# Patient Record
Sex: FEMALE | Race: AMERICAN INDIAN OR ALASKA NATIVE | NOT HISPANIC OR LATINO | Employment: FULL TIME | URBAN - METROPOLITAN AREA
[De-identification: names, ages, dates, MRNs, and addresses within clinical notes are randomized per-mention and may not be internally consistent; named-entity substitution may affect disease eponyms.]

---

## 2021-04-27 ENCOUNTER — HOSPITAL ENCOUNTER (OUTPATIENT)
Dept: RADIOLOGY | Facility: MEDICAL CENTER | Age: 41
End: 2021-04-27
Payer: OTHER GOVERNMENT

## 2021-04-27 ENCOUNTER — HOSPITAL ENCOUNTER (INPATIENT)
Facility: MEDICAL CENTER | Age: 41
LOS: 7 days | DRG: 433 | End: 2021-05-04
Attending: EMERGENCY MEDICINE | Admitting: INTERNAL MEDICINE
Payer: OTHER GOVERNMENT

## 2021-04-27 ENCOUNTER — APPOINTMENT (OUTPATIENT)
Dept: RADIOLOGY | Facility: MEDICAL CENTER | Age: 41
DRG: 433 | End: 2021-04-27
Attending: EMERGENCY MEDICINE
Payer: OTHER GOVERNMENT

## 2021-04-27 ENCOUNTER — APPOINTMENT (OUTPATIENT)
Dept: RADIOLOGY | Facility: MEDICAL CENTER | Age: 41
DRG: 433 | End: 2021-04-27
Attending: INTERNAL MEDICINE
Payer: OTHER GOVERNMENT

## 2021-04-27 DIAGNOSIS — K70.30 ALCOHOLIC CIRRHOSIS OF LIVER WITHOUT ASCITES (HCC): ICD-10-CM

## 2021-04-27 DIAGNOSIS — E87.20 LACTIC ACIDOSIS: ICD-10-CM

## 2021-04-27 DIAGNOSIS — I50.9 NEW ONSET OF CONGESTIVE HEART FAILURE (HCC): ICD-10-CM

## 2021-04-27 DIAGNOSIS — K72.00 ACUTE LIVER FAILURE WITHOUT HEPATIC COMA: ICD-10-CM

## 2021-04-27 DIAGNOSIS — R73.9 HYPERGLYCEMIA: ICD-10-CM

## 2021-04-27 PROBLEM — K21.9 GASTROESOPHAGEAL REFLUX DISEASE WITHOUT ESOPHAGITIS: Status: ACTIVE | Noted: 2021-04-27

## 2021-04-27 PROBLEM — E11.49 TYPE 2 DIABETES MELLITUS WITH NEUROLOGIC COMPLICATION, WITHOUT LONG-TERM CURRENT USE OF INSULIN (HCC): Status: ACTIVE | Noted: 2021-04-27

## 2021-04-27 PROBLEM — R79.89 ELEVATED BRAIN NATRIURETIC PEPTIDE (BNP) LEVEL: Status: ACTIVE | Noted: 2021-04-27

## 2021-04-27 PROBLEM — K70.10 ALCOHOLIC HEPATITIS WITHOUT ASCITES: Status: ACTIVE | Noted: 2021-04-27

## 2021-04-27 PROBLEM — R06.01 ORTHOPNEA: Status: ACTIVE | Noted: 2021-04-27

## 2021-04-27 PROBLEM — R79.89 ELEVATED LIVER FUNCTION TESTS: Status: ACTIVE | Noted: 2021-04-27

## 2021-04-27 PROBLEM — D53.9 MACROCYTIC ANEMIA: Status: ACTIVE | Noted: 2021-04-27

## 2021-04-27 PROBLEM — R94.31 QT PROLONGATION: Status: ACTIVE | Noted: 2021-04-27

## 2021-04-27 LAB
ALBUMIN SERPL BCP-MCNC: 2 G/DL (ref 3.2–4.9)
ALBUMIN/GLOB SERPL: 0.3 G/DL
ALP SERPL-CCNC: 263 U/L (ref 30–99)
ALT SERPL-CCNC: 31 U/L (ref 2–50)
AMMONIA PLAS-SCNC: 61 UMOL/L (ref 11–45)
AMPHET UR QL SCN: NEGATIVE
ANION GAP SERPL CALC-SCNC: 13 MMOL/L (ref 7–16)
ANION GAP SERPL CALC-SCNC: 7 MMOL/L (ref 7–16)
APPEARANCE UR: CLEAR
AST SERPL-CCNC: 167 U/L (ref 12–45)
B-OH-BUTYR SERPL-MCNC: 0.34 MMOL/L (ref 0.02–0.27)
BACTERIA #/AREA URNS HPF: NEGATIVE /HPF
BARBITURATES UR QL SCN: NEGATIVE
BASE EXCESS BLDV CALC-SCNC: 6 MMOL/L
BASOPHILS # BLD AUTO: 5.1 % (ref 0–1.8)
BASOPHILS # BLD: 0.42 K/UL (ref 0–0.12)
BENZODIAZ UR QL SCN: NEGATIVE
BILIRUB SERPL-MCNC: 5.7 MG/DL (ref 0.1–1.5)
BILIRUB UR QL STRIP.AUTO: ABNORMAL
BODY TEMPERATURE: ABNORMAL CENTIGRADE
BUN SERPL-MCNC: 2 MG/DL (ref 8–22)
BUN SERPL-MCNC: 2 MG/DL (ref 8–22)
BZE UR QL SCN: NEGATIVE
CALCIUM SERPL-MCNC: 7.1 MG/DL (ref 8.5–10.5)
CALCIUM SERPL-MCNC: 7.3 MG/DL (ref 8.5–10.5)
CANNABINOIDS UR QL SCN: NEGATIVE
CHLORIDE SERPL-SCNC: 95 MMOL/L (ref 96–112)
CHLORIDE SERPL-SCNC: 95 MMOL/L (ref 96–112)
CHOLEST SERPL-MCNC: 108 MG/DL (ref 100–199)
CO2 SERPL-SCNC: 26 MMOL/L (ref 20–33)
CO2 SERPL-SCNC: 29 MMOL/L (ref 20–33)
COLOR UR: ABNORMAL
CREAT SERPL-MCNC: 0.41 MG/DL (ref 0.5–1.4)
CREAT SERPL-MCNC: 0.49 MG/DL (ref 0.5–1.4)
EKG IMPRESSION: NORMAL
EOSINOPHIL # BLD AUTO: 0.14 K/UL (ref 0–0.51)
EOSINOPHIL NFR BLD: 1.7 % (ref 0–6.9)
EPI CELLS #/AREA URNS HPF: NEGATIVE /HPF
ERYTHROCYTE [DISTWIDTH] IN BLOOD BY AUTOMATED COUNT: 61.9 FL (ref 35.9–50)
EST. AVERAGE GLUCOSE BLD GHB EST-MCNC: 226 MG/DL
GLOBULIN SER CALC-MCNC: 6.1 G/DL (ref 1.9–3.5)
GLUCOSE BLD-MCNC: 248 MG/DL (ref 65–99)
GLUCOSE BLD-MCNC: 267 MG/DL (ref 65–99)
GLUCOSE BLD-MCNC: 276 MG/DL (ref 65–99)
GLUCOSE SERPL-MCNC: 265 MG/DL (ref 65–99)
GLUCOSE SERPL-MCNC: 301 MG/DL (ref 65–99)
GLUCOSE UR STRIP.AUTO-MCNC: 100 MG/DL
HAV IGM SERPL QL IA: NORMAL
HBA1C MFR BLD: 9.5 % (ref 4–5.6)
HBV CORE IGM SER QL: NORMAL
HBV SURFACE AG SER QL: NORMAL
HCG SERPL QL: NEGATIVE
HCO3 BLDV-SCNC: 28 MMOL/L (ref 24–28)
HCT VFR BLD AUTO: 34.7 % (ref 37–47)
HCV AB SER QL: NORMAL
HDLC SERPL-MCNC: 6 MG/DL
HGB BLD-MCNC: 11.5 G/DL (ref 12–16)
HYALINE CASTS #/AREA URNS LPF: ABNORMAL /LPF
INR PPP: 1.77 (ref 0.87–1.13)
KETONES UR STRIP.AUTO-MCNC: NEGATIVE MG/DL
LACTATE BLD-SCNC: 3.4 MMOL/L (ref 0.5–2)
LACTATE BLD-SCNC: 4.6 MMOL/L (ref 0.5–2)
LACTATE BLD-SCNC: 4.9 MMOL/L (ref 0.5–2)
LACTATE BLD-SCNC: 6.4 MMOL/L (ref 0.5–2)
LDLC SERPL CALC-MCNC: 64 MG/DL
LEUKOCYTE ESTERASE UR QL STRIP.AUTO: NEGATIVE
LIPASE SERPL-CCNC: 9 U/L (ref 11–82)
LYMPHOCYTES # BLD AUTO: 0.78 K/UL (ref 1–4.8)
LYMPHOCYTES NFR BLD: 9.4 % (ref 22–41)
MAGNESIUM SERPL-MCNC: 1.8 MG/DL (ref 1.5–2.5)
MAGNESIUM SERPL-MCNC: 1.8 MG/DL (ref 1.5–2.5)
MANUAL DIFF BLD: NORMAL
MCH RBC QN AUTO: 35 PG (ref 27–33)
MCHC RBC AUTO-ENTMCNC: 33.1 G/DL (ref 33.6–35)
MCV RBC AUTO: 105.5 FL (ref 81.4–97.8)
METHADONE UR QL SCN: NEGATIVE
MICRO URNS: ABNORMAL
MONOCYTES # BLD AUTO: 0.56 K/UL (ref 0–0.85)
MONOCYTES NFR BLD AUTO: 6.8 % (ref 0–13.4)
MORPHOLOGY BLD-IMP: NORMAL
MYELOCYTES NFR BLD MANUAL: 0.9 %
NEUTROPHILS # BLD AUTO: 6.32 K/UL (ref 2–7.15)
NEUTROPHILS NFR BLD: 74.4 % (ref 44–72)
NEUTS BAND NFR BLD MANUAL: 1.7 % (ref 0–10)
NITRITE UR QL STRIP.AUTO: NEGATIVE
NRBC # BLD AUTO: 0 K/UL
NRBC BLD-RTO: 0 /100 WBC
NT-PROBNP SERPL IA-MCNC: 1068 PG/ML (ref 0–125)
OPIATES UR QL SCN: NEGATIVE
OXYCODONE UR QL SCN: NEGATIVE
PCO2 BLDV: 34.8 MMHG (ref 41–51)
PCP UR QL SCN: NEGATIVE
PH BLDV: 7.53 [PH] (ref 7.31–7.45)
PH UR STRIP.AUTO: 7 [PH] (ref 5–8)
PHOSPHATE SERPL-MCNC: 1.1 MG/DL (ref 2.5–4.5)
PLATELET # BLD AUTO: 123 K/UL (ref 164–446)
PLATELET BLD QL SMEAR: NORMAL
PMV BLD AUTO: 11.9 FL (ref 9–12.9)
PO2 BLDV: 36.6 MMHG (ref 25–40)
POTASSIUM SERPL-SCNC: 3.2 MMOL/L (ref 3.6–5.5)
POTASSIUM SERPL-SCNC: 4 MMOL/L (ref 3.6–5.5)
PROPOXYPH UR QL SCN: NEGATIVE
PROT SERPL-MCNC: 8.1 G/DL (ref 6–8.2)
PROT UR QL STRIP: NEGATIVE MG/DL
PROTHROMBIN TIME: 21.1 SEC (ref 12–14.6)
RBC # BLD AUTO: 3.29 M/UL (ref 4.2–5.4)
RBC # URNS HPF: ABNORMAL /HPF
RBC UR QL AUTO: ABNORMAL
SAO2 % BLDV: 74 %
SODIUM SERPL-SCNC: 131 MMOL/L (ref 135–145)
SODIUM SERPL-SCNC: 134 MMOL/L (ref 135–145)
SP GR UR STRIP.AUTO: 1.01
T4 FREE SERPL-MCNC: 1.14 NG/DL (ref 0.93–1.7)
T4 FREE SERPL-MCNC: 1.29 NG/DL (ref 0.93–1.7)
TRIGL SERPL-MCNC: 189 MG/DL (ref 0–149)
TROPONIN T SERPL-MCNC: 6 NG/L (ref 6–19)
TROPONIN T SERPL-MCNC: <6 NG/L (ref 6–19)
TSH SERPL DL<=0.005 MIU/L-ACNC: 8 UIU/ML (ref 0.38–5.33)
UROBILINOGEN UR STRIP.AUTO-MCNC: 4 MG/DL
VIT B12 SERPL-MCNC: >2000 PG/ML (ref 211–911)
WBC # BLD AUTO: 8.3 K/UL (ref 4.8–10.8)
WBC #/AREA URNS HPF: ABNORMAL /HPF

## 2021-04-27 PROCEDURE — 99285 EMERGENCY DEPT VISIT HI MDM: CPT

## 2021-04-27 PROCEDURE — 700105 HCHG RX REV CODE 258: Performed by: STUDENT IN AN ORGANIZED HEALTH CARE EDUCATION/TRAINING PROGRAM

## 2021-04-27 PROCEDURE — 87040 BLOOD CULTURE FOR BACTERIA: CPT | Mod: 91

## 2021-04-27 PROCEDURE — 700105 HCHG RX REV CODE 258: Performed by: EMERGENCY MEDICINE

## 2021-04-27 PROCEDURE — 84484 ASSAY OF TROPONIN QUANT: CPT | Mod: 91

## 2021-04-27 PROCEDURE — 85610 PROTHROMBIN TIME: CPT

## 2021-04-27 PROCEDURE — 83735 ASSAY OF MAGNESIUM: CPT | Mod: 91

## 2021-04-27 PROCEDURE — 81001 URINALYSIS AUTO W/SCOPE: CPT

## 2021-04-27 PROCEDURE — 80074 ACUTE HEPATITIS PANEL: CPT

## 2021-04-27 PROCEDURE — 700111 HCHG RX REV CODE 636 W/ 250 OVERRIDE (IP): Performed by: EMERGENCY MEDICINE

## 2021-04-27 PROCEDURE — 770020 HCHG ROOM/CARE - TELE (206)

## 2021-04-27 PROCEDURE — 84439 ASSAY OF FREE THYROXINE: CPT | Mod: 91

## 2021-04-27 PROCEDURE — 82607 VITAMIN B-12: CPT

## 2021-04-27 PROCEDURE — 83690 ASSAY OF LIPASE: CPT

## 2021-04-27 PROCEDURE — 84100 ASSAY OF PHOSPHORUS: CPT

## 2021-04-27 PROCEDURE — 82803 BLOOD GASES ANY COMBINATION: CPT

## 2021-04-27 PROCEDURE — 84443 ASSAY THYROID STIM HORMONE: CPT

## 2021-04-27 PROCEDURE — 700111 HCHG RX REV CODE 636 W/ 250 OVERRIDE (IP): Performed by: STUDENT IN AN ORGANIZED HEALTH CARE EDUCATION/TRAINING PROGRAM

## 2021-04-27 PROCEDURE — A9270 NON-COVERED ITEM OR SERVICE: HCPCS | Performed by: STUDENT IN AN ORGANIZED HEALTH CARE EDUCATION/TRAINING PROGRAM

## 2021-04-27 PROCEDURE — 700117 HCHG RX CONTRAST REV CODE 255: Performed by: EMERGENCY MEDICINE

## 2021-04-27 PROCEDURE — 82010 KETONE BODYS QUAN: CPT

## 2021-04-27 PROCEDURE — 93970 EXTREMITY STUDY: CPT | Mod: 26 | Performed by: INTERNAL MEDICINE

## 2021-04-27 PROCEDURE — 93005 ELECTROCARDIOGRAM TRACING: CPT | Performed by: STUDENT IN AN ORGANIZED HEALTH CARE EDUCATION/TRAINING PROGRAM

## 2021-04-27 PROCEDURE — 80061 LIPID PANEL: CPT

## 2021-04-27 PROCEDURE — 93970 EXTREMITY STUDY: CPT

## 2021-04-27 PROCEDURE — 80307 DRUG TEST PRSMV CHEM ANLYZR: CPT

## 2021-04-27 PROCEDURE — 96372 THER/PROPH/DIAG INJ SC/IM: CPT

## 2021-04-27 PROCEDURE — 82140 ASSAY OF AMMONIA: CPT

## 2021-04-27 PROCEDURE — 96375 TX/PRO/DX INJ NEW DRUG ADDON: CPT

## 2021-04-27 PROCEDURE — 83880 ASSAY OF NATRIURETIC PEPTIDE: CPT

## 2021-04-27 PROCEDURE — 83036 HEMOGLOBIN GLYCOSYLATED A1C: CPT

## 2021-04-27 PROCEDURE — 84703 CHORIONIC GONADOTROPIN ASSAY: CPT

## 2021-04-27 PROCEDURE — 93010 ELECTROCARDIOGRAM REPORT: CPT | Performed by: INTERNAL MEDICINE

## 2021-04-27 PROCEDURE — 80048 BASIC METABOLIC PNL TOTAL CA: CPT

## 2021-04-27 PROCEDURE — 36415 COLL VENOUS BLD VENIPUNCTURE: CPT

## 2021-04-27 PROCEDURE — 99223 1ST HOSP IP/OBS HIGH 75: CPT | Mod: GC | Performed by: INTERNAL MEDICINE

## 2021-04-27 PROCEDURE — 85027 COMPLETE CBC AUTOMATED: CPT

## 2021-04-27 PROCEDURE — 82962 GLUCOSE BLOOD TEST: CPT

## 2021-04-27 PROCEDURE — 700102 HCHG RX REV CODE 250 W/ 637 OVERRIDE(OP): Performed by: STUDENT IN AN ORGANIZED HEALTH CARE EDUCATION/TRAINING PROGRAM

## 2021-04-27 PROCEDURE — 80053 COMPREHEN METABOLIC PANEL: CPT

## 2021-04-27 PROCEDURE — 96365 THER/PROPH/DIAG IV INF INIT: CPT

## 2021-04-27 PROCEDURE — 74177 CT ABD & PELVIS W/CONTRAST: CPT

## 2021-04-27 PROCEDURE — 83605 ASSAY OF LACTIC ACID: CPT | Mod: 91

## 2021-04-27 PROCEDURE — 85007 BL SMEAR W/DIFF WBC COUNT: CPT

## 2021-04-27 RX ORDER — GUAIFENESIN/DEXTROMETHORPHAN 100-10MG/5
10 SYRUP ORAL EVERY 6 HOURS PRN
Status: DISCONTINUED | OUTPATIENT
Start: 2021-04-27 | End: 2021-05-04 | Stop reason: HOSPADM

## 2021-04-27 RX ORDER — LANSOPRAZOLE 30 MG/1
30 CAPSULE, DELAYED RELEASE ORAL DAILY
COMMUNITY
End: 2021-06-11

## 2021-04-27 RX ORDER — PREGABALIN 50 MG/1
50 CAPSULE ORAL 2 TIMES DAILY
COMMUNITY
End: 2021-08-01

## 2021-04-27 RX ORDER — PREGABALIN 25 MG/1
50 CAPSULE ORAL 2 TIMES DAILY
Status: DISCONTINUED | OUTPATIENT
Start: 2021-04-27 | End: 2021-05-04 | Stop reason: HOSPADM

## 2021-04-27 RX ORDER — FUROSEMIDE 10 MG/ML
40 INJECTION INTRAMUSCULAR; INTRAVENOUS
Status: DISCONTINUED | OUTPATIENT
Start: 2021-04-27 | End: 2021-04-27

## 2021-04-27 RX ORDER — POLYETHYLENE GLYCOL 3350 17 G/17G
1 POWDER, FOR SOLUTION ORAL
Status: DISCONTINUED | OUTPATIENT
Start: 2021-04-27 | End: 2021-05-04 | Stop reason: HOSPADM

## 2021-04-27 RX ORDER — ACETAMINOPHEN 500 MG
500 TABLET ORAL EVERY 6 HOURS PRN
Status: DISCONTINUED | OUTPATIENT
Start: 2021-04-27 | End: 2021-05-04 | Stop reason: HOSPADM

## 2021-04-27 RX ORDER — VITAMIN B COMPLEX
2500 TABLET ORAL DAILY
Status: DISCONTINUED | OUTPATIENT
Start: 2021-04-27 | End: 2021-04-27

## 2021-04-27 RX ORDER — INSULIN GLARGINE 100 [IU]/ML
20 INJECTION, SOLUTION SUBCUTANEOUS DAILY
COMMUNITY
End: 2021-06-11

## 2021-04-27 RX ORDER — SODIUM CHLORIDE, SODIUM LACTATE, POTASSIUM CHLORIDE, AND CALCIUM CHLORIDE .6; .31; .03; .02 G/100ML; G/100ML; G/100ML; G/100ML
500 INJECTION, SOLUTION INTRAVENOUS ONCE
Status: COMPLETED | OUTPATIENT
Start: 2021-04-27 | End: 2021-04-27

## 2021-04-27 RX ORDER — MAGNESIUM SULFATE HEPTAHYDRATE 40 MG/ML
2 INJECTION, SOLUTION INTRAVENOUS ONCE
Status: COMPLETED | OUTPATIENT
Start: 2021-04-27 | End: 2021-04-28

## 2021-04-27 RX ORDER — VITAMIN B COMPLEX
2500 TABLET ORAL DAILY
COMMUNITY

## 2021-04-27 RX ORDER — DEXTROSE MONOHYDRATE 25 G/50ML
50 INJECTION, SOLUTION INTRAVENOUS
Status: DISCONTINUED | OUTPATIENT
Start: 2021-04-27 | End: 2021-05-04 | Stop reason: HOSPADM

## 2021-04-27 RX ORDER — AMOXICILLIN 250 MG
2 CAPSULE ORAL 2 TIMES DAILY
Status: DISCONTINUED | OUTPATIENT
Start: 2021-04-27 | End: 2021-05-04 | Stop reason: HOSPADM

## 2021-04-27 RX ORDER — POTASSIUM CHLORIDE 750 MG/1
10 TABLET, FILM COATED, EXTENDED RELEASE ORAL DAILY
Status: DISCONTINUED | OUTPATIENT
Start: 2021-04-27 | End: 2021-04-29

## 2021-04-27 RX ORDER — FUROSEMIDE 40 MG/1
40 TABLET ORAL EVERY MORNING
COMMUNITY
End: 2021-06-11

## 2021-04-27 RX ORDER — OMEPRAZOLE 20 MG/1
20 CAPSULE, DELAYED RELEASE ORAL DAILY
Status: DISCONTINUED | OUTPATIENT
Start: 2021-04-27 | End: 2021-05-04 | Stop reason: HOSPADM

## 2021-04-27 RX ORDER — SODIUM CHLORIDE 9 MG/ML
500 INJECTION, SOLUTION INTRAVENOUS ONCE
Status: COMPLETED | OUTPATIENT
Start: 2021-04-27 | End: 2021-04-27

## 2021-04-27 RX ORDER — POTASSIUM CHLORIDE 20 MEQ/1
40 TABLET, EXTENDED RELEASE ORAL ONCE
Status: COMPLETED | OUTPATIENT
Start: 2021-04-27 | End: 2021-04-27

## 2021-04-27 RX ORDER — CANAGLIFLOZIN 100 MG/1
100 TABLET, FILM COATED ORAL DAILY
Status: ON HOLD | COMMUNITY
End: 2021-06-14

## 2021-04-27 RX ORDER — ONDANSETRON 4 MG/1
8 TABLET, ORALLY DISINTEGRATING ORAL 3 TIMES DAILY PRN
COMMUNITY
End: 2021-06-11

## 2021-04-27 RX ORDER — CLOTRIMAZOLE 1 %
1 CREAM WITH APPLICATOR VAGINAL
COMMUNITY
End: 2021-04-27

## 2021-04-27 RX ORDER — ACETAMINOPHEN 325 MG/1
650 TABLET ORAL EVERY 6 HOURS PRN
Status: DISCONTINUED | OUTPATIENT
Start: 2021-04-27 | End: 2021-04-27

## 2021-04-27 RX ORDER — POTASSIUM CHLORIDE 750 MG/1
10 TABLET, FILM COATED, EXTENDED RELEASE ORAL DAILY
Status: ON HOLD | COMMUNITY
End: 2021-05-04

## 2021-04-27 RX ORDER — HEPARIN SODIUM 5000 [USP'U]/ML
5000 INJECTION, SOLUTION INTRAVENOUS; SUBCUTANEOUS EVERY 8 HOURS
Status: DISCONTINUED | OUTPATIENT
Start: 2021-04-27 | End: 2021-05-04 | Stop reason: HOSPADM

## 2021-04-27 RX ORDER — ONDANSETRON 2 MG/ML
4 INJECTION INTRAMUSCULAR; INTRAVENOUS EVERY 4 HOURS PRN
Status: DISCONTINUED | OUTPATIENT
Start: 2021-04-27 | End: 2021-04-27

## 2021-04-27 RX ORDER — INSULIN GLARGINE 100 [IU]/ML
20 INJECTION, SOLUTION SUBCUTANEOUS DAILY
Status: DISCONTINUED | OUTPATIENT
Start: 2021-04-27 | End: 2021-04-28

## 2021-04-27 RX ORDER — BISACODYL 10 MG
10 SUPPOSITORY, RECTAL RECTAL
Status: DISCONTINUED | OUTPATIENT
Start: 2021-04-27 | End: 2021-05-04 | Stop reason: HOSPADM

## 2021-04-27 RX ORDER — FOLIC ACID 1 MG/1
1 TABLET ORAL DAILY
Status: DISCONTINUED | OUTPATIENT
Start: 2021-04-27 | End: 2021-05-04 | Stop reason: HOSPADM

## 2021-04-27 RX ORDER — IBUPROFEN 400 MG/1
400 TABLET ORAL EVERY 6 HOURS PRN
Status: ON HOLD | COMMUNITY
End: 2021-06-14

## 2021-04-27 RX ORDER — GAUZE BANDAGE 2" X 2"
100 BANDAGE TOPICAL DAILY
Status: DISCONTINUED | OUTPATIENT
Start: 2021-04-27 | End: 2021-04-27

## 2021-04-27 RX ORDER — LIDOCAINE HYDROCHLORIDE 20 MG/ML
10 SOLUTION OROPHARYNGEAL PRN
COMMUNITY
End: 2021-04-27

## 2021-04-27 RX ORDER — PROCHLORPERAZINE EDISYLATE 5 MG/ML
10 INJECTION INTRAMUSCULAR; INTRAVENOUS EVERY 6 HOURS PRN
Status: DISCONTINUED | OUTPATIENT
Start: 2021-04-27 | End: 2021-04-29

## 2021-04-27 RX ORDER — TRAMADOL HYDROCHLORIDE 50 MG/1
50 TABLET ORAL 3 TIMES DAILY PRN
COMMUNITY
End: 2021-06-11

## 2021-04-27 RX ADMIN — FUROSEMIDE 40 MG: 10 INJECTION, SOLUTION INTRAMUSCULAR; INTRAVENOUS at 11:00

## 2021-04-27 RX ADMIN — PREDNISOLONE 39.9 MG: 15 SOLUTION ORAL at 15:42

## 2021-04-27 RX ADMIN — MAGNESIUM SULFATE 2 G: 2 INJECTION INTRAVENOUS at 22:50

## 2021-04-27 RX ADMIN — INSULIN GLARGINE 20 UNITS: 100 INJECTION, SOLUTION SUBCUTANEOUS at 17:45

## 2021-04-27 RX ADMIN — INSULIN HUMAN 5 UNITS: 100 INJECTION, SOLUTION PARENTERAL at 20:53

## 2021-04-27 RX ADMIN — PREGABALIN 50 MG: 25 CAPSULE ORAL at 13:51

## 2021-04-27 RX ADMIN — ONDANSETRON 4 MG: 2 INJECTION INTRAMUSCULAR; INTRAVENOUS at 10:56

## 2021-04-27 RX ADMIN — INSULIN HUMAN 3 UNITS: 100 INJECTION, SOLUTION PARENTERAL at 10:52

## 2021-04-27 RX ADMIN — OMEPRAZOLE 20 MG: 20 CAPSULE, DELAYED RELEASE ORAL at 11:00

## 2021-04-27 RX ADMIN — INSULIN HUMAN 6 UNITS: 100 INJECTION, SOLUTION PARENTERAL at 17:44

## 2021-04-27 RX ADMIN — SODIUM CHLORIDE 500 ML: 9 INJECTION, SOLUTION INTRAVENOUS at 13:52

## 2021-04-27 RX ADMIN — PREGABALIN 50 MG: 25 CAPSULE ORAL at 17:45

## 2021-04-27 RX ADMIN — POTASSIUM CHLORIDE 40 MEQ: 1500 TABLET, EXTENDED RELEASE ORAL at 21:03

## 2021-04-27 RX ADMIN — POTASSIUM CHLORIDE 40 MEQ: 1500 TABLET, EXTENDED RELEASE ORAL at 15:41

## 2021-04-27 RX ADMIN — POTASSIUM CHLORIDE 10 MEQ: 750 TABLET, FILM COATED, EXTENDED RELEASE ORAL at 13:51

## 2021-04-27 RX ADMIN — DOCUSATE SODIUM 50 MG AND SENNOSIDES 8.6 MG 2 TABLET: 8.6; 5 TABLET, FILM COATED ORAL at 17:45

## 2021-04-27 RX ADMIN — THIAMINE HYDROCHLORIDE 100 MG: 100 INJECTION, SOLUTION INTRAMUSCULAR; INTRAVENOUS at 20:38

## 2021-04-27 RX ADMIN — THERA TABS 1 TABLET: TAB at 15:41

## 2021-04-27 RX ADMIN — FOLIC ACID 1 MG: 1 TABLET ORAL at 13:51

## 2021-04-27 RX ADMIN — IOHEXOL 100 ML: 350 INJECTION, SOLUTION INTRAVENOUS at 07:50

## 2021-04-27 RX ADMIN — SODIUM CHLORIDE, POTASSIUM CHLORIDE, SODIUM LACTATE AND CALCIUM CHLORIDE 500 ML: 600; 310; 30; 20 INJECTION, SOLUTION INTRAVENOUS at 20:37

## 2021-04-27 RX ADMIN — HEPARIN SODIUM 5000 UNITS: 5000 INJECTION, SOLUTION INTRAVENOUS; SUBCUTANEOUS at 13:50

## 2021-04-27 RX ADMIN — CEFTRIAXONE SODIUM 2 G: 2 INJECTION, POWDER, FOR SOLUTION INTRAMUSCULAR; INTRAVENOUS at 07:00

## 2021-04-27 RX ADMIN — HEPARIN SODIUM 5000 UNITS: 5000 INJECTION, SOLUTION INTRAVENOUS; SUBCUTANEOUS at 21:02

## 2021-04-27 RX ADMIN — ACETAMINOPHEN 650 MG: 325 TABLET ORAL at 11:00

## 2021-04-27 RX ADMIN — Medication 100 MG: at 13:51

## 2021-04-27 ASSESSMENT — LIFESTYLE VARIABLES
DOES PATIENT WANT TO STOP DRINKING: YES
EVER HAD A DRINK FIRST THING IN THE MORNING TO STEADY YOUR NERVES TO GET RID OF A HANGOVER: NO
ALCOHOL_USE: YES
DO YOU DRINK ALCOHOL: NO
EVER FELT BAD OR GUILTY ABOUT YOUR DRINKING: YES
TOTAL SCORE: 3
AVERAGE NUMBER OF DAYS PER WEEK YOU HAVE A DRINK CONTAINING ALCOHOL: 7
DOES PATIENT WANT TO TALK TO SOMEONE ABOUT QUITTING: YES
TOTAL SCORE: 3
HOW MANY TIMES IN THE PAST YEAR HAVE YOU HAD 5 OR MORE DRINKS IN A DAY: 60
CONSUMPTION TOTAL: POSITIVE
HAVE YOU EVER FELT YOU SHOULD CUT DOWN ON YOUR DRINKING: YES
TOTAL SCORE: 3
HAVE PEOPLE ANNOYED YOU BY CRITICIZING YOUR DRINKING: YES
ON A TYPICAL DAY WHEN YOU DRINK ALCOHOL HOW MANY DRINKS DO YOU HAVE: 3

## 2021-04-27 ASSESSMENT — ENCOUNTER SYMPTOMS
HEADACHES: 0
FALLS: 0
BLURRED VISION: 0
TREMORS: 0
LOSS OF CONSCIOUSNESS: 0
NAUSEA: 1
PND: 0
COUGH: 1
CHILLS: 1
ORTHOPNEA: 1
MYALGIAS: 0
SHORTNESS OF BREATH: 1
PALPITATIONS: 0
EYE PAIN: 0
SPUTUM PRODUCTION: 0
VOMITING: 0
ABDOMINAL PAIN: 1
SPEECH CHANGE: 0
FEVER: 0
FLANK PAIN: 0
CONSTIPATION: 1
DIARRHEA: 0
SEIZURES: 0
BLOOD IN STOOL: 0
HEMOPTYSIS: 0
BACK PAIN: 1
HEARTBURN: 0
DIZZINESS: 1
WHEEZING: 0
EYES NEGATIVE: 1

## 2021-04-27 ASSESSMENT — COGNITIVE AND FUNCTIONAL STATUS - GENERAL
CLIMB 3 TO 5 STEPS WITH RAILING: A LITTLE
SUGGESTED CMS G CODE MODIFIER DAILY ACTIVITY: CJ
DRESSING REGULAR LOWER BODY CLOTHING: A LITTLE
SUGGESTED CMS G CODE MODIFIER MOBILITY: CI
MOBILITY SCORE: 23
DAILY ACTIVITIY SCORE: 21
PERSONAL GROOMING: A LITTLE
TOILETING: A LITTLE

## 2021-04-27 ASSESSMENT — FIBROSIS 4 INDEX
FIB4 SCORE: 9.27

## 2021-04-27 ASSESSMENT — PAIN DESCRIPTION - PAIN TYPE
TYPE: ACUTE PAIN
TYPE: ACUTE PAIN;CHRONIC PAIN

## 2021-04-27 ASSESSMENT — PATIENT HEALTH QUESTIONNAIRE - PHQ9
1. LITTLE INTEREST OR PLEASURE IN DOING THINGS: NOT AT ALL
2. FEELING DOWN, DEPRESSED, IRRITABLE, OR HOPELESS: NOT AT ALL
SUM OF ALL RESPONSES TO PHQ9 QUESTIONS 1 AND 2: 0

## 2021-04-27 NOTE — PROGRESS NOTES
2 RN Skin Check    2 RN skin check complete.   Devices in place: NA.  Skin assessed under devices: N\A.  Confirmed pressure ulcers found on: NA.  New potential pressure ulcers noted on NA. Wound consult placed No.  The following interventions in place Pillows.    Old scar tissue from previous surgery's, small healing scabs on torso area and belly.

## 2021-04-27 NOTE — ASSESSMENT & PLAN NOTE
The patient's labs and history are strongly suggestive of alcoholic hepatitis.  Her lower extremity edema is likely secondary to hypoalbuminemia and low oncotic pressure.    Maddrey's discriminatory function score 43 on presentation.   CT abdomen done outside of this hospital showed hepatosplenomegaly, hepatic steatosis and 1 cm renal lesion which is an incidental finding.   Lower extremity edema likely secondary to hypoalbuminemia and low oncotic pressure.  Renal function remains preserved, blood pressure remains soft.   Right upper quadrant ultrasound did not show portal vein thrombosis.  Iron panel is not suggestive of hemochromatosis, autoimmune labs pending.  Alcoholic hepatitis remains stable in terms of clinical and biochemical condition.  GI has signed off, agreed with treatment course and recommend close outpatient follow-up. Per pt insurance coverage will determine if she will continue to follow-up in [place] or closer to [place] (closest GI in [place])  GI signed off recommended close outpatient followup    Plan:  -Better prognosis by Lille score, 0.4 today, would continue prednisolone for total 28-day course ending on 5/24/2021  -Continue Lasix 40 mg p.o., as well as Aldactone 100 mg p.o.  I/O charting, sodium and water restriction.  -Continue monitoring of CMP for liver, renal function  -Boost plus supplement for albumin of 2 nutritional support,   -Leg elevation when in bed and ambulation 3 times daily  -Continue thiamine and folic along with multivitamin.  -Continued counseling on alcohol cessation, patient motivated to quit.  Declined AA at this time stating that she has a good support system.

## 2021-04-27 NOTE — ASSESSMENT & PLAN NOTE
RESOLVED  This is likely a combination of type B lactic acidosis and hypoperfusion.  This improved after fluids.

## 2021-04-27 NOTE — H&P
History & Physical Note    Date of Admission: 4/27/2021  Admission Status: Emergency  Attending: Espernaza Mai D.O.   Senior Resident: Joshua Hamlni M.D.  Contact Number: [phone]    Chief Complaint:   Chief Complaint   Patient presents with   • Hepatic Disease        History of Present Illness (HPI):   Hien is a 40 y.o. female with past medical history of diabetes mellitus type 2 and meets criteria for morbidly obese who presented 4/27/2021 with following.  About a month ago, she started noticing swelling in one of her leg and she thought that this has been secondary to her diabetes.  She then noticed lower extremity swelling in both of her legs about 2 weeks ago.  Along with this, she noticed shortness of breath with just 1 flight of stairs, orthopnea, dry cough, nausea about 2 weeks ago.  Her symptoms have been getting worse since then which prompted her to go to the [facility].  There she was found to have elevated liver function tests and tachycardia and she was transferred to this hospital for further care.  She denies having any PND or chest pain.    The patient has been taking Trulicity and SGLT2 inhibitor for her diabetes.  She has injecting Trulicity in her lower extremities.  She stopped taking this medication in December because she was having nausea and her appetite reduced significantly after started taking Trulicity.  She continued to gain weight even though she was not eating well.  Her sleep is disturbed and unable to sleep for the past 1 month.    She was tachycardic in the ED.  Afebrile.  Blood pressure ranging from 9210 systolic.  She has had dizziness in the past but not present on admission.  She is otherwise stable.      Review of Systems:   Review of Systems   Constitutional: Positive for chills and malaise/fatigue. Negative for fever.        Weight gain   HENT: Negative.  Negative for ear pain and hearing loss.    Eyes: Negative.  Negative for blurred vision  and pain.   Respiratory: Positive for cough and shortness of breath. Negative for hemoptysis, sputum production and wheezing.    Cardiovascular: Positive for orthopnea and leg swelling. Negative for chest pain, palpitations and PND.   Gastrointestinal: Positive for abdominal pain, constipation and nausea. Negative for blood in stool, diarrhea, heartburn and vomiting.   Genitourinary: Positive for dysuria. Negative for flank pain.   Musculoskeletal: Positive for back pain. Negative for falls, joint pain and myalgias.   Skin: Negative.  Negative for itching and rash.   Neurological: Positive for dizziness. Negative for tremors, speech change, seizures, loss of consciousness and headaches.       Past Medical History:   Past Medical History was reviewed with patient.    has a past medical history of DM (diabetes mellitus) (HCC).    Past Surgical History: Past Surgical History was reviewed with patient. 2 C section and cholecystectomy.    has no past surgical history on file.    Medications: Medications have been reviewed with patient.  Prior to Admission Medications   Prescriptions Last Dose Informant Patient Reported? Taking?   Canagliflozin (INVOKANA) 100 MG Tab 4/26/2021 at 0800 Patient Yes Yes   Sig: Take 100 mg by mouth every day.   cyanocobalamin (VITAMIN B12) 2500 MCG SL Tab 4/26/2021 at 0800 Patient Yes Yes   Sig: Take 2,500 mcg by mouth every day. Under the tongue   furosemide (LASIX) 40 MG Tab 4/26/2021 at 0800 Patient Yes Yes   Sig: Take 40 mg by mouth every morning.   ibuprofen (MOTRIN) 400 MG Tab 4/26/2021 at PRN Patient Yes Yes   Sig: Take 400 mg by mouth every 6 hours as needed.   insulin glargine (LANTUS SOLOSTAR) 100 UNIT/ML Solution Pen-injector injection 4/26/2021 at 0800 Patient Yes Yes   Sig: Inject 20 Units under the skin every day.   lansoprazole (PREVACID) 30 MG CAPSULE DELAYED RELEASE 4/26/2021 at 0800 Patient Yes Yes   Sig: Take 30 mg by mouth every day.   ondansetron (ZOFRAN ODT) 4 MG TABLET  DISPERSIBLE 4/26/2021 at PRN Patient Yes Yes   Sig: Take 8 mg by mouth 3 times a day as needed for Nausea. 2 tabs = 8 mg   potassium chloride ER (KLOR-CON) 10 MEQ tablet 4/26/2021 at 0800 Patient Yes Yes   Sig: Take 10 mEq by mouth every day. With food   pregabalin (LYRICA) 50 MG capsule 4/26/2021 at 0800 Patient Yes Yes   Sig: Take 50 mg by mouth 2 times a day.   traMADol (ULTRAM) 50 MG Tab 4/26/2021 at PRN Patient Yes Yes   Sig: Take 50 mg by mouth 3 times a day as needed (Pain).      Facility-Administered Medications: None        Allergies: Allergies have been reviewed with patient.  No Known Allergies    Family History: Father has diabetes. Mother has arthritis.   family history is not on file.     Social History:   Tobacco: Quit 10 years ago. 1 pack a week before this.   Alcohol: 2 beer / violet a day   Recreational drugs (illegal and prescription):  Hx of meth use 5 years ago  Employment: Healthcare worker, home health in [place]  Activity Level: Walker by her self. [employer].    Living situation:  [place], [place] with  and 2 kids  Recent travel:  None  Primary Care Provider: reviewed Sai Wang M.D.  Other (stressors, spirituality, exposures):  [employer]  Physical Exam:   Vitals:  Temp:  [36.3 °C (97.4 °F)-36.8 °C (98.2 °F)] 36.8 °C (98.2 °F)  Pulse:  [104-113] 108  Resp:  [14-21] 18  BP: ()/(56-72) 107/64  SpO2:  [86 %-97 %] 92 %    Physical Exam  Vitals and nursing note reviewed.   Constitutional:       General: She is not in acute distress.     Appearance: Normal appearance. She is obese. She is not ill-appearing.   HENT:      Head: Normocephalic and atraumatic.      Nose: Nose normal. No congestion.      Mouth/Throat:      Mouth: Mucous membranes are moist.      Pharynx: Oropharynx is clear.   Eyes:      Extraocular Movements: Extraocular movements intact.      Conjunctiva/sclera: Conjunctivae normal.      Pupils: Pupils are equal, round, and reactive to light.   Cardiovascular:      Rate  and Rhythm: Regular rhythm. Tachycardia present.      Pulses: Normal pulses.      Heart sounds: Normal heart sounds. No murmur.   Pulmonary:      Effort: Pulmonary effort is normal. No respiratory distress.      Breath sounds: No wheezing.      Comments: Reduced breath sounds bilaterally  Abdominal:      General: Abdomen is flat. Bowel sounds are normal. There is distension.      Palpations: Abdomen is soft.      Tenderness: There is no abdominal tenderness.   Musculoskeletal:         General: No swelling or tenderness. Normal range of motion.      Cervical back: Normal range of motion and neck supple.      Right lower leg: Edema present.      Left lower leg: Edema present.   Skin:     General: Skin is warm and dry.      Coloration: Skin is not jaundiced or pale.   Neurological:      General: No focal deficit present.      Mental Status: She is alert and oriented to person, place, and time. Mental status is at baseline.         Labs:   Recent Results (from the past 24 hour(s))   CBC WITH DIFFERENTIAL    Collection Time: 04/27/21  5:27 AM   Result Value Ref Range    WBC 8.3 4.8 - 10.8 K/uL    RBC 3.29 (L) 4.20 - 5.40 M/uL    Hemoglobin 11.5 (L) 12.0 - 16.0 g/dL    Hematocrit 34.7 (L) 37.0 - 47.0 %    .5 (H) 81.4 - 97.8 fL    MCH 35.0 (H) 27.0 - 33.0 pg    MCHC 33.1 (L) 33.6 - 35.0 g/dL    RDW 61.9 (H) 35.9 - 50.0 fL    Platelet Count 123 (L) 164 - 446 K/uL    MPV 11.9 9.0 - 12.9 fL    Neutrophils-Polys 74.40 (H) 44.00 - 72.00 %    Lymphocytes 9.40 (L) 22.00 - 41.00 %    Monocytes 6.80 0.00 - 13.40 %    Eosinophils 1.70 0.00 - 6.90 %    Basophils 5.10 (H) 0.00 - 1.80 %    Nucleated RBC 0.00 /100 WBC    Neutrophils (Absolute) 6.32 2.00 - 7.15 K/uL    Lymphs (Absolute) 0.78 (L) 1.00 - 4.80 K/uL    Monos (Absolute) 0.56 0.00 - 0.85 K/uL    Eos (Absolute) 0.14 0.00 - 0.51 K/uL    Baso (Absolute) 0.42 (H) 0.00 - 0.12 K/uL    NRBC (Absolute) 0.00 K/uL   COMP METABOLIC PANEL    Collection Time: 04/27/21  5:27 AM    Result Value Ref Range    Sodium 134 (L) 135 - 145 mmol/L    Potassium 3.2 (L) 3.6 - 5.5 mmol/L    Chloride 95 (L) 96 - 112 mmol/L    Co2 26 20 - 33 mmol/L    Anion Gap 13.0 7.0 - 16.0    Glucose 265 (H) 65 - 99 mg/dL    Bun 2 (L) 8 - 22 mg/dL    Creatinine 0.49 (L) 0.50 - 1.40 mg/dL    Calcium 7.3 (L) 8.5 - 10.5 mg/dL    AST(SGOT) 167 (H) 12 - 45 U/L    ALT(SGPT) 31 2 - 50 U/L    Alkaline Phosphatase 263 (H) 30 - 99 U/L    Total Bilirubin 5.7 (H) 0.1 - 1.5 mg/dL    Albumin 2.0 (L) 3.2 - 4.9 g/dL    Total Protein 8.1 6.0 - 8.2 g/dL    Globulin 6.1 (H) 1.9 - 3.5 g/dL    A-G Ratio 0.3 g/dL   LIPASE    Collection Time: 04/27/21  5:27 AM   Result Value Ref Range    Lipase 9 (L) 11 - 82 U/L   proBrain Natriuretic Peptide, NT    Collection Time: 04/27/21  5:27 AM   Result Value Ref Range    NT-proBNP 1068 (H) 0 - 125 pg/mL   TROPONIN    Collection Time: 04/27/21  5:27 AM   Result Value Ref Range    Troponin T 6 6 - 19 ng/L   LACTIC ACID    Collection Time: 04/27/21  5:27 AM   Result Value Ref Range    Lactic Acid 6.4 (HH) 0.5 - 2.0 mmol/L   HCG QUAL SERUM    Collection Time: 04/27/21  5:27 AM   Result Value Ref Range    Beta-Hcg Qualitative Serum Negative Negative   AMMONIA    Collection Time: 04/27/21  5:27 AM   Result Value Ref Range    Ammonia 61 (H) 11 - 45 umol/L   VENOUS BLOOD GAS    Collection Time: 04/27/21  5:27 AM   Result Value Ref Range    Venous Bg Ph 7.53 (H) 7.31 - 7.45    Venous Bg Pco2 34.8 (L) 41.0 - 51.0 mmHg    Venous Bg Po2 36.6 25.0 - 40.0 mmHg    Venous Bg O2 Saturation 74.0 %    Venous Bg Hco3 28 24 - 28 mmol/L    Venous Bg Base Excess 6 mmol/L    Body Temp see below Centigrade   BETA-HYDROXYBUTYRIC ACID    Collection Time: 04/27/21  5:27 AM   Result Value Ref Range    beta-Hydroxybutyric Acid 0.34 (H) 0.02 - 0.27 mmol/L   DIFFERENTIAL MANUAL    Collection Time: 04/27/21  5:27 AM   Result Value Ref Range    Bands-Stabs 1.70 0.00 - 10.00 %    Myelocytes 0.90 %    Manual Diff Status PERFORMED     PERIPHERAL SMEAR REVIEW    Collection Time: 04/27/21  5:27 AM   Result Value Ref Range    Peripheral Smear Review see below    PLATELET ESTIMATE    Collection Time: 04/27/21  5:27 AM   Result Value Ref Range    Plt Estimation Decreased    ESTIMATED GFR    Collection Time: 04/27/21  5:27 AM   Result Value Ref Range    GFR If African American >60 >60 mL/min/1.73 m 2    GFR If Non African American >60 >60 mL/min/1.73 m 2   TSH WITH REFLEX TO FT4    Collection Time: 04/27/21  5:27 AM   Result Value Ref Range    TSH 8.000 (H) 0.380 - 5.330 uIU/mL   Magnesium    Collection Time: 04/27/21  5:27 AM   Result Value Ref Range    Magnesium 1.8 1.5 - 2.5 mg/dL   HEPATITIS PANEL ACUTE(4 COMPONENTS)    Collection Time: 04/27/21  5:27 AM   Result Value Ref Range    Hepatitis B Surface Antigen Non-Reactive Non-Reactive    Hepatitis B Cors Ab,IgM Non-Reactive Non-Reactive    Hepatitis A Virus Ab, IgM Non-Reactive Non-Reactive    Hepatitis C Antibody Non-Reactive Non-Reactive   HEMOGLOBIN A1C    Collection Time: 04/27/21  5:27 AM   Result Value Ref Range    Glycohemoglobin 9.5 (H) 4.0 - 5.6 %    Est Avg Glucose 226 mg/dL   Lipid Profile    Collection Time: 04/27/21  5:27 AM   Result Value Ref Range    Cholesterol,Tot 108 100 - 199 mg/dL    Triglycerides 189 (H) 0 - 149 mg/dL    HDL 6 (A) >=40 mg/dL    LDL 64 <100 mg/dL   POCT glucose device results    Collection Time: 04/27/21 10:47 AM   Result Value Ref Range    Glucose - Accu-Ck 248 (H) 65 - 99 mg/dL   TROPONIN    Collection Time: 04/27/21 11:04 AM   Result Value Ref Range    Troponin T <6 6 - 19 ng/L   URINALYSIS CULTURE, IF INDICATED    Collection Time: 04/27/21 11:48 AM    Specimen: Urine   Result Value Ref Range    Color Gaby     Character Clear     Specific Gravity 1.010 <1.035    Ph 7.0 5.0 - 8.0    Glucose 100 (A) Negative mg/dL    Ketones Negative Negative mg/dL    Protein Negative Negative mg/dL    Bilirubin Moderate (A) Negative    Urobilinogen, Urine 4.0 Negative     Nitrite Negative Negative    Leukocyte Esterase Negative Negative    Occult Blood Large (A) Negative    Micro Urine Req Microscopic    URINE DRUG SCREEN    Collection Time: 21 11:48 AM   Result Value Ref Range    Amphetamines Urine Negative Negative    Barbiturates Negative Negative    Benzodiazepines Negative Negative    Cocaine Metabolite Negative Negative    Methadone Negative Negative    Opiates Negative Negative    Oxycodone Negative Negative    Phencyclidine -Pcp Negative Negative    Propoxyphene Negative Negative    Cannabinoid Metab Negative Negative   URINE MICROSCOPIC (W/UA)    Collection Time: 21 11:48 AM   Result Value Ref Range    WBC 2-5 /hpf    -150 (A) /hpf    Bacteria Negative None /hpf    Epithelial Cells Negative /hpf    Hyaline Cast 0-2 /lpf   Prothrombin Time    Collection Time: 21 12:38 PM   Result Value Ref Range    PT 21.1 (H) 12.0 - 14.6 sec    INR 1.77 (H) 0.87 - 1.13   LACTIC ACID    Collection Time: 21 12:38 PM   Result Value Ref Range    Lactic Acid 4.6 (HH) 0.5 - 2.0 mmol/L   EKG    Collection Time: 21  1:57 PM   Result Value Ref Range    Report       [facility]    Test Date:  2021  Pt Name:    YUSUF CULVER                Department: ER  MRN:        [id number]                      Room:       T717  Gender:     Female                       Technician: HEATHER  :        [id number]                   Requested By:LIANA PAYAN  Order #:    [id number]                    Reading MD: Lit Liao MD    Measurements  Intervals                                Axis  Rate:       107                          P:          30  LA:         82                           QRS:        -19  QRSD:       78                           T:          -14  QT:         420  QTc:        561    Interpretive Statements  SINUS TACHYCARDIA  LOW VOLTAGE, PRECORDIAL LEADS  LVH BY VOLTAGE  NONSPECIFIC T ABNRM, ANTEROLATERAL LEADS  PROLONGED QT INTERVAL  No previous ECG  available for comparison  Electronically Signed On 4- 14:49:42 PDT by Lit Liao MD         Imaging:   [id number] WITH   Final Result      Hepatomegaly and hepatic steatosis. The liver is heterogeneous and small hepatic lesions are not excluded.      Indeterminate 1 cm right renal lesion. Further evaluation can be performed with renal protocol MRI.      Scattered colonic diverticulosis.      Status post cholecystectomy.      Splenomegaly.      OUTSIDE IMAGES-DX CHEST   Final Result      US-EXTREMITY VENOUS LOWER BILAT    (Results Pending)   EC-ECHOCARDIOGRAM COMPLETE W/O CONT    (Results Pending)       Previous Data Review: reviewed    Problem Representation:     * Alcoholic hepatitis without ascites- (present on admission)  Assessment & Plan  She has been drinking 2 glasses of alcohol every day.  Presented with AST more than ALT, elevated alkaline phosphatase and total bilirubin, reduced platelet count and albumin.  Maddrey's discriminatory function score 43 on presentation.  -Started her on prednisolone 40 mg daily for 28 days.  -Ammonia level 61 on arrival however patient is not confused.  -CT abdomen done outside of this hospital showed hepatosplenomegaly, hepatic steatosis and 1 cm renal lesion which is an incidental finding.  -Follow-up with CHEM panel tomorrow morning  -Boost plus supplement for albumin of 2  -Continue thiamine and folic along with multivitamin.  -Aggressively counseled her to stop drinking alcohol.    Lactic acidosis  Assessment & Plan  Does not have signs of infection.  This may be secondary to hypoperfusion.  Lactate levels trended down with IV fluids.  Repeat lactate in 4 hours and make sure lactate is normalized.    Elevated brain natriuretic peptide (BNP) level- (present on admission)  Assessment & Plan  She has orthopnea, dyspnea on exertion and lower extremity swelling consistent with volume overload.  This could be secondary to alcoholic liver disease.  She does  not have a history of heart failure.  She has a history of methamphetamine use which she stopped about 5 years ago.  She also drinks about 2 alcoholic drinks every day.  - BNP on admission: 1068  -  We do not have an echocardiogram for this patient which was ordered on 4/27.    Macrocytic anemia- (present on admission)  Assessment & Plan  This may be secondary to alcohol use affecting her liver.  Vitamin B12 level pending.  Continue folate.    QT prolongation  Assessment & Plan  Probably secondary to hypokalemia.  Avoid QTC prolonging medication.    Gastroesophageal reflux disease without esophagitis- (present on admission)  Assessment & Plan  Continue PPI for GERD which she takes at home    Type 2 diabetes mellitus with neurologic complication, without long-term current use of insulin (HCC)- (present on admission)  Assessment & Plan  She has been taking Trulicity and SGLT2 inhibitor at home which was stopped on admission due to unavailability.  -We will continue Lantus 20 units along with sliding scale insulin during this hospitalization.  -Follow-up with repeat A1c.  -Continue Lyrica for peripheral neuropathy

## 2021-04-27 NOTE — ASSESSMENT & PLAN NOTE
This may be secondary to alcohol causing bone marrow suppression.  Vitamin B12 and folate levels not decreased.  She is also currently on her menstrual cycle.  TSH is slightly elevated at 8 but free T4 level normal.    Plan:  -Continue to monitor at this time with periodic CBCs.

## 2021-04-27 NOTE — ED NOTES
Pt medicated per MAR to eat, provided food. Reported nausea and pain. Pt medicated for both.   Labs drawn and sent.     Tele notified pt ready.

## 2021-04-27 NOTE — CARE PLAN
Problem: Communication  Goal: The ability to communicate needs accurately and effectively will improve  Outcome: PROGRESSING AS EXPECTED     Problem: Safety  Goal: Will remain free from injury  Outcome: PROGRESSING AS EXPECTED     Problem: Pain Management  Goal: Pain level will decrease to patient's comfort goal  Outcome: PROGRESSING AS EXPECTED    Assumed care of patient at 1230. Received bedside report from ED RN. Bed is locked and lowest position, call light within reach. Treaded socks in place. Patient updated on plan of care, no complaints or pain at this time. White board updated. Pt AxOx4 Patient breathing pattern is unlabored. Pt is tele. Lactic Acid Critical 4.6 trending down from 6.4, Dr. Fajardo notified. All needs met at this time. Will continue care and monitoring as ordered.

## 2021-04-27 NOTE — ASSESSMENT & PLAN NOTE
She has been taking Trulicity and SGLT2 inhibitor at home which was stopped on admission due to unavailability.  Per records she was also on Tradjenta DPP 4 inhibitor  -Repeat A1c 9.5  Plan:  -Continue Lantus 20 units daily, 4 units Humalog premeal due to elevated blood glucose during the day along with sliding scale insulin during this hospitalization, will continue to adjust as appropriate  -For home regimen would continue Lantus and SGLT2 inhibitor, discontinue DPP 4 inhibitor  -Continue Lyrica for peripheral neuropathy

## 2021-04-27 NOTE — ED TRIAGE NOTES
Pt was transferred from [facility] due to hepatic insufficiency, DM and Lactic Acidosis. Pt will need to see a Endocrinologist. Please see chart for meds in which pt received prior to arrival. Pt has a 20G in the L AC. BS= 234

## 2021-04-27 NOTE — ED NOTES
Med Rec completed: per patient at bedside and [facility] Medication List  Preferred Pharmacy: [facility]  Allergies:  No Known Allergies    No ORAL antibiotics in last 14 days    Home Medications:  Medication Sig Comments   • furosemide (LASIX) 40 MG Tab Take 40 mg by mouth every morning.    • traMADol (ULTRAM) 50 MG Tab Take 50 mg by mouth 3 times a day as needed (Pain).    • cyanocobalamin (VITAMIN B12) 2500 MCG SL Tab Take 2,500 mcg by mouth every day. Under the tongue    • Canagliflozin (INVOKANA) 100 MG Tab Take 100 mg by mouth every day.    • lansoprazole (PREVACID) 30 MG CAPSULE DELAYED RELEASE Take 30 mg by mouth every day.    • ibuprofen (MOTRIN) 400 MG Tab Take 400 mg by mouth every 6 hours as needed.    • ondansetron (ZOFRAN ODT) 4 MG TABLET DISPERSIBLE Take 8 mg by mouth 3 times a day as needed for Nausea.   2 tabs = 8 mg    • insulin glargine (LANTUS SOLOSTAR) 100 UNIT/ML Solution Pen-injector injection Inject 20 Units under the skin every day. AM   • potassium chloride ER (KLOR-CON) 10 MEQ tablet Take 10 mEq by mouth every day. With food    • pregabalin (LYRICA) 50 MG capsule Take 50 mg by mouth 2 times a day.    • [DISCONTINUED] clotrimazole (LOTRIMIN) 1 % Cream Insert 1 Application into the vagina at bedtime. For 7 days for yeast infection On [place] list but patient reports completed script so removed   • [DISCONTINUED] linagliptin (TRADJENTA) 5 MG Tab tablet Take 5 mg by mouth every day. On [employer] list but patient reports she stopped taking in Dec.      **Patient denies any other prescription or OTC medications.

## 2021-04-27 NOTE — ED PROVIDER NOTES
"ED Provider Note    CHIEF COMPLAINT  Chief Complaint   Patient presents with   • Hepatic Disease       HPI  Hien Alfaro is a 40 y.o. female who presents to the emergency department by EMS transferred from outside facility for further evaluation of new liver failure, lactic acidosis.    Patient states she was admitted in February to an outside hospital for \"dehydration.\"  She states since that time she has struggled with nausea and vomiting and blood glucose control.  She describes a 20+ pound unintentional weight loss.  More recently with more frequent nausea and vomiting, poorly controlled blood glucose as well as malaise and fatigue.  Increased lower extremity edema.  Exertional dyspnea.  Denies fever or chills.    REVIEW OF SYSTEMS  See HPI for further details. All other systems are negative.     PAST MEDICAL HISTORY   has a past medical history of DM (diabetes mellitus) (HCC).    SOCIAL HISTORY  Social History     Tobacco Use   • Smoking status: Not on file   Substance and Sexual Activity   • Alcohol use: Not on file   • Drug use: Not on file   • Sexual activity: Not on file       SURGICAL HISTORY  patient denies any surgical history    CURRENT MEDICATIONS  Home Medications     Reviewed by Zakiya Freeman Goes Out, PhT (Pharmacy Tech) on 04/27/21 at 0522  Med List Status: Partial   Medication Last Dose Status   Canagliflozin (INVOKANA) 100 MG Tab UNK Active   clotrimazole (LOTRIMIN) 1 % Cream UNK Active   cyanocobalamin (VITAMIN B12) 2500 MCG SL Tab UNK Active   furosemide (LASIX) 40 MG Tab UNK Active   ibuprofen (MOTRIN) 400 MG Tab UNK Active   insulin glargine (LANTUS SOLOSTAR) 100 UNIT/ML Solution Pen-injector injection UNK Active   lansoprazole (PREVACID) 30 MG CAPSULE DELAYED RELEASE UNK Active   lidocaine (XYLOCAINE) 2 % Solution UNK Active   linagliptin (TRADJENTA) 5 MG Tab tablet UNK Active   ondansetron (ZOFRAN ODT) 4 MG TABLET DISPERSIBLE UNK Active   potassium chloride ER (KLOR-CON) 10 MEQ tablet " "UNK Active   pregabalin (LYRICA) 50 MG capsule UNK Active   traMADol (ULTRAM) 50 MG Tab UNK Active                ALLERGIES  No Known Allergies    PHYSICAL EXAM  VITAL SIGNS: /72   Pulse (!) 105   Temp 36.3 °C (97.4 °F) (Temporal)   Resp 17   Ht 1.6 m (5' 3\")   Wt 109 kg (240 lb)   SpO2 96%   BMI 42.51 kg/m²   Pulse ox interpretation: I interpret this pulse ox as normal.  Constitutional: Alert in no apparent distress.  HENT: Normocephalic, atraumatic. Bilateral external ears normal, Nose normal. Moist mucous membranes.    Eyes: Pupils are equal and reactive.  Scleral icterus.    Neck: Normal range of motion, Supple  Lymphatic: No lymphadenopathy noted.   Cardiovascular: Regular rate and rhythm, no murmurs. Distal pulses intact.  Pitting bilateral peripheral edema.  Thorax & Lungs: Normal breath sounds.  No wheezing/rales/ronchi. No increased work of breathing  Abdomen: Obese, soft, non-distended, non-tender to palpation. No palpable or pulsatile masses. No peritoneal signs.  Skin: Warm, Dry, No erythema, No rash.   Musculoskeletal: Good range of motion in all major joints..   Neurologic: Alert and orient x4.  Speech clear and cohesive.  Moves all extremity spontaneously.  Psychiatric: Affect normal, Judgment normal, Mood normal.       DIAGNOSTIC STUDIES / PROCEDURES    LABS  Results for orders placed or performed during the hospital encounter of 04/27/21   CBC WITH DIFFERENTIAL   Result Value Ref Range    WBC 8.3 4.8 - 10.8 K/uL    RBC 3.29 (L) 4.20 - 5.40 M/uL    Hemoglobin 11.5 (L) 12.0 - 16.0 g/dL    Hematocrit 34.7 (L) 37.0 - 47.0 %    .5 (H) 81.4 - 97.8 fL    MCH 35.0 (H) 27.0 - 33.0 pg    MCHC 33.1 (L) 33.6 - 35.0 g/dL    RDW 61.9 (H) 35.9 - 50.0 fL    Platelet Count 123 (L) 164 - 446 K/uL    MPV 11.9 9.0 - 12.9 fL    Neutrophils-Polys 74.40 (H) 44.00 - 72.00 %    Lymphocytes 9.40 (L) 22.00 - 41.00 %    Monocytes 6.80 0.00 - 13.40 %    Eosinophils 1.70 0.00 - 6.90 %    Basophils 5.10 (H) " 0.00 - 1.80 %    Nucleated RBC 0.00 /100 WBC    Neutrophils (Absolute) 6.32 2.00 - 7.15 K/uL    Lymphs (Absolute) 0.78 (L) 1.00 - 4.80 K/uL    Monos (Absolute) 0.56 0.00 - 0.85 K/uL    Eos (Absolute) 0.14 0.00 - 0.51 K/uL    Baso (Absolute) 0.42 (H) 0.00 - 0.12 K/uL    NRBC (Absolute) 0.00 K/uL   COMP METABOLIC PANEL   Result Value Ref Range    Sodium 134 (L) 135 - 145 mmol/L    Potassium 3.2 (L) 3.6 - 5.5 mmol/L    Chloride 95 (L) 96 - 112 mmol/L    Co2 26 20 - 33 mmol/L    Anion Gap 13.0 7.0 - 16.0    Glucose 265 (H) 65 - 99 mg/dL    Bun 2 (L) 8 - 22 mg/dL    Creatinine 0.49 (L) 0.50 - 1.40 mg/dL    Calcium 7.3 (L) 8.5 - 10.5 mg/dL    AST(SGOT) 167 (H) 12 - 45 U/L    ALT(SGPT) 31 2 - 50 U/L    Alkaline Phosphatase 263 (H) 30 - 99 U/L    Total Bilirubin 5.7 (H) 0.1 - 1.5 mg/dL    Albumin 2.0 (L) 3.2 - 4.9 g/dL    Total Protein 8.1 6.0 - 8.2 g/dL    Globulin 6.1 (H) 1.9 - 3.5 g/dL    A-G Ratio 0.3 g/dL   LIPASE   Result Value Ref Range    Lipase 9 (L) 11 - 82 U/L   proBrain Natriuretic Peptide, NT   Result Value Ref Range    NT-proBNP 1068 (H) 0 - 125 pg/mL   TROPONIN   Result Value Ref Range    Troponin T 6 6 - 19 ng/L   LACTIC ACID   Result Value Ref Range    Lactic Acid 6.4 (HH) 0.5 - 2.0 mmol/L   HCG QUAL SERUM   Result Value Ref Range    Beta-Hcg Qualitative Serum Negative Negative   AMMONIA   Result Value Ref Range    Ammonia 61 (H) 11 - 45 umol/L   VENOUS BLOOD GAS   Result Value Ref Range    Venous Bg Ph 7.53 (H) 7.31 - 7.45    Venous Bg Pco2 34.8 (L) 41.0 - 51.0 mmHg    Venous Bg Po2 36.6 25.0 - 40.0 mmHg    Venous Bg O2 Saturation 74.0 %    Venous Bg Hco3 28 24 - 28 mmol/L    Venous Bg Base Excess 6 mmol/L    Body Temp see below Centigrade   BETA-HYDROXYBUTYRIC ACID   Result Value Ref Range    beta-Hydroxybutyric Acid 0.34 (H) 0.02 - 0.27 mmol/L   DIFFERENTIAL MANUAL   Result Value Ref Range    Bands-Stabs 1.70 0.00 - 10.00 %    Myelocytes 0.90 %    Manual Diff Status PERFORMED    PERIPHERAL SMEAR REVIEW    Result Value Ref Range    Peripheral Smear Review see below    PLATELET ESTIMATE   Result Value Ref Range    Plt Estimation Decreased    ESTIMATED GFR   Result Value Ref Range    GFR If African American >60 >60 mL/min/1.73 m 2    GFR If Non African American >60 >60 mL/min/1.73 m 2   TSH WITH REFLEX TO FT4   Result Value Ref Range    TSH 8.000 (H) 0.380 - 5.330 uIU/mL   Magnesium   Result Value Ref Range    Magnesium 1.8 1.5 - 2.5 mg/dL   HEPATITIS PANEL ACUTE(4 COMPONENTS)   Result Value Ref Range    Hepatitis B Surface Antigen Non-Reactive Non-Reactive    Hepatitis B Cors Ab,IgM Non-Reactive Non-Reactive    Hepatitis A Virus Ab, IgM Non-Reactive Non-Reactive    Hepatitis C Antibody Non-Reactive Non-Reactive   HEMOGLOBIN A1C   Result Value Ref Range    Glycohemoglobin 9.5 (H) 4.0 - 5.6 %    Est Avg Glucose 226 mg/dL   TROPONIN   Result Value Ref Range    Troponin T <6 6 - 19 ng/L   Lipid Profile   Result Value Ref Range    Cholesterol,Tot 108 100 - 199 mg/dL    Triglycerides 189 (H) 0 - 149 mg/dL    HDL 6 (A) >=40 mg/dL    LDL 64 <100 mg/dL   POCT glucose device results   Result Value Ref Range    Glucose - Accu-Ck 248 (H) 65 - 99 mg/dL     RADIOLOGY  CT-ABDOMEN-PELVIS WITH   Final Result      Hepatomegaly and hepatic steatosis. The liver is heterogeneous and small hepatic lesions are not excluded.      Indeterminate 1 cm right renal lesion. Further evaluation can be performed with renal protocol MRI.      Scattered colonic diverticulosis.      Status post cholecystectomy.      Splenomegaly.      OUTSIDE IMAGES-DX CHEST   Final Result      US-EXTREMITY VENOUS LOWER BILAT    (Results Pending)   EC-ECHOCARDIOGRAM COMPLETE W/O CONT    (Results Pending)       COURSE & MEDICAL DECISION MAKING  Nursing notes and vital signs were reviewed. (See chart for details)  The patients records were reviewed, history was obtained from the patient;     ED evaluation was concerning for lactic acidosis, although there is no  overwhelming evidence for infectious etiology.  Symptomatology seems subacute.  No new medications.  No leukocytosis, left shift or bandemia.  Chest x-ray was unremarkable at outside facility, no pneumonia.  CT of the abdomen and pelvis quite unrevealing at this facility.  Abdominal exam is benign without evidence for peritonitis, doubt SBP.  No no peritonitis on CT, doubt SBP.  She was given a single dose of Rocephin per protocol otherwise.  Hemodynamically stable without fever, significant tachycardia or hypotension.  No acute respiratory distress or hypoxia.  She did not receive fluid bolus per protocol for the reasons above.  She does have mild hyperglycemia without DKA.  Evidence for liver failure with , ALT 31, alk phos 263 and total bilirubin 5.7.  She has had previous cholecystectomy.  Again her abdominal exam is benign and she is pain-free.  Lipase is 9, ammonia slightly elevated at 61 but mentation is intact.  She has a normal troponin but an elevated BNP.  Given exertional dyspnea, peripheral edema and new liver failure, consider congestion from a new onset CHF.  She will be hospitalized for further evaluation and treatment.    0845 - [facility] is aware of the patient agreeable to consultation.    FINAL IMPRESSION  (E87.2) Lactic acidosis  (K72.00) Acute liver failure without hepatic coma  (I50.9) New onset of congestive heart failure (HCC)  (R73.9) Hyperglycemia      Electronically signed by: Esperanza Mai D.O., 4/27/2021 12:14 PM      This dictation was created using voice recognition software. The accuracy of the dictation is limited to the abilities of the software. I expect there may be some errors of grammar and possibly content. The nursing notes were reviewed and certain aspects of this information were incorporated into this note.

## 2021-04-27 NOTE — ED NOTES
Pt ambulatory to and from restroom with steady gait. Urine collected and sent.   Bedside report completed with tele RN. Pt going to floor in stable condition with belongings.

## 2021-04-27 NOTE — ED NOTES
Assumed care of pt, report received. Introduced self as pt RN. O2 SAT while sleeping high 80's. 2L NC applied. Pt to and from CT in stable condition. Will cont to monitor.

## 2021-04-28 ENCOUNTER — APPOINTMENT (OUTPATIENT)
Dept: RADIOLOGY | Facility: MEDICAL CENTER | Age: 41
DRG: 433 | End: 2021-04-28
Attending: STUDENT IN AN ORGANIZED HEALTH CARE EDUCATION/TRAINING PROGRAM
Payer: OTHER GOVERNMENT

## 2021-04-28 ENCOUNTER — APPOINTMENT (OUTPATIENT)
Dept: CARDIOLOGY | Facility: MEDICAL CENTER | Age: 41
DRG: 433 | End: 2021-04-28
Attending: INTERNAL MEDICINE
Payer: OTHER GOVERNMENT

## 2021-04-28 PROBLEM — R06.02 SHORTNESS OF BREATH: Status: ACTIVE | Noted: 2021-04-28

## 2021-04-28 PROBLEM — E87.1 HYPONATREMIA: Status: ACTIVE | Noted: 2021-04-28

## 2021-04-28 LAB
ALBUMIN SERPL BCP-MCNC: 1.8 G/DL (ref 3.2–4.9)
ALBUMIN/GLOB SERPL: 0.3 G/DL
ALP SERPL-CCNC: 242 U/L (ref 30–99)
ALT SERPL-CCNC: 30 U/L (ref 2–50)
ANION GAP SERPL CALC-SCNC: 6 MMOL/L (ref 7–16)
AST SERPL-CCNC: 182 U/L (ref 12–45)
BASOPHILS # BLD AUTO: 0.4 % (ref 0–1.8)
BASOPHILS # BLD: 0.03 K/UL (ref 0–0.12)
BILIRUB SERPL-MCNC: 6.5 MG/DL (ref 0.1–1.5)
BUN SERPL-MCNC: 3 MG/DL (ref 8–22)
CALCIUM SERPL-MCNC: 7.2 MG/DL (ref 8.5–10.5)
CHLORIDE SERPL-SCNC: 93 MMOL/L (ref 96–112)
CO2 SERPL-SCNC: 28 MMOL/L (ref 20–33)
CREAT SERPL-MCNC: 0.37 MG/DL (ref 0.5–1.4)
EOSINOPHIL # BLD AUTO: 0 K/UL (ref 0–0.51)
EOSINOPHIL NFR BLD: 0 % (ref 0–6.9)
ERYTHROCYTE [DISTWIDTH] IN BLOOD BY AUTOMATED COUNT: 64.4 FL (ref 35.9–50)
FERRITIN SERPL-MCNC: 458 NG/ML (ref 10–291)
GLOBULIN SER CALC-MCNC: 6.1 G/DL (ref 1.9–3.5)
GLUCOSE BLD-MCNC: 256 MG/DL (ref 65–99)
GLUCOSE BLD-MCNC: 300 MG/DL (ref 65–99)
GLUCOSE BLD-MCNC: 305 MG/DL (ref 65–99)
GLUCOSE BLD-MCNC: 363 MG/DL (ref 65–99)
GLUCOSE SERPL-MCNC: 311 MG/DL (ref 65–99)
HCT VFR BLD AUTO: 31.8 % (ref 37–47)
HGB BLD-MCNC: 10.7 G/DL (ref 12–16)
IMM GRANULOCYTES # BLD AUTO: 0.04 K/UL (ref 0–0.11)
IMM GRANULOCYTES NFR BLD AUTO: 0.6 % (ref 0–0.9)
IRON SATN MFR SERPL: ABNORMAL % (ref 15–55)
IRON SERPL-MCNC: 75 UG/DL (ref 40–170)
LACTATE BLD-SCNC: 2.3 MMOL/L (ref 0.5–2)
LV EJECT FRACT  99904: 65
LV EJECT FRACT MOD 2C 99903: 67.96
LV EJECT FRACT MOD 4C 99902: 69.96
LV EJECT FRACT MOD BP 99901: 70.69
LYMPHOCYTES # BLD AUTO: 0.62 K/UL (ref 1–4.8)
LYMPHOCYTES NFR BLD: 8.6 % (ref 22–41)
MAGNESIUM SERPL-MCNC: 2.1 MG/DL (ref 1.5–2.5)
MCH RBC QN AUTO: 35.4 PG (ref 27–33)
MCHC RBC AUTO-ENTMCNC: 33.6 G/DL (ref 33.6–35)
MCV RBC AUTO: 105.3 FL (ref 81.4–97.8)
MONOCYTES # BLD AUTO: 0.33 K/UL (ref 0–0.85)
MONOCYTES NFR BLD AUTO: 4.6 % (ref 0–13.4)
NEUTROPHILS # BLD AUTO: 6.17 K/UL (ref 2–7.15)
NEUTROPHILS NFR BLD: 85.8 % (ref 44–72)
NRBC # BLD AUTO: 0 K/UL
NRBC BLD-RTO: 0 /100 WBC
PHOSPHATE SERPL-MCNC: 2.7 MG/DL (ref 2.5–4.5)
PLATELET # BLD AUTO: 122 K/UL (ref 164–446)
PMV BLD AUTO: 12.6 FL (ref 9–12.9)
POTASSIUM SERPL-SCNC: 3.8 MMOL/L (ref 3.6–5.5)
PROT SERPL-MCNC: 7.9 G/DL (ref 6–8.2)
RBC # BLD AUTO: 3.02 M/UL (ref 4.2–5.4)
SODIUM SERPL-SCNC: 127 MMOL/L (ref 135–145)
TIBC SERPL-MCNC: 92 UG/DL (ref 250–450)
UIBC SERPL-MCNC: <17 UG/DL (ref 110–370)
WBC # BLD AUTO: 7.2 K/UL (ref 4.8–10.8)

## 2021-04-28 PROCEDURE — 36415 COLL VENOUS BLD VENIPUNCTURE: CPT

## 2021-04-28 PROCEDURE — 99232 SBSQ HOSP IP/OBS MODERATE 35: CPT | Mod: GC | Performed by: INTERNAL MEDICINE

## 2021-04-28 PROCEDURE — 700102 HCHG RX REV CODE 250 W/ 637 OVERRIDE(OP): Performed by: STUDENT IN AN ORGANIZED HEALTH CARE EDUCATION/TRAINING PROGRAM

## 2021-04-28 PROCEDURE — A9270 NON-COVERED ITEM OR SERVICE: HCPCS | Performed by: STUDENT IN AN ORGANIZED HEALTH CARE EDUCATION/TRAINING PROGRAM

## 2021-04-28 PROCEDURE — 83735 ASSAY OF MAGNESIUM: CPT

## 2021-04-28 PROCEDURE — 700101 HCHG RX REV CODE 250: Performed by: STUDENT IN AN ORGANIZED HEALTH CARE EDUCATION/TRAINING PROGRAM

## 2021-04-28 PROCEDURE — 93306 TTE W/DOPPLER COMPLETE: CPT

## 2021-04-28 PROCEDURE — 84100 ASSAY OF PHOSPHORUS: CPT

## 2021-04-28 PROCEDURE — 82728 ASSAY OF FERRITIN: CPT

## 2021-04-28 PROCEDURE — 83550 IRON BINDING TEST: CPT

## 2021-04-28 PROCEDURE — 80053 COMPREHEN METABOLIC PANEL: CPT

## 2021-04-28 PROCEDURE — 83540 ASSAY OF IRON: CPT

## 2021-04-28 PROCEDURE — 83605 ASSAY OF LACTIC ACID: CPT

## 2021-04-28 PROCEDURE — 93306 TTE W/DOPPLER COMPLETE: CPT | Mod: 26 | Performed by: INTERNAL MEDICINE

## 2021-04-28 PROCEDURE — 770020 HCHG ROOM/CARE - TELE (206)

## 2021-04-28 PROCEDURE — 700105 HCHG RX REV CODE 258: Performed by: STUDENT IN AN ORGANIZED HEALTH CARE EDUCATION/TRAINING PROGRAM

## 2021-04-28 PROCEDURE — 76705 ECHO EXAM OF ABDOMEN: CPT

## 2021-04-28 PROCEDURE — 82962 GLUCOSE BLOOD TEST: CPT | Mod: 91

## 2021-04-28 PROCEDURE — 700111 HCHG RX REV CODE 636 W/ 250 OVERRIDE (IP): Performed by: STUDENT IN AN ORGANIZED HEALTH CARE EDUCATION/TRAINING PROGRAM

## 2021-04-28 PROCEDURE — 85025 COMPLETE CBC W/AUTO DIFF WBC: CPT

## 2021-04-28 RX ORDER — INSULIN GLARGINE 100 [IU]/ML
20 INJECTION, SOLUTION SUBCUTANEOUS DAILY
Status: DISCONTINUED | OUTPATIENT
Start: 2021-04-28 | End: 2021-04-30

## 2021-04-28 RX ORDER — LACTULOSE 20 G/30ML
30 SOLUTION ORAL 2 TIMES DAILY
Status: DISCONTINUED | OUTPATIENT
Start: 2021-04-28 | End: 2021-04-28

## 2021-04-28 RX ADMIN — DOCUSATE SODIUM 50 MG AND SENNOSIDES 8.6 MG 2 TABLET: 8.6; 5 TABLET, FILM COATED ORAL at 05:52

## 2021-04-28 RX ADMIN — HEPARIN SODIUM 5000 UNITS: 5000 INJECTION, SOLUTION INTRAVENOUS; SUBCUTANEOUS at 21:17

## 2021-04-28 RX ADMIN — INSULIN LISPRO 5 UNITS: 100 INJECTION, SOLUTION INTRAVENOUS; SUBCUTANEOUS at 21:18

## 2021-04-28 RX ADMIN — THIAMINE HYDROCHLORIDE 100 MG: 100 INJECTION, SOLUTION INTRAMUSCULAR; INTRAVENOUS at 17:52

## 2021-04-28 RX ADMIN — FOLIC ACID 1 MG: 1 TABLET ORAL at 05:52

## 2021-04-28 RX ADMIN — INSULIN LISPRO 8 UNITS: 100 INJECTION, SOLUTION INTRAVENOUS; SUBCUTANEOUS at 10:44

## 2021-04-28 RX ADMIN — SODIUM PHOSPHATE, MONOBASIC, MONOHYDRATE AND SODIUM PHOSPHATE, DIBASIC, ANHYDROUS 30 MMOL: 276; 142 INJECTION, SOLUTION INTRAVENOUS at 01:33

## 2021-04-28 RX ADMIN — INSULIN GLARGINE 20 UNITS: 100 INJECTION, SOLUTION SUBCUTANEOUS at 17:27

## 2021-04-28 RX ADMIN — PREDNISOLONE 39.9 MG: 15 SOLUTION ORAL at 05:52

## 2021-04-28 RX ADMIN — INSULIN LISPRO 6 UNITS: 100 INJECTION, SOLUTION INTRAVENOUS; SUBCUTANEOUS at 17:25

## 2021-04-28 RX ADMIN — HEPARIN SODIUM 5000 UNITS: 5000 INJECTION, SOLUTION INTRAVENOUS; SUBCUTANEOUS at 06:02

## 2021-04-28 RX ADMIN — LACTULOSE 30 ML: 20 SOLUTION ORAL at 10:44

## 2021-04-28 RX ADMIN — OMEPRAZOLE 20 MG: 20 CAPSULE, DELAYED RELEASE ORAL at 05:52

## 2021-04-28 RX ADMIN — POTASSIUM CHLORIDE 10 MEQ: 750 TABLET, FILM COATED, EXTENDED RELEASE ORAL at 05:56

## 2021-04-28 RX ADMIN — INSULIN LISPRO 5 UNITS: 100 INJECTION, SOLUTION INTRAVENOUS; SUBCUTANEOUS at 08:30

## 2021-04-28 RX ADMIN — HEPARIN SODIUM 5000 UNITS: 5000 INJECTION, SOLUTION INTRAVENOUS; SUBCUTANEOUS at 13:51

## 2021-04-28 RX ADMIN — INSULIN GLARGINE 20 UNITS: 100 INJECTION, SOLUTION SUBCUTANEOUS at 06:21

## 2021-04-28 RX ADMIN — PREGABALIN 50 MG: 25 CAPSULE ORAL at 05:58

## 2021-04-28 RX ADMIN — DOCUSATE SODIUM 50 MG AND SENNOSIDES 8.6 MG 2 TABLET: 8.6; 5 TABLET, FILM COATED ORAL at 17:26

## 2021-04-28 RX ADMIN — THERA TABS 1 TABLET: TAB at 05:52

## 2021-04-28 RX ADMIN — PREGABALIN 50 MG: 25 CAPSULE ORAL at 17:26

## 2021-04-28 ASSESSMENT — PAIN DESCRIPTION - PAIN TYPE
TYPE: ACUTE PAIN
TYPE: ACUTE PAIN;CHRONIC PAIN
TYPE: ACUTE PAIN;CHRONIC PAIN

## 2021-04-28 ASSESSMENT — ENCOUNTER SYMPTOMS
DIARRHEA: 0
TREMORS: 0
SPUTUM PRODUCTION: 0
DIZZINESS: 1
BLOOD IN STOOL: 0
VOMITING: 0
SEIZURES: 0
NAUSEA: 0
BACK PAIN: 1
FALLS: 0
MYALGIAS: 0
LOSS OF CONSCIOUSNESS: 0
ORTHOPNEA: 1
EYES NEGATIVE: 1
ABDOMINAL PAIN: 1
SPEECH CHANGE: 0
FEVER: 0
EYE PAIN: 0
BLURRED VISION: 0
PALPITATIONS: 0
SHORTNESS OF BREATH: 1
HEARTBURN: 0
PND: 0
CONSTIPATION: 1
HEADACHES: 0
CHILLS: 0
HEMOPTYSIS: 0
COUGH: 1
WHEEZING: 0
FLANK PAIN: 0

## 2021-04-28 ASSESSMENT — FIBROSIS 4 INDEX
FIB4 SCORE: 10.35
FIB4 SCORE: 10.35

## 2021-04-28 NOTE — PROGRESS NOTES
"Daily Progress Note:     Date of Service: 4/28/2021  Primary Team: [facility] Red Team    Attending: Vianey Fajardo M.D.   Senior Resident: Joseph Vogel M.D.   Contact:  [phone]    Chief Complaint:   Leg swelling, shortness of breath    ID: 40-year-old female with past medical history of T2DM and alcohol use presented to outside hospital in [facility] for elevated liver function test, worsening bilateral lower extremity swelling with pain, and shortness of breath.    Subjective:  -This morning patient states her lower extremity swelling and pain is unchanged from admission.    -Has been constipated, last BM 4 days ago  -Currently on her menstrual period, day 4 of about 6, medium-heavy flow per usual  -Continue shortness of breath worse after taking a deep breath  -Overall she has been fatigued with less \"get up and go\"  -Patient motivated to cease alcohol completely    Interval Updates:  -bilirubin to 6.5, MELD 26, 19.6% mortality  -Received 500 cc LR bolus due to elevated lactic acid and sodium phosphate due to hypophosphatemia  -GI consulted    Consultants/Specialty:  GI    Review of Systems:   Review of Systems   Constitutional: Positive for malaise/fatigue. Negative for chills and fever.        Weight gain   HENT: Negative.  Negative for ear pain and hearing loss.    Eyes: Negative.  Negative for blurred vision and pain.   Respiratory: Positive for cough and shortness of breath. Negative for hemoptysis, sputum production and wheezing.    Cardiovascular: Positive for orthopnea and leg swelling. Negative for chest pain, palpitations and PND.   Gastrointestinal: Positive for abdominal pain and constipation. Negative for blood in stool, diarrhea, heartburn, nausea and vomiting.   Genitourinary: Positive for dysuria. Negative for flank pain.   Musculoskeletal: Positive for back pain. Negative for falls, joint pain and myalgias.   Skin: Negative.  Negative for itching and rash.   Neurological: " Positive for dizziness. Negative for tremors, speech change, seizures, loss of consciousness and headaches.       Objective Data:   Physical Exam:   Vitals:   Temp:  [36.6 °C (97.9 °F)-37.6 °C (99.6 °F)] 36.6 °C (97.9 °F)  Pulse:  [] 96  Resp:  [16-18] 17  BP: ()/(63-75) 115/75  SpO2:  [90 %-95 %] 90 %    Physical Exam  Vitals and nursing note reviewed.   Constitutional:       General: She is not in acute distress.     Appearance: Normal appearance. She is obese. She is not ill-appearing.   HENT:      Head: Normocephalic and atraumatic.      Nose: Nose normal. No congestion.      Mouth/Throat:      Mouth: Mucous membranes are moist.      Pharynx: Oropharynx is clear.      Comments: Sublingual icterus  Eyes:      General: Scleral icterus present.      Extraocular Movements: Extraocular movements intact.      Conjunctiva/sclera: Conjunctivae normal.      Pupils: Pupils are equal, round, and reactive to light.   Cardiovascular:      Rate and Rhythm: Regular rhythm. Tachycardia present.      Pulses: Normal pulses.      Heart sounds: Normal heart sounds. No murmur.   Pulmonary:      Effort: Pulmonary effort is normal. No respiratory distress.      Breath sounds: No wheezing.      Comments: Reduced breath sounds bilaterally  Abdominal:      General: Abdomen is flat. Bowel sounds are normal. There is distension.      Palpations: Abdomen is soft. There is no mass.      Tenderness: There is no abdominal tenderness. There is no guarding or rebound.      Comments: Hepatomegaly to about 2 cm below costal margin.  No palpable spleen   Musculoskeletal:         General: No swelling or tenderness. Normal range of motion.      Cervical back: Normal range of motion and neck supple.      Right lower le+ Pitting Edema present.      Left lower le+ Pitting Edema present.      Comments: Edema to the thighs   Skin:     General: Skin is warm and dry.      Coloration: Skin is not jaundiced or pale.   Neurological:       General: No focal deficit present.      Mental Status: She is alert and oriented to person, place, and time. Mental status is at baseline.           Labs:   HEMATOLOGY/ ONCOLOGY/ID:            Recent Labs     04/27/21 0527 04/28/21 0013   WBC 8.3 7.2   RBC 3.29* 3.02*   HEMOGLOBIN 11.5* 10.7*   HEMATOCRIT 34.7* 31.8*   .5* 105.3*   MCH 35.0* 35.4*   RDW 61.9* 64.4*   PLATELETCT 123* 122*   MPV 11.9 12.6   NEUTSPOLYS 74.40* 85.80*   LYMPHOCYTES 9.40* 8.60*   MONOCYTES 6.80 4.60   EOSINOPHILS 1.70 0.00   BASOPHILS 5.10* 0.40     Lab Results   Component Value Date    WXCAUOXS61 >2000 (H) 04/27/2021    FERRITIN 458.0 (H) 04/28/2021    IRON 75 04/28/2021    TOTIRONBC 92 (L) 04/28/2021       RENAL:        Estimated GFR/CRCL = Estimated Creatinine Clearance: 253.2 mL/min (A) (by C-G formula based on SCr of 0.37 mg/dL (L)).  Recent Labs     04/27/21 0527 04/27/21  1956 04/28/21  0013 04/28/21  0727   SODIUM 134* 131* 127*  --    POTASSIUM 3.2* 4.0 3.8  --    CHLORIDE 95* 95* 93*  --    CO2 26 29 28  --    GLUCOSE 265* 301* 311*  --    BUN 2* 2* 3*  --    CREATININE 0.49* 0.41* 0.37*  --    CALCIUM 7.3* 7.1* 7.2*  --    MAGNESIUM 1.8 1.8 2.1  --    PHOSPHORUS  --  1.1*  --  2.7   ALBUMIN 2.0*  --  1.8*  --        GASTROINTESTINAL/ HEPATIC:          Recent Labs     04/27/21 0527 04/27/21  1238 04/28/21 0013   ALTSGPT 31  --  30   ASTSGOT 167*  --  182*   ALKPHOSPHAT 263*  --  242*   TBILIRUBIN 5.7*  --  6.5*   LIPASE 9*  --   --    ALBUMIN 2.0*  --  1.8*   GLOBULIN 6.1*  --  6.1*   INR  --  1.77*  --      Lab Results   Component Value Date    AMMONIA 61 (H) 04/27/2021       ENDOCRINE:              Recent Labs     04/27/21  0527 04/27/21  1047 04/27/21  1725 04/27/21 1956 04/27/21 2050 04/28/21  0013 04/28/21  0741 04/28/21  1026   GLUCOSE 265*  --   --  301*  --  311*  --   --    POCGLUCOSE  --    < >   < >  --  276*  --  300* 363*    < > = values in this interval not displayed.     Lab Results   Component Value  Date    HBA1C 9.5 (H) 04/27/2021    FREET4 1.14 04/27/2021    FREET4 1.29 04/27/2021       Imaging:   US-EXTREMITY VENOUS LOWER BILAT   Final Result      CT-ABDOMEN-PELVIS WITH   Final Result      Hepatomegaly and hepatic steatosis. The liver is heterogeneous and small hepatic lesions are not excluded.      Indeterminate 1 cm right renal lesion. Further evaluation can be performed with renal protocol MRI.      Scattered colonic diverticulosis.      Status post cholecystectomy.      Splenomegaly.      OUTSIDE IMAGES-DX CHEST   Final Result      EC-ECHOCARDIOGRAM COMPLETE W/O CONT    (Results Pending)   US-RUQ    (Results Pending)       Problem Representation: 40-year-old female with history of type 2 diabetes and alcohol use presenting with shortness of breath lower extremity edema and AST over ALT elevation more than 2:1, elevated bilirubin with scleral and sublingual icterus being treated for alcoholic hepatitis.    * Alcoholic hepatitis without ascites- (present on admission)  Assessment & Plan  She has been drinking 2 glasses of alcohol every day.  Presented with AST more than ALT, elevated alkaline phosphatase and total bilirubin, reduced platelet count and albumin.  Maddrey's discriminatory function score 43 on presentation.  -CT abdomen done outside of this hospital showed hepatosplenomegaly, hepatic steatosis and 1 cm renal lesion which is an incidental finding.  Lower extremity edema likely secondary to hypoalbuminemia and low oncotic pressure.  Renal function preserved  Labs and history strongly suggest alcohol as cause for otitis, however will rule out autoimmune hepatitis and hereditary causes (hemochromatosis). Acute viral hepatitis panel negative    Plan:  -Started her on prednisolone 40 mg daily for 28 days.  Lille score to be calculated after 7 days to determine effectiveness.  -Right upper quadrant ultrasound to evaluate portal vein flow  -Ordered antimitochondrial anti-smooth muscle antibody to rule  out autoimmune hepatitis  -Iron panel to rule out hemochromatosis.    -continue close monitoring of CMP and MELD  -Boost plus supplement for albumin of 2 nutritional support,   -Leg elevation when in bed and ambulation 3 times daily  -Continue thiamine and folic along with multivitamin.  -Continued counseling on alcohol cessation, patient motivated    Lactic acidosis  Assessment & Plan  Does not have signs of infection.  This may be secondary to hypoperfusion, since improved after small amount of fluids to avoid overload.  No further IV fluids, continue PO intake when not NPO with fluid restriction.    Elevated brain natriuretic peptide (BNP) level- (present on admission)  Assessment & Plan  She has orthopnea, dyspnea on exertion and lower extremity swelling consistent with volume overload.  This could be secondary to alcoholic liver disease. She does not have a history of heart failure. Low lung volumes on CXR.  She has a history of methamphetamine use which she stopped about 5 years ago.  She also drinks about 2 alcoholic drinks every day.  - BNP on admission: 1068  - Pending echocardiogram    Shortness of breath  Assessment & Plan  Admission x-ray clear but decreased lung volumes.  Satting above 90%, likely component of obesity,  no ascites seen on CT scan but will recheck with ultrasound.  Continue incentive spirometry (able to draw 1500 cc with maximum effort), ambulation,  monitor closely for signs of hepatopulmonary syndrome.    Macrocytic anemia- (present on admission)  Assessment & Plan  This may be secondary to alcohol use affecting her liver.  Vitamin B12 and folate levels not decreased.  She is also currently on her menstrual cycle.  TSH is slightly elevated at 8 but free T4 level normal.    Plan:  -Check iron panel and ferritin    Hyponatremia  Assessment & Plan  Secondary to liver disease and poor nutrition.    Plan:  -Check urine osmolality and urine sodium to determine if it is appropriate to start  diuretics  -Dietary consult      QT prolongation  Assessment & Plan  Probably secondary to hypokalemia.  Avoid QTC prolonging medication.    Gastroesophageal reflux disease without esophagitis- (present on admission)  Assessment & Plan  Continue PPI for GERD which she takes at home    Type 2 diabetes mellitus with neurologic complication, without long-term current use of insulin (HCC)- (present on admission)  Assessment & Plan  She has been taking Trulicity and SGLT2 inhibitor at home which was stopped on admission due to unavailability. -Repeat A1c 9.5  Plan:  -We will continue Lantus 20 units daily along with sliding scale insulin during this hospitalization and adjust as appropriate  -Continue Lyrica for peripheral neuropathy

## 2021-04-28 NOTE — ASSESSMENT & PLAN NOTE
Satting above 90%, likely component of obesity,  no ascites seen on ultrasound.      Plan:  -Continue incentive spirometry, monitor closely for signs of hepatopulmonary syndrome.

## 2021-04-28 NOTE — CARE PLAN
Problem: Communication  Goal: The ability to communicate needs accurately and effectively will improve  Outcome: PROGRESSING AS EXPECTED     Problem: Safety  Goal: Will remain free from injury  Outcome: PROGRESSING AS EXPECTED     Problem: Pain Management  Goal: Pain level will decrease to patient's comfort goal  Outcome: PROGRESSING AS EXPECTED    Assumed care of patient at 0715. Received bedside report from night shift RN. Bed is locked and lowest position, call light within reach. Treaded socks in place. Patient updated on plan of care, no complaints or pain at this time. White board updated. Pt AxOx4 Patient breathing pattern is unlabored. Pt is tele. All needs met at this time. Will continue care and monitoring as ordered.

## 2021-04-28 NOTE — DISCHARGE SUMMARY
Discharge Summary    CHIEF COMPLAINT ON ADMISSION  Chief Complaint   Patient presents with   • Hepatic Disease       Reason for Admission  EMS     Admission Date  4/27/2021    CODE STATUS  Full Code    HPI & HOSPITAL COURSE  This is a 40 y.o. female here with a past medical history of type 2 diabetes and alcohol use, remote meth use 5 years prior who was transferred from [facility] where she presented with 2 weeks of increasing lower extremity edema, shortness of breath, orthopnea and found to have elevated liver enzymes.  She was drinking about 2 alcoholic drinks every day and sometimes more. She also had poor appetite and oral intake since starting GLP-1 and SGLT2 medications.  She was also noted to be jaundiced with scleral icterus.    Conditions treated:  1.  Alcoholic cirrhosis with alcoholic hepatitis  She was found to have elevated AST over ALT, elevated INR at 1.9, and Madrey discriminant function score on admission 43 with total bilirubin 5.7.  Anti-mitochondrial antibody and anti-smooth muscle negative, less likely autoimmune. She was started on prednisolone daily on admission however after 7 days her bilirubin had increased to 9.8 and Lille score was 0.49 suggesting nonresponse to prednisolone this was discontinued on discharge. CT scan showed steatohepatitis, hepatomegaly, and diverticulosis.  It also showed an incidental 1 cm right renal lesion to be followed up as outpatient. Right upper quadrant ultrasound suggested cirrhosis with normal portal vein flow.  During her hospitalization the renal function remained normal and she had no detectable ascites. She was started on Lasix and Aldactone for fluid control and was discharged on 40 mg and 100 mg respectively, her blood pressure tolerated this dose. Over the course of hospitalization her presenting symptoms improved.  On day of discharge she was clinically stable eating and drinking voiding and stooling. She was extensively  counseled on complete cessation of alcohol and the high mortality risk associated with her alcoholic liver disease on her discharge meld score was 22 15-20% 90day mortality risk, this was shared with the patient.  She will be discharged with lab tests and PCP follow-up within 1 to 2 weeks and prompt GI evaluation where available. Depending on her MELD score and length of sobriety she may also need referral to tertiary transplant center.    2.  Lower extremity edema, orthopnea  Her NT proBNP was elevated at 1086, echocardiogram showed ejection fraction of 65% with no wall motion abnormalities or valvular abnormalities. Lower extremity ultrasound bilaterally did not show any evidence of DVT.  Therefore her BNP elevation likely secondary to fluid overload from low oncotic pressure secondary to liver disease.  Her edema and breathing slowly improved after the above-mentioned diuretics which she will continue as an outpatient.    3.  Lactic acidosis  Likely secondary to poor oral intake of fluids, improved with gentle hydration.    #4.  Protein calorie malnutrition  Albumin 1.8 on admission, secondary to liver disease and poor oral intake.  She was counseled by dietitian on healthy diet and protein supplements.     #5.  Type 2 diabetes, uncontrolled  Her A1c on admission was 9.5.  She was restarted on her home Lantus 20 units with scheduled mealtime insulin 4 units.  Her fasting blood glucose on this regimen is acceptable in 150s however she did have excursions to the high 200s around lunchtime. On discharge she will continue her Lantus 20 with as well as her SGLT2 inhibitor Invokana, recommended discontinuing DPP 4 inhibitor (Tradjenta) at this time and follow-up with primary care provider. Depending on her intake at home she may need scheduled mealtime insulin.    #6.  Macrocytic anemia  Most likely secondary to alcohol use and bone marrow suppression.  B12 and folate normal. Iron level normal with low percent  saturation.    #7. Subclinical hypothyroidism  TSH elevated at 8.0, Free T-4 1.14, no treatment indicated currently unless significantly symptomatic. Her current fatigue likely from malnutrition and ETOH use.    8# Hyponatremia  Secondary to alcoholic liver disease and poor oral intake.    No notes on file    Therefore, she is discharged in fair and stable condition to home with close outpatient follow-up.    The patient met 2-midnight criteria for an inpatient stay at the time of discharge.    Discharge Date  05/04/21     FOLLOW UP ITEMS POST DISCHARGE  #1.  Follow-up with PCP in 1 week for kidney function electrolytes in setting of diuretics, diabetes management, and liver enzymes, prompt GI referral either to closest provider or may follow-up in [place]  #2.  Prompt GI referral and follow-up for alcoholic liver disease, monitoring of liver function and evaluation for transplant/tertiary center if necessary  #3.  Follow-up with PCP for incidentally found hypodense 1cm right renal lesion. May be evaluated in the future with MRI.      DISCHARGE DIAGNOSES  Principal Problem:    Alcoholic cirrhosis of liver without ascites (HCC) POA: Unknown  Active Problems:    Alcoholic hepatitis without ascites POA: Yes    Macrocytic anemia POA: Yes    Elevated brain natriuretic peptide (BNP) level POA: Yes    Type 2 diabetes mellitus with neurologic complication, without long-term current use of insulin (HCC) POA: Yes    Gastroesophageal reflux disease without esophagitis POA: Yes    Hyponatremia POA: Unknown  Resolved Problems:    Lactic acidosis POA: Unknown    Shortness of breath POA: Unknown    QT prolongation POA: Unknown      FOLLOW UP  No future appointments.  Sai Wang M.D.  [address] [facility] [address] [phone]    Schedule an appointment as soon as possible for a visit in 1 week      [address] [place] [address] [phone]      for GI in [place]      MEDICATIONS ON DISCHARGE      Medication List      START taking these medications      Instructions   folic acid 1 MG Tabs  Start taking on: May 5, 2021  Commonly known as: FOLVITE   Take 1 tablet by mouth every day.  Dose: 1 mg     multivitamin Tabs  Start taking on: May 5, 2021   Take 1 tablet by mouth every day.  Dose: 1 tablet     spironolactone 100 MG Tabs  Start taking on: May 5, 2021  Commonly known as: ALDACTONE   Take 1 tablet by mouth every day.  Dose: 100 mg        CONTINUE taking these medications      Instructions   cyanocobalamin 2500 MCG Subl  Commonly known as: vitamin b12   Take 2,500 mcg by mouth every day. Under the tongue  Dose: 2,500 mcg     furosemide 40 MG Tabs  Commonly known as: LASIX   Take 40 mg by mouth every morning.  Dose: 40 mg     ibuprofen 400 MG Tabs  Commonly known as: MOTRIN   Take 400 mg by mouth every 6 hours as needed.  Dose: 400 mg     Invokana 100 MG Tabs  Generic drug: Canagliflozin   Take 100 mg by mouth every day.  Dose: 100 mg     lansoprazole 30 MG Cpdr  Commonly known as: PREVACID   Take 30 mg by mouth every day.  Dose: 30 mg     Lantus SoloStar 100 UNIT/ML Sopn injection  Generic drug: insulin glargine   Inject 20 Units under the skin every day.  Dose: 20 Units     ondansetron 4 MG Tbdp  Commonly known as: ZOFRAN ODT   Take 8 mg by mouth 3 times a day as needed for Nausea. 2 tabs = 8 mg  Dose: 8 mg     pregabalin 50 MG capsule  Commonly known as: LYRICA   Take 50 mg by mouth 2 times a day.  Dose: 50 mg     traMADol 50 MG Tabs  Commonly known as: ULTRAM   Take 50 mg by mouth 3 times a day as needed (Pain).  Dose: 50 mg        STOP taking these medications    potassium chloride ER 10 MEQ tablet  Commonly known as: KLOR-CON            Allergies  No Known Allergies    DIET  Orders Placed This Encounter   Procedures   • Diet Order Diet: Consistent CHO (Diabetic); Second Modifier: (optional): 2 Gram Sodium; Fluid modifications: (optional): 1800 ml Fluid Restriction     Standing Status:   Standing      Number of Occurrences:   1     Order Specific Question:   Diet:     Answer:   Consistent CHO (Diabetic) [4]     Order Specific Question:   Second Modifier: (optional)     Answer:   2 Gram Sodium [7]     Order Specific Question:   Fluid modifications: (optional)     Answer:   1800 ml Fluid Restriction [10]       ACTIVITY  As tolerated.  Weight bearing as tolerated    CONSULTATIONS  [facility] Dr. Smyth    PROCEDURES  N/A    Subjective  On the day of discharge patient has been urinating several times a night with diuretics, no further shortness of breath and abdomen has no tenderness, energy improved with nutrition. Dietary and alcohol cessation counseling provided and she is motivated to improve her condition.    Physical Exam  Vitals and nursing note reviewed.   Constitutional:       General: She is not in acute distress.     Appearance: She is obese. She is not ill-appearing.   HENT:      Head: Normocephalic and atraumatic.      Mouth/Throat:      Mouth: Mucous membranes are moist.      Pharynx: Oropharynx is clear.      Comments: Sublingual jaundice  Eyes:      General: Scleral icterus present.      Extraocular Movements: Extraocular movements intact.      Pupils: Pupils are equal, round, and reactive to light.   Cardiovascular:      Rate and Rhythm: Normal rate and regular rhythm.      Heart sounds: No murmur. No gallop.    Pulmonary:      Effort: Pulmonary effort is normal. No respiratory distress.      Breath sounds: Normal breath sounds. No wheezing, rhonchi or rales.   Abdominal:      General: Abdomen is flat. There is no distension.      Palpations: Abdomen is soft.      Tenderness: There is no abdominal tenderness. There is no guarding.   Musculoskeletal:         General: Swelling present.      Right lower leg: Edema present.      Left lower leg: Edema present.      Comments: Bilateral 2+ pitting edema to thighs, improved from admission   Skin:     General: Skin is warm  and dry.   Neurological:      General: No focal deficit present.      Mental Status: She is alert and oriented to person, place, and time.   Psychiatric:         Mood and Affect: Mood normal.         Behavior: Behavior normal.            LABORATORY  Lab Results   Component Value Date    SODIUM 137 05/04/2021    POTASSIUM 3.7 05/04/2021    CHLORIDE 106 05/04/2021    CO2 24 05/04/2021    GLUCOSE 158 (H) 05/04/2021    BUN 6 (L) 05/04/2021    CREATININE 0.21 (L) 05/04/2021        Lab Results   Component Value Date    WBC 9.5 05/03/2021    HEMOGLOBIN 11.3 (L) 05/03/2021    HEMATOCRIT 34.4 (L) 05/03/2021    PLATELETCT 153 (L) 05/03/2021        Total time of the discharge process exceeds 40 minutes.

## 2021-04-28 NOTE — PROGRESS NOTES
Telemetry Shift Summary     Rhythm ST  HR Range 100-104  Ectopy NA  Measurements .20/.08/.34       Normal Values  Rhythm SR  HR Range    Measurements 0.12-0.20 / 0.06-0.10  / 0.30-0.52    Ongoing care no acute issues at this time. Reported all critical to Dr. Fajardo. Patient resting in bed safely with no c/o anything at this time. Will continue care and monitoring as ordered.

## 2021-04-28 NOTE — DIETARY
NUTRITION SERVICES: BMI - Pt with BMI >40 (=Body mass index is 45.18 kg/m².), Class III obesity. Weight loss counseling not appropriate in acute care setting.     Offered low sodium, high protein diet education for alcoholic hepatitis and possible CHF. Pt at time of visit did not feel well. She accepted handouts but did not want to discuss diet. She said that she has a procedure at 3:00 today. Pt would like RD to follow up after her procedure.     RECOMMEND - If appropriate at [place] please refer to outpatient nutrition services for weight management.

## 2021-04-28 NOTE — CONSULTS
Gastroenterology Consult Note     Date of Consult: 4/28/2021 alcoholic hepatitis     Reason for consult: Alcoholic Hepatitis     HPI:     The patient is a 40-year-old female with a past medical history significant for type 2 diabetes mellitus, morbid obesity, who presented to the hospital on 4/27/2021 with lower extremity swelling and pain.  Patient states that she is a daily drinker.  She states that she drinks approximately 2-3 margaritas per day since the age of 23 and up to 6 alcoholic beverages on the weekends.  She denies any prior history of liver disease.  Patient reports that she has been having intermittent abdominal pain.  She noticed that she was getting short of breath approximately 2 weeks ago.  She lives in [facility] and went to [facility].  She was initially thought to have acute heart failure and was transferred to [facility] for higher level of care. Echocardiogram at [facility] showed EF of 65%.    On admission at [facility], the patient's labs were significant for a lactic acid of 6.4, an AST of 182, ALT of 30, alk phos of 242, total bilirubin of 6.5, and albumin of 1.8.  Patient had a CT abdomen pelvis that was significant for paddle megaly and hepatic steatosis.    Patient denies any NSAID use.  Denies any family history of liver disease, colon cancer, pancreatic cancer.  Denies any black tarry stools, hematemesis, odynophagia or dysphagia.       PMHX:  Past Medical History:   Diagnosis Date   • DM (diabetes mellitus) (HCC)           PSurgHx: History reviewed. No pertinent surgical history.     ALLERGIES:Patient has no known allergies.     SocHx:   Social History     Socioeconomic History   • Marital status:      Spouse name: Not on file   • Number of children: Not on file   • Years of education: Not on file   • Highest education level: Not on file   Occupational History   • Not on file   Tobacco Use   • Smoking status: Former Smoker   •  Smokeless tobacco: Former User   Substance and Sexual Activity   • Alcohol use: Yes     Alcohol/week: 8.4 oz     Types: 14 Cans of beer per week   • Drug use: Never   • Sexual activity: Not on file   Other Topics Concern   • Not on file   Social History Narrative   • Not on file     Social Determinants of Health     Financial Resource Strain:    • Difficulty of Paying Living Expenses:    Food Insecurity:    • Worried About Running Out of Food in the Last Year:    • Ran Out of Food in the Last Year:    Transportation Needs:    • Lack of Transportation (Medical):    • Lack of Transportation (Non-Medical):    Physical Activity:    • Days of Exercise per Week:    • Minutes of Exercise per Session:    Stress:    • Feeling of Stress :    Social Connections:    • Frequency of Communication with Friends and Family:    • Frequency of Social Gatherings with Friends and Family:    • Attends Hoahaoism Services:    • Active Member of Clubs or Organizations:    • Attends Club or Organization Meetings:    • Marital Status:    Intimate Partner Violence:    • Fear of Current or Ex-Partner:    • Emotionally Abused:    • Physically Abused:    • Sexually Abused:         FAMHx: History reviewed. No pertinent family history.     ROS:  Constitutional: No fevers, chills, no night sweats, no weight changes  HEENT: no vision or hearing changes, no dry mouth, no change in smell  CARDIO: no palpitations, no orthopnea, no chest pain  PULM: no cough, positive for shortness of breath  NEURO: no Seizures, no memory impairment, no change in sensation  GI: as above  : no dysuria, no hematuria  HEME: no anemia, no easy brusing  MUSCULOSKELETAL: no muscle aches, no back pain, no arthritis  PSYCH: no anxiety or depression  SKIN: no rashes     PE:  Vitals:    04/28/21 0448 04/28/21 0825 04/28/21 0900 04/28/21 1313   BP: 111/71 109/74  115/75   Pulse: 98 93  96   Resp: 16 17  17   Temp: 37.3 °C (99.1 °F) 37.1 °C (98.8 °F)  36.6 °C (97.9 °F)   TempSrc:  Temporal Temporal  Temporal   SpO2: 91% 90%  90%   Weight:   116 kg (255 lb 1.2 oz)    Height:         Gen: AAOx3, NAD, lying in bed  HEENT: PERRL, EOMI, nares patent, Mucous membranes moist. No scleral icterus.  Neck: supple, no cervical or supraclavicular adenopathy  Chest: No spider angiomata.  CVS: regular rhythm, normal rate, no MRG  Pulm: CTAB, no crackles  Abd: soft, Nd, epigastric tenderness with moderate palpation, no guarding or rebound. Hepatomegaly present. No fluid shift.  Ext: 3+ pitting peripheral edema bilaterally  NEURO: grossly normal, no weakness. No flapping tremor.   Skin: warm, no rash  Psych: normal Affect, no anxiety     LABS:  Lab Results   Component Value Date/Time    SODIUM 127 (L) 04/28/2021 12:13 AM    POTASSIUM 3.8 04/28/2021 12:13 AM    CHLORIDE 93 (L) 04/28/2021 12:13 AM    CO2 28 04/28/2021 12:13 AM    GLUCOSE 311 (H) 04/28/2021 12:13 AM    BUN 3 (L) 04/28/2021 12:13 AM    CREATININE 0.37 (L) 04/28/2021 12:13 AM      Lab Results   Component Value Date/Time    WBC 7.2 04/28/2021 12:13 AM    RBC 3.02 (L) 04/28/2021 12:13 AM    HEMOGLOBIN 10.7 (L) 04/28/2021 12:13 AM    HEMATOCRIT 31.8 (L) 04/28/2021 12:13 AM    .3 (H) 04/28/2021 12:13 AM    MCH 35.4 (H) 04/28/2021 12:13 AM    MCHC 33.6 04/28/2021 12:13 AM    MPV 12.6 04/28/2021 12:13 AM    NEUTSPOLYS 85.80 (H) 04/28/2021 12:13 AM    LYMPHOCYTES 8.60 (L) 04/28/2021 12:13 AM    MONOCYTES 4.60 04/28/2021 12:13 AM    EOSINOPHILS 0.00 04/28/2021 12:13 AM    BASOPHILS 0.40 04/28/2021 12:13 AM        Lab Results   Component Value Date/Time    PROTHROMBTM 21.1 (H) 04/27/2021 12:38 PM    INR 1.77 (H) 04/27/2021 12:38 PM      Recent Labs     04/27/21  0527 04/27/21  1238 04/28/21  0013   ASTSGOT 167*  --  182*   ALTSGPT 31  --  30   TBILIRUBIN 5.7*  --  6.5*   GLOBULIN 6.1*  --  6.1*   INR  --  1.77*  --    AMMONIA 61*  --   --        IMAGING: Reviewed personally and summarized in HPI       ASSESSMENT:        #Alcoholic Hepatitis without  Ascites     The patient is a 40-year-old female with a past medical history significant for      type 2 diabetes mellitus, morbid obesity, who presented to the hospital on     4/27/2021 with lower extremity swelling and pain.  Patient has a MELD score     of 27 with a 19.6% estimated 3 month morality. Maddrey's Discriminant     function is 46.  Patient was started on IV steroids.  As the patient's renal     function is intact and she has no active signs of infection or GI bleed, can     continue steroids for now. Per STOPAH trial, no significant mortality benefit     with steroid use in alcoholic hepatitis, but agree that it is reasonable to         continue given thepatient's renal  function is intact and she has no active     signs of infection or GI bleed. Diuretics were held as the patient's lactic acid     was elevated on admission and she was hypotensive. If the patient's lactic      Acid is normal in the AM and BP stablizes, then can consider starting     Diuretics. We will also check an ammonia as the patient reported recent     Confusion, though is not overtly encephalopathic. The patient was counseled     extensively on the importance of Alcohol cessation.    #Hyperbilirubinemia  -Secondary to alcoholic use. No clinical signs of obstructive stones.    #Lactic acidosis  -Likely secondary to hypoperfusion. Holding diuretics for now    #GERD  -History of GERD on PPI, can continue fore now    #Macrocytic Anemia  -Secondary to alcohol use.    #Hyponatremia  -Likely secondary to liver disease. Further workup per primary team.    #Type II DM  -Insulin regimen per primary team     PLAN:   Agree with continuing steroids for now.  Reassess patient's blood pressure, lactic acid in the AM.  If patient's lactic acid and BP normalize, can start diuretics with IV lasix.  Check ammonia  Check PT/INR daily  Needs close outpatient followup with GI  Once hepatitis resolves, will need evaluation for cirrhosis.   Avoid all  NSAIDs    Thank you for this consult.        Khang Freeman D.O. PGY-3

## 2021-04-28 NOTE — HEART FAILURE PROGRAM
Possible new heart failure in the setting of prior methamphetamine abuse and current excessive ETOH consumption.     There are abnormal hepatic labs which prompted patient's transfer to us yesterday from outlying facility. CT shows hepatomegaly and steatosis, heterogenous liver.    She has been diagnosed with alcoholic hepatitis. Echocardiogram is pending completion for suspicion of HF.    Glycohemoglobin 9.5  UDS negative  NT -proBNP is 1068    Patient lives in [place], [place] and has [facility] coverage.    I's and O's and daily weights are ordered- this is appropriate. NO urine output documented since 1800 yesterday. Also only 120 mL documented PO so far and that was at 1230 yesterday.    Ambulation tolerance has been documented twice and both indicate that patient Tolerates Well. Please continue this documentation as it is very informative for what class patient is should she end up with a heart failure diagnosis.    Thank you, Jayshree Cardio RN Navigator [phone]

## 2021-04-29 LAB
ALBUMIN SERPL BCP-MCNC: 1.8 G/DL (ref 3.2–4.9)
ALBUMIN/GLOB SERPL: 0.3 G/DL
ALP SERPL-CCNC: 213 U/L (ref 30–99)
ALT SERPL-CCNC: 29 U/L (ref 2–50)
AMMONIA PLAS-SCNC: 68 UMOL/L (ref 11–45)
ANION GAP SERPL CALC-SCNC: 9 MMOL/L (ref 7–16)
ANISOCYTOSIS BLD QL SMEAR: ABNORMAL
AST SERPL-CCNC: 191 U/L (ref 12–45)
BASOPHILS # BLD AUTO: 0.8 % (ref 0–1.8)
BASOPHILS # BLD: 0.07 K/UL (ref 0–0.12)
BILIRUB SERPL-MCNC: 6.1 MG/DL (ref 0.1–1.5)
BUN SERPL-MCNC: 5 MG/DL (ref 8–22)
CALCIUM SERPL-MCNC: 7.5 MG/DL (ref 8.5–10.5)
CHLORIDE SERPL-SCNC: 99 MMOL/L (ref 96–112)
CO2 SERPL-SCNC: 27 MMOL/L (ref 20–33)
CREAT SERPL-MCNC: 0.24 MG/DL (ref 0.5–1.4)
CREAT UR-MCNC: 307.42 MG/DL
EOSINOPHIL # BLD AUTO: 0 K/UL (ref 0–0.51)
EOSINOPHIL NFR BLD: 0 % (ref 0–6.9)
ERYTHROCYTE [DISTWIDTH] IN BLOOD BY AUTOMATED COUNT: 65.5 FL (ref 35.9–50)
GLOBULIN SER CALC-MCNC: 5.8 G/DL (ref 1.9–3.5)
GLUCOSE BLD-MCNC: 201 MG/DL (ref 65–99)
GLUCOSE BLD-MCNC: 271 MG/DL (ref 65–99)
GLUCOSE BLD-MCNC: 275 MG/DL (ref 65–99)
GLUCOSE BLD-MCNC: 294 MG/DL (ref 65–99)
GLUCOSE SERPL-MCNC: 184 MG/DL (ref 65–99)
HCT VFR BLD AUTO: 31 % (ref 37–47)
HGB BLD-MCNC: 10.3 G/DL (ref 12–16)
INR PPP: 1.9 (ref 0.87–1.13)
LYMPHOCYTES # BLD AUTO: 1.83 K/UL (ref 1–4.8)
LYMPHOCYTES NFR BLD: 19.7 % (ref 22–41)
MACROCYTES BLD QL SMEAR: ABNORMAL
MAGNESIUM SERPL-MCNC: 2.3 MG/DL (ref 1.5–2.5)
MANUAL DIFF BLD: NORMAL
MCH RBC QN AUTO: 35.3 PG (ref 27–33)
MCHC RBC AUTO-ENTMCNC: 33.2 G/DL (ref 33.6–35)
MCV RBC AUTO: 106.2 FL (ref 81.4–97.8)
MONOCYTES # BLD AUTO: 0.96 K/UL (ref 0–0.85)
MONOCYTES NFR BLD AUTO: 10.3 % (ref 0–13.4)
MORPHOLOGY BLD-IMP: NORMAL
NEUTROPHILS # BLD AUTO: 6.44 K/UL (ref 2–7.15)
NEUTROPHILS NFR BLD: 69.2 % (ref 44–72)
NRBC # BLD AUTO: 0.02 K/UL
NRBC BLD-RTO: 0.2 /100 WBC
OSMOLALITY UR: 690 MOSM/KG H2O (ref 300–900)
PLATELET # BLD AUTO: 130 K/UL (ref 164–446)
PLATELET BLD QL SMEAR: NORMAL
PMV BLD AUTO: 12 FL (ref 9–12.9)
POTASSIUM SERPL-SCNC: 3.3 MMOL/L (ref 3.6–5.5)
PROT SERPL-MCNC: 7.6 G/DL (ref 6–8.2)
PROT UR-MCNC: 152 MG/DL (ref 0–15)
PROT/CREAT UR: 494 MG/G (ref 10–107)
PROTHROMBIN TIME: 22.4 SEC (ref 12–14.6)
RBC # BLD AUTO: 2.92 M/UL (ref 4.2–5.4)
RBC BLD AUTO: PRESENT
SODIUM SERPL-SCNC: 135 MMOL/L (ref 135–145)
SODIUM UR-SCNC: <20 MMOL/L
WBC # BLD AUTO: 9.3 K/UL (ref 4.8–10.8)

## 2021-04-29 PROCEDURE — 36415 COLL VENOUS BLD VENIPUNCTURE: CPT

## 2021-04-29 PROCEDURE — 83735 ASSAY OF MAGNESIUM: CPT

## 2021-04-29 PROCEDURE — 700111 HCHG RX REV CODE 636 W/ 250 OVERRIDE (IP): Performed by: STUDENT IN AN ORGANIZED HEALTH CARE EDUCATION/TRAINING PROGRAM

## 2021-04-29 PROCEDURE — 82962 GLUCOSE BLOOD TEST: CPT | Mod: 91

## 2021-04-29 PROCEDURE — 82140 ASSAY OF AMMONIA: CPT

## 2021-04-29 PROCEDURE — 82570 ASSAY OF URINE CREATININE: CPT

## 2021-04-29 PROCEDURE — 770020 HCHG ROOM/CARE - TELE (206)

## 2021-04-29 PROCEDURE — 84156 ASSAY OF PROTEIN URINE: CPT

## 2021-04-29 PROCEDURE — 83516 IMMUNOASSAY NONANTIBODY: CPT

## 2021-04-29 PROCEDURE — 700102 HCHG RX REV CODE 250 W/ 637 OVERRIDE(OP): Performed by: STUDENT IN AN ORGANIZED HEALTH CARE EDUCATION/TRAINING PROGRAM

## 2021-04-29 PROCEDURE — 85027 COMPLETE CBC AUTOMATED: CPT

## 2021-04-29 PROCEDURE — A9270 NON-COVERED ITEM OR SERVICE: HCPCS | Performed by: STUDENT IN AN ORGANIZED HEALTH CARE EDUCATION/TRAINING PROGRAM

## 2021-04-29 PROCEDURE — 80053 COMPREHEN METABOLIC PANEL: CPT

## 2021-04-29 PROCEDURE — 85610 PROTHROMBIN TIME: CPT

## 2021-04-29 PROCEDURE — 97161 PT EVAL LOW COMPLEX 20 MIN: CPT

## 2021-04-29 PROCEDURE — 84300 ASSAY OF URINE SODIUM: CPT

## 2021-04-29 PROCEDURE — 83935 ASSAY OF URINE OSMOLALITY: CPT

## 2021-04-29 PROCEDURE — 85007 BL SMEAR W/DIFF WBC COUNT: CPT

## 2021-04-29 PROCEDURE — 99233 SBSQ HOSP IP/OBS HIGH 50: CPT | Mod: GC | Performed by: INTERNAL MEDICINE

## 2021-04-29 RX ORDER — POTASSIUM CHLORIDE 20 MEQ/1
40 TABLET, EXTENDED RELEASE ORAL ONCE
Status: COMPLETED | OUTPATIENT
Start: 2021-04-29 | End: 2021-04-29

## 2021-04-29 RX ORDER — FUROSEMIDE 20 MG/1
20 TABLET ORAL ONCE
Status: DISCONTINUED | OUTPATIENT
Start: 2021-04-29 | End: 2021-04-30

## 2021-04-29 RX ORDER — SPIRONOLACTONE 25 MG/1
50 TABLET ORAL ONCE
Status: DISCONTINUED | OUTPATIENT
Start: 2021-04-29 | End: 2021-04-30

## 2021-04-29 RX ORDER — POTASSIUM CHLORIDE 750 MG/1
40 TABLET, FILM COATED, EXTENDED RELEASE ORAL DAILY
Status: DISCONTINUED | OUTPATIENT
Start: 2021-04-30 | End: 2021-04-29

## 2021-04-29 RX ADMIN — ACETAMINOPHEN 500 MG: 500 TABLET ORAL at 16:05

## 2021-04-29 RX ADMIN — PREDNISOLONE 39.9 MG: 15 SOLUTION ORAL at 05:48

## 2021-04-29 RX ADMIN — PREGABALIN 50 MG: 25 CAPSULE ORAL at 17:52

## 2021-04-29 RX ADMIN — THERA TABS 1 TABLET: TAB at 05:48

## 2021-04-29 RX ADMIN — INSULIN LISPRO 3 UNITS: 100 INJECTION, SOLUTION INTRAVENOUS; SUBCUTANEOUS at 08:16

## 2021-04-29 RX ADMIN — HEPARIN SODIUM 5000 UNITS: 5000 INJECTION, SOLUTION INTRAVENOUS; SUBCUTANEOUS at 05:49

## 2021-04-29 RX ADMIN — HEPARIN SODIUM 5000 UNITS: 5000 INJECTION, SOLUTION INTRAVENOUS; SUBCUTANEOUS at 21:26

## 2021-04-29 RX ADMIN — POTASSIUM CHLORIDE 40 MEQ: 1500 TABLET, EXTENDED RELEASE ORAL at 08:13

## 2021-04-29 RX ADMIN — THIAMINE HYDROCHLORIDE 100 MG: 100 INJECTION, SOLUTION INTRAMUSCULAR; INTRAVENOUS at 17:52

## 2021-04-29 RX ADMIN — INSULIN LISPRO 5 UNITS: 100 INJECTION, SOLUTION INTRAVENOUS; SUBCUTANEOUS at 12:10

## 2021-04-29 RX ADMIN — OMEPRAZOLE 20 MG: 20 CAPSULE, DELAYED RELEASE ORAL at 05:48

## 2021-04-29 RX ADMIN — INSULIN GLARGINE 20 UNITS: 100 INJECTION, SOLUTION SUBCUTANEOUS at 12:11

## 2021-04-29 RX ADMIN — POTASSIUM CHLORIDE 10 MEQ: 750 TABLET, FILM COATED, EXTENDED RELEASE ORAL at 05:48

## 2021-04-29 RX ADMIN — INSULIN LISPRO 5 UNITS: 100 INJECTION, SOLUTION INTRAVENOUS; SUBCUTANEOUS at 21:29

## 2021-04-29 RX ADMIN — FOLIC ACID 1 MG: 1 TABLET ORAL at 05:48

## 2021-04-29 RX ADMIN — PREGABALIN 50 MG: 25 CAPSULE ORAL at 05:48

## 2021-04-29 RX ADMIN — INSULIN LISPRO 5 UNITS: 100 INJECTION, SOLUTION INTRAVENOUS; SUBCUTANEOUS at 17:57

## 2021-04-29 ASSESSMENT — ENCOUNTER SYMPTOMS
FEVER: 0
SHORTNESS OF BREATH: 0
HEMOPTYSIS: 0
DEPRESSION: 0
ABDOMINAL PAIN: 1
PALPITATIONS: 0
HEADACHES: 0
DOUBLE VISION: 0
MYALGIAS: 0
SHORTNESS OF BREATH: 1
ORTHOPNEA: 0
HEARTBURN: 0
BLOOD IN STOOL: 0
NAUSEA: 0
SORE THROAT: 0
BLURRED VISION: 0
CONSTIPATION: 0
BRUISES/BLEEDS EASILY: 0
VOMITING: 0
DIZZINESS: 0
SPUTUM PRODUCTION: 0
COUGH: 0
DIARRHEA: 0
CHILLS: 0

## 2021-04-29 ASSESSMENT — PAIN DESCRIPTION - PAIN TYPE
TYPE: ACUTE PAIN
TYPE: ACUTE PAIN

## 2021-04-29 ASSESSMENT — GAIT ASSESSMENTS
ASSISTIVE DEVICE: FRONT WHEEL WALKER;NONE
GAIT LEVEL OF ASSIST: SUPERVISED
DISTANCE (FEET): 450
DEVIATION: BRADYKINETIC;DECREASED HEEL STRIKE;DECREASED TOE OFF

## 2021-04-29 ASSESSMENT — COGNITIVE AND FUNCTIONAL STATUS - GENERAL
MOVING FROM LYING ON BACK TO SITTING ON SIDE OF FLAT BED: A LITTLE
WALKING IN HOSPITAL ROOM: A LITTLE
MOBILITY SCORE: 20
STANDING UP FROM CHAIR USING ARMS: A LITTLE
CLIMB 3 TO 5 STEPS WITH RAILING: A LITTLE
SUGGESTED CMS G CODE MODIFIER MOBILITY: CJ

## 2021-04-29 NOTE — PROGRESS NOTES
Gastroenterology Progress Note     Author: Khang Freeman D.O.   Date & Time Created: 4/29/2021 8:51 AM    Chief Complaint:  Lower Extremity Swelling and Pain    Interval History:  No acute overnight events  The patient states that her lower extremity leg pain is improving.   Denies any nausea, vomiting, diarrhea. Has mild abdominal pain.  Lactic acid decreased to 2.3. AST and ALT remain markedly elevated at 191 and 29 respectively INR is 1.90.    Review of Systems:  Review of Systems   Constitutional: Negative for chills and fever.   HENT: Negative for hearing loss and tinnitus.    Eyes: Negative for blurred vision.   Respiratory: Negative for cough, hemoptysis, sputum production and shortness of breath.    Cardiovascular: Positive for leg swelling. Negative for chest pain, palpitations and orthopnea.   Gastrointestinal: Positive for abdominal pain. Negative for blood in stool, constipation, diarrhea, heartburn, nausea and vomiting.   Genitourinary: Negative for dysuria, frequency, hematuria and urgency.   Musculoskeletal: Negative for myalgias.   Skin: Negative for itching and rash.   Neurological: Negative for dizziness and headaches.   Endo/Heme/Allergies: Does not bruise/bleed easily.   Psychiatric/Behavioral: Negative for depression.   All other systems reviewed and are negative.      Physical Exam:  Physical Exam   Constitutional: She appears well-developed and well-nourished.   HENT:   Head: Normocephalic and atraumatic.   Eyes: EOM are normal. Right eye exhibits no discharge. Left eye exhibits no discharge. No scleral icterus.   Neck: No tracheal deviation present. No thyromegaly present.   Cardiovascular: Normal rate, regular rhythm and normal heart sounds. Exam reveals no gallop and no friction rub.   No murmur heard.  Pulmonary/Chest: Breath sounds normal. No respiratory distress. She has no wheezes.   Abdominal: Soft. Bowel sounds are normal. She exhibits no distension. There is abdominal  tenderness. There is no rebound and no guarding.   Tenderness in lower left and right quadrants with palpation.  Hepatomegaly, splenomegaly   Musculoskeletal:         General: Edema present.      Comments: 3+ pitting edema   Skin: Skin is dry. No rash noted. No erythema.   Nursing note and vitals reviewed.      Labs:          Recent Labs     04/27/21 1956 04/28/21 0013 04/28/21 0727 04/29/21  0510   SODIUM 131* 127*  --  135   POTASSIUM 4.0 3.8  --  3.3*   CHLORIDE 95* 93*  --  99   CO2 29 28 --  27   BUN 2* 3*  --  5*   CREATININE 0.41* 0.37*  --  0.24*   MAGNESIUM 1.8 2.1  --  2.3   PHOSPHORUS 1.1*  --  2.7  --    CALCIUM 7.1* 7.2*  --  7.5*     Recent Labs     04/27/21 0527 04/27/21 0527 04/27/21 1956 04/28/21 0013 04/29/21  0510   ALTSGPT 31  --   --  30 29   ASTSGOT 167*  --   --  182* 191*   ALKPHOSPHAT 263*  --   --  242* 213*   TBILIRUBIN 5.7*  --   --  6.5* 6.1*   LIPASE 9*  --   --   --   --    GLUCOSE 265*   < > 301* 311* 184*    < > = values in this interval not displayed.     Recent Labs     04/27/21 0527 04/27/21 1238 04/28/21 0013 04/28/21 0727 04/29/21  0510   RBC 3.29*  --  3.02*  --  2.92*   HEMOGLOBIN 11.5*  --  10.7*  --  10.3*   HEMATOCRIT 34.7*  --  31.8*  --  31.0*   PLATELETCT 123*  --  122*  --  130*   PROTHROMBTM  --  21.1*  --   --  22.4*   INR  --  1.77*  --   --  1.90*   IRON  --   --   --  75  --    FERRITIN  --   --   --  458.0*  --    TOTIRONBC  --   --   --  92*  --      Recent Labs     04/27/21 0527 04/28/21 0013 04/29/21  0510   WBC 8.3 7.2 9.3   NEUTSPOLYS 74.40* 85.80* 69.20   LYMPHOCYTES 9.40* 8.60* 19.70*   MONOCYTES 6.80 4.60 10.30   EOSINOPHILS 1.70 0.00 0.00   BASOPHILS 5.10* 0.40 0.80   ASTSGOT 167* 182* 191*   ALTSGPT 31 30 29   ALKPHOSPHAT 263* 242* 213*   TBILIRUBIN 5.7* 6.5* 6.1*       Imaging:  No new imaging    Assessment:    #Alcoholic Hepatitis  The patient is a pleasant 39 y/o female with a PMH significant for Type 2 DM and morbid obesity who  presented to the hospital on 4/27/21 with lower extremity swelling and pain, found to have alcoholic hepatitis. The patient's blood pressure and lactic acid have normalized. AST is 191, ALT 29, Alk phos 213, total bilirubin 6.1. Would recommend the patient start on diuretics as her blood pressure is currently stable and creatinine is low. Counseled the patient again on the importance of alcohol cessation. MELD score is 21.0 with 19.6% estimated 3 month mortality. The patient verbalized understanding and agreed that she would attempt to stop drinking alcohol.    #Lactic Acidosis  -Resolving    #GERD  -Continue PPI    #Hypokalemia  -Replacing, continue to monitor with diuresis    #Macrocytic Anemia  -Secondary to alcohol use    #Hyponatremia  -Likely secondary to liver disease    #Type II DM  -On Invokana at home. Currently on glargine and lispro.      Plan:  Ok to start diuresing patient with IV Lasix and Aldactone.  Recommended starting dose would be 50mg of aldactone and 20mg of Lasix.  Watch kidney function closely. Will need close outpatient follow up to titrate dosage.  Strongly recommend alcohol cessation.  Continue IV steroids for now  Close follow-up with GI outpatient for further evaluation for potential cirrhosis once inflammation resolves  No NSAIDs in the future    Quality-Core Measures   Reviewed items::  Labs reviewed and Medications reviewed  Charlton catheter::  No Charlton  DVT prophylaxis pharmacological::  Heparin    Thank you for the consult.    Khang Freeman D.O. PGY-3

## 2021-04-29 NOTE — THERAPY
Physical Therapy   Initial Evaluation     Patient Name: Hien Alfaro  Age:  40 y.o., Sex:  female  Medical Record #: [id number]  Today's Date: 4/29/2021          Assessment  Patient is 40 y.o. female presents with alcoholic hepatitis and lower extremity edema; PMH of DM2 and ETOH abuse and remote meth use. Pt demonstrates limited activity tolerance compared to her baseline due to her BLE edema. Pt requiring FWW at this time for mobility, but otherwise supervision with mobility as below. Educated pt in importance of mobility, especially while in the hospital and she stated understanding. 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 At this time she has no further acute PT needs. Patient will not be actively followed for physical therapy services at this time, however may be seen if requested by physician for 1 more visit within 30 days to address any discharge or equipment needs.      Plan    Recommend Physical Therapy for Evaluation only.    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; no physical assistance but cleaning/cooking/ADLs   Prior Level of Functional Mobility   Bed Mobility Independent   Transfer Status Independent   Ambulation Independent   Distance Ambulation (Feet)   (community)   Assistive Devices Used None   Stairs Independent   Comments pt reports she started having difficulty with mobility a few days before being admitted due to her BLE  edema/pain; she has a fear of falling   History of Falls   History of Falls No   Cognition    Cognition / Consciousness WDL   Level of Consciousness Alert   Comments pleasand and cooperative    Passive ROM Lower Body   Passive ROM Lower Body WDL   Active ROM Lower Body    Active ROM Lower Body  WDL   Strength Lower Body   Lower Body Strength  WDL   Sensation Lower Body   Lower Extremity Sensation   WDL   Lower Body Muscle Tone   Lower Body Muscle Tone  WDL   Neurological Concerns   Neurological Concerns No   Coordination Lower Body    Coordination Lower Body  WDL   Gait Analysis   Gait Level Of Assist Supervised   Assistive Device Front Wheel Walker;None   Distance (Feet) 450   # of Times Distance was Traveled 1   Deviation Bradykinetic;Decreased Heel Strike;Decreased Toe Off   # of Stairs Climbed 0   Weight Bearing Status FWB   Comments pt ambulated 400ft with FWW and 50ft in room with no device and SPV. Pt politely declining stair trial this date, as she knows her  can help her with BUE support on her 2 stairs to enter her home    Bed Mobility    Supine to Sit Independent   Sit to Supine Independent   Scooting Independent   Functional Mobility   Sit to Stand Supervised   Activity Tolerance   Sitting in Chair left up in chair   Sitting Edge of Bed NT   Standing 10mins   Patient / Family Goals    Patient / Family Goal #1 To be able to walk without fear of falling again    Education Group   Education Provided Role of Physical Therapist   Role of Physical Therapist Patient Response Patient;Acceptance;Explanation;Demonstration;Verbal Demonstration;Action Demonstration

## 2021-04-29 NOTE — PROGRESS NOTES
Received bedside report from RN, pt care assumed, VSS. Pt AAOx4, with no complaint of pain at this time. No signs of acute distress noted at this time. Plan of care discussed with pt and verbalizes no questions. Pt denies any additional needs at this time. Bed locked/in lowest position, pt educated on fall risk and verbalized understanding, call light within reach, hourly rounding initiated.

## 2021-04-29 NOTE — PROGRESS NOTES
Telemetry Shift Summary     Rhythm SR  HR Range 90s  Ectopy Rpvc  Measurements .16/.08/.44       Normal Values  Rhythm SR  HR Range    Measurements 0.12-0.20 / 0.06-0.10  / 0.30-0.52    Ongoing care no acute issues at this time. Pt had Us today, starting diuretics tomorrow, labs in AM,  updated by MD Patient resting in bed safely with no c/o anything at this time. Will continue care and monitoring as ordered.

## 2021-04-29 NOTE — HEART FAILURE PROGRAM
Echocardiogram resulted yesterday afternoon not supportive of a heart failure diagnosis.    With that said, patient should be educated that she is on her way to developing heart failure if she does not make changes advised by attending team.    Thank you, Jayshree Cardio RN Navigator [phone]

## 2021-04-29 NOTE — NON-PROVIDER
"                               [facility] Internal Medicine Interval Note    Name Hien Alfaro       1980   Age/Sex 40 y.o. female   MRN [id number]       Interval Problem Daily Status Update    Hien Alfaro is a 41YO PMHx type 2 diabetes (last A1c 9.5), morbid obesity (BMI 45), presented , transfer from [facility], originally thought to have acute heart failure (EF 65%) with BLE swelling and pain. Intermittent abd pain, SOB 2 weeks ago. Drinks 2-3 margaritas/day since age 23 (19 years) and up to 6 alcoholic beverages on wknds. No prior history of liver disease. Patient denied any NSAID use.  Denied any family history of liver disease, colon cancer, pancreatic cancer.  Denied any black tarry stools, hematemesis, odynophagia or dysphagia.     On admission at [facility], the patient's labs were significant for a lactic acid of 6.4, an AST of 182, ALT of 30, alk phos of 242, total bilirubin of 6.5, and albumin of 1.8.  Patient had a CT abdomen pelvis that was significant for hepatomegaly and hepatic steatosis.    No acute overnight events.   Today, Ms. Alfaro reports better energy and improvement in abdominal pain. No change in SOB, has been doing IS exercises and ambulating. Sleeps upright, does not sleep flat because \"feels heavy\". Her BLE swelling and pain is unchanged from admission. Her last BM was last night. Continues on menstrual period, day 5 of about 6, medium-heavy flow per usual. Patient motivated to cease alcohol completely    Past Medical History:   has a past medical history of DM (diabetes mellitus) (HCC).     Inpatient Medications:  Insulin Glargine 20U daily  Humalog QID 2-9 units   omeprazole 20mg daily  prednisolone 39.9 mg daily  potassium chloride 10mEq daily   Thiamine (B1) 100mg daily  folic [id number] daily  bowel  tylenol  pregabalin 50mg BID - nerve/muscle pain  heparin TID    Allergies: NKDA    Past Surgical History:  2  and cholecystectomy.      Family History: Father " has diabetes. Mother has arthritis.      Social History:   Tobacco: Quit 10 years ago. 1 pack a week before this.   Alcohol: 2 beer / violet a day   Recreational drugs (illegal and prescription):  Hx of meth use 5 years ago  Employment: Healthcare worker, home health in [place]  Activity Level: Walker by her self. Ambulate.    Living situation:  [place], [place] with  and 2 kids  Recent travel:  None  Primary Care Provider: reviewed Sai Wang M.D.    Physical Exam       Vitals:    04/28/21 2000 04/29/21 0000 04/29/21 0332 04/29/21 0848   BP:  (!) 99/69 104/67 104/67   Pulse:  97 (!) 104 100   Resp:  19 18 18   Temp:  36.3 °C (97.4 °F) 36.6 °C (97.8 °F) 37.2 °C (98.9 °F)   TempSrc:  Temporal Temporal Temporal   SpO2:  97% 98% 90%   Weight: 116 kg (255 lb 1.2 oz)      Height:         Body mass index is 45.18 kg/m². Weight: 116 kg (255 lb 1.2 oz)  Oxygen Therapy:  Pulse Oximetry: 90 %, O2 (LPM): 0, O2 Delivery Device: None - Room Air    Physical Exam  Constitutional: NAD, laying comfortably, smiling, speaks in sentences without difficulty  Eyes: Scleral icterus present.   Mouth: Sublingual icterus  Cardiovascular: normal rate, normal rhythm. No murmurs, rubs, gallops.   Pulmonary: Lungs clear to ausculatation bilaterally. No rales suggestive of pulmonary edema. No wheezing.   Abd: Soft. No guarding. No rebound tenderness. Abdominal distension present. Hepatomegaly present. No palpable spleen. No fluid shift. No tenderness to palpation.   Ext: 2+ pitting peripheral edema bilaterally to thighs.   NEURO: grossly normal, no weakness. No flapping tremor. Mental status is at baseline.     Lab Data Review:  Recent Labs     04/27/21 1956 04/28/21  0013 04/28/21  0727 04/29/21  0510   SODIUM 131* 127*  --  135   POTASSIUM 4.0 3.8  --  3.3*   CHLORIDE 95* 93*  --  99   CO2 29 28 --  27   BUN 2* 3*  --  5*   CREATININE 0.41* 0.37*  --  0.24*   MAGNESIUM 1.8 2.1  --  2.3   PHOSPHORUS 1.1*  --  2.7  --    CALCIUM  7.1* 7.2*  --  7.5*       Recent Labs     21  0527 21  0527 21  1956 21  0013 21  0510   ALTSGPT 31  --   --  30 29   ASTSGOT 167*  --   --  182* 191*   ALKPHOSPHAT 263*  --   --  242* 213*   TBILIRUBIN 5.7*  --   --  6.5* 6.1*   LIPASE 9*  --   --   --   --    GLUCOSE 265*   < > 301* 311* 184*    < > = values in this interval not displayed.       Recent Labs     21  1238 21  0013 21  0510   RBC 3.29*  --  3.02*  --  2.92*   HEMOGLOBIN 11.5*  --  10.7*  --  10.3*   HEMATOCRIT 34.7*  --  31.8*  --  31.0*   PLATELETCT 123*  --  122*  --  130*   PROTHROMBTM  --  21.1*  --   --  22.4*   INR  --  1.77*  --   --  1.90*   IRON  --   --   --  75  --    FERRITIN  --   --   --  458.0*  --    TOTIRONBC  --   --   --  92*  --        Recent Labs     213 21  0510   WBC 8.3 7.2 9.3   NEUTSPOLYS 74.40* 85.80* 69.20   LYMPHOCYTES 9.40* 8.60* 19.70*   MONOCYTES 6.80 4.60 10.30   EOSINOPHILS 1.70 0.00 0.00   BASOPHILS 5.10* 0.40 0.80   ASTSGOT 167* 182* 191*   ALTSGPT 31 30 29   ALKPHOSPHAT 263* 242* 213*   TBILIRUBIN 5.7* 6.5* 6.1*       Imagin/27 CT Abdomen Pelvis w/ cont  HISTORY/REASON FOR EXAM: Jaundice.  IMPRESSION:  Hepatomegaly and hepatic steatosis. The liver is heterogeneous and small hepatic lesions are not excluded.     Indeterminate 1 cm right renal lesion. Further evaluation can be performed with renal protocol MRI.     Scattered colonic diverticulosis.     Status post cholecystectomy.     Splenomegaly.     US-EXTREMITY VENOUS LOWER BILAT  FINDINGS:   Bilateral lower extremities -   No evidence of deep venous thrombosis.    Complete color filling and compressibility with normal venous flow dynamics    including spontaneous flow, response to augmentation maneuvers, and    respiratory phasicity.    Interstitial fluid consistent with edema is observed below the knee,    bilaterally.      EC-ECHOCARDIOGRAM COMPLETE W/O CONT  CONCLUSIONS  Normal echocardiogram study.  LV EF:  65    %    4/28 US-RUQ   HISTORY/REASON FOR EXAM:  Jaundice; alcoholic hepatitis please evaluate portal vein flow  IMPRESSION:     1.  Limited by body habitus.  2.  Echogenic liver suggesting cirrhosis.  3.  Flow present within the portal vein.  4.  Prior cholecystectomy.  5.  No biliary dilation.  6.  Limited evaluation of pancreas and abdominal aorta.      Assessment and Plan:   Hien Alfaro is a 40-year-old female with history of type 2 diabetes and alcohol use presenting with shortness of breath, lower extremity edema and AST over ALT elevation more than 2:1, elevated bilirubin with scleral and sublingual icterus being treated for alcoholic hepatitis.    #Alcoholic hepatitis   Assessment & Plan  Labs significant for Hepatocellular Damage (AST 182H, ALT 30), Cholestasis (Alk Phos 242H - 263H, TBili 6.5H-5.7H), and Decreased Hepatic Synthesis (Albumin 1.8L - 2.0L, PT 22.4H - 21.1H, INR 1.9H - 1.77H, Platelet 130L - 122L 123L), elevated Ammonia 68H - 61H. Alcohol x 17 years. CT Abdomen showed hepatosplenomegaly, hepatic steatosis and 1cm renal lesion (incidental finding)  Overall Liver status: labs indicate hepatocellular damage (high AST), Decreased function (low albumin, high PT/INR, low platelet). Further, high ammonia (decreased detox). AST/ALT>2.  Labs and history strongly suggest alcohol as cause for hepatitis, however will r/o autoimmune hepatitis (labs pending). Hemochromatosis unlikely - labs not showing iron overload. Acute viral hepatitis panel negative. RUQ U/S showed Flow present within the portal vein. MELD score is 21 with 19.6% estimated 3 month mortality. AST to platelet ratio = 167/123 = 1.35. APRI>1 suggests cirrhosis.  Ms. Alfaro is motivated to cease alcohol completely. Lactic acid and BP have stabilized.   Plan:  - Start diuresis with IV furosemide 20mg and Spirinolactone 50mg   - Close monitoring of  kidney function  - Follow-up outpatient to titrate dosage  - Continue prednisolone 40 mg daily for now   - Follow-up with GI outpatient for further evaluation for potential cirrhosis once inflammation resolves  - Avoid NSAIDs in future  -Continued counseling on alcohol cessation, patient motivated    #Peripheral Edema, Bilateral LE  Assessment & Plan  Etiologies: Likely 2/2 to Liver Disease (hypo-osmotic effect 2/2 low albumin), Less likely Heart Failure: elevated BNP (1068), ECHO showing LV EF:  65%), less likely Kidney Disease: low albumin, urinalysis shows no protein in urine, BUN 5, Cr 0.24.   Kidney fxn normal. However, Protein/Creat Ratio 494H - P/C ratio > 3.5 can represent nephrotic range proteinuria.   Plan:   - Start diuresis with IV furosemide 20mg and Spirinolactone 50mg     #Lactic acidosis, resolving  Assessment & Plan  Does not have signs of infection.  This may be secondary to hypoperfusion, since improved after small amount of fluids to avoid overload. 4/28 Lactic acid now 2.3 from 6.4 at admission.     #Anemia, macrocytic  Assessment & Plan  Hg 10.3L, .2H, RDW 65.5H, Ferritin 458.0. Vitamin B12 and folate levels not decreased. She is also currently on her menstrual cycle.  TSH is slightly elevated at 8 but free T4 level normal. Anemia quite stable. No change in MCV despite supplement folate. Likeley 2/2 to chronic alcohol use     #Hyperammonemia  Assessment & Plan  Ammonia 68H  No asterixis, no AMS suggestive of heptatic encephalopathy  Plan:  - monitor for AMS    #Thrombocytopenia  Assessment & Plan  Platelet 130L - 122L 123L  Thrombocytopenia quite stable   Likely 2/2 to decreased thrombopoietin produced by liver    #Hyponatremia, resolving  Assessment & Plan  Sodium improving, now Na+ 135. Urine Osmolality 690. Urine Sodium < 20 indicating good kidney function.   Plan:  - Okay to start diuresis    #Type 2 diabetes mellitus (last A1c 9.5)  Assessment & Plan  She has been taking Trulicity  and SGLT2 inhibitor at home which was stopped on admission due to unavailability. Currently taking Insulin Glargine 20U daily and Humalog QID 2-9 units.   Plan:  -We will continue Lantus 20 units daily along with sliding scale insulin during this hospitalization and adjust as appropriate  -Continue Lyrica for peripheral neuropathy    #QT prolongation   Assessment & Plan  Probably secondary to hypokalemia.  Avoid QTC prolonging medication.    #Gastroesophageal reflux disease without esophagitis- (present on admission)  Assessment & Plan  Continue PPI for GERD which she takes at home

## 2021-04-30 LAB
ALBUMIN SERPL BCP-MCNC: 1.8 G/DL (ref 3.2–4.9)
ALBUMIN/GLOB SERPL: 0.3 G/DL
ALP SERPL-CCNC: 226 U/L (ref 30–99)
ALT SERPL-CCNC: 32 U/L (ref 2–50)
ANION GAP SERPL CALC-SCNC: 5 MMOL/L (ref 7–16)
AST SERPL-CCNC: 203 U/L (ref 12–45)
BASOPHILS # BLD AUTO: 0.2 % (ref 0–1.8)
BASOPHILS # BLD: 0.02 K/UL (ref 0–0.12)
BILIRUB SERPL-MCNC: 7.1 MG/DL (ref 0.1–1.5)
BUN SERPL-MCNC: 6 MG/DL (ref 8–22)
CALCIUM SERPL-MCNC: 7.6 MG/DL (ref 8.5–10.5)
CHLORIDE SERPL-SCNC: 98 MMOL/L (ref 96–112)
CO2 SERPL-SCNC: 27 MMOL/L (ref 20–33)
CREAT SERPL-MCNC: 0.38 MG/DL (ref 0.5–1.4)
EOSINOPHIL # BLD AUTO: 0.06 K/UL (ref 0–0.51)
EOSINOPHIL NFR BLD: 0.6 % (ref 0–6.9)
ERYTHROCYTE [DISTWIDTH] IN BLOOD BY AUTOMATED COUNT: 68.5 FL (ref 35.9–50)
GLOBULIN SER CALC-MCNC: 6.2 G/DL (ref 1.9–3.5)
GLUCOSE BLD-MCNC: 227 MG/DL (ref 65–99)
GLUCOSE BLD-MCNC: 282 MG/DL (ref 65–99)
GLUCOSE BLD-MCNC: 307 MG/DL (ref 65–99)
GLUCOSE SERPL-MCNC: 193 MG/DL (ref 65–99)
HCT VFR BLD AUTO: 35.4 % (ref 37–47)
HGB BLD-MCNC: 11.3 G/DL (ref 12–16)
IMM GRANULOCYTES # BLD AUTO: 0.09 K/UL (ref 0–0.11)
IMM GRANULOCYTES NFR BLD AUTO: 0.9 % (ref 0–0.9)
INR PPP: 1.75 (ref 0.87–1.13)
LYMPHOCYTES # BLD AUTO: 1.43 K/UL (ref 1–4.8)
LYMPHOCYTES NFR BLD: 15.1 % (ref 22–41)
MCH RBC QN AUTO: 35.1 PG (ref 27–33)
MCHC RBC AUTO-ENTMCNC: 31.9 G/DL (ref 33.6–35)
MCV RBC AUTO: 109.9 FL (ref 81.4–97.8)
MONOCYTES # BLD AUTO: 0.7 K/UL (ref 0–0.85)
MONOCYTES NFR BLD AUTO: 7.4 % (ref 0–13.4)
NEUTROPHILS # BLD AUTO: 7.2 K/UL (ref 2–7.15)
NEUTROPHILS NFR BLD: 75.8 % (ref 44–72)
NRBC # BLD AUTO: 0.04 K/UL
NRBC BLD-RTO: 0.4 /100 WBC
PLATELET # BLD AUTO: 150 K/UL (ref 164–446)
PMV BLD AUTO: 12.4 FL (ref 9–12.9)
POTASSIUM SERPL-SCNC: 3.5 MMOL/L (ref 3.6–5.5)
PROT SERPL-MCNC: 8 G/DL (ref 6–8.2)
PROTHROMBIN TIME: 21 SEC (ref 12–14.6)
RBC # BLD AUTO: 3.22 M/UL (ref 4.2–5.4)
SODIUM SERPL-SCNC: 130 MMOL/L (ref 135–145)
WBC # BLD AUTO: 9.5 K/UL (ref 4.8–10.8)

## 2021-04-30 PROCEDURE — 85025 COMPLETE CBC W/AUTO DIFF WBC: CPT

## 2021-04-30 PROCEDURE — 99233 SBSQ HOSP IP/OBS HIGH 50: CPT | Mod: GC | Performed by: INTERNAL MEDICINE

## 2021-04-30 PROCEDURE — A9270 NON-COVERED ITEM OR SERVICE: HCPCS | Performed by: STUDENT IN AN ORGANIZED HEALTH CARE EDUCATION/TRAINING PROGRAM

## 2021-04-30 PROCEDURE — 700111 HCHG RX REV CODE 636 W/ 250 OVERRIDE (IP): Performed by: STUDENT IN AN ORGANIZED HEALTH CARE EDUCATION/TRAINING PROGRAM

## 2021-04-30 PROCEDURE — 700102 HCHG RX REV CODE 250 W/ 637 OVERRIDE(OP): Performed by: STUDENT IN AN ORGANIZED HEALTH CARE EDUCATION/TRAINING PROGRAM

## 2021-04-30 PROCEDURE — 82962 GLUCOSE BLOOD TEST: CPT | Mod: 91

## 2021-04-30 PROCEDURE — 36415 COLL VENOUS BLD VENIPUNCTURE: CPT

## 2021-04-30 PROCEDURE — 80053 COMPREHEN METABOLIC PANEL: CPT

## 2021-04-30 PROCEDURE — 85610 PROTHROMBIN TIME: CPT

## 2021-04-30 PROCEDURE — 770020 HCHG ROOM/CARE - TELE (206)

## 2021-04-30 RX ORDER — SPIRONOLACTONE 25 MG/1
50 TABLET ORAL ONCE
Status: DISCONTINUED | OUTPATIENT
Start: 2021-04-30 | End: 2021-04-30

## 2021-04-30 RX ORDER — SPIRONOLACTONE 25 MG/1
100 TABLET ORAL ONCE
Status: COMPLETED | OUTPATIENT
Start: 2021-04-30 | End: 2021-04-30

## 2021-04-30 RX ORDER — FUROSEMIDE 40 MG/1
40 TABLET ORAL ONCE
Status: COMPLETED | OUTPATIENT
Start: 2021-04-30 | End: 2021-04-30

## 2021-04-30 RX ORDER — INSULIN GLARGINE 100 [IU]/ML
24 INJECTION, SOLUTION SUBCUTANEOUS DAILY
Status: DISCONTINUED | OUTPATIENT
Start: 2021-05-01 | End: 2021-05-01

## 2021-04-30 RX ORDER — POTASSIUM CHLORIDE 20 MEQ/1
40 TABLET, EXTENDED RELEASE ORAL ONCE
Status: COMPLETED | OUTPATIENT
Start: 2021-04-30 | End: 2021-04-30

## 2021-04-30 RX ADMIN — HEPARIN SODIUM 5000 UNITS: 5000 INJECTION, SOLUTION INTRAVENOUS; SUBCUTANEOUS at 21:23

## 2021-04-30 RX ADMIN — SPIRONOLACTONE 100 MG: 25 TABLET ORAL at 15:37

## 2021-04-30 RX ADMIN — PREGABALIN 50 MG: 25 CAPSULE ORAL at 16:46

## 2021-04-30 RX ADMIN — FOLIC ACID 1 MG: 1 TABLET ORAL at 06:03

## 2021-04-30 RX ADMIN — INSULIN LISPRO 6 UNITS: 100 INJECTION, SOLUTION INTRAVENOUS; SUBCUTANEOUS at 21:26

## 2021-04-30 RX ADMIN — HEPARIN SODIUM 5000 UNITS: 5000 INJECTION, SOLUTION INTRAVENOUS; SUBCUTANEOUS at 06:03

## 2021-04-30 RX ADMIN — PREDNISOLONE 39.9 MG: 15 SOLUTION ORAL at 06:04

## 2021-04-30 RX ADMIN — INSULIN LISPRO 3 UNITS: 100 INJECTION, SOLUTION INTRAVENOUS; SUBCUTANEOUS at 07:59

## 2021-04-30 RX ADMIN — HEPARIN SODIUM 5000 UNITS: 5000 INJECTION, SOLUTION INTRAVENOUS; SUBCUTANEOUS at 13:48

## 2021-04-30 RX ADMIN — INSULIN GLARGINE 20 UNITS: 100 INJECTION, SOLUTION SUBCUTANEOUS at 12:05

## 2021-04-30 RX ADMIN — DOCUSATE SODIUM 50 MG AND SENNOSIDES 8.6 MG 2 TABLET: 8.6; 5 TABLET, FILM COATED ORAL at 16:46

## 2021-04-30 RX ADMIN — INSULIN LISPRO 5 UNITS: 100 INJECTION, SOLUTION INTRAVENOUS; SUBCUTANEOUS at 12:05

## 2021-04-30 RX ADMIN — INSULIN LISPRO 6 UNITS: 100 INJECTION, SOLUTION INTRAVENOUS; SUBCUTANEOUS at 16:50

## 2021-04-30 RX ADMIN — PREGABALIN 50 MG: 25 CAPSULE ORAL at 06:03

## 2021-04-30 RX ADMIN — ACETAMINOPHEN 500 MG: 500 TABLET ORAL at 16:46

## 2021-04-30 RX ADMIN — POTASSIUM CHLORIDE 40 MEQ: 1500 TABLET, EXTENDED RELEASE ORAL at 07:58

## 2021-04-30 RX ADMIN — OMEPRAZOLE 20 MG: 20 CAPSULE, DELAYED RELEASE ORAL at 06:03

## 2021-04-30 RX ADMIN — FUROSEMIDE 40 MG: 40 TABLET ORAL at 12:40

## 2021-04-30 RX ADMIN — THERA TABS 1 TABLET: TAB at 06:03

## 2021-04-30 ASSESSMENT — ENCOUNTER SYMPTOMS
PALPITATIONS: 0
CONSTIPATION: 0
BLURRED VISION: 0
NAUSEA: 0
MYALGIAS: 0
ABDOMINAL PAIN: 1
DOUBLE VISION: 0
ORTHOPNEA: 0
VOMITING: 0
CHILLS: 0
HEARTBURN: 0
DIZZINESS: 0
SHORTNESS OF BREATH: 0
SORE THROAT: 0
BLOOD IN STOOL: 0
SHORTNESS OF BREATH: 1
FEVER: 0
COUGH: 0
HEMOPTYSIS: 0
DIARRHEA: 0
DEPRESSION: 0
BRUISES/BLEEDS EASILY: 0
SPUTUM PRODUCTION: 0
HEADACHES: 0

## 2021-04-30 ASSESSMENT — FIBROSIS 4 INDEX
FIB4 SCORE: 9.09
FIB4 SCORE: 9.09

## 2021-04-30 NOTE — HEART FAILURE PROGRAM
From resident note today:       Elevated brain natriuretic peptide (BNP) level- (present on admission)  Assessment & Plan  Echocardiogram did not show heart failure, normal EF without diastolic dysfunction      Appointment and heart failure checklist not indicated.    Thank you, Jayshree Cardio RN Navigator [phone]

## 2021-04-30 NOTE — CARE PLAN
Problem: Communication  Goal: The ability to communicate needs accurately and effectively will improve  Outcome: PROGRESSING AS EXPECTED     Problem: Safety  Goal: Will remain free from injury  Outcome: PROGRESSING AS EXPECTED     Problem: Infection  Goal: Will remain free from infection  Outcome: PROGRESSING AS EXPECTED     Problem: Knowledge Deficit  Goal: Knowledge of the prescribed therapeutic regimen will improve  Outcome: PROGRESSING AS EXPECTED

## 2021-04-30 NOTE — CARE PLAN
Problem: Communication  Goal: The ability to communicate needs accurately and effectively will improve  Outcome: PROGRESSING AS EXPECTED     Problem: Venous Thromboembolism (VTW)/Deep Vein Thrombosis (DVT) Prevention:  Goal: Patient will participate in Venous Thrombosis (VTE)/Deep Vein Thrombosis (DVT)Prevention Measures  Outcome: PROGRESSING AS EXPECTED     Problem: Discharge Barriers/Planning  Goal: Patient's continuum of care needs will be met  Outcome: PROGRESSING AS EXPECTED

## 2021-04-30 NOTE — PROGRESS NOTES
Daily Progress Note:     Date of Service: 4/30/2021  Primary Team: [facility] IM Red Team  Attending: Vianey Fajardo M.D.   Senior Resident: Davin Hernandez M.D.  Contact:  [phone]    ID:   40-year-old female with a past medical history of type 2 diabetes and alcohol use disorder who presented from OSH in [place], [place] for alcoholic hepatitis.    Interval Update:  Patient seen and examined at bedside this AM, stable, NAD, no acute events overnight, patient feels better today, is able to ambulate with walker, edema is unchanged, did not have much sponsor diuretic yesterday.  Will administer Lasix 40 mg p.o. and Aldactone 100 mg p.o. with hold parameters for blood pressure to assess for increased urine output, although renal function is normal this is tenuous.  Significantly hypoalbuminemic, coagulopathic, transaminitis is unchanged as well as unchanged bilirubin.  Detectable but not tappable ascites in midline in supine position.  Patient feels subjectively better.  Autoimmune labs pending.  We will continue prednisolone at current dose for 28 days, check Lille score on 5/4 to assess response, GI following, appreciate recommendations, will correct for hyperglycemia by increasing Lantus to 24 U with high correctional SSI.    Consultants/Specialty:  Gastroenterology    Review of Systems:    Review of Systems   Constitutional: Negative for chills and fever.   HENT: Negative for congestion and sore throat.    Eyes: Negative for blurred vision and double vision.   Respiratory: Positive for shortness of breath (Fatigued when getting up and walking around.). Negative for cough.    Cardiovascular: Positive for leg swelling. Negative for chest pain and orthopnea.   Gastrointestinal: Negative for nausea and vomiting.   Genitourinary: Negative for dysuria and urgency.   Musculoskeletal: Negative for joint pain and myalgias.   Skin: Negative for itching and rash.   Neurological: Negative for dizziness and headaches.        Objective Data:   Physical Exam:   Vitals:   Temp:  [36.2 °C (97.2 °F)-37.2 °C (99 °F)] 36.2 °C (97.2 °F)  Pulse:  [] 96  Resp:  [18-20] 18  BP: ()/(58-80) 110/71  SpO2:  [91 %-93 %] 93 %     Physical Exam  Vitals and nursing note reviewed.   Constitutional:       General: She is not in acute distress.     Appearance: She is obese. She is not ill-appearing.   HENT:      Head: Normocephalic and atraumatic.      Mouth/Throat:      Mouth: Mucous membranes are moist.      Pharynx: Oropharynx is clear.      Comments: Sublingual jaundice  Eyes:      General: Scleral icterus present.      Extraocular Movements: Extraocular movements intact.      Pupils: Pupils are equal, round, and reactive to light.   Cardiovascular:      Rate and Rhythm: Normal rate and regular rhythm.      Heart sounds: No murmur. No gallop.    Pulmonary:      Effort: Pulmonary effort is normal. No respiratory distress.      Breath sounds: Normal breath sounds. No wheezing, rhonchi or rales.   Abdominal:      General: Abdomen is flat. There is no distension.      Palpations: Abdomen is soft.      Tenderness: There is no abdominal tenderness. There is no guarding.   Musculoskeletal:         General: Swelling present.      Right lower leg: Edema present.      Left lower leg: Edema present.      Comments: Bilateral 2+ pitting edema to thighs.   Skin:     General: Skin is warm and dry.   Neurological:      General: No focal deficit present.      Mental Status: She is alert and oriented to person, place, and time.   Psychiatric:         Mood and Affect: Mood normal.         Behavior: Behavior normal.       Labs:   4/29/2021  2:38 PM    Recent Labs     04/27/21 1956 04/27/21 1956 04/28/21  0013 04/28/21  0727 04/29/21  0510 04/30/21  0542   SODIUM 131*   < > 127*  --  135 130*   POTASSIUM 4.0   < > 3.8  --  3.3* 3.5*   CHLORIDE 95*   < > 93*  --  99 98   CO2 29   < > 28  --  27 27   BUN 2*   < > 3*  --  5* 6*   CREATININE 0.41*   < > 0.37*   --  0.24* 0.38*   MAGNESIUM 1.8  --  2.1  --  2.3  --    PHOSPHORUS 1.1*  --   --  2.7  --   --    CALCIUM 7.1*   < > 7.2*  --  7.5* 7.6*    < > = values in this interval not displayed.       Recent Labs     04/28/21  0013 04/29/21  0510 04/30/21  0542   ALTSGPT 30 29 32   ASTSGOT 182* 191* 203*   ALKPHOSPHAT 242* 213* 226*   TBILIRUBIN 6.5* 6.1* 7.1*   GLUCOSE 311* 184* 193*       Recent Labs     04/28/21  0013 04/28/21  0727 04/29/21  0510 04/30/21  0542   RBC 3.02*  --  2.92* 3.22*   HEMOGLOBIN 10.7*  --  10.3* 11.3*   HEMATOCRIT 31.8*  --  31.0* 35.4*   PLATELETCT 122*  --  130* 150*   PROTHROMBTM  --   --  22.4* 21.0*   INR  --   --  1.90* 1.75*   IRON  --  75  --   --    FERRITIN  --  458.0*  --   --    TOTIRONBC  --  92*  --   --        Imaging:   US-RUQ   Final Result      1.  Limited by body habitus.   2.  Echogenic liver suggesting cirrhosis.   3.  Flow present within the portal vein.   4.  Prior cholecystectomy.   5.  No biliary dilation.   6.  Limited evaluation of pancreas and abdominal aorta.         EC-ECHOCARDIOGRAM COMPLETE W/O CONT   Final Result      US-EXTREMITY VENOUS LOWER BILAT   Final Result      CT-ABDOMEN-PELVIS WITH   Final Result      Hepatomegaly and hepatic steatosis. The liver is heterogeneous and small hepatic lesions are not excluded.      Indeterminate 1 cm right renal lesion. Further evaluation can be performed with renal protocol MRI.      Scattered colonic diverticulosis.      Status post cholecystectomy.      Splenomegaly.      OUTSIDE IMAGES-DX CHEST   Final Result          Problem Representation:   The patient is a 40-year-old female with a past medical history of alcohol use and type 2 diabetes who presented to the hospital with bilateral lower extremity edema and was found to have alcoholic hepatitis.     * Alcoholic hepatitis without ascites- (present on admission)  Assessment & Plan  The patient's labs and history are strongly suggestive of alcoholic hepatitis.  Her lower  extremity edema is likely secondary to hypoalbuminemia and low oncotic pressure.    Maddrey's discriminatory function score 43 on presentation.   CT abdomen done outside of this hospital showed hepatosplenomegaly, hepatic steatosis and 1 cm renal lesion which is an incidental finding.   Lower extremity edema likely secondary to hypoalbuminemia and low oncotic pressure.  Renal function remains preserved, blood pressure remains soft.   Right upper quadrant ultrasound did not show portal vein thrombosis.  Iron panel is not suggestive of hemochromatosis, autoimmune labs pending.  Alcoholic hepatitis remains stable in terms of clinical and biochemical condition.    Plan:  -Continue prednisolone 40 mg daily for 28 days (start date 4/27).  Lille score to be calculated after 7 days (5/4) to determine effectiveness.  -Lasix 40 mg p.o. for one-time dose, as well as Aldactone 100 mg p.o. 1 time and assess diuretic response, I/O charting, sodium and water restriction.  -Continue close monitoring of CMP for renal function  -Boost plus supplement for albumin of 2 nutritional support,   -Leg elevation when in bed and ambulation 3 times daily  -Continue thiamine and folic along with multivitamin.  -Continued counseling on alcohol cessation, patient motivated to quit.  Declined AA at this time stating that she has a good support system.    Lactic acidosis  Assessment & Plan  RESOLVED  This is likely a combination of type B lactic acidosis and hypoperfusion.  This improved after fluids.      Shortness of breath  Assessment & Plan  Satting above 90%, likely component of obesity,  no ascites seen on ultrasound.      Plan:  -Continue incentive spirometry (able to draw 1500 cc with maximum effort), ambulation,  monitor closely for signs of hepatopulmonary syndrome.    Macrocytic anemia- (present on admission)  Assessment & Plan  This may be secondary to alcohol causing bone marrow toxicity.  Vitamin B12 and folate levels not  decreased.  She is also currently on her menstrual cycle.  TSH is slightly elevated at 8 but free T4 level normal.    Plan:  -Continue to monitor at this time with periodic CBCs.    Hyponatremia  Assessment & Plan  Secondary to hypovolemia as suggested by urine chemistry.  This improved with IV fluids.      QT prolongation  Assessment & Plan  Probably secondary to hypokalemia.      Plan:  -Replating potassium as necessary, especially with diuretics  -Avoid QTC prolonging medication.    Gastroesophageal reflux disease without esophagitis- (present on admission)  Assessment & Plan  Continue PPI for GERD which she takes at home    Type 2 diabetes mellitus with neurologic complication, without long-term current use of insulin (HCC)- (present on admission)  Assessment & Plan  She has been taking Trulicity and SGLT2 inhibitor at home which was stopped on admission due to unavailability.   -Repeat A1c 9.5  Plan:  -Increase Lantus to 24 units daily along with sliding scale insulin during this hospitalization and adjust as appropriate  -Continue Lyrica for peripheral neuropathy    Elevated brain natriuretic peptide (BNP) level- (present on admission)  Assessment & Plan  Echocardiogram did not show heart failure, normal EF without diastolic dysfunction      DVT ppx: Heparin  Diet: Diabetic  Tubes/Lines: PIV  Code status: Full    Davin Hernandez M.D.

## 2021-04-30 NOTE — PROGRESS NOTES
Gastroenterology Progress Note     Author: Khang Freeman D.O.   Date & Time Created: 4/30/2021 7:55 AM    Chief Complaint:  Lower Extremity Swelling and Pain    Interval History:  No acute overnight events.  The patient is currently being diuresed with lasix and aldactone.  Denies any significant abdominal pain, nausea or vomiting. States that swelling has improved slightly.    Review of Systems:  Review of Systems   Constitutional: Negative for chills and fever.   HENT: Negative for hearing loss and tinnitus.    Eyes: Negative for blurred vision.   Respiratory: Negative for cough, hemoptysis, sputum production and shortness of breath.    Cardiovascular: Positive for leg swelling. Negative for chest pain, palpitations and orthopnea.   Gastrointestinal: Positive for abdominal pain. Negative for blood in stool, constipation, diarrhea, heartburn, nausea and vomiting.   Genitourinary: Negative for dysuria, frequency, hematuria and urgency.   Musculoskeletal: Negative for myalgias.   Skin: Negative for itching and rash.   Neurological: Negative for dizziness and headaches.   Endo/Heme/Allergies: Does not bruise/bleed easily.   Psychiatric/Behavioral: Negative for depression.   All other systems reviewed and are negative.      Physical Exam:  Physical Exam   Constitutional: She is oriented to person, place, and time. She appears well-developed and well-nourished.   HENT:   Head: Normocephalic and atraumatic.   Eyes: Pupils are equal, round, and reactive to light. EOM are normal. Right eye exhibits no discharge. Left eye exhibits no discharge. Scleral icterus is present.   Neck: No tracheal deviation present. No thyromegaly present.   Cardiovascular: Normal rate, regular rhythm and normal heart sounds. Exam reveals no gallop and no friction rub.   No murmur heard.  Pulmonary/Chest: Breath sounds normal. No respiratory distress. She has no wheezes.   Abdominal: Soft. Bowel sounds are normal. She exhibits no  distension. There is abdominal tenderness. There is no rebound and no guarding.   Tenderness in lower left and right quadrants with palpation.  Hepatomegaly, splenomegaly   Musculoskeletal:         General: Edema present.      Comments: 3+ pitting edema in bilateral lower extremities   Neurological: She is alert and oriented to person, place, and time.   Skin: Skin is dry. No rash noted. No erythema.   Nursing note and vitals reviewed.      Labs:          Recent Labs     04/27/21 1956 04/27/21 1956 04/28/21 0013 04/28/21 0727 04/29/21  0510 04/30/21  0542   SODIUM 131*   < > 127*  --  135 130*   POTASSIUM 4.0   < > 3.8  --  3.3* 3.5*   CHLORIDE 95*   < > 93*  --  99 98   CO2 29   < > 28  --  27 27   BUN 2*   < > 3*  --  5* 6*   CREATININE 0.41*   < > 0.37*  --  0.24* 0.38*   MAGNESIUM 1.8  --  2.1  --  2.3  --    PHOSPHORUS 1.1*  --   --  2.7  --   --    CALCIUM 7.1*   < > 7.2*  --  7.5* 7.6*    < > = values in this interval not displayed.     Recent Labs     04/28/21 0013 04/29/21  0510 04/30/21  0542   ALTSGPT 30 29 32   ASTSGOT 182* 191* 203*   ALKPHOSPHAT 242* 213* 226*   TBILIRUBIN 6.5* 6.1* 7.1*   GLUCOSE 311* 184* 193*     Recent Labs     04/27/21  1238 04/28/21  0013 04/28/21  0727 04/29/21  0510 04/30/21  0542   RBC  --  3.02*  --  2.92* 3.22*   HEMOGLOBIN  --  10.7*  --  10.3* 11.3*   HEMATOCRIT  --  31.8*  --  31.0* 35.4*   PLATELETCT  --  122*  --  130* 150*   PROTHROMBTM 21.1*  --   --  22.4* 21.0*   INR 1.77*  --   --  1.90* 1.75*   IRON  --   --  75  --   --    FERRITIN  --   --  458.0*  --   --    TOTIRONBC  --   --  92*  --   --      Recent Labs     04/28/21  0013 04/29/21  0510 04/30/21  0542   WBC 7.2 9.3 9.5   NEUTSPOLYS 85.80* 69.20 75.80*   LYMPHOCYTES 8.60* 19.70* 15.10*   MONOCYTES 4.60 10.30 7.40   EOSINOPHILS 0.00 0.00 0.60   BASOPHILS 0.40 0.80 0.20   ASTSGOT 182* 191* 203*   ALTSGPT 30 29 32   ALKPHOSPHAT 242* 213* 226*   TBILIRUBIN 6.5* 6.1* 7.1*       Imaging:  No new  imaging    Assessment:    #Alcoholic Hepatitis  The patient is a pleasant 41 y/o female with a PMH significant for Type 2 DM and morbid obesity who presented to the hospital on 4/27/21 with lower extremity swelling and pain, found to have alcoholic hepatitis. The patient has been on IV steroids due to Maddrey's Discriminant Function of 46. Would recommend calculating Lille score when the time comes to assess whether the patient would benefit from steroids upon discharge or not. Counseled the patient again on the importance of alcohol cessation.    #Lactic Acidosis  -Resolved    #Anasarca  -Continue diuresis    #GERD  -Continue PPI    #Hypokalemia  -Continue to monitor and replace  -On aldactone    #Macrocytic Anemia  -Secondary to alcohol use    #Hyponatremia  -Secondary to liver disease    #Type II DM  -On Invokana at home. Currently on glargine and lispro.      Plan:  -Continue diuresing patient with IV Lasix and Aldactone.  -Monitor kidney function closely. Will need close outpatient follow up to titrate dosage.  -Strongly recommend alcohol cessation.  -Recommend calculating Lille score to assess whether the patient would need to be on further steroid treatment. Ideally, patient would be discharged without steroids due to long-term complications of chronic steroid use.  -Close follow-up with GI outpatient for further evaluation for potential cirrhosis once inflammation resolves.  -No NSAIDs in the future  -The patient verbalized understanding and agrees with the plan of care.    GI will sign off. Please feel free to call with questions.    Quality-Core Measures   Reviewed items::  Labs reviewed and Medications reviewed  Charlton catheter::  No Charlton  DVT prophylaxis pharmacological::  Heparin    Thank you for the consult.    Khang Freeman D.O. PGY-3

## 2021-04-30 NOTE — NON-PROVIDER
[facility] Medicine Interval Note    Name Hien Alfaro       1980   Age/Sex 40 y.o. female   MRN [id number]   Code Status full     ID:   40-year-old female with a past medical history of type 2 diabetes and alcohol use disorder who presented from an outside hospital in [facility] for alcoholic hepatitis.     Interval Problem Daily Status Update    Hine Alfaro is a 41YO PMHx type 2 diabetes (last A1c 9.5), morbid obesity (BMI 45), presented , transfer from [facility], originally thought to have acute heart failure (EF 65%) with BLE swelling and pain. Intermittent abd pain, SOB 2 weeks ago. Drinks 2-3 margaritas/day since age 23 (19 years) and up to 6 alcoholic beverages on wknds. No prior history of liver disease. Patient denied any NSAID use.  Denied any family history of liver disease, colon cancer, pancreatic cancer.  Denied any black tarry stools, hematemesis, odynophagia or dysphagia.     On admission at [facility], the patient's labs were significant for a lactic acid of 6.4, an AST of 182, ALT of 30, alk phos of 242, total bilirubin of 6.5, and albumin of 1.8.  Patient had a CT abdomen pelvis that was significant for hepatomegaly and hepatic steatosis.    No acute overnight events.   Today, Ms. Alfaro reports better energy, good appetite. No new symptoms. She reports dry cough that she has had in the past. Ongoing intermitten chills. Started diuretics yesterday, Has not urinated since last night. No change in baseline subjective SOB due to inability to take deep breaths due to abdominal distention, has been doing IS exercises and ambulating. Her BLE swelling and pain is unchanged from admission. Took bowel medications yesterday, last BM was yesterday. Patient motivated to cease alcohol completely. Denies fever, N/V/D, abdominal pain, tremors, chest pain, change in baseline dyspnea, palpitations.     Consultants/Specialty:  Gastroenterology,  Dr. Smyth     Physical Exam       Vitals:    04/30/21 0449 04/30/21 0832 04/30/21 0946 04/30/21 1125   BP: 113/80 103/68  113/78   Pulse: (!) 106 99  95   Resp: 18 18  20   Temp: 36.3 °C (97.4 °F) 36.3 °C (97.4 °F)  36.3 °C (97.4 °F)   TempSrc: Temporal Temporal  Temporal   SpO2:  91%  92%   Weight:   115 kg (253 lb 15.5 oz)    Height:         Body mass index is 44.99 kg/m². Weight: 115 kg (253 lb 15.5 oz)  Oxygen Therapy:  Pulse Oximetry: 92 %, O2 (LPM): 0, O2 Delivery Device: None - Room Air    Physical Exam  Constitutional: NAD, laying comfortably, smiling, speaks in sentences without difficulty  Eyes: Scleral icterus present.   Mouth: Sublingual icterus  Cardiovascular: normal rate, normal rhythm. No murmurs, rubs, gallops.   Pulmonary: Lungs clear to ausculatation bilaterally. No rales suggestive of pulmonary edema. No wheezing.   Abd: Soft. No guarding. No rebound tenderness. Abdominal distension present. Hepatomegaly present. No palpable spleen. No fluid shift. No tenderness to palpation.   Ext: 2+ pitting peripheral edema bilaterally to thighs.   NEURO: grossly normal, no weakness. No flapping tremor. Mental status is at baseline.      Lab Data Review:  4/30/2021  1:49 PM    Recent Labs     04/27/21 1956 04/27/21 1956 04/28/21  0013 04/28/21  0727 04/29/21  0510 04/30/21  0542   SODIUM 131*   < > 127*  --  135 130*   POTASSIUM 4.0   < > 3.8  --  3.3* 3.5*   CHLORIDE 95*   < > 93*  --  99 98   CO2 29   < > 28  --  27 27   BUN 2*   < > 3*  --  5* 6*   CREATININE 0.41*   < > 0.37*  --  0.24* 0.38*   MAGNESIUM 1.8  --  2.1  --  2.3  --    PHOSPHORUS 1.1*  --   --  2.7  --   --    CALCIUM 7.1*   < > 7.2*  --  7.5* 7.6*    < > = values in this interval not displayed.       Recent Labs     04/28/21  0013 04/29/21  0510 04/30/21  0542   ALTSGPT 30 29 32   ASTSGOT 182* 191* 203*   ALKPHOSPHAT 242* 213* 226*   TBILIRUBIN 6.5* 6.1* 7.1*   GLUCOSE 311* 184* 193*       Recent Labs     04/28/21  0013 04/28/21  0727  04/29/21  0510 04/30/21  0542   RBC 3.02*  --  2.92* 3.22*   HEMOGLOBIN 10.7*  --  10.3* 11.3*   HEMATOCRIT 31.8*  --  31.0* 35.4*   PLATELETCT 122*  --  130* 150*   PROTHROMBTM  --   --  22.4* 21.0*   INR  --   --  1.90* 1.75*   IRON  --  75  --   --    FERRITIN  --  458.0*  --   --    TOTIRONBC  --  92*  --   --        Recent Labs     04/28/21  0013 04/29/21  0510 04/30/21  0542   WBC 7.2 9.3 9.5   NEUTSPOLYS 85.80* 69.20 75.80*   LYMPHOCYTES 8.60* 19.70* 15.10*   MONOCYTES 4.60 10.30 7.40   EOSINOPHILS 0.00 0.00 0.60   BASOPHILS 0.40 0.80 0.20   ASTSGOT 182* 191* 203*   ALTSGPT 30 29 32   ALKPHOSPHAT 242* 213* 226*   TBILIRUBIN 6.5* 6.1* 7.1*     Imaging: no new imaging.     Assessment and Plan   Hien Alfaro is a 40-year-old female with history of type 2 diabetes and alcohol use presenting with shortness of breath, lower extremity edema and AST over ALT elevation more than 2:1, elevated bilirubin with scleral and sublingual icterus being treated for alcoholic hepatitis.  Overall, labs show preserved renal function, stable liver integrity and function, labs consistent with intravascular volume depletion.     #Alcoholic hepatitis without ascites   Assessment & Plan  On admission, labs significant for Hepatocellular Damage (AST 182H, ALT 30), Cholestasis (Alk Phos 242H, TBili 6.5H), and Decreased Hepatic Synthesis (Albumin 1.8L, PT 22.4H, INR 1.9H, Platelet 130L), elevated Ammonia 68H. Alcohol x 17 years. CT Abdomen showed hepatosplenomegaly, hepatic steatosis and 1cm renal lesion (incidental finding)  Overall Liver status: labs indicate hepatocellular damage (high AST), Decreased function (low albumin, high PT/INR, low platelet). Further, high ammonia (decreased detox). AST/ALT>2.  Labs and history strongly suggest alcohol as cause for hepatitis, however will r/o autoimmune hepatitis (labs pending). Hemochromatosis unlikely - labs not showing iron overload. Acute viral hepatitis panel negative. RUQ U/S showed  Flow present within the portal vein. MELD score is 21 with 19.6% estimated 3 month mortality. AST to platelet ratio = 167/123 = 1.35. APRI>1 suggests cirrhosis.  Ms. Alfaro is motivated to cease alcohol completely. Lactic acid and BP have stabilized.   Today (4/30), AST elevated 203, but staying stable. ALT 32. Alk Phos elevated (226), but staying stable. Tbili elevated (7.1), INR elevated 1.75, but stable. 1.75 albumin decreased (1.8) but staying stable. Overall, liver function is stabily decreased.   Plan:  - Continue prednisolone 40 mg daily for 28 days (start date 4/27).  Lille score to be calculated after 7 days (5/4) to determine effectiveness.  - Ordered antimitochondrial anti-smooth muscle antibody to ensure that we are not missing an autoimmune process on top of her alcohol use.  - No role for lactulose as patient is not encephalopathic  - Avoid additional IV fluids given third spacing  - Encourage good diet (including protein) intake   - Continue thiamine and folic along with multivitamin  -Boost plus supplement for albumin of 2 nutritional support  -Continued counseling on alcohol cessation, patient motivated  - Follow-up with GI outpatient for further evaluation for potential cirrhosis once inflammation resolves    #Peripheral Edema, Bilateral LE  Assessment & Plan  Etiologies: Likely 2/2 to Liver Disease (hypo-osmotic effect 2/2 low albumin), Less likely Heart Failure: elevated BNP (1068), ECHO showing LV EF:  65%), less likely Kidney Disease: low albumin, urinalysis shows no protein in urine, BUN 5, Cr 0.24. US BLE showed no evidence of DVT.   Kidney fxn normal. However, Protein/Creat Ratio 494H - P/C ratio > 3.5 can represent nephrotic range proteinuria.   Started on 4/29: Diuresis with IV furosemide 20mg and Spirinolactone 50mg with minimal/no response in BLE edema. Kidney function stable (BUN 6, Cr 0.38). We will therefore increase to IV furosemide 40mg and Spirinolactone 100mg.   Plan:   - Increase  to IV furosemide 40mg and Spirinolactone 100mg  - Monitor In's and Out's  - Continue close monitoring of CMP for renal function  - Electrolyte repletion (especially K+) as needed  - Monitor BP (Patient around 110 systolic baseline)   - Leg elevation when in bed and ambulation 3 times daily  - Upon discharge, Follow-up outpatient to titrate dosage  - Avoid NSAIDs in future    #Type 2 diabetes mellitus (last A1c 9.5)  Assessment & Plan  She has been taking Trulicity and SGLT2 inhibitor at home which was stopped on admission due to unavailability. Started Insulin Glargine 20U daily and Humalog QID 2-9 units with most recent POC glucose 282, 227, 275, 295. Ideally want to keep blood sugar levels 140-180, to minimize risk of Diabetic Hyperglycemic hyperosmolar syndrome. Of note, Patient is on prednisolone which may increase blood glucose. To decrease blood glucose to target, we will change basal insulin from Insulin Glargine 20U to 24U daily, and keep short-acting Humalog 2-9 units sliding scale as is, for now.   Plan:  - Increase Lantus 20 units daily to 24 units daily along with sliding scale Humalog insulin during this hospitalization and adjust as appropriate  -Continue Lyrica for peripheral neuropathy    #QT prolongation  Assessment & Plan  Probably secondary to hypokalemia.    Plan:  -Repleting potassium as necessary, especially given use of diuretics  -Avoid QTC prolonging medication    #Anemia, macrocytic  Assessment & Plan  Hg 10.3L, .2H, RDW 65.5H, Ferritin 458.0. Vitamin B12 and folate levels not decreased. She is also currently on her menstrual cycle.  TSH is slightly elevated at 8 but free T4 level normal. Anemia quite stable. No change in MCV despite supplement folate. Likeley 2/2 to chronic alcohol use causing bone marrow toxicity  - Monitor with periodic CBCs    #Hyponatremia, resolving  Assessment & Plan  Sodium improving, now Na+ 135. Urine Osmolality 690. Urine Sodium < 20 indicating appropriate  kidney function.   Plan:  - Okay to start diuresis  - Monitor Na+ with CMP     #Shortness of breath  Assessment & Plan  Admission x-ray clear but decreased lung volumes.  Satting above 90%, likely component of obesity,  no ascites seen on ultrasound.    Plan:  -Continue incentive spirometry (able to draw 1500 cc with maximum effort), ambulation,  monitor closely for signs of hepatopulmonary syndrome.    #Lactic acidosis, resolved  Assessment & Plan  Does not have signs of infection.  This may be secondary to hypoperfusion, since improved after small amount of fluids to avoid overload. 4/28 Lactic acid now 2.3 from 6.4 at admission.     #Hyperammonemia  Assessment & Plan  Ammonia 68H  No asterixis, no AMS suggestive of heptatic encephalopathy  Plan:  - No role for lactulose    #Thrombocytopenia  Assessment & Plan  Platelet 130L - 122L 123L  Thrombocytopenia quite stable   Likely 2/2 to decreased thrombopoietin produced by liver    #QT prolongation  Assessment & Plan  Probably secondary to hypokalemia.  Avoid QTC prolonging medication.    #Gastroesophageal reflux disease without esophagitis- (present on admission)  Assessment & Plan  Continue PPI for GERD which she takes at home    #Elevated brain natriuretic peptide (BNP) level- (present on admission)  Assessment & Plan  Echocardiogram did not show heart failure.

## 2021-05-01 PROBLEM — E87.20 LACTIC ACIDOSIS: Status: RESOLVED | Noted: 2021-04-27 | Resolved: 2021-05-01

## 2021-05-01 LAB
ALBUMIN SERPL BCP-MCNC: 1.8 G/DL (ref 3.2–4.9)
ALBUMIN/GLOB SERPL: 0.3 G/DL
ALP SERPL-CCNC: 214 U/L (ref 30–99)
ALT SERPL-CCNC: 34 U/L (ref 2–50)
ANION GAP SERPL CALC-SCNC: 6 MMOL/L (ref 7–16)
AST SERPL-CCNC: 189 U/L (ref 12–45)
BILIRUB SERPL-MCNC: 7.5 MG/DL (ref 0.1–1.5)
BUN SERPL-MCNC: 6 MG/DL (ref 8–22)
CALCIUM SERPL-MCNC: 7.5 MG/DL (ref 8.5–10.5)
CHLORIDE SERPL-SCNC: 97 MMOL/L (ref 96–112)
CO2 SERPL-SCNC: 25 MMOL/L (ref 20–33)
CREAT SERPL-MCNC: 0.36 MG/DL (ref 0.5–1.4)
GLOBULIN SER CALC-MCNC: 6.1 G/DL (ref 1.9–3.5)
GLUCOSE BLD-MCNC: 230 MG/DL (ref 65–99)
GLUCOSE BLD-MCNC: 230 MG/DL (ref 65–99)
GLUCOSE BLD-MCNC: 296 MG/DL (ref 65–99)
GLUCOSE BLD-MCNC: 314 MG/DL (ref 65–99)
GLUCOSE BLD-MCNC: 322 MG/DL (ref 65–99)
GLUCOSE SERPL-MCNC: 188 MG/DL (ref 65–99)
MITOCHONDRIA M2 IGG SER-ACNC: 19.8 UNITS (ref 0–24.9)
POTASSIUM SERPL-SCNC: 3.4 MMOL/L (ref 3.6–5.5)
PROT SERPL-MCNC: 7.9 G/DL (ref 6–8.2)
SMA IGG SER-ACNC: 17 UNITS (ref 0–19)
SODIUM SERPL-SCNC: 128 MMOL/L (ref 135–145)

## 2021-05-01 PROCEDURE — A9270 NON-COVERED ITEM OR SERVICE: HCPCS | Performed by: STUDENT IN AN ORGANIZED HEALTH CARE EDUCATION/TRAINING PROGRAM

## 2021-05-01 PROCEDURE — 80053 COMPREHEN METABOLIC PANEL: CPT

## 2021-05-01 PROCEDURE — 700111 HCHG RX REV CODE 636 W/ 250 OVERRIDE (IP): Performed by: STUDENT IN AN ORGANIZED HEALTH CARE EDUCATION/TRAINING PROGRAM

## 2021-05-01 PROCEDURE — 700102 HCHG RX REV CODE 250 W/ 637 OVERRIDE(OP): Performed by: STUDENT IN AN ORGANIZED HEALTH CARE EDUCATION/TRAINING PROGRAM

## 2021-05-01 PROCEDURE — 82962 GLUCOSE BLOOD TEST: CPT

## 2021-05-01 PROCEDURE — 36415 COLL VENOUS BLD VENIPUNCTURE: CPT

## 2021-05-01 PROCEDURE — 770020 HCHG ROOM/CARE - TELE (206)

## 2021-05-01 PROCEDURE — 99232 SBSQ HOSP IP/OBS MODERATE 35: CPT | Mod: GC | Performed by: INTERNAL MEDICINE

## 2021-05-01 RX ORDER — FUROSEMIDE 40 MG/1
40 TABLET ORAL
Status: DISCONTINUED | OUTPATIENT
Start: 2021-05-01 | End: 2021-05-04 | Stop reason: HOSPADM

## 2021-05-01 RX ORDER — INSULIN GLARGINE 100 [IU]/ML
20 INJECTION, SOLUTION SUBCUTANEOUS DAILY
Status: DISCONTINUED | OUTPATIENT
Start: 2021-05-01 | End: 2021-05-04 | Stop reason: HOSPADM

## 2021-05-01 RX ORDER — SPIRONOLACTONE 25 MG/1
100 TABLET ORAL
Status: DISCONTINUED | OUTPATIENT
Start: 2021-05-01 | End: 2021-05-04 | Stop reason: HOSPADM

## 2021-05-01 RX ORDER — POTASSIUM CHLORIDE 20 MEQ/1
40 TABLET, EXTENDED RELEASE ORAL ONCE
Status: COMPLETED | OUTPATIENT
Start: 2021-05-01 | End: 2021-05-01

## 2021-05-01 RX ADMIN — FUROSEMIDE 40 MG: 40 TABLET ORAL at 09:57

## 2021-05-01 RX ADMIN — PREDNISOLONE 39.9 MG: 15 SOLUTION ORAL at 05:38

## 2021-05-01 RX ADMIN — GUAIFENESIN AND DEXTROMETHORPHAN 10 ML: 100; 10 SYRUP ORAL at 20:11

## 2021-05-01 RX ADMIN — INSULIN LISPRO 5 UNITS: 100 INJECTION, SOLUTION INTRAVENOUS; SUBCUTANEOUS at 17:25

## 2021-05-01 RX ADMIN — DOCUSATE SODIUM 50 MG AND SENNOSIDES 8.6 MG 2 TABLET: 8.6; 5 TABLET, FILM COATED ORAL at 17:26

## 2021-05-01 RX ADMIN — INSULIN LISPRO 3 UNITS: 100 INJECTION, SOLUTION INTRAVENOUS; SUBCUTANEOUS at 20:11

## 2021-05-01 RX ADMIN — HEPARIN SODIUM 5000 UNITS: 5000 INJECTION, SOLUTION INTRAVENOUS; SUBCUTANEOUS at 13:56

## 2021-05-01 RX ADMIN — OMEPRAZOLE 20 MG: 20 CAPSULE, DELAYED RELEASE ORAL at 05:36

## 2021-05-01 RX ADMIN — INSULIN LISPRO 3 UNITS: 100 INJECTION, SOLUTION INTRAVENOUS; SUBCUTANEOUS at 08:20

## 2021-05-01 RX ADMIN — HEPARIN SODIUM 5000 UNITS: 5000 INJECTION, SOLUTION INTRAVENOUS; SUBCUTANEOUS at 05:36

## 2021-05-01 RX ADMIN — PREGABALIN 50 MG: 25 CAPSULE ORAL at 05:36

## 2021-05-01 RX ADMIN — INSULIN GLARGINE 20 UNITS: 100 INJECTION, SOLUTION SUBCUTANEOUS at 11:33

## 2021-05-01 RX ADMIN — SPIRONOLACTONE 100 MG: 25 TABLET ORAL at 09:57

## 2021-05-01 RX ADMIN — POTASSIUM CHLORIDE 40 MEQ: 1500 TABLET, EXTENDED RELEASE ORAL at 08:16

## 2021-05-01 RX ADMIN — HEPARIN SODIUM 5000 UNITS: 5000 INJECTION, SOLUTION INTRAVENOUS; SUBCUTANEOUS at 21:00

## 2021-05-01 RX ADMIN — PREGABALIN 50 MG: 25 CAPSULE ORAL at 17:26

## 2021-05-01 RX ADMIN — GUAIFENESIN AND DEXTROMETHORPHAN 10 ML: 100; 10 SYRUP ORAL at 08:16

## 2021-05-01 RX ADMIN — FOLIC ACID 1 MG: 1 TABLET ORAL at 05:36

## 2021-05-01 RX ADMIN — DOCUSATE SODIUM 50 MG AND SENNOSIDES 8.6 MG 2 TABLET: 8.6; 5 TABLET, FILM COATED ORAL at 05:38

## 2021-05-01 RX ADMIN — INSULIN LISPRO 6 UNITS: 100 INJECTION, SOLUTION INTRAVENOUS; SUBCUTANEOUS at 11:33

## 2021-05-01 RX ADMIN — THERA TABS 1 TABLET: TAB at 05:36

## 2021-05-01 ASSESSMENT — ENCOUNTER SYMPTOMS
BLURRED VISION: 0
COUGH: 0
CHILLS: 0
ORTHOPNEA: 0
SHORTNESS OF BREATH: 1
FEVER: 0
DOUBLE VISION: 0
DIZZINESS: 0
HEADACHES: 0
SORE THROAT: 0
VOMITING: 0
MYALGIAS: 0
NAUSEA: 0

## 2021-05-01 ASSESSMENT — FIBROSIS 4 INDEX
FIB4 SCORE: 8.21
FIB4 SCORE: 8.21

## 2021-05-01 NOTE — CARE PLAN
Problem: Communication  Goal: The ability to communicate needs accurately and effectively will improve  Outcome: PROGRESSING AS EXPECTED     Problem: Infection  Goal: Will remain free from infection  Outcome: PROGRESSING AS EXPECTED     Problem: Knowledge Deficit  Goal: Knowledge of disease process/condition, treatment plan, diagnostic tests, and medications will improve  Outcome: PROGRESSING AS EXPECTED  Goal: Knowledge of the prescribed therapeutic regimen will improve  Outcome: PROGRESSING AS EXPECTED

## 2021-05-01 NOTE — PROGRESS NOTES
Daily Progress Note:     Date of Service: 5/1/2021  Primary Team: [facility] IM Red Team  Attending: Vianey Fajardo M.D.   Senior Resident: Joseph Vogel M.D.  Contact:  [phone]    ID:   40-year-old female with a past medical history of type 2 diabetes and alcohol use disorder who presented from OSH in [place], [place] for alcoholic hepatitis.    Subjective:  Patient seen and examined at bedside this AM, stable, NAD, no acute events overnight,   -Patient overall is feeling better, still has edema but getting around better and feels her breathing is improved slightly  -Urinated about 5 times overnight could not get accurate reading of amount.  Having bowel movements.  Discussed to royce on the white board how many times she is going to the bathroom.  -Blood pressure 120s over 80s tolerated 40 Lasix 100 Aldactone yesterday    Interval update:  -Meld score unchanged at 26, T bili still elevated above 7    Consultants/Specialty:  Gastroenterology    Review of Systems:    Review of Systems   Constitutional: Negative for chills and fever.   HENT: Negative for congestion and sore throat.    Eyes: Negative for blurred vision and double vision.   Respiratory: Positive for shortness of breath (Fatigued when getting up and walking around.). Negative for cough.    Cardiovascular: Positive for leg swelling. Negative for chest pain and orthopnea.   Gastrointestinal: Negative for nausea and vomiting.   Genitourinary: Negative for dysuria and urgency.   Musculoskeletal: Negative for joint pain and myalgias.   Skin: Negative for itching and rash.   Neurological: Negative for dizziness and headaches.       Objective Data:   Physical Exam:   Vitals:   Temp:  [36 °C (96.8 °F)-36.5 °C (97.7 °F)] 36.3 °C (97.3 °F)  Pulse:  [] 82  Resp:  [18-24] 18  BP: ()/(63-86) 99/67  SpO2:  [91 %-94 %] 91 %     Physical Exam  Vitals and nursing note reviewed.   Constitutional:       General: She is not in acute distress.      Appearance: She is obese. She is not ill-appearing.   HENT:      Head: Normocephalic and atraumatic.      Mouth/Throat:      Mouth: Mucous membranes are moist.      Pharynx: Oropharynx is clear.      Comments: Sublingual jaundice  Eyes:      General: Scleral icterus present.      Extraocular Movements: Extraocular movements intact.      Pupils: Pupils are equal, round, and reactive to light.   Cardiovascular:      Rate and Rhythm: Normal rate and regular rhythm.      Heart sounds: No murmur. No gallop.    Pulmonary:      Effort: Pulmonary effort is normal. No respiratory distress.      Breath sounds: Normal breath sounds. No wheezing, rhonchi or rales.   Abdominal:      General: Abdomen is flat. There is no distension.      Palpations: Abdomen is soft.      Tenderness: There is no abdominal tenderness. There is no guarding.   Musculoskeletal:         General: Swelling present.      Right lower leg: Edema present.      Left lower leg: Edema present.      Comments: Bilateral 3+ pitting edema to thighs, 2+ in presacral area   Skin:     General: Skin is warm and dry.   Neurological:      General: No focal deficit present.      Mental Status: She is alert and oriented to person, place, and time.   Psychiatric:         Mood and Affect: Mood normal.         Behavior: Behavior normal.       Labs:   4/29/2021  2:38 PM    Recent Labs     04/29/21  0510 04/30/21  0542 05/01/21  0325   SODIUM 135 130* 128*   POTASSIUM 3.3* 3.5* 3.4*   CHLORIDE 99 98 97   CO2 27 27 25   BUN 5* 6* 6*   CREATININE 0.24* 0.38* 0.36*   MAGNESIUM 2.3  --   --    CALCIUM 7.5* 7.6* 7.5*       Recent Labs     04/29/21  0510 04/30/21  0542 05/01/21  0325   ALTSGPT 29 32 34   ASTSGOT 191* 203* 189*   ALKPHOSPHAT 213* 226* 214*   TBILIRUBIN 6.1* 7.1* 7.5*   GLUCOSE 184* 193* 188*       Recent Labs     04/29/21  0510 04/30/21  0542   RBC 2.92* 3.22*   HEMOGLOBIN 10.3* 11.3*   HEMATOCRIT 31.0* 35.4*   PLATELETCT 130* 150*   PROTHROMBTM 22.4* 21.0*   INR  1.90* 1.75*       Imaging:   US-RUQ   Final Result      1.  Limited by body habitus.   2.  Echogenic liver suggesting cirrhosis.   3.  Flow present within the portal vein.   4.  Prior cholecystectomy.   5.  No biliary dilation.   6.  Limited evaluation of pancreas and abdominal aorta.         EC-ECHOCARDIOGRAM COMPLETE W/O CONT   Final Result      US-EXTREMITY VENOUS LOWER BILAT   Final Result      CT-ABDOMEN-PELVIS WITH   Final Result      Hepatomegaly and hepatic steatosis. The liver is heterogeneous and small hepatic lesions are not excluded.      Indeterminate 1 cm right renal lesion. Further evaluation can be performed with renal protocol MRI.      Scattered colonic diverticulosis.      Status post cholecystectomy.      Splenomegaly.      OUTSIDE IMAGES-DX CHEST   Final Result          Problem Representation:   The patient is a 40-year-old female with a past medical history of alcohol use and type 2 diabetes who presented to the hospital with bilateral lower extremity edema and was found to have alcoholic hepatitis.     * Alcoholic hepatitis without ascites- (present on admission)  Assessment & Plan  The patient's labs and history are strongly suggestive of alcoholic hepatitis.  Her lower extremity edema is likely secondary to hypoalbuminemia and low oncotic pressure.    Maddrey's discriminatory function score 43 on presentation.   CT abdomen done outside of this hospital showed hepatosplenomegaly, hepatic steatosis and 1 cm renal lesion which is an incidental finding.   Lower extremity edema likely secondary to hypoalbuminemia and low oncotic pressure.  Renal function remains preserved, blood pressure remains soft.   Right upper quadrant ultrasound did not show portal vein thrombosis.  Iron panel is not suggestive of hemochromatosis, autoimmune labs pending.  Alcoholic hepatitis remains stable in terms of clinical and biochemical condition.  GI has signed off, agreed with treatment course and recommend close  outpatient follow-up.  Patient will determine if she will continue to follow-up in [place] or closer to [place]    Plan:  -Continue prednisolone 40 mg daily for 28 days (start date 4/27).  Lille score to be calculated after 7 days (5/4) to determine effectiveness.  -Continue Lasix 40 mg p.o., as well as Aldactone 100 mg p.o.  I/O charting, sodium and water restriction.  -Continue close monitoring of CMP for renal function  -Boost plus supplement for albumin of 2 nutritional support,   -Leg elevation when in bed and ambulation 3 times daily  -Continue thiamine and folic along with multivitamin.  -Continued counseling on alcohol cessation, patient motivated to quit.  Declined AA at this time stating that she has a good support system.    Shortness of breath  Assessment & Plan  Satting above 90%, likely component of obesity,  no ascites seen on ultrasound.      Plan:  -Continue incentive spirometry, monitor closely for signs of hepatopulmonary syndrome.    Macrocytic anemia- (present on admission)  Assessment & Plan  This may be secondary to alcohol causing bone marrow suppression.  Vitamin B12 and folate levels not decreased.  She is also currently on her menstrual cycle.  TSH is slightly elevated at 8 but free T4 level normal.    Plan:  -Continue to monitor at this time with periodic CBCs.    Hyponatremia  Assessment & Plan  Secondary to hypovolemia as suggested by urine chemistry.  This improved with IV fluids.      QT prolongation  Assessment & Plan  Probably secondary to hypokalemia.      Plan:  -Replating potassium as necessary, especially with diuretics  -Avoid QTC prolonging medication.    Gastroesophageal reflux disease without esophagitis- (present on admission)  Assessment & Plan  Continue PPI for GERD which she takes at home    Type 2 diabetes mellitus with neurologic complication, without long-term current use of insulin (HCC)- (present on admission)  Assessment & Plan  She has been taking Trulicity and  SGLT2 inhibitor at home which was stopped on admission due to unavailability.   -Repeat A1c 9.5  Plan:  -Continue Lantus 20 units daily, add 4 units Humalog premeal due to elevated blood glucose during the day along with sliding scale insulin during this hospitalization, will continue to adjust as appropriate  -Continue Lyrica for peripheral neuropathy    Elevated brain natriuretic peptide (BNP) level- (present on admission)  Assessment & Plan  Echocardiogram did not show heart failure, normal EF without diastolic dysfunction      DVT ppx: Heparin  Diet: Diabetic  Tubes/Lines: PIV  Code status: Full    Joseph Vogel M.D.

## 2021-05-01 NOTE — NON-PROVIDER
[facility] Interval Note    Name Hien Alfaro       1980   Age/Sex 40 y.o. female   MRN [id number]   Code Status full     ID:   40-year-old female with a past medical history of type 2 diabetes and alcohol use disorder who presented from an outside hospital in [facility] for alcoholic hepatitis.     Interval Problem Daily Status Update    Hien Alfaro is a 39YO PMHx type 2 diabetes (last A1c 9.5), morbid obesity (BMI 45), presented , transfer from [facility], originally thought to have acute heart failure (EF 65%) with BLE swelling and pain. Intermittent abd pain, SOB 2 weeks ago. Drinks 2-3 margaritas/day since age 23 (19 years) and up to 6 alcoholic beverages on wknds. No prior history of liver disease. Patient denied any NSAID use.  Denied any family history of liver disease, colon cancer, pancreatic cancer.  Denied any black tarry stools, hematemesis, odynophagia or dysphagia.     On admission at [facility], the patient's labs were significant for a lactic acid of 6.4, an AST of 182, ALT of 30, alk phos of 242, total bilirubin of 6.5, and albumin of 1.8.  Patient had a CT abdomen pelvis that was significant for hepatomegaly and hepatic steatosis.     No acute overnight events. Hospital Day 4  GE saw her yesterday - relatively good prognosis 85% in next 6 months if remains sober and recommended outpatient follow-up PCP for diuresis, electrolytes, and sobriety/ETOH dependence support, and Calculate Lille score (at 7 days) to decide on continuation of steroids.   Lasix 20 mg and aldactone 50mg - preserved kidney fxn with little decrease in BLE edema, and no increase in urination.   Therefore, increased to lasix 40mg and spironolactone 100mg yesterday with preserved kidney function, no/minimal decrease in BLE edema, and increased urination. Have been supplementing potassium (KCl 40mEq tablet)  Basal insulin (Lantus) remained at 20U with most  recent POC blood glucose 314 / 307 / 282 / 227/ 275/294.  Negative Anti-mitochondrial Ab 19.8  Negative Anti-smooth Muscle Ab 17     Today, the patient reports continued improvement in energy and mental clarity. No new symptoms. Continues to have dry cough, has been taking robitussin. No change in baseline subjective SOB due to inability to take deep breaths due to abdominal distention, has been doing IS exercises and ambulating. Her BLE swelling and pain is unchanged from admission. BM good. Patient motivated to cease alcohol completely. Denies fever, N/V/D, abdominal pain, tremors, chest pain, change in baseline dyspnea, palpitations.       Physical Exam       Vitals:    05/01/21 0300 05/01/21 0741 05/01/21 0956 05/01/21 1137   BP: 120/86 101/63  (!) 99/67   Pulse: 100 95  82   Resp: 20 18  18   Temp: 36.5 °C (97.7 °F) 36.1 °C (97 °F)  36.3 °C (97.3 °F)   TempSrc: Temporal Temporal  Temporal   SpO2: 92% 93%  91%   Weight:   115 kg (254 lb 6.6 oz)    Height:         Body mass index is 45.07 kg/m². Weight: 115 kg (254 lb 6.6 oz)  Oxygen Therapy:  Pulse Oximetry: 91 %, O2 (LPM): 0, O2 Delivery Device: None - Room Air    Physical Exam  Constitutional: NAD, laying comfortably, smiling, speaks in sentences without difficulty  Eyes: Scleral icterus present.   Mouth: Sublingual icterus present  Cardiovascular: normal rate, normal rhythm. No murmurs, rubs, gallops.   Pulmonary: Lungs clear to ausculatation bilaterally. No rales suggestive of pulmonary edema. No wheezing.   Abd: Soft. No guarding. No rebound tenderness. Abdominal distension present. Hepatomegaly present. No palpable spleen. No fluid shift. No tenderness to palpation.   Ext: 2+ pitting peripheral edema bilaterally to thighs.   NEURO: grossly normal, no weakness. No flapping tremor. Mental status is at baseline.       Lab Data Review:  5/1/2021  1:41 PM    Recent Labs     04/29/21  0510 04/30/21  0542 05/01/21  0325   SODIUM 135 130* 128*   POTASSIUM 3.3*  3.5* 3.4*   CHLORIDE 99 98 97   CO2 27 27 25   BUN 5* 6* 6*   CREATININE 0.24* 0.38* 0.36*   MAGNESIUM 2.3  --   --    CALCIUM 7.5* 7.6* 7.5*       Recent Labs     04/29/21  0510 04/30/21  0542 05/01/21  0325   ALTSGPT 29 32 34   ASTSGOT 191* 203* 189*   ALKPHOSPHAT 213* 226* 214*   TBILIRUBIN 6.1* 7.1* 7.5*   GLUCOSE 184* 193* 188*       Recent Labs     04/29/21  0510 04/30/21  0542   RBC 2.92* 3.22*   HEMOGLOBIN 10.3* 11.3*   HEMATOCRIT 31.0* 35.4*   PLATELETCT 130* 150*   PROTHROMBTM 22.4* 21.0*   INR 1.90* 1.75*       Recent Labs     04/29/21  0510 04/30/21  0542 05/01/21  0325   WBC 9.3 9.5  --    NEUTSPOLYS 69.20 75.80*  --    LYMPHOCYTES 19.70* 15.10*  --    MONOCYTES 10.30 7.40  --    EOSINOPHILS 0.00 0.60  --    BASOPHILS 0.80 0.20  --    ASTSGOT 191* 203* 189*   ALTSGPT 29 32 34   ALKPHOSPHAT 213* 226* 214*   TBILIRUBIN 6.1* 7.1* 7.5*       Assessment and Plan   Hien Alfaro is a 40-year-old female with history of type 2 diabetes and alcohol use presenting with shortness of breath, lower extremity edema and AST over ALT elevation more than 2:1, elevated bilirubin with scleral and sublingual icterus being treated for alcoholic hepatitis.  Overall, labs show preserved renal function, stable liver integrity and function, labs consistent with intravascular volume depletion.     #Alcoholic hepatitis without ascites   Assessment & Plan  On admission, labs significant for Hepatocellular Damage (AST 182H, ALT 30), Cholestasis (Alk Phos 242H, TBili 6.5H), and Decreased Hepatic Synthesis (Albumin 1.8L, PT 22.4H, INR 1.9H, Platelet 130L), elevated Ammonia 68H. Alcohol x 17 years. CT Abdomen showed hepatosplenomegaly, hepatic steatosis and 1cm renal lesion (incidental finding)  Overall Liver status: labs indicate hepatocellular damage (high AST), Decreased function (low albumin, high PT/INR, low platelet). Further, high ammonia (decreased detox). AST/ALT>2.  Labs and history strongly suggest alcohol as cause for  hepatitis. Unlikely autoimmune hepatitis given Negative Anti-mitochondrial Ab 19.8 and Negative Anti-smooth Muscle Ab 17. Hemochromatosis unlikely - labs not showing iron overload. Acute viral hepatitis panel negative. RUQ U/S showed Flow present within the portal vein. MELD score is 21 with 19.6% estimated 3 month mortality. AST to platelet ratio = 167/123 = 1.35. APRI>1 suggests cirrhosis. Ms. Alfaro is motivated to cease alcohol completely. Lactic acid and BP have stabilized.   Today, Liver function staying quite stablily decreased   MELD score 26 today, with 19.6% 3-month mortality risk (unchanged)   Lille Model for Alcoholic Hepatitis Score 0.342 at day 4 - shows: Good prognosis, predicts 6-month survival of 85% - if remains sober  Negative Anti-mitochondrial Ab 19.8  Negative Anti-smooth Muscle Ab 17   GE saw her yesterday - relatively good prognosis 85% in next 6 months if remains sober, and follow-up PCP for diuresis, electrolytes, and sobriety/ETOH dependence support  Diuresis with Lasix 20 mg and aldactone 50mg - preserved kidney fxn with little decrease in BLE edema, and no increase in urination. We then increased to lasix 40mg and spironolactone 100mg yesterday with preserved kidney function, no/minimal decrease in BLE edema, and increased urination. Have been supplementing potassium (KCl 40mEq tablet).   Plan:  - Continue prednisolone 40 mg daily for 28 days (start date 4/27).  Lille score to be calculated after 7 days (5/4) to determine effectiveness and decide on continuation of steroids.   - Avoid additional IV fluids given third spacing  - Encourage good diet (including protein) intake   -Boost plus supplement for albumin of 2 nutritional support  - Continue thiamine and folic along with multivitamin  -Continued counseling on alcohol cessation, patient motivated  - Follow-up with GI outpatient for further evaluation for potential cirrhosis once inflammation resolves      #Peripheral Edema,  BLE  Etiologies: Likely 2/2 to Liver Disease (hypo-osmotic effect 2/2 low albumin) and poor nutritional status, Less likely Heart Failure: elevated BNP (1068), ECHO showing LV EF:  65%), less likely Kidney Disease: low albumin, urinalysis shows no protein in urine, BUN 5, Cr 0.24. US BLE showed no evidence of DVT.   Kidney fxn normal. However, Protein/Creat Ratio 494H - P/C ratio > 3.5 can represent nephrotic range proteinuria.   Diuresis with Lasix 20 mg and aldactone 50mg - preserved kidney fxn with little decrease in BLE edema, and no increase in urination. We then increased to lasix 40mg and spironolactone 100mg yesterday 4/30, with preserved kidney function, no/minimal decrease in BLE edema, and increased urination. Have been repleting K+ prn, /86, Sodium 128L   Plan:   - Continue 40mg lasix oral, 100mg aldactone  - Monitor In's and Out's  - Patient track voiding with tick marks  - Continue close monitoring of CMP for renal function  - Electrolyte repletion (especially K+) as needed  - Monitor BP (Patient around 110 systolic baseline)   - Leg elevation when in bed and ambulation 3 times daily  - Upon discharge, Follow-up outpatient to titrate dosage  - Avoid NSAIDs in future    #Type 2 diabetes mellitus (last A1c 9.5)  Assessment & Plan  She has been taking Trulicity and SGLT2 inhibitor at home which was stopped on admission due to unavailability. Started Insulin Glargine 20U daily and Humalog QID 2-9 units with most POC glucose 282, 227, 275, 295. Ideally want to keep blood sugar levels 140-180, to minimize risk of Diabetic Hyperglycemic hyperosmolar syndrome. Of note, Patient is on prednisolone which may increase blood glucose. To decrease blood glucose to target, we will continue basal insulin Insulin Glargine 20U, and continue short-acting Humalog 2-9 units sliding scale as is, for now. We will add 4 units pre-meal lispro.  Plan:  - Continue Lantus 20 units daily. Continue sliding scale Humalog  insulin   - Add 4U Lispro premeal  -Continue Lyrica for peripheral neuropathy     #QT prolongation  Assessment & Plan  Probably secondary to hypokalemia.    Plan:  -Repleting potassium as necessary, especially given use of diuretics  -Avoid QTC prolonging medication     #Anemia, macrocytic  Assessment & Plan  Hg 10.3L, .2H, RDW 65.5H, Ferritin 458.0. Vitamin B12 and folate levels not decreased. She is also currently on her menstrual cycle.  TSH is slightly elevated at 8 but free T4 level normal. Anemia quite stable. No change in MCV despite supplement folate. Likeley 2/2 to chronic alcohol use causing bone marrow toxicity  - Monitor with periodic CBCs     #Hyponatremia, resolving  Assessment & Plan  Sodium stable. Urine Osmolality 690. Urine Sodium < 20 indicating appropriate kidney function.   Plan:  - Okay to start diuresis  - Monitor Na+ with CMP     #Shortness of breath  Assessment & Plan  Admission x-ray clear but decreased lung volumes.  Satting above 90%, likely component of obesity,  no ascites seen on ultrasound.    Plan:  -Continue incentive spirometry (able to draw 1500 cc with maximum effort), ambulation,  monitor closely for signs of hepatopulmonary syndrome.     #Lactic acidosis, resolved  Assessment & Plan  Does not have signs of infection.  This may be secondary to hypoperfusion, since improved after small amount of fluids to avoid overload. 4/28 Lactic acid 2.3 from 6.4 at admission.      #Hyperammonemia  Assessment & Plan  Ammonia 68H  No asterixis, no AMS suggestive of heptatic encephalopathy  Plan:  - No role for lactulose     #Thrombocytopenia  Assessment & Plan  Platelet 130L - 122L 123L  Thrombocytopenia quite stable   Likely 2/2 to decreased thrombopoietin produced by liver     #QT prolongation  Assessment & Plan  Probably secondary to hypokalemia.  Avoid QTC prolonging medication.     #Gastroesophageal reflux disease without esophagitis- (present on admission)  Assessment &  Plan  Continue PPI for GERD which she takes at home     #Elevated brain natriuretic peptide (BNP) level- (present on admission)  Assessment & Plan  Echocardiogram did not show heart failure.

## 2021-05-02 LAB
ALBUMIN SERPL BCP-MCNC: 1.7 G/DL (ref 3.2–4.9)
ALP SERPL-CCNC: 186 U/L (ref 30–99)
ALT SERPL-CCNC: 40 U/L (ref 2–50)
ANION GAP SERPL CALC-SCNC: 8 MMOL/L (ref 7–16)
AST SERPL-CCNC: 190 U/L (ref 12–45)
BACTERIA BLD CULT: NORMAL
BACTERIA BLD CULT: NORMAL
BASOPHILS # BLD AUTO: 0.3 % (ref 0–1.8)
BASOPHILS # BLD: 0.03 K/UL (ref 0–0.12)
BILIRUB CONJ SERPL-MCNC: 6.4 MG/DL (ref 0.1–0.5)
BILIRUB INDIRECT SERPL-MCNC: 2.1 MG/DL (ref 0–1)
BILIRUB SERPL-MCNC: 8.5 MG/DL (ref 0.1–1.5)
BUN SERPL-MCNC: 6 MG/DL (ref 8–22)
CALCIUM SERPL-MCNC: 7.5 MG/DL (ref 8.5–10.5)
CHLORIDE SERPL-SCNC: 101 MMOL/L (ref 96–112)
CO2 SERPL-SCNC: 25 MMOL/L (ref 20–33)
CREAT SERPL-MCNC: 0.2 MG/DL (ref 0.5–1.4)
EKG IMPRESSION: NORMAL
EOSINOPHIL # BLD AUTO: 0.08 K/UL (ref 0–0.51)
EOSINOPHIL NFR BLD: 0.7 % (ref 0–6.9)
ERYTHROCYTE [DISTWIDTH] IN BLOOD BY AUTOMATED COUNT: 68.5 FL (ref 35.9–50)
GLUCOSE BLD-MCNC: 185 MG/DL (ref 65–99)
GLUCOSE BLD-MCNC: 218 MG/DL (ref 65–99)
GLUCOSE BLD-MCNC: 221 MG/DL (ref 65–99)
GLUCOSE BLD-MCNC: 235 MG/DL (ref 65–99)
GLUCOSE BLD-MCNC: 338 MG/DL (ref 65–99)
GLUCOSE SERPL-MCNC: 174 MG/DL (ref 65–99)
HCT VFR BLD AUTO: 34.8 % (ref 37–47)
HGB BLD-MCNC: 11.4 G/DL (ref 12–16)
IMM GRANULOCYTES # BLD AUTO: 0.14 K/UL (ref 0–0.11)
IMM GRANULOCYTES NFR BLD AUTO: 1.3 % (ref 0–0.9)
INR PPP: 1.7 (ref 0.87–1.13)
LYMPHOCYTES # BLD AUTO: 1.74 K/UL (ref 1–4.8)
LYMPHOCYTES NFR BLD: 15.8 % (ref 22–41)
MAGNESIUM SERPL-MCNC: 1.9 MG/DL (ref 1.5–2.5)
MCH RBC QN AUTO: 35.8 PG (ref 27–33)
MCHC RBC AUTO-ENTMCNC: 32.8 G/DL (ref 33.6–35)
MCV RBC AUTO: 109.4 FL (ref 81.4–97.8)
MONOCYTES # BLD AUTO: 0.79 K/UL (ref 0–0.85)
MONOCYTES NFR BLD AUTO: 7.2 % (ref 0–13.4)
NEUTROPHILS # BLD AUTO: 8.2 K/UL (ref 2–7.15)
NEUTROPHILS NFR BLD: 74.7 % (ref 44–72)
NRBC # BLD AUTO: 0 K/UL
NRBC BLD-RTO: 0 /100 WBC
PLATELET # BLD AUTO: 154 K/UL (ref 164–446)
PMV BLD AUTO: 12.3 FL (ref 9–12.9)
POTASSIUM SERPL-SCNC: 3.5 MMOL/L (ref 3.6–5.5)
PROT SERPL-MCNC: 7.4 G/DL (ref 6–8.2)
PROTHROMBIN TIME: 20.5 SEC (ref 12–14.6)
RBC # BLD AUTO: 3.18 M/UL (ref 4.2–5.4)
SIGNIFICANT IND 70042: NORMAL
SIGNIFICANT IND 70042: NORMAL
SITE SITE: NORMAL
SITE SITE: NORMAL
SODIUM SERPL-SCNC: 134 MMOL/L (ref 135–145)
SOURCE SOURCE: NORMAL
SOURCE SOURCE: NORMAL
WBC # BLD AUTO: 11 K/UL (ref 4.8–10.8)

## 2021-05-02 PROCEDURE — 700102 HCHG RX REV CODE 250 W/ 637 OVERRIDE(OP): Performed by: STUDENT IN AN ORGANIZED HEALTH CARE EDUCATION/TRAINING PROGRAM

## 2021-05-02 PROCEDURE — 93005 ELECTROCARDIOGRAM TRACING: CPT | Performed by: STUDENT IN AN ORGANIZED HEALTH CARE EDUCATION/TRAINING PROGRAM

## 2021-05-02 PROCEDURE — 770020 HCHG ROOM/CARE - TELE (206)

## 2021-05-02 PROCEDURE — 82962 GLUCOSE BLOOD TEST: CPT | Mod: 91

## 2021-05-02 PROCEDURE — 99232 SBSQ HOSP IP/OBS MODERATE 35: CPT | Mod: GC | Performed by: INTERNAL MEDICINE

## 2021-05-02 PROCEDURE — 93010 ELECTROCARDIOGRAM REPORT: CPT | Performed by: INTERNAL MEDICINE

## 2021-05-02 PROCEDURE — A9270 NON-COVERED ITEM OR SERVICE: HCPCS | Performed by: STUDENT IN AN ORGANIZED HEALTH CARE EDUCATION/TRAINING PROGRAM

## 2021-05-02 PROCEDURE — 85610 PROTHROMBIN TIME: CPT

## 2021-05-02 PROCEDURE — 83735 ASSAY OF MAGNESIUM: CPT

## 2021-05-02 PROCEDURE — 85025 COMPLETE CBC W/AUTO DIFF WBC: CPT

## 2021-05-02 PROCEDURE — 80076 HEPATIC FUNCTION PANEL: CPT

## 2021-05-02 PROCEDURE — 700111 HCHG RX REV CODE 636 W/ 250 OVERRIDE (IP): Performed by: STUDENT IN AN ORGANIZED HEALTH CARE EDUCATION/TRAINING PROGRAM

## 2021-05-02 PROCEDURE — 80048 BASIC METABOLIC PNL TOTAL CA: CPT

## 2021-05-02 PROCEDURE — 36415 COLL VENOUS BLD VENIPUNCTURE: CPT

## 2021-05-02 RX ORDER — POTASSIUM CHLORIDE 20 MEQ/1
40 TABLET, EXTENDED RELEASE ORAL ONCE
Status: COMPLETED | OUTPATIENT
Start: 2021-05-02 | End: 2021-05-02

## 2021-05-02 RX ADMIN — DOCUSATE SODIUM 50 MG AND SENNOSIDES 8.6 MG 2 TABLET: 8.6; 5 TABLET, FILM COATED ORAL at 17:49

## 2021-05-02 RX ADMIN — HEPARIN SODIUM 5000 UNITS: 5000 INJECTION, SOLUTION INTRAVENOUS; SUBCUTANEOUS at 14:34

## 2021-05-02 RX ADMIN — POTASSIUM CHLORIDE 40 MEQ: 1500 TABLET, EXTENDED RELEASE ORAL at 07:14

## 2021-05-02 RX ADMIN — PREDNISOLONE 39.9 MG: 15 SOLUTION ORAL at 05:00

## 2021-05-02 RX ADMIN — PREGABALIN 50 MG: 25 CAPSULE ORAL at 04:59

## 2021-05-02 RX ADMIN — FOLIC ACID 1 MG: 1 TABLET ORAL at 04:59

## 2021-05-02 RX ADMIN — HEPARIN SODIUM 5000 UNITS: 5000 INJECTION, SOLUTION INTRAVENOUS; SUBCUTANEOUS at 04:58

## 2021-05-02 RX ADMIN — INSULIN LISPRO 3 UNITS: 100 INJECTION, SOLUTION INTRAVENOUS; SUBCUTANEOUS at 07:17

## 2021-05-02 RX ADMIN — HEPARIN SODIUM 5000 UNITS: 5000 INJECTION, SOLUTION INTRAVENOUS; SUBCUTANEOUS at 20:22

## 2021-05-02 RX ADMIN — DOCUSATE SODIUM 50 MG AND SENNOSIDES 8.6 MG 2 TABLET: 8.6; 5 TABLET, FILM COATED ORAL at 05:00

## 2021-05-02 RX ADMIN — OMEPRAZOLE 20 MG: 20 CAPSULE, DELAYED RELEASE ORAL at 04:59

## 2021-05-02 RX ADMIN — SPIRONOLACTONE 100 MG: 25 TABLET ORAL at 04:59

## 2021-05-02 RX ADMIN — THERA TABS 1 TABLET: TAB at 04:59

## 2021-05-02 RX ADMIN — INSULIN LISPRO 6 UNITS: 100 INJECTION, SOLUTION INTRAVENOUS; SUBCUTANEOUS at 11:48

## 2021-05-02 RX ADMIN — FUROSEMIDE 40 MG: 40 TABLET ORAL at 04:59

## 2021-05-02 RX ADMIN — INSULIN GLARGINE 20 UNITS: 100 INJECTION, SOLUTION SUBCUTANEOUS at 11:48

## 2021-05-02 RX ADMIN — PREGABALIN 50 MG: 25 CAPSULE ORAL at 17:49

## 2021-05-02 RX ADMIN — INSULIN LISPRO 3 UNITS: 100 INJECTION, SOLUTION INTRAVENOUS; SUBCUTANEOUS at 20:23

## 2021-05-02 RX ADMIN — INSULIN LISPRO 3 UNITS: 100 INJECTION, SOLUTION INTRAVENOUS; SUBCUTANEOUS at 17:51

## 2021-05-02 ASSESSMENT — ENCOUNTER SYMPTOMS
COUGH: 0
BLURRED VISION: 0
VOMITING: 0
SHORTNESS OF BREATH: 1
DOUBLE VISION: 0
MYALGIAS: 0
ORTHOPNEA: 0
DIZZINESS: 0
FEVER: 0
CHILLS: 0
SORE THROAT: 0
HEADACHES: 0
NAUSEA: 0

## 2021-05-02 ASSESSMENT — FIBROSIS 4 INDEX: FIB4 SCORE: 7.41

## 2021-05-02 NOTE — PROGRESS NOTES
Daily Progress Note:     Date of Service: 5/2/2021  Primary Team: [facility] IM Red Team  Attending: Vianey Fajardo M.D.   Senior Resident: Joseph Vogel M.D.  Contact:  [phone]    ID:   40-year-old female with a past medical history of type 2 diabetes and alcohol use disorder who presented from OSH in [place], [place] for alcoholic hepatitis.    Subjective:  Patient seen and examined at bedside this AM, stable, NAD, no acute events overnight,   -Patient overall continues to feeling better, still has edema but it is slowly improving, getting around better and feels her breathing is improved slightly  -Urinated about 8 times overnight  -Blood pressure stable, tolerating diuretics tolerated 40 Lasix 100 Aldactone yesterday  -Aiming for discharge in the next few days    Interval update:  -Tbili elevated to 8.5  -Renal function continues to be stable  -Weight decreased from 254 to 250lb    Consultants/Specialty:  Gastroenterology    Review of Systems:    Review of Systems   Constitutional: Negative for chills and fever.   HENT: Negative for congestion and sore throat.    Eyes: Negative for blurred vision and double vision.   Respiratory: Positive for shortness of breath (Fatigued when getting up and walking around.). Negative for cough.    Cardiovascular: Positive for leg swelling. Negative for chest pain and orthopnea.   Gastrointestinal: Negative for nausea and vomiting.   Genitourinary: Negative for dysuria and urgency.   Musculoskeletal: Negative for joint pain and myalgias.   Skin: Negative for itching and rash.   Neurological: Negative for dizziness and headaches.       Objective Data:   Physical Exam:   Vitals:   Temp:  [36.2 °C (97.1 °F)-37.1 °C (98.8 °F)] 37.1 °C (98.8 °F)  Pulse:  [63-91] 91  Resp:  [16-20] 18  BP: ()/(66-87) 102/70  SpO2:  [90 %-98 %] 92 %     Physical Exam  Vitals and nursing note reviewed.   Constitutional:       General: She is not in acute distress.     Appearance: She is  obese. She is not ill-appearing.   HENT:      Head: Normocephalic and atraumatic.      Mouth/Throat:      Mouth: Mucous membranes are moist.      Pharynx: Oropharynx is clear.      Comments: Sublingual jaundice  Eyes:      General: Scleral icterus present.      Extraocular Movements: Extraocular movements intact.      Pupils: Pupils are equal, round, and reactive to light.   Cardiovascular:      Rate and Rhythm: Normal rate and regular rhythm.      Heart sounds: No murmur. No gallop.    Pulmonary:      Effort: Pulmonary effort is normal. No respiratory distress.      Breath sounds: Normal breath sounds. No wheezing, rhonchi or rales.   Abdominal:      General: Abdomen is flat. There is no distension.      Palpations: Abdomen is soft.      Tenderness: There is no abdominal tenderness. There is no guarding.   Musculoskeletal:         General: Swelling present.      Right lower leg: Edema present.      Left lower leg: Edema present.      Comments: Bilateral 3+ pitting edema to thighs, 1+ in presacral area/lower abdomen   Skin:     General: Skin is warm and dry.   Neurological:      General: No focal deficit present.      Mental Status: She is alert and oriented to person, place, and time.   Psychiatric:         Mood and Affect: Mood normal.         Behavior: Behavior normal.       Labs:   4/29/2021  2:38 PM    Recent Labs     04/30/21 0542 05/01/21 0325 05/02/21 0432   SODIUM 130* 128* 134*   POTASSIUM 3.5* 3.4* 3.5*   CHLORIDE 98 97 101   CO2 27 25 25   BUN 6* 6* 6*   CREATININE 0.38* 0.36* 0.20*   MAGNESIUM  --   --  1.9   CALCIUM 7.6* 7.5* 7.5*       Recent Labs     04/30/21 0542 05/01/21 0325 05/02/21 0432   ALTSGPT 32 34 40   ASTSGOT 203* 189* 190*   ALKPHOSPHAT 226* 214* 186*   TBILIRUBIN 7.1* 7.5* 8.5*   DBILIRUBIN  --   --  6.4*   GLUCOSE 193* 188* 174*       Recent Labs     04/30/21 0542 05/02/21 0432   RBC 3.22* 3.18*   HEMOGLOBIN 11.3* 11.4*   HEMATOCRIT 35.4* 34.8*   PLATELETCT 150* 154*    PROTHROMBTM 21.0* 20.5*   INR 1.75* 1.70*       Imaging:   US-RUQ   Final Result      1.  Limited by body habitus.   2.  Echogenic liver suggesting cirrhosis.   3.  Flow present within the portal vein.   4.  Prior cholecystectomy.   5.  No biliary dilation.   6.  Limited evaluation of pancreas and abdominal aorta.         EC-ECHOCARDIOGRAM COMPLETE W/O CONT   Final Result      US-EXTREMITY VENOUS LOWER BILAT   Final Result      CT-ABDOMEN-PELVIS WITH   Final Result      Hepatomegaly and hepatic steatosis. The liver is heterogeneous and small hepatic lesions are not excluded.      Indeterminate 1 cm right renal lesion. Further evaluation can be performed with renal protocol MRI.      Scattered colonic diverticulosis.      Status post cholecystectomy.      Splenomegaly.      OUTSIDE IMAGES-DX CHEST   Final Result          Problem Representation:   The patient is a 40-year-old female with a past medical history of alcohol use and type 2 diabetes who presented to the hospital with bilateral lower extremity edema and was found to have alcoholic hepatitis.     * Alcoholic hepatitis without ascites- (present on admission)  Assessment & Plan  The patient's labs and history are strongly suggestive of alcoholic hepatitis.  Her lower extremity edema is likely secondary to hypoalbuminemia and low oncotic pressure.    Maddrey's discriminatory function score 43 on presentation.   CT abdomen done outside of this hospital showed hepatosplenomegaly, hepatic steatosis and 1 cm renal lesion which is an incidental finding.   Lower extremity edema likely secondary to hypoalbuminemia and low oncotic pressure.  Renal function remains preserved, blood pressure remains soft.   Right upper quadrant ultrasound did not show portal vein thrombosis.  Iron panel is not suggestive of hemochromatosis, autoimmune labs pending.  Alcoholic hepatitis remains stable in terms of clinical and biochemical condition.  GI has signed off, agreed with  treatment course and recommend close outpatient follow-up. Per pt insurance patient will determine if she will continue to follow-up in [place] or closer to [place] (closest GI in [place])  Her Lille score is less than 0.45 indicating good prognosis, therefore steroid should be continued for 20 days.  Per Am J Gastroenterol 2017 Feb;112(2):306-315, the day for score is as effective at 7 days ago.    Plan:  -Given good prognosis by Lille score 0.33 continue prednisolone for total 28-day course ending on 5/24/2021  -Continue Lasix 40 mg p.o., as well as Aldactone 100 mg p.o.  I/O charting, sodium and water restriction.  -Continue close monitoring of CMP for renal function  -Boost plus supplement for albumin of 2 nutritional support,   -Leg elevation when in bed and ambulation 3 times daily  -Continue thiamine and folic along with multivitamin.  -Continued counseling on alcohol cessation, patient motivated to quit.  Declined AA at this time stating that she has a good support system.    Shortness of breath  Assessment & Plan  Satting above 90%, likely component of obesity,  no ascites seen on ultrasound.      Plan:  -Continue incentive spirometry, monitor closely for signs of hepatopulmonary syndrome.    Macrocytic anemia- (present on admission)  Assessment & Plan  This may be secondary to alcohol causing bone marrow suppression.  Vitamin B12 and folate levels not decreased.  She is also currently on her menstrual cycle.  TSH is slightly elevated at 8 but free T4 level normal.    Plan:  -Continue to monitor at this time with periodic CBCs.    Hyponatremia  Assessment & Plan  Secondary to hypovolemia as suggested by urine chemistry.  This improved with IV fluids.      QT prolongation  Assessment & Plan  Probably secondary to hypokalemia.      Plan:  -Replating potassium as necessary, especially with diuretics  -Avoid QTC prolonging medication.  -recheck EKG to determine suitability of antiemetic  agents    Gastroesophageal reflux disease without esophagitis- (present on admission)  Assessment & Plan  Continue PPI for GERD which she takes at home    Type 2 diabetes mellitus with neurologic complication, without long-term current use of insulin (HCC)- (present on admission)  Assessment & Plan  She has been taking Trulicity and SGLT2 inhibitor at home which was stopped on admission due to unavailability.  Per records she was also on Tradjenta DPP 4 inhibitor  -Repeat A1c 9.5  Plan:  -Continue Lantus 20 units daily, 4 units Humalog premeal due to elevated blood glucose during the day along with sliding scale insulin during this hospitalization, will continue to adjust as appropriate  -For home regimen would continue Lantus and SGLT2 inhibitor, discontinue DPP 4 inhibitor  -Continue Lyrica for peripheral neuropathy    Elevated brain natriuretic peptide (BNP) level- (present on admission)  Assessment & Plan  Echocardiogram did not show heart failure, normal EF without diastolic dysfunction      DVT ppx: Heparin  Diet: Diabetic  Tubes/Lines: PIV  Code status: Full    Joseph Vogel M.D.

## 2021-05-02 NOTE — PROGRESS NOTES
Bedside shift report received from ROSALIND Murray.  Safety check complete.  All patient needs met at this time.  Will continue to monitor.

## 2021-05-02 NOTE — CARE PLAN
Problem: Communication  Goal: The ability to communicate needs accurately and effectively will improve  Outcome: PROGRESSING AS EXPECTED     Problem: Safety  Goal: Will remain free from injury  Outcome: PROGRESSING AS EXPECTED  Goal: Will remain free from falls  Outcome: PROGRESSING AS EXPECTED     Problem: Infection  Goal: Will remain free from infection  Outcome: PROGRESSING AS EXPECTED     Problem: Venous Thromboembolism (VTW)/Deep Vein Thrombosis (DVT) Prevention:  Goal: Patient will participate in Venous Thrombosis (VTE)/Deep Vein Thrombosis (DVT)Prevention Measures  Outcome: PROGRESSING AS EXPECTED     Problem: Bowel/Gastric:  Goal: Normal bowel function is maintained or improved  Outcome: PROGRESSING AS EXPECTED  Goal: Will not experience complications related to bowel motility  Outcome: PROGRESSING AS EXPECTED     Problem: Knowledge Deficit  Goal: Knowledge of disease process/condition, treatment plan, diagnostic tests, and medications will improve  Outcome: PROGRESSING AS EXPECTED  Goal: Knowledge of the prescribed therapeutic regimen will improve  Outcome: PROGRESSING AS EXPECTED     Problem: Discharge Barriers/Planning  Goal: Patient's continuum of care needs will be met  Outcome: PROGRESSING AS EXPECTED     Problem: Fluid Volume:  Goal: Will maintain balanced intake and output  Outcome: PROGRESSING AS EXPECTED     Problem: Pain Management  Goal: Pain level will decrease to patient's comfort goal  Outcome: PROGRESSING AS EXPECTED     Problem: Respiratory:  Goal: Respiratory status will improve  Outcome: PROGRESSING AS EXPECTED

## 2021-05-03 PROBLEM — R06.02 SHORTNESS OF BREATH: Status: RESOLVED | Noted: 2021-04-28 | Resolved: 2021-05-03

## 2021-05-03 PROBLEM — R94.31 QT PROLONGATION: Status: RESOLVED | Noted: 2021-04-27 | Resolved: 2021-05-03

## 2021-05-03 LAB
ALBUMIN SERPL BCP-MCNC: 1.8 G/DL (ref 3.2–4.9)
ALBUMIN/GLOB SERPL: 0.3 G/DL
ALP SERPL-CCNC: 174 U/L (ref 30–99)
ALT SERPL-CCNC: 42 U/L (ref 2–50)
ANION GAP SERPL CALC-SCNC: 7 MMOL/L (ref 7–16)
AST SERPL-CCNC: 181 U/L (ref 12–45)
BASOPHILS # BLD AUTO: 0.4 % (ref 0–1.8)
BASOPHILS # BLD: 0.04 K/UL (ref 0–0.12)
BILIRUB SERPL-MCNC: 9 MG/DL (ref 0.1–1.5)
BUN SERPL-MCNC: 6 MG/DL (ref 8–22)
CALCIUM SERPL-MCNC: 7.4 MG/DL (ref 8.5–10.5)
CHLORIDE SERPL-SCNC: 101 MMOL/L (ref 96–112)
CO2 SERPL-SCNC: 24 MMOL/L (ref 20–33)
CREAT SERPL-MCNC: 0.31 MG/DL (ref 0.5–1.4)
EOSINOPHIL # BLD AUTO: 0.07 K/UL (ref 0–0.51)
EOSINOPHIL NFR BLD: 0.7 % (ref 0–6.9)
ERYTHROCYTE [DISTWIDTH] IN BLOOD BY AUTOMATED COUNT: 67.1 FL (ref 35.9–50)
GLOBULIN SER CALC-MCNC: 5.2 G/DL (ref 1.9–3.5)
GLUCOSE BLD-MCNC: 180 MG/DL (ref 65–99)
GLUCOSE BLD-MCNC: 182 MG/DL (ref 65–99)
GLUCOSE BLD-MCNC: 207 MG/DL (ref 65–99)
GLUCOSE BLD-MCNC: 303 MG/DL (ref 65–99)
GLUCOSE BLD-MCNC: 386 MG/DL (ref 65–99)
GLUCOSE SERPL-MCNC: 149 MG/DL (ref 65–99)
HCT VFR BLD AUTO: 34.4 % (ref 37–47)
HGB BLD-MCNC: 11.3 G/DL (ref 12–16)
IMM GRANULOCYTES # BLD AUTO: 0.12 K/UL (ref 0–0.11)
IMM GRANULOCYTES NFR BLD AUTO: 1.3 % (ref 0–0.9)
LYMPHOCYTES # BLD AUTO: 1.55 K/UL (ref 1–4.8)
LYMPHOCYTES NFR BLD: 16.4 % (ref 22–41)
MAGNESIUM SERPL-MCNC: 1.8 MG/DL (ref 1.5–2.5)
MCH RBC QN AUTO: 35.5 PG (ref 27–33)
MCHC RBC AUTO-ENTMCNC: 32.8 G/DL (ref 33.6–35)
MCV RBC AUTO: 108.2 FL (ref 81.4–97.8)
MONOCYTES # BLD AUTO: 0.84 K/UL (ref 0–0.85)
MONOCYTES NFR BLD AUTO: 8.9 % (ref 0–13.4)
NEUTROPHILS # BLD AUTO: 6.85 K/UL (ref 2–7.15)
NEUTROPHILS NFR BLD: 72.3 % (ref 44–72)
NRBC # BLD AUTO: 0 K/UL
NRBC BLD-RTO: 0 /100 WBC
PLATELET # BLD AUTO: 153 K/UL (ref 164–446)
PMV BLD AUTO: 12.2 FL (ref 9–12.9)
POTASSIUM SERPL-SCNC: 3.9 MMOL/L (ref 3.6–5.5)
PROT SERPL-MCNC: 7 G/DL (ref 6–8.2)
RBC # BLD AUTO: 3.18 M/UL (ref 4.2–5.4)
SODIUM SERPL-SCNC: 132 MMOL/L (ref 135–145)
WBC # BLD AUTO: 9.5 K/UL (ref 4.8–10.8)

## 2021-05-03 PROCEDURE — A9270 NON-COVERED ITEM OR SERVICE: HCPCS | Performed by: STUDENT IN AN ORGANIZED HEALTH CARE EDUCATION/TRAINING PROGRAM

## 2021-05-03 PROCEDURE — 83735 ASSAY OF MAGNESIUM: CPT

## 2021-05-03 PROCEDURE — 85025 COMPLETE CBC W/AUTO DIFF WBC: CPT

## 2021-05-03 PROCEDURE — 700111 HCHG RX REV CODE 636 W/ 250 OVERRIDE (IP): Performed by: STUDENT IN AN ORGANIZED HEALTH CARE EDUCATION/TRAINING PROGRAM

## 2021-05-03 PROCEDURE — 700102 HCHG RX REV CODE 250 W/ 637 OVERRIDE(OP): Performed by: STUDENT IN AN ORGANIZED HEALTH CARE EDUCATION/TRAINING PROGRAM

## 2021-05-03 PROCEDURE — 82962 GLUCOSE BLOOD TEST: CPT | Mod: 91

## 2021-05-03 PROCEDURE — 770001 HCHG ROOM/CARE - MED/SURG/GYN PRIV*

## 2021-05-03 PROCEDURE — 99232 SBSQ HOSP IP/OBS MODERATE 35: CPT | Mod: GC | Performed by: HOSPITALIST

## 2021-05-03 PROCEDURE — 36415 COLL VENOUS BLD VENIPUNCTURE: CPT

## 2021-05-03 PROCEDURE — 80053 COMPREHEN METABOLIC PANEL: CPT

## 2021-05-03 RX ORDER — MAGNESIUM SULFATE HEPTAHYDRATE 40 MG/ML
2 INJECTION, SOLUTION INTRAVENOUS ONCE
Status: COMPLETED | OUTPATIENT
Start: 2021-05-03 | End: 2021-05-03

## 2021-05-03 RX ADMIN — INSULIN LISPRO 2 UNITS: 100 INJECTION, SOLUTION INTRAVENOUS; SUBCUTANEOUS at 20:05

## 2021-05-03 RX ADMIN — HEPARIN SODIUM 5000 UNITS: 5000 INJECTION, SOLUTION INTRAVENOUS; SUBCUTANEOUS at 04:45

## 2021-05-03 RX ADMIN — PREDNISOLONE 39.9 MG: 15 SOLUTION ORAL at 04:45

## 2021-05-03 RX ADMIN — HEPARIN SODIUM 5000 UNITS: 5000 INJECTION, SOLUTION INTRAVENOUS; SUBCUTANEOUS at 20:02

## 2021-05-03 RX ADMIN — HEPARIN SODIUM 5000 UNITS: 5000 INJECTION, SOLUTION INTRAVENOUS; SUBCUTANEOUS at 14:53

## 2021-05-03 RX ADMIN — FOLIC ACID 1 MG: 1 TABLET ORAL at 04:46

## 2021-05-03 RX ADMIN — INSULIN LISPRO 8 UNITS: 100 INJECTION, SOLUTION INTRAVENOUS; SUBCUTANEOUS at 11:55

## 2021-05-03 RX ADMIN — PREGABALIN 50 MG: 25 CAPSULE ORAL at 04:45

## 2021-05-03 RX ADMIN — SPIRONOLACTONE 100 MG: 25 TABLET ORAL at 04:45

## 2021-05-03 RX ADMIN — MAGNESIUM SULFATE IN WATER 2 G: 40 INJECTION, SOLUTION INTRAVENOUS at 07:54

## 2021-05-03 RX ADMIN — INSULIN LISPRO 6 UNITS: 100 INJECTION, SOLUTION INTRAVENOUS; SUBCUTANEOUS at 16:52

## 2021-05-03 RX ADMIN — OMEPRAZOLE 20 MG: 20 CAPSULE, DELAYED RELEASE ORAL at 04:46

## 2021-05-03 RX ADMIN — INSULIN GLARGINE 20 UNITS: 100 INJECTION, SOLUTION SUBCUTANEOUS at 11:54

## 2021-05-03 RX ADMIN — FUROSEMIDE 40 MG: 40 TABLET ORAL at 04:46

## 2021-05-03 RX ADMIN — THERA TABS 1 TABLET: TAB at 04:46

## 2021-05-03 RX ADMIN — PREGABALIN 50 MG: 25 CAPSULE ORAL at 17:00

## 2021-05-03 RX ADMIN — INSULIN LISPRO 2 UNITS: 100 INJECTION, SOLUTION INTRAVENOUS; SUBCUTANEOUS at 06:08

## 2021-05-03 ASSESSMENT — ENCOUNTER SYMPTOMS
BLURRED VISION: 0
CHILLS: 0
SORE THROAT: 0
ORTHOPNEA: 0
DIZZINESS: 0
DOUBLE VISION: 0
VOMITING: 0
MYALGIAS: 0
HEADACHES: 0
NAUSEA: 0
COUGH: 0
FEVER: 0
SHORTNESS OF BREATH: 1

## 2021-05-03 NOTE — CARE PLAN
Problem: Safety  Goal: Will remain free from injury  Outcome: PROGRESSING AS EXPECTED  Pt educated on safety precautions and precautions in place. Treaded socks on, bed locked and in lowest position, belongings and call light within reach.     Problem: Knowledge Deficit  Goal: Knowledge of disease process/condition, treatment plan, diagnostic tests, and medications will improve  Outcome: PROGRESSING AS EXPECTED   Patient educated on disease process, treatment plan, medications, and plan of care for today. Patient verbalized understanding and no additional questions at this time.

## 2021-05-03 NOTE — DISCHARGE PLANNING
Care Transition Team Assessment    NOK is patient's spouse Brant Reardon at [phone]    Anticipated Discharge Disposition: Home with help    Action: RN CM spoke with patient at bedside.  Patient was previously independent with ADLs/IADLs.  Patient's home address is [address] [place] [address] [place] [id number].  Patient's PCP is Sai Wang MD, and her pharmacy is at the [email or web address] in [place].  Patient stated that she is currently employed with the [employer].  Patient explained that she desires to stop drinking, and that she will reach out to resources with the [facility].  Patient stated that she will be able to get a ride home with her  and daughter.  RN CM anticipates no needs from case coordination.    Barriers to Discharge: Medical clearance     Plan: Case coordination available to discuss discharge barriers.    Information Source  Orientation Level: Oriented X4  Information Given By: Patient  Who is responsible for making decisions for patient? : Patient    Readmission Evaluation  Is this a readmission?: No    Elopement Risk  Legal Hold: No  Ambulatory or Self Mobile in Wheelchair: Yes  Disoriented: No  Psychiatric Symptoms: None  History of Wandering: No  Elopement this Admit: No  Vocalizing Wanting to Leave: No  Displays Behaviors, Body Language Wanting to Leave: No-Not at Risk for Elopement  Elopement Risk: Not at Risk for Elopement    Interdisciplinary Discharge Planning  Lives with - Patient's Self Care Capacity: Spouse, Child Less than 18 Years of Age(11 yr old and 17 yr old)  Patient or legal guardian wants to designate a caregiver: Yes(Brant Reardon)  Caregiver name:  Brant Reardon  Caregiver contact info: [phone]  ([facility]) Authorization for Release of Health Information has been completed: Patient refused to sign  Support Systems: Family Member(s)  Housing / Facility: [address]  Prior Services: Home-Independent  Durable Medical Equipment: Not  Applicable    Discharge Preparedness  What is your plan after discharge?: Home with help  What are your discharge supports?: Spouse, Child  Prior Functional Level: Ambulatory, Independent with Activities of Daily Living, Independent with Medication Management  Difficulity with ADLs: None  Difficulity with IADLs: None    Functional Assesment  Prior Functional Level: Ambulatory, Independent with Activities of Daily Living, Independent with Medication Management    Finances  Financial Barriers to Discharge: No  Prescription Coverage: Yes    Vision / Hearing Impairment  Vision Impairment : Yes  Right Eye Vision: Impaired, Wears Glasses  Left Eye Vision: Impaired, Wears Glasses  Hearing Impairment : No    Advance Directive  Advance Directive?: None    Domestic Abuse  Have you ever been the victim of abuse or violence?: No  Physical Abuse or Sexual Abuse: No  Verbal Abuse or Emotional Abuse: No  Possible Abuse/Neglect Reported to:: Not Applicable    Psychological Assessment  History of Substance Abuse: Alcohol  Date Last Used - Alcohol: 04/27/21  History of Psychiatric Problems: No  Non-compliant with Treatment: No  Newly Diagnosed Illness: Yes    Discharge Risks or Barriers  Discharge risks or barriers?: Complex medical needs, Substance abuse  Patient risk factors: Complex medical needs, Substance abuse    Anticipated Discharge Information  Discharge Disposition: Discharged to home/self care (01)  Discharge Address: [address] [place] [address] [facility] [address]  Discharge Contact Phone Number: [phone]

## 2021-05-03 NOTE — PROGRESS NOTES
Patient now medical, tele monitor removed and returned to monitor room. Patient instructed about the need to track her I&O's and patient verbalized understanding.

## 2021-05-03 NOTE — PROGRESS NOTES
Daily Progress Note:     Date of Service: 5/3/2021  Primary Team: [facility] IM Red Team  Attending: BENJAMIN Martinez M.D.   Senior Resident: Joseph Vogel M.D.  Contact:  [phone]    ID:   40-year-old female with a past medical history of type 2 diabetes and alcohol use disorder who presented from OSH in [place], [place] for alcoholic hepatitis.    Subjective:  Patient seen and examined at bedside this AM, stable, NAD, no acute events overnight,   -Patient continues to feeling better, edema slowly improving, getting around better and feels her breathing is improved slightly  -Urinated about 5-6 times overnight  -Blood pressure stable, tolerating diuretics tolerated 40 Lasix 100 Aldactone daily  -Aiming for discharge tomorrow    Interval update:  -Tbili elevated to 9, Lille score today 0.4 (borderline, <0.45 better prognosis)  -Renal function continues to be stable  -Weight decreased from 254 to 250lb    Consultants/Specialty:  Gastroenterology    Review of Systems:    Review of Systems   Constitutional: Negative for chills and fever.   HENT: Negative for congestion and sore throat.    Eyes: Negative for blurred vision and double vision.   Respiratory: Positive for shortness of breath (Fatigued when getting up and walking around.). Negative for cough.    Cardiovascular: Positive for leg swelling. Negative for chest pain and orthopnea.   Gastrointestinal: Negative for nausea and vomiting.   Genitourinary: Negative for dysuria and urgency.   Musculoskeletal: Negative for joint pain and myalgias.   Skin: Negative for itching and rash.   Neurological: Negative for dizziness and headaches.       Objective Data:   Physical Exam:   Vitals:   Temp:  [36.5 °C (97.7 °F)-37.3 °C (99.2 °F)] 36.8 °C (98.2 °F)  Pulse:  [71-95] 74  Resp:  [16-17] 16  BP: ()/(60-73) 110/61  SpO2:  [92 %-98 %] 92 %     Physical Exam  Vitals and nursing note reviewed.   Constitutional:       General: She is not in acute distress.      Appearance: She is obese. She is not ill-appearing.   HENT:      Head: Normocephalic and atraumatic.      Mouth/Throat:      Mouth: Mucous membranes are moist.      Pharynx: Oropharynx is clear.      Comments: Sublingual jaundice  Eyes:      General: Scleral icterus present.      Extraocular Movements: Extraocular movements intact.      Pupils: Pupils are equal, round, and reactive to light.   Cardiovascular:      Rate and Rhythm: Normal rate and regular rhythm.      Heart sounds: No murmur. No gallop.    Pulmonary:      Effort: Pulmonary effort is normal. No respiratory distress.      Breath sounds: Normal breath sounds. No wheezing, rhonchi or rales.   Abdominal:      General: Abdomen is flat. There is no distension.      Palpations: Abdomen is soft.      Tenderness: There is no abdominal tenderness. There is no guarding.   Musculoskeletal:         General: Swelling present.      Right lower leg: Edema present.      Left lower leg: Edema present.      Comments: Bilateral 3+ pitting edema to thighs, 1+ in presacral area/lower abdomen   Skin:     General: Skin is warm and dry.   Neurological:      General: No focal deficit present.      Mental Status: She is alert and oriented to person, place, and time.   Psychiatric:         Mood and Affect: Mood normal.         Behavior: Behavior normal.       Labs:   4/29/2021  2:38 PM    Recent Labs     05/01/21 0325 05/02/21 0432 05/03/21  0444   SODIUM 128* 134* 132*   POTASSIUM 3.4* 3.5* 3.9   CHLORIDE 97 101 101   CO2 25 25 24   BUN 6* 6* 6*   CREATININE 0.36* 0.20* 0.31*   MAGNESIUM  --  1.9 1.8   CALCIUM 7.5* 7.5* 7.4*       Recent Labs     05/01/21 0325 05/02/21 0432 05/03/21  0444   ALTSGPT 34 40 42   ASTSGOT 189* 190* 181*   ALKPHOSPHAT 214* 186* 174*   TBILIRUBIN 7.5* 8.5* 9.0*   DBILIRUBIN  --  6.4*  --    GLUCOSE 188* 174* 149*       Recent Labs     05/02/21 0432 05/03/21  0444   RBC 3.18* 3.18*   HEMOGLOBIN 11.4* 11.3*   HEMATOCRIT 34.8* 34.4*   PLATELETCT  154* 153*   PROTHROMBTM 20.5*  --    INR 1.70*  --        Imaging:   US-RUQ   Final Result      1.  Limited by body habitus.   2.  Echogenic liver suggesting cirrhosis.   3.  Flow present within the portal vein.   4.  Prior cholecystectomy.   5.  No biliary dilation.   6.  Limited evaluation of pancreas and abdominal aorta.         EC-ECHOCARDIOGRAM COMPLETE W/O CONT   Final Result      US-EXTREMITY VENOUS LOWER BILAT   Final Result      CT-ABDOMEN-PELVIS WITH   Final Result      Hepatomegaly and hepatic steatosis. The liver is heterogeneous and small hepatic lesions are not excluded.      Indeterminate 1 cm right renal lesion. Further evaluation can be performed with renal protocol MRI.      Scattered colonic diverticulosis.      Status post cholecystectomy.      Splenomegaly.      OUTSIDE IMAGES-DX CHEST   Final Result          Problem Representation:   The patient is a 40-year-old female with a past medical history of alcohol use and type 2 diabetes who presented to the hospital with bilateral lower extremity edema and was found to have alcoholic hepatitis.     * Alcoholic hepatitis without ascites- (present on admission)  Assessment & Plan  The patient's labs and history are strongly suggestive of alcoholic hepatitis.  Her lower extremity edema is likely secondary to hypoalbuminemia and low oncotic pressure.    Maddrey's discriminatory function score 43 on presentation.   CT abdomen done outside of this hospital showed hepatosplenomegaly, hepatic steatosis and 1 cm renal lesion which is an incidental finding.   Lower extremity edema likely secondary to hypoalbuminemia and low oncotic pressure.  Renal function remains preserved, blood pressure remains soft.   Right upper quadrant ultrasound did not show portal vein thrombosis.  Iron panel is not suggestive of hemochromatosis, autoimmune labs pending.  Alcoholic hepatitis remains stable in terms of clinical and biochemical condition.  GI has signed off, agreed  with treatment course and recommend close outpatient follow-up. Per pt insurance coverage will determine if she will continue to follow-up in [place] or closer to [place] (closest GI in [place])  GI signed off recommended close outpatient followup    Plan:  -Better prognosis by Lille score, 0.4 today, would continue prednisolone for total 28-day course ending on 5/24/2021  -Continue Lasix 40 mg p.o., as well as Aldactone 100 mg p.o.  I/O charting, sodium and water restriction.  -Continue monitoring of CMP for liver, renal function  -Boost plus supplement for albumin of 2 nutritional support,   -Leg elevation when in bed and ambulation 3 times daily  -Continue thiamine and folic along with multivitamin.  -Continued counseling on alcohol cessation, patient motivated to quit.  Declined AA at this time stating that she has a good support system.    Shortness of breath  Assessment & Plan  Satting above 90%, likely component of obesity,  no ascites seen on ultrasound.      Plan:  -Continue incentive spirometry, monitor closely for signs of hepatopulmonary syndrome.    Macrocytic anemia- (present on admission)  Assessment & Plan  This may be secondary to alcohol causing bone marrow suppression.  Vitamin B12 and folate levels not decreased.  She is also currently on her menstrual cycle.  TSH is slightly elevated at 8 but free T4 level normal.    Plan:  -Continue to monitor at this time with periodic CBCs.    Hyponatremia  Assessment & Plan  Secondary to hypovolemia as suggested by urine chemistry.  This improved with IV fluids.      QT prolongation  Assessment & Plan  Likely secondary to hypokalemia. Resolved qtc 478 on subsequent EKG    Gastroesophageal reflux disease without esophagitis- (present on admission)  Assessment & Plan  Continue PPI for GERD which she takes at home    Type 2 diabetes mellitus with neurologic complication, without long-term current use of insulin (HCC)- (present on admission)  Assessment &  Plan  She has been taking Trulicity and SGLT2 inhibitor at home which was stopped on admission due to unavailability.  Per records she was also on Tradjenta DPP 4 inhibitor  -Repeat A1c 9.5  Plan:  -Continue Lantus 20 units daily, 4 units Humalog premeal due to elevated blood glucose during the day along with sliding scale insulin during this hospitalization, will continue to adjust as appropriate  -For home regimen would continue Lantus and SGLT2 inhibitor, discontinue DPP 4 inhibitor  -Continue Lyrica for peripheral neuropathy    Elevated brain natriuretic peptide (BNP) level- (present on admission)  Assessment & Plan  Echocardiogram did not show heart failure, normal EF without diastolic dysfunction      DVT ppx: Heparin  Diet: Diabetic  Tubes/Lines: PIV  Code status: Full    Joseph Vogel M.D.

## 2021-05-03 NOTE — PROGRESS NOTES
Report received at bedside from NOC RN, pt care assumed, tele box on. Pt aaox4, no signs of distress noted at this time. Patient resting comfortably in bed. POC discussed with pt and verbalizes no questions. Pt denies any additional needs at this time. Bed in lowest position, pt educated on fall risk and verbalized understanding, call light within reach.

## 2021-05-04 VITALS
TEMPERATURE: 97.8 F | WEIGHT: 250.66 LBS | DIASTOLIC BLOOD PRESSURE: 75 MMHG | HEART RATE: 97 BPM | SYSTOLIC BLOOD PRESSURE: 110 MMHG | HEIGHT: 63 IN | RESPIRATION RATE: 16 BRPM | BODY MASS INDEX: 44.41 KG/M2 | OXYGEN SATURATION: 95 %

## 2021-05-04 PROBLEM — K70.30 ALCOHOLIC CIRRHOSIS OF LIVER WITHOUT ASCITES (HCC): Status: ACTIVE | Noted: 2021-05-04

## 2021-05-04 LAB
ALBUMIN SERPL BCP-MCNC: 1.8 G/DL (ref 3.2–4.9)
ALBUMIN/GLOB SERPL: 0.4 G/DL
ALP SERPL-CCNC: 164 U/L (ref 30–99)
ALT SERPL-CCNC: 45 U/L (ref 2–50)
ANION GAP SERPL CALC-SCNC: 7 MMOL/L (ref 7–16)
AST SERPL-CCNC: 175 U/L (ref 12–45)
BILIRUB SERPL-MCNC: 9.8 MG/DL (ref 0.1–1.5)
BUN SERPL-MCNC: 6 MG/DL (ref 8–22)
CALCIUM SERPL-MCNC: 7.5 MG/DL (ref 8.5–10.5)
CHLORIDE SERPL-SCNC: 106 MMOL/L (ref 96–112)
CO2 SERPL-SCNC: 24 MMOL/L (ref 20–33)
CREAT SERPL-MCNC: 0.21 MG/DL (ref 0.5–1.4)
GLOBULIN SER CALC-MCNC: 5 G/DL (ref 1.9–3.5)
GLUCOSE BLD-MCNC: 262 MG/DL (ref 65–99)
GLUCOSE BLD-MCNC: 316 MG/DL (ref 65–99)
GLUCOSE SERPL-MCNC: 158 MG/DL (ref 65–99)
INR PPP: 1.69 (ref 0.87–1.13)
POTASSIUM SERPL-SCNC: 3.7 MMOL/L (ref 3.6–5.5)
PROT SERPL-MCNC: 6.8 G/DL (ref 6–8.2)
PROTHROMBIN TIME: 20.4 SEC (ref 12–14.6)
SODIUM SERPL-SCNC: 137 MMOL/L (ref 135–145)

## 2021-05-04 PROCEDURE — 99239 HOSP IP/OBS DSCHRG MGMT >30: CPT | Mod: GC | Performed by: HOSPITALIST

## 2021-05-04 PROCEDURE — 700102 HCHG RX REV CODE 250 W/ 637 OVERRIDE(OP): Performed by: STUDENT IN AN ORGANIZED HEALTH CARE EDUCATION/TRAINING PROGRAM

## 2021-05-04 PROCEDURE — 36415 COLL VENOUS BLD VENIPUNCTURE: CPT

## 2021-05-04 PROCEDURE — A9270 NON-COVERED ITEM OR SERVICE: HCPCS | Performed by: STUDENT IN AN ORGANIZED HEALTH CARE EDUCATION/TRAINING PROGRAM

## 2021-05-04 PROCEDURE — 85610 PROTHROMBIN TIME: CPT

## 2021-05-04 PROCEDURE — 80053 COMPREHEN METABOLIC PANEL: CPT

## 2021-05-04 PROCEDURE — 82962 GLUCOSE BLOOD TEST: CPT

## 2021-05-04 PROCEDURE — 700111 HCHG RX REV CODE 636 W/ 250 OVERRIDE (IP): Performed by: STUDENT IN AN ORGANIZED HEALTH CARE EDUCATION/TRAINING PROGRAM

## 2021-05-04 RX ORDER — FOLIC ACID 1 MG/1
1 TABLET ORAL DAILY
Qty: 90 TABLET | Refills: 0 | Status: SHIPPED | OUTPATIENT
Start: 2021-05-05

## 2021-05-04 RX ORDER — SPIRONOLACTONE 100 MG/1
100 TABLET, FILM COATED ORAL DAILY
Qty: 90 TABLET | Refills: 0 | Status: SHIPPED | OUTPATIENT
Start: 2021-05-05

## 2021-05-04 RX ADMIN — SPIRONOLACTONE 100 MG: 25 TABLET ORAL at 04:29

## 2021-05-04 RX ADMIN — INSULIN GLARGINE 20 UNITS: 100 INJECTION, SOLUTION SUBCUTANEOUS at 12:18

## 2021-05-04 RX ADMIN — INSULIN LISPRO 5 UNITS: 100 INJECTION, SOLUTION INTRAVENOUS; SUBCUTANEOUS at 07:53

## 2021-05-04 RX ADMIN — FOLIC ACID 1 MG: 1 TABLET ORAL at 04:29

## 2021-05-04 RX ADMIN — THERA TABS 1 TABLET: TAB at 04:29

## 2021-05-04 RX ADMIN — HEPARIN SODIUM 5000 UNITS: 5000 INJECTION, SOLUTION INTRAVENOUS; SUBCUTANEOUS at 04:29

## 2021-05-04 RX ADMIN — OMEPRAZOLE 20 MG: 20 CAPSULE, DELAYED RELEASE ORAL at 04:29

## 2021-05-04 RX ADMIN — PREDNISOLONE 39.9 MG: 15 SOLUTION ORAL at 04:30

## 2021-05-04 RX ADMIN — INSULIN LISPRO 6 UNITS: 100 INJECTION, SOLUTION INTRAVENOUS; SUBCUTANEOUS at 12:18

## 2021-05-04 RX ADMIN — PREGABALIN 50 MG: 25 CAPSULE ORAL at 04:29

## 2021-05-04 RX ADMIN — FUROSEMIDE 40 MG: 40 TABLET ORAL at 04:29

## 2021-05-04 NOTE — PROGRESS NOTES
Assumed care at 1900, bedside report received from day shift RN. Pt is medical. Initial assessment completed, orders reviewed, call light within reach, and hourly rounding in place. POC addressed with patient, no additional questions at this time.

## 2021-05-04 NOTE — PROGRESS NOTES
Report received at bedside from NOC RN, pt care assumed, pt medical. Pt aaox4, no signs of distress noted at this time. Patient resting comfortably in bed. POC discussed with pt and verbalizes no questions. Pt c/o of no pain. Pt denies any additional needs at this time. Bed in lowest position, pt educated on fall risk and verbalized understanding, call light within reach.

## 2021-05-04 NOTE — CARE PLAN
Problem: Communication  Goal: The ability to communicate needs accurately and effectively will improve  Outcome: PROGRESSING AS EXPECTED  Patient educated to utilize call light. Patient encouraged to ask questions about plan of care. Patient effectively uses call light and is involved in POC.      Problem: Venous Thromboembolism (VTW)/Deep Vein Thrombosis (DVT) Prevention:  Goal: Patient will participate in Venous Thrombosis (VTE)/Deep Vein Thrombosis (DVT)Prevention Measures  Outcome: PROGRESSING AS EXPECTED   Patient educated on VTE prophylaxis including mechanical and pharmacologic methods. Medication orders followed. Educated on the importance of VTE prophylaxis due to decreased mobility while in the hospital. Verbalized understanding and no additional questions at this time.

## 2021-05-04 NOTE — DISCHARGE INSTRUCTIONS
"Discharge Instructions    Discharged to home by car with relative. Discharged via wheelchair, hospital escort: Yes.  Special equipment needed: Not Applicable    Be sure to schedule a follow-up appointment with your primary care doctor or any specialists as instructed.     Discharge Plan:   Diet Plan: Discussed  Activity Level: Discussed  Confirmed Follow up Appointment: Patient to Call and Schedule Appointment  Confirmed Symptoms Management: Discussed  Medication Reconciliation Updated: Yes    I understand that a diet low in cholesterol, fat, and sodium is recommended for good health. Unless I have been given specific instructions below for another diet, I accept this instruction as my diet prescription.   Other diet: Diabetic, low sodium    Special Instructions: None    · Is patient discharged on Warfarin / Coumadin?   No     Alcoholic Hepatitis  Alcoholic hepatitis is a condition of the liver characterized by direct injury to the liver cells from alcohol abuse. This disease can occur after years of excessive drinking, or it may occur after isolated episodes of heavy alcohol consumption, such as binge drinking. The symptoms may include:  · Loss of appetite.   · Nausea or vomiting.   · Abdominal pain.   · Fever.   · Jaundice (yellowing of the skin or whites of the eyes).   · Dark urine.   · Swelling of the liver (fullness in your right upper abdomen).   Vomiting blood, fainting, memory loss, blackouts and hallucinations can develop as the disease progresses. This becomes worse with longer abuse. The more you drink, the greater the risk for severe alcoholic hepatitis. You do not have to drink to the point of being \"drunk\" to cause liver damage. Permanent liver damage with cirrhosis, liver failure and death can develop if you continue to drink.  A poor diet is a big part of the problem. Heavy drinkers do not eat a balanced diet. Essential foods and vitamins may be missing from the diet. This leads to malnutrition, " which damages the liver further, and can damage the brain, nerves, stomach and other organ systems. Treating alcoholic hepatitis means that you need to stop drinking and start eating a well-balanced diet including appropriate calories, proteins, vitamins and certain fats. You should also take vitamin supplements, especially vitamin B1 (thiamine) and folic acid. You must stay away from alcohol to recover. Many of the effects of acute alcoholic hepatitis are reversible, if you avoid further alcohol use. Avoid other drugs that are toxic to the liver; your caregiver can tell you which drugs these are. Joining a recovery group such as AA or seeking a rehabilitation clinic or group can help give you the support you need to make this change possible.  SEEK IMMEDIATE MEDICAL CARE IF:   · You have increasing abdominal pain.   · You have persistent vomiting or are unable to tolerate oral nutrition.   · You have blood in your vomit.   · You have confusion, lethargy (a lack of energy) or any change in your normal mental abilities.   · You develop jaundice.   Document Released: 01/25/2006 Document Revised: 03/11/2013 Document Reviewed: 12/20/2010  [employer] Patient Information ©2013 [employer].      Fluid Restriction  With some health conditions, you must restrict your fluid intake. This means that you need to limit the amount of fluid that you drink each day (fluid restriction). When you have a fluid restriction, you must carefully measure and keep track of the amount of fluid that you drink. Your health care provider will identify the specific amount of fluid you are allowed each day (fluid allowance). This amount may depend on several things, such as:  · How well your kidneys function.  · How much fluid you are keeping (retaining) in your body tissues.  · Your blood pressure.  · Your heart function.  · Your blood sodium level.  What is my plan?  Your health care provider recommends that you limit your fluid intake to 1800  ml per day.  What counts toward my fluid intake?  Your fluid intake includes all liquids that you drink, as well as any foods that become liquid at room temperature.  The following are examples of some fluids that you will have to restrict:  · Tea, coffee, soda, lemonade, milk, water, juice, sports drinks, and nutritional supplement beverages.  · Alcoholic beverages.  · Cream.  · Gravy.  · Ice cubes.  · Soup and broth.  The following are examples of foods that become liquid at room temperature. These foods will also count toward your fluid intake.  · Ice cream and ice milk.  · Frozen yogurt and sherbet.  · Frozen ice pops.  · Flavored gelatin.  How do I keep track of my fluid intake?  Each morning, fill a jug with the amount of water that is equal to your daily fluid allowance. You can use this water as a guideline for fluid allowance. Each time you take in any form of fluid (including ice cubes and foods that become liquid at room temperature), pour an equal amount of water out of the container. This helps you to see how much fluid you are taking in. It also helps you to see how much more fluid you can take in during the rest of the day.  The following conversions may also be helpful in measuring your fluid intake:  · 1 cup equals 8 oz (240 mL).  · ¾ cup equals 6 oz (180 mL).  · ? cup equals 5? oz (160 mL).  · ½ cup equals 4 oz (120 mL).  · ? cup equals 2? oz (80 mL).  · ¼ cup equals 2 oz (60 mL).  · 2 Tbsp equals 1 oz (30 mL).  What are tips for following this plan?  General instructions  · Make sure that you stay within your recommended fluid allowance each day. Always measure and keep track of your fluids (including ice cubes and foods that become liquid at room temperature).  · Use small cups and glasses and learn to sip fluids slowly.  · Try frozen fruits between meals, such as grapes or strawberries. These can satisfy thirst without adding to your fluid intake.  · Swallow your pills along with meals or soft  foods such as applesauce or mashed potatoes, instead of with liquids. Doing this helps you to save your fluid allowance for something that you enjoy.  Weigh yourself each day         Weigh yourself every day. Keeping track of your daily weight can help you and your health care provider to notice as soon as possible if you are retaining too much fluid in your body.  · Follow this sequence every mornin. Urinate.  2. Weigh yourself.  3. Eat breakfast.  · Wear the same amount of clothing each time you weigh yourself.  · Write down your daily weight. Give this weight record to your health care provider. If your weight is going up, you may be retaining too much fluid. Every 1 lb (0.45 kg) of body weight that you gain is a sign that your body is retaining 2 cups (480 mL) of fluid.    Manage your thirst  · Add lemon juice or a slice of fresh lemon to water or ice. Doing this helps to satisfy your thirst.  · Freeze fruit juice or water in an ice cube tray. Use this as part of your fluid allowance. These cubes are useful for quenching your thirst. Before you freeze the juice or water, measure how much liquid you use to fill a cube section of the ice tray. Subtract this amount from your day's allowance each time you consume a frozen cube.  · Avoid salty (high-sodium) foods. These foods make you thirsty and make it more difficult to stay within your daily fluid allowance.  · Keep the temperature in your home at a cooler level.  · Keep the air in your home as humid as possible. Dry air increases thirst.  · Avoid being out in the hot sun, which can cause you to sweat and become thirsty.  · To help avoid dry mouth, brush your teeth often or rinse out your mouth with mouthwash. Lemon wedges, hard sour candies, chewing gum, or breath spray may also help to moisten your mouth.  What are some signs that I may be taking in too much fluid?  You may be taking in too much fluid if:  · Your weight increases. Contact your health care  provider if you gain weight rapidly.  · Your face, hands, legs, feet, and abdomen start to swell.  · You have trouble breathing.  Summary  · With some health conditions, you must limit (restrict) your fluid intake. This means that you need to limit the amount of fluid you drink each day (fluid restriction). Your health care provider will identify the specific amount of fluid that you are allowed each day.  · When you have a fluid restriction, you must carefully measure and keep track of the amount of fluid that you drink.  · Your fluid intake includes all liquids that you drink, as well as any foods that become liquid at room temperature (such as ice cream and gelatin).  · You may be taking in too much fluid if your weight increases, your body starts to swell, or you have trouble breathing.  This information is not intended to replace advice given to you by your health care provider. Make sure you discuss any questions you have with your health care provider.  Document Released: 10/14/2008 Document Revised: 04/09/2020 Document Reviewed: 08/22/2018  [employer] Patient Education © 2020 [employer]      Depression / Suicide Risk    As you are discharged from this [facility] facility, it is important to learn how to keep safe from harming yourself.    Recognize the warning signs:  · Abrupt changes in personality, positive or negative- including increase in energy   · Giving away possessions  · Change in eating patterns- significant weight changes-  positive or negative  · Change in sleeping patterns- unable to sleep or sleeping all the time   · Unwillingness or inability to communicate  · Depression  · Unusual sadness, discouragement and loneliness  · Talk of wanting to die  · Neglect of personal appearance   · Rebelliousness- reckless behavior  · Withdrawal from people/activities they love  · Confusion- inability to concentrate     If you or a loved one observes any of these behaviors or has concerns about  self-harm, here's what you can do:  · Talk about it- your feelings and reasons for harming yourself  · Remove any means that you might use to hurt yourself (examples: pills, rope, extension cords, firearm)  · Get professional help from the community (Mental Health, Substance Abuse, psychological counseling)  · Do not be alone:Call your Safe Contact- someone whom you trust who will be there for you.  · Call your local CRISIS HOTLINE 394-8006 or 909-424-8556  · Call your local Children's Mobile Crisis Response Team [place] (240) 930-7022 or www.[employer]  · Call the toll free National Suicide Prevention Hotlines   · National Suicide Prevention Lifeline 522-454-YZCO (8548)  · National Hope Line Network 800-SUICIDE (395-9094)    Spironolactone tablets  What is this medicine?  SPIRONOLACTONE ([place] LAK tone) is a diuretic. It helps you make more urine and to lose excess water from your body. This medicine is used to treat high blood pressure, and edema or swelling from heart, kidney, or liver disease. It is also used to treat patients who make too much aldosterone or have low potassium.  This medicine may be used for other purposes; ask your health care provider or pharmacist if you have questions.  COMMON BRAND NAME(S): Aldactone  What should I tell my health care provider before I take this medicine?  They need to know if you have any of these conditions:  · high blood level of potassium  · kidney disease or trouble making urine  · liver disease  · an unusual or allergic reaction to spironolactone, other medicines, foods, dyes, or preservatives  · pregnant or trying to get pregnant  · breast-feeding  How should I use this medicine?  Take this medicine by mouth with a drink of water. Follow the directions on your prescription label. You can take it with or without food. If it upsets your stomach, take it with food. Do not take your medicine more often than directed. Remember that you will need to pass  more urine after taking this medicine. Do not take your doses at a time of day that will cause you problems. Do not take at bedtime.  Talk to your pediatrician regarding the use of this medicine in children. While this drug may be prescribed for selected conditions, precautions do apply.  Overdosage: If you think you have taken too much of this medicine contact a poison control center or emergency room at once.  NOTE: This medicine is only for you. Do not share this medicine with others.  What if I miss a dose?  If you miss a dose, take it as soon as you can. If it is almost time for your next dose, take only that dose. Do not take double or extra doses.  What may interact with this medicine?  Do not take this medicine with any of the following medications:  · cidofovir  · eplerenone  · tranylcypromine  This medicine may also interact with the following medications:  · aspirin  · certain medicines for blood pressure or heart disease like benazepril, lisinopril, losartan, valsartan  · certain medicines that treat or prevent blood clots like heparin and enoxaparin  · cholestyramine  · cyclosporine  · digoxin  · lithium  · medicines that relax muscles for surgery  · NSAIDs, medicines for pain and inflammation, like ibuprofen or naproxen  · other diuretics  · potassium supplements  · steroid medicines like prednisone or cortisone  · trimethoprim  This list may not describe all possible interactions. Give your health care provider a list of all the medicines, herbs, non-prescription drugs, or dietary supplements you use. Also tell them if you smoke, drink alcohol, or use illegal drugs. Some items may interact with your medicine.  What should I watch for while using this medicine?  Visit your doctor or health care professional for regular checks on your progress. Check your blood pressure as directed. Ask your doctor what your blood pressure should be, and when you should contact them.  You may need to be on a special  diet while taking this medicine. Ask your doctor. Also, ask how many glasses of fluid you need to drink a day. You must not get dehydrated.  This medicine may make you feel confused, dizzy or lightheaded. Drinking alcohol and taking some medicines can make this worse. Do not drive, use machinery, or do anything that needs mental alertness until you know how this medicine affects you. Do not sit or stand up quickly.  What side effects may I notice from receiving this medicine?  Side effects that you should report to your doctor or health care professional as soon as possible:  · allergic reactions such as skin rash or itching, hives, swelling of the lips, mouth, tongue, or throat  · black or tarry stools  · fast, irregular heartbeat  · fever  · muscle pain, cramps  · numbness, tingling in hands or feet  · trouble breathing  · trouble passing urine  · unusual bleeding  · unusually weak or tired  Side effects that usually do not require medical attention (report to your doctor or health care professional if they continue or are bothersome):  · change in voice or hair growth  · confusion  · dizzy, drowsy  · dry mouth, increased thirst  · enlarged or tender breasts  · headache  · irregular menstrual periods  · sexual difficulty, unable to have an erection  · stomach upset  This list may not describe all possible side effects. Call your doctor for medical advice about side effects. You may report side effects to FDA at 3-114-FDA-5263.  Where should I keep my medicine?  Keep out of the reach of children.  Store below 25 degrees C (77 degrees F). Throw away any unused medicine after the expiration date.  NOTE: This sheet is a summary. It may not cover all possible information. If you have questions about this medicine, talk to your doctor, pharmacist, or health care provider.  © 2020 [employer] (2018-05-04 09:42:28)      Edema    Edema is when you have too much fluid in your body or under your skin. Edema may  make your legs, feet, and ankles swell up. Swelling is also common in looser tissues, like around your eyes. This is a common condition. It gets more common as you get older. There are many possible causes of edema. Eating too much salt (sodium) and being on your feet or sitting for a long time can cause edema in your legs, feet, and ankles. Hot weather may make edema worse.  Edema is usually painless. Your skin may look swollen or shiny.  Follow these instructions at home:  · Keep the swollen body part raised (elevated) above the level of your heart when you are sitting or lying down.  · Do not sit still or stand for a long time.  · Do not wear tight clothes. Do not wear garters on your upper legs.  · Exercise your legs. This can help the swelling go down.  · Wear elastic bandages or support stockings as told by your doctor.  · Eat a low-salt (low-sodium) diet to reduce fluid as told by your doctor.  · Depending on the cause of your swelling, you may need to limit how much fluid you drink (fluid restriction).  · Take over-the-counter and prescription medicines only as told by your doctor.  Contact a doctor if:  · Treatment is not working.  · You have heart, liver, or kidney disease and have symptoms of edema.  · You have sudden and unexplained weight gain.  Get help right away if:  · You have shortness of breath or chest pain.  · You cannot breathe when you lie down.  · You have pain, redness, or warmth in the swollen areas.  · You have heart, liver, or kidney disease and get edema all of a sudden.  · You have a fever and your symptoms get worse all of a sudden.  Summary  · Edema is when you have too much fluid in your body or under your skin.  · Edema may make your legs, feet, and ankles swell up. Swelling is also common in looser tissues, like around your eyes.  · Raise (elevate) the swollen body part above the level of your heart when you are sitting or lying down.  · Follow your doctor's instructions about diet  and how much fluid you can drink (fluid restriction).  This information is not intended to replace advice given to you by your health care provider. Make sure you discuss any questions you have with your health care provider.  Document Released: 06/05/2009 Document Revised: 12/21/2018 Document Reviewed: 01/05/2018  Elsevier Patient Education © 2020 [employer].

## 2021-05-04 NOTE — PROGRESS NOTES
Patient escorted via wheelchair to car to go home with family. IV and tele box removed previously, all belongings gathered and sent home with patient. Patient a&ox4, educated on medications and where to pick them up. Verbalized understanding and denied any questions or additional needs at this time.

## 2021-05-04 NOTE — ASSESSMENT & PLAN NOTE
History of significant ETOH use, RUQ ultrasound suggesting steatosis cirrhotic changes liver.  Presented with alcoholic hepatitis nonresponsive to steroids per [place] score.  Abnormal liver synthetic function with INR elevated.  Clinically stable on discharge to follow with GI as outpatient.

## 2021-06-11 ENCOUNTER — HOSPITAL ENCOUNTER (INPATIENT)
Facility: MEDICAL CENTER | Age: 41
LOS: 1 days | DRG: 871 | End: 2021-06-15
Attending: EMERGENCY MEDICINE | Admitting: STUDENT IN AN ORGANIZED HEALTH CARE EDUCATION/TRAINING PROGRAM
Payer: OTHER GOVERNMENT

## 2021-06-11 ENCOUNTER — HOSPITAL ENCOUNTER (OUTPATIENT)
Dept: RADIOLOGY | Facility: MEDICAL CENTER | Age: 41
End: 2021-06-11
Payer: OTHER GOVERNMENT

## 2021-06-11 DIAGNOSIS — I81 PORTAL VEIN THROMBOSIS: ICD-10-CM

## 2021-06-11 PROBLEM — D72.829 LEUKOCYTOSIS: Status: ACTIVE | Noted: 2021-06-11

## 2021-06-11 PROBLEM — N17.9 AKI (ACUTE KIDNEY INJURY) (HCC): Status: ACTIVE | Noted: 2021-06-11

## 2021-06-11 PROBLEM — E03.9 HYPOTHYROIDISM: Status: ACTIVE | Noted: 2021-06-11

## 2021-06-11 LAB
ALBUMIN SERPL BCP-MCNC: 2.3 G/DL (ref 3.2–4.9)
ALBUMIN/GLOB SERPL: 0.4 G/DL
ALP SERPL-CCNC: 218 U/L (ref 30–99)
ALT SERPL-CCNC: 38 U/L (ref 2–50)
AMMONIA PLAS-SCNC: 74 UMOL/L (ref 11–45)
ANION GAP SERPL CALC-SCNC: 11 MMOL/L (ref 7–16)
APPEARANCE UR: CLEAR
APTT PPP: 58.7 SEC (ref 24.7–36)
AST SERPL-CCNC: 157 U/L (ref 12–45)
BACTERIA #/AREA URNS HPF: NEGATIVE /HPF
BASOPHILS # BLD AUTO: 1.2 % (ref 0–1.8)
BASOPHILS # BLD: 0.22 K/UL (ref 0–0.12)
BILIRUB SERPL-MCNC: 12.6 MG/DL (ref 0.1–1.5)
BILIRUB UR QL STRIP.AUTO: ABNORMAL
BUN SERPL-MCNC: 18 MG/DL (ref 8–22)
CALCIUM SERPL-MCNC: 8.1 MG/DL (ref 8.5–10.5)
CHLORIDE SERPL-SCNC: 109 MMOL/L (ref 96–112)
CO2 SERPL-SCNC: 16 MMOL/L (ref 20–33)
COLOR UR: ABNORMAL
CREAT SERPL-MCNC: 1.12 MG/DL (ref 0.5–1.4)
EOSINOPHIL # BLD AUTO: 0.24 K/UL (ref 0–0.51)
EOSINOPHIL NFR BLD: 1.3 % (ref 0–6.9)
EPI CELLS #/AREA URNS HPF: NEGATIVE /HPF
ERYTHROCYTE [DISTWIDTH] IN BLOOD BY AUTOMATED COUNT: 51.3 FL (ref 35.9–50)
ETHANOL BLD-MCNC: <10.1 MG/DL (ref 0–10)
GLOBULIN SER CALC-MCNC: 5.2 G/DL (ref 1.9–3.5)
GLUCOSE SERPL-MCNC: 118 MG/DL (ref 65–99)
GLUCOSE UR STRIP.AUTO-MCNC: >=1000 MG/DL
HCG UR QL: NEGATIVE
HCT VFR BLD AUTO: 33.6 % (ref 37–47)
HGB BLD-MCNC: 11.4 G/DL (ref 12–16)
HYALINE CASTS #/AREA URNS LPF: NORMAL /LPF
IMM GRANULOCYTES # BLD AUTO: 0.14 K/UL (ref 0–0.11)
IMM GRANULOCYTES NFR BLD AUTO: 0.7 % (ref 0–0.9)
INR PPP: 2.2 (ref 0.87–1.13)
KETONES UR STRIP.AUTO-MCNC: NEGATIVE MG/DL
LACTATE BLD-SCNC: 2.2 MMOL/L (ref 0.5–2)
LEUKOCYTE ESTERASE UR QL STRIP.AUTO: ABNORMAL
LIPASE SERPL-CCNC: 20 U/L (ref 11–82)
LYMPHOCYTES # BLD AUTO: 2.47 K/UL (ref 1–4.8)
LYMPHOCYTES NFR BLD: 13.1 % (ref 22–41)
MCH RBC QN AUTO: 34.4 PG (ref 27–33)
MCHC RBC AUTO-ENTMCNC: 33.9 G/DL (ref 33.6–35)
MCV RBC AUTO: 101.5 FL (ref 81.4–97.8)
MICRO URNS: ABNORMAL
MONOCYTES # BLD AUTO: 1.52 K/UL (ref 0–0.85)
MONOCYTES NFR BLD AUTO: 8.1 % (ref 0–13.4)
MUCOUS THREADS #/AREA URNS HPF: NORMAL /HPF
NEUTROPHILS # BLD AUTO: 14.25 K/UL (ref 2–7.15)
NEUTROPHILS NFR BLD: 75.6 % (ref 44–72)
NITRITE UR QL STRIP.AUTO: NEGATIVE
NRBC # BLD AUTO: 0 K/UL
NRBC BLD-RTO: 0 /100 WBC
PH UR STRIP.AUTO: 6 [PH] (ref 5–8)
PLATELET # BLD AUTO: 191 K/UL (ref 164–446)
PMV BLD AUTO: 11.1 FL (ref 9–12.9)
POTASSIUM SERPL-SCNC: 4.3 MMOL/L (ref 3.6–5.5)
PROT SERPL-MCNC: 7.5 G/DL (ref 6–8.2)
PROT UR QL STRIP: NEGATIVE MG/DL
PROTHROMBIN TIME: 23.7 SEC (ref 12–14.6)
RBC # BLD AUTO: 3.31 M/UL (ref 4.2–5.4)
RBC # URNS HPF: NORMAL /HPF
RBC UR QL AUTO: NEGATIVE
SODIUM SERPL-SCNC: 136 MMOL/L (ref 135–145)
SP GR UR STRIP.AUTO: 1.02
UROBILINOGEN UR STRIP.AUTO-MCNC: 2 MG/DL
WBC # BLD AUTO: 18.8 K/UL (ref 4.8–10.8)
WBC #/AREA URNS HPF: NORMAL /HPF

## 2021-06-11 PROCEDURE — 81025 URINE PREGNANCY TEST: CPT

## 2021-06-11 PROCEDURE — 99285 EMERGENCY DEPT VISIT HI MDM: CPT

## 2021-06-11 PROCEDURE — U0005 INFEC AGEN DETEC AMPLI PROBE: HCPCS

## 2021-06-11 PROCEDURE — 85610 PROTHROMBIN TIME: CPT

## 2021-06-11 PROCEDURE — 82140 ASSAY OF AMMONIA: CPT

## 2021-06-11 PROCEDURE — G0378 HOSPITAL OBSERVATION PER HR: HCPCS

## 2021-06-11 PROCEDURE — 80053 COMPREHEN METABOLIC PANEL: CPT

## 2021-06-11 PROCEDURE — U0003 INFECTIOUS AGENT DETECTION BY NUCLEIC ACID (DNA OR RNA); SEVERE ACUTE RESPIRATORY SYNDROME CORONAVIRUS 2 (SARS-COV-2) (CORONAVIRUS DISEASE [COVID-19]), AMPLIFIED PROBE TECHNIQUE, MAKING USE OF HIGH THROUGHPUT TECHNOLOGIES AS DESCRIBED BY CMS-2020-01-R: HCPCS

## 2021-06-11 PROCEDURE — 87040 BLOOD CULTURE FOR BACTERIA: CPT

## 2021-06-11 PROCEDURE — 82077 ASSAY SPEC XCP UR&BREATH IA: CPT

## 2021-06-11 PROCEDURE — 85730 THROMBOPLASTIN TIME PARTIAL: CPT

## 2021-06-11 PROCEDURE — 99220 PR INITIAL OBSERVATION CARE,LEVL III: CPT | Performed by: STUDENT IN AN ORGANIZED HEALTH CARE EDUCATION/TRAINING PROGRAM

## 2021-06-11 PROCEDURE — 85025 COMPLETE CBC W/AUTO DIFF WBC: CPT

## 2021-06-11 PROCEDURE — 83605 ASSAY OF LACTIC ACID: CPT

## 2021-06-11 PROCEDURE — 84145 PROCALCITONIN (PCT): CPT

## 2021-06-11 PROCEDURE — 700105 HCHG RX REV CODE 258: Performed by: EMERGENCY MEDICINE

## 2021-06-11 PROCEDURE — A9270 NON-COVERED ITEM OR SERVICE: HCPCS | Performed by: STUDENT IN AN ORGANIZED HEALTH CARE EDUCATION/TRAINING PROGRAM

## 2021-06-11 PROCEDURE — 82962 GLUCOSE BLOOD TEST: CPT

## 2021-06-11 PROCEDURE — 83690 ASSAY OF LIPASE: CPT

## 2021-06-11 PROCEDURE — 700105 HCHG RX REV CODE 258: Performed by: STUDENT IN AN ORGANIZED HEALTH CARE EDUCATION/TRAINING PROGRAM

## 2021-06-11 PROCEDURE — 81001 URINALYSIS AUTO W/SCOPE: CPT

## 2021-06-11 PROCEDURE — 700102 HCHG RX REV CODE 250 W/ 637 OVERRIDE(OP): Performed by: STUDENT IN AN ORGANIZED HEALTH CARE EDUCATION/TRAINING PROGRAM

## 2021-06-11 RX ORDER — DEXTROSE MONOHYDRATE 25 G/50ML
50 INJECTION, SOLUTION INTRAVENOUS
Status: DISCONTINUED | OUTPATIENT
Start: 2021-06-11 | End: 2021-06-15 | Stop reason: HOSPADM

## 2021-06-11 RX ORDER — LEVOTHYROXINE SODIUM 0.05 MG/1
50 TABLET ORAL
COMMUNITY

## 2021-06-11 RX ORDER — FOLIC ACID 1 MG/1
1 TABLET ORAL DAILY
Status: DISCONTINUED | OUTPATIENT
Start: 2021-06-12 | End: 2021-06-15 | Stop reason: HOSPADM

## 2021-06-11 RX ORDER — PREGABALIN 25 MG/1
50 CAPSULE ORAL NIGHTLY
Status: DISCONTINUED | OUTPATIENT
Start: 2021-06-11 | End: 2021-06-15 | Stop reason: HOSPADM

## 2021-06-11 RX ORDER — SULFAMETHOXAZOLE AND TRIMETHOPRIM 800; 160 MG/1; MG/1
1 TABLET ORAL 2 TIMES DAILY
COMMUNITY

## 2021-06-11 RX ORDER — SODIUM CHLORIDE 9 MG/ML
INJECTION, SOLUTION INTRAVENOUS CONTINUOUS
Status: DISCONTINUED | OUTPATIENT
Start: 2021-06-11 | End: 2021-06-15

## 2021-06-11 RX ORDER — LACTULOSE 20 G/30ML
30 SOLUTION ORAL 3 TIMES DAILY
Status: DISCONTINUED | OUTPATIENT
Start: 2021-06-11 | End: 2021-06-13

## 2021-06-11 RX ORDER — INSULIN LISPRO 100 [IU]/ML
2-9 INJECTION, SOLUTION INTRAVENOUS; SUBCUTANEOUS EVERY 6 HOURS
Status: DISCONTINUED | OUTPATIENT
Start: 2021-06-12 | End: 2021-06-15 | Stop reason: HOSPADM

## 2021-06-11 RX ORDER — HEPARIN SODIUM 5000 [USP'U]/ML
5000 INJECTION, SOLUTION INTRAVENOUS; SUBCUTANEOUS EVERY 8 HOURS
Status: DISCONTINUED | OUTPATIENT
Start: 2021-06-11 | End: 2021-06-11

## 2021-06-11 RX ORDER — PROCHLORPERAZINE EDISYLATE 5 MG/ML
5-10 INJECTION INTRAMUSCULAR; INTRAVENOUS EVERY 4 HOURS PRN
Status: DISCONTINUED | OUTPATIENT
Start: 2021-06-11 | End: 2021-06-15 | Stop reason: HOSPADM

## 2021-06-11 RX ORDER — SODIUM CHLORIDE 9 MG/ML
1000 INJECTION, SOLUTION INTRAVENOUS ONCE
Status: COMPLETED | OUTPATIENT
Start: 2021-06-11 | End: 2021-06-11

## 2021-06-11 RX ORDER — LEVOTHYROXINE SODIUM 0.05 MG/1
50 TABLET ORAL
Status: DISCONTINUED | OUTPATIENT
Start: 2021-06-12 | End: 2021-06-15 | Stop reason: HOSPADM

## 2021-06-11 RX ORDER — ONDANSETRON 4 MG/1
4 TABLET, ORALLY DISINTEGRATING ORAL EVERY 4 HOURS PRN
Status: DISCONTINUED | OUTPATIENT
Start: 2021-06-11 | End: 2021-06-15 | Stop reason: HOSPADM

## 2021-06-11 RX ORDER — ONDANSETRON 2 MG/ML
4 INJECTION INTRAMUSCULAR; INTRAVENOUS EVERY 4 HOURS PRN
Status: DISCONTINUED | OUTPATIENT
Start: 2021-06-11 | End: 2021-06-15 | Stop reason: HOSPADM

## 2021-06-11 RX ORDER — OMEPRAZOLE 20 MG/1
20 CAPSULE, DELAYED RELEASE ORAL DAILY
Status: DISCONTINUED | OUTPATIENT
Start: 2021-06-12 | End: 2021-06-15 | Stop reason: HOSPADM

## 2021-06-11 RX ORDER — TRAMADOL HYDROCHLORIDE 50 MG/1
50 TABLET ORAL 3 TIMES DAILY PRN
Status: DISCONTINUED | OUTPATIENT
Start: 2021-06-11 | End: 2021-06-15 | Stop reason: HOSPADM

## 2021-06-11 RX ORDER — PROMETHAZINE HYDROCHLORIDE 25 MG/1
12.5-25 SUPPOSITORY RECTAL EVERY 4 HOURS PRN
Status: DISCONTINUED | OUTPATIENT
Start: 2021-06-11 | End: 2021-06-15 | Stop reason: HOSPADM

## 2021-06-11 RX ORDER — CHOLECALCIFEROL (VITAMIN D3) 125 MCG
2000 CAPSULE ORAL DAILY
Status: DISCONTINUED | OUTPATIENT
Start: 2021-06-12 | End: 2021-06-15 | Stop reason: HOSPADM

## 2021-06-11 RX ORDER — PROMETHAZINE HYDROCHLORIDE 25 MG/1
12.5-25 TABLET ORAL EVERY 4 HOURS PRN
Status: DISCONTINUED | OUTPATIENT
Start: 2021-06-11 | End: 2021-06-15 | Stop reason: HOSPADM

## 2021-06-11 RX ADMIN — PREGABALIN 50 MG: 25 CAPSULE ORAL at 22:31

## 2021-06-11 RX ADMIN — LACTULOSE 30 ML: 20 SOLUTION ORAL at 20:51

## 2021-06-11 RX ADMIN — SODIUM CHLORIDE 1000 ML: 9 INJECTION, SOLUTION INTRAVENOUS at 20:23

## 2021-06-11 RX ADMIN — SODIUM CHLORIDE: 9 INJECTION, SOLUTION INTRAVENOUS at 22:33

## 2021-06-11 ASSESSMENT — LIFESTYLE VARIABLES
EVER HAD A DRINK FIRST THING IN THE MORNING TO STEADY YOUR NERVES TO GET RID OF A HANGOVER: NO
HOW MANY TIMES IN THE PAST YEAR HAVE YOU HAD 5 OR MORE DRINKS IN A DAY: 0
EVER FELT BAD OR GUILTY ABOUT YOUR DRINKING: NO
AVERAGE NUMBER OF DAYS PER WEEK YOU HAVE A DRINK CONTAINING ALCOHOL: 0
TOTAL SCORE: 0
DOES PATIENT WANT TO STOP DRINKING: NO
HAVE YOU EVER FELT YOU SHOULD CUT DOWN ON YOUR DRINKING: NO
ON A TYPICAL DAY WHEN YOU DRINK ALCOHOL HOW MANY DRINKS DO YOU HAVE: 0
ALCOHOL_USE: NO
CONSUMPTION TOTAL: NEGATIVE
HAVE PEOPLE ANNOYED YOU BY CRITICIZING YOUR DRINKING: NO

## 2021-06-11 ASSESSMENT — PAIN DESCRIPTION - PAIN TYPE: TYPE: ACUTE PAIN

## 2021-06-11 ASSESSMENT — FIBROSIS 4 INDEX
FIB4 SCORE: 6.65
FIB4 SCORE: 5.2
FIB4 SCORE: 5.2

## 2021-06-11 ASSESSMENT — ENCOUNTER SYMPTOMS: ROS SKIN COMMENTS: JAUNDICE

## 2021-06-12 ENCOUNTER — APPOINTMENT (OUTPATIENT)
Dept: RADIOLOGY | Facility: MEDICAL CENTER | Age: 41
DRG: 871 | End: 2021-06-12
Attending: STUDENT IN AN ORGANIZED HEALTH CARE EDUCATION/TRAINING PROGRAM
Payer: OTHER GOVERNMENT

## 2021-06-12 PROBLEM — R65.20 SEPSIS WITH ACUTE RENAL FAILURE (HCC): Status: ACTIVE | Noted: 2021-06-11

## 2021-06-12 PROBLEM — E66.01 CLASS 3 SEVERE OBESITY DUE TO EXCESS CALORIES WITH SERIOUS COMORBIDITY AND BODY MASS INDEX (BMI) OF 40.0 TO 44.9 IN ADULT (HCC): Chronic | Status: ACTIVE | Noted: 2021-06-12

## 2021-06-12 PROBLEM — A41.9 SEPSIS WITH ACUTE RENAL FAILURE WITHOUT SEPTIC SHOCK (HCC): Status: ACTIVE | Noted: 2021-06-12

## 2021-06-12 PROBLEM — N17.9 SEPSIS WITH ACUTE RENAL FAILURE (HCC): Status: ACTIVE | Noted: 2021-06-11

## 2021-06-12 PROBLEM — R79.1 ABNORMAL INR: Status: ACTIVE | Noted: 2021-06-12

## 2021-06-12 PROBLEM — N17.9 SEPSIS WITH ACUTE RENAL FAILURE WITHOUT SEPTIC SHOCK (HCC): Status: ACTIVE | Noted: 2021-06-12

## 2021-06-12 PROBLEM — R65.20 SEPSIS WITH ACUTE RENAL FAILURE WITHOUT SEPTIC SHOCK (HCC): Status: ACTIVE | Noted: 2021-06-12

## 2021-06-12 PROBLEM — A41.9 SEPSIS WITH ACUTE RENAL FAILURE (HCC): Status: ACTIVE | Noted: 2021-06-11

## 2021-06-12 LAB
ALBUMIN SERPL BCP-MCNC: 1.7 G/DL (ref 3.2–4.9)
ALBUMIN/GLOB SERPL: 0.3 G/DL
ALP SERPL-CCNC: 189 U/L (ref 30–99)
ALT SERPL-CCNC: 29 U/L (ref 2–50)
ANION GAP SERPL CALC-SCNC: 9 MMOL/L (ref 7–16)
AST SERPL-CCNC: 145 U/L (ref 12–45)
BASOPHILS # BLD AUTO: 1.3 % (ref 0–1.8)
BASOPHILS # BLD: 0.21 K/UL (ref 0–0.12)
BILIRUB SERPL-MCNC: 11.2 MG/DL (ref 0.1–1.5)
BUN SERPL-MCNC: 16 MG/DL (ref 8–22)
CALCIUM SERPL-MCNC: 7.9 MG/DL (ref 8.5–10.5)
CHLORIDE SERPL-SCNC: 111 MMOL/L (ref 96–112)
CO2 SERPL-SCNC: 15 MMOL/L (ref 20–33)
CREAT SERPL-MCNC: 0.92 MG/DL (ref 0.5–1.4)
EOSINOPHIL # BLD AUTO: 0.24 K/UL (ref 0–0.51)
EOSINOPHIL NFR BLD: 1.4 % (ref 0–6.9)
ERYTHROCYTE [DISTWIDTH] IN BLOOD BY AUTOMATED COUNT: 53.7 FL (ref 35.9–50)
GLOBULIN SER CALC-MCNC: 4.9 G/DL (ref 1.9–3.5)
GLUCOSE BLD-MCNC: 103 MG/DL (ref 65–99)
GLUCOSE BLD-MCNC: 106 MG/DL (ref 65–99)
GLUCOSE BLD-MCNC: 117 MG/DL (ref 65–99)
GLUCOSE BLD-MCNC: 146 MG/DL (ref 65–99)
GLUCOSE SERPL-MCNC: 136 MG/DL (ref 65–99)
HCT VFR BLD AUTO: 32.7 % (ref 37–47)
HGB BLD-MCNC: 10.4 G/DL (ref 12–16)
IMM GRANULOCYTES # BLD AUTO: 0.15 K/UL (ref 0–0.11)
IMM GRANULOCYTES NFR BLD AUTO: 0.9 % (ref 0–0.9)
LACTATE BLD-SCNC: 1.5 MMOL/L (ref 0.5–2)
LYMPHOCYTES # BLD AUTO: 1.95 K/UL (ref 1–4.8)
LYMPHOCYTES NFR BLD: 11.6 % (ref 22–41)
MCH RBC QN AUTO: 33.1 PG (ref 27–33)
MCHC RBC AUTO-ENTMCNC: 31.8 G/DL (ref 33.6–35)
MCV RBC AUTO: 104.1 FL (ref 81.4–97.8)
MONOCYTES # BLD AUTO: 1.25 K/UL (ref 0–0.85)
MONOCYTES NFR BLD AUTO: 7.5 % (ref 0–13.4)
NEUTROPHILS # BLD AUTO: 12.96 K/UL (ref 2–7.15)
NEUTROPHILS NFR BLD: 77.3 % (ref 44–72)
NRBC # BLD AUTO: 0 K/UL
NRBC BLD-RTO: 0 /100 WBC
PLATELET # BLD AUTO: 185 K/UL (ref 164–446)
PMV BLD AUTO: 11.6 FL (ref 9–12.9)
POTASSIUM SERPL-SCNC: 4.4 MMOL/L (ref 3.6–5.5)
PROCALCITONIN SERPL-MCNC: 0.26 NG/ML
PROT SERPL-MCNC: 6.6 G/DL (ref 6–8.2)
RBC # BLD AUTO: 3.14 M/UL (ref 4.2–5.4)
SARS-COV-2 RNA RESP QL NAA+PROBE: NOTDETECTED
SODIUM SERPL-SCNC: 135 MMOL/L (ref 135–145)
SPECIMEN SOURCE: NORMAL
WBC # BLD AUTO: 16.8 K/UL (ref 4.8–10.8)

## 2021-06-12 PROCEDURE — 83605 ASSAY OF LACTIC ACID: CPT

## 2021-06-12 PROCEDURE — 700111 HCHG RX REV CODE 636 W/ 250 OVERRIDE (IP): Performed by: INTERNAL MEDICINE

## 2021-06-12 PROCEDURE — 96375 TX/PRO/DX INJ NEW DRUG ADDON: CPT

## 2021-06-12 PROCEDURE — G0378 HOSPITAL OBSERVATION PER HR: HCPCS

## 2021-06-12 PROCEDURE — 80053 COMPREHEN METABOLIC PANEL: CPT

## 2021-06-12 PROCEDURE — 85025 COMPLETE CBC W/AUTO DIFF WBC: CPT

## 2021-06-12 PROCEDURE — 96367 TX/PROPH/DG ADDL SEQ IV INF: CPT

## 2021-06-12 PROCEDURE — 99226 PR SUBSEQUENT OBSERVATION CARE,LEVEL III: CPT | Performed by: INTERNAL MEDICINE

## 2021-06-12 PROCEDURE — 96366 THER/PROPH/DIAG IV INF ADDON: CPT

## 2021-06-12 PROCEDURE — 84597 ASSAY OF VITAMIN K: CPT

## 2021-06-12 PROCEDURE — 96365 THER/PROPH/DIAG IV INF INIT: CPT

## 2021-06-12 PROCEDURE — 76705 ECHO EXAM OF ABDOMEN: CPT

## 2021-06-12 PROCEDURE — 700102 HCHG RX REV CODE 250 W/ 637 OVERRIDE(OP): Performed by: STUDENT IN AN ORGANIZED HEALTH CARE EDUCATION/TRAINING PROGRAM

## 2021-06-12 PROCEDURE — 700105 HCHG RX REV CODE 258: Performed by: STUDENT IN AN ORGANIZED HEALTH CARE EDUCATION/TRAINING PROGRAM

## 2021-06-12 PROCEDURE — 700105 HCHG RX REV CODE 258: Performed by: INTERNAL MEDICINE

## 2021-06-12 PROCEDURE — 700111 HCHG RX REV CODE 636 W/ 250 OVERRIDE (IP): Performed by: STUDENT IN AN ORGANIZED HEALTH CARE EDUCATION/TRAINING PROGRAM

## 2021-06-12 PROCEDURE — 82962 GLUCOSE BLOOD TEST: CPT

## 2021-06-12 PROCEDURE — A9270 NON-COVERED ITEM OR SERVICE: HCPCS | Performed by: STUDENT IN AN ORGANIZED HEALTH CARE EDUCATION/TRAINING PROGRAM

## 2021-06-12 PROCEDURE — 36415 COLL VENOUS BLD VENIPUNCTURE: CPT

## 2021-06-12 RX ORDER — SODIUM CHLORIDE 9 MG/ML
500 INJECTION, SOLUTION INTRAVENOUS ONCE
Status: COMPLETED | OUTPATIENT
Start: 2021-06-12 | End: 2021-06-12

## 2021-06-12 RX ADMIN — FOLIC ACID 1 MG: 1 TABLET ORAL at 05:24

## 2021-06-12 RX ADMIN — RIFAXIMIN 550 MG: 550 TABLET ORAL at 00:02

## 2021-06-12 RX ADMIN — OMEPRAZOLE 20 MG: 20 CAPSULE, DELAYED RELEASE ORAL at 05:23

## 2021-06-12 RX ADMIN — SODIUM CHLORIDE 500 ML: 9 INJECTION, SOLUTION INTRAVENOUS at 01:19

## 2021-06-12 RX ADMIN — LACTULOSE 30 ML: 20 SOLUTION ORAL at 05:22

## 2021-06-12 RX ADMIN — PREGABALIN 50 MG: 25 CAPSULE ORAL at 20:35

## 2021-06-12 RX ADMIN — LACTULOSE 30 ML: 20 SOLUTION ORAL at 16:30

## 2021-06-12 RX ADMIN — THIAMINE HYDROCHLORIDE 100 MG: 100 INJECTION, SOLUTION INTRAMUSCULAR; INTRAVENOUS at 05:17

## 2021-06-12 RX ADMIN — RIFAXIMIN 550 MG: 550 TABLET ORAL at 05:23

## 2021-06-12 RX ADMIN — LEVOTHYROXINE SODIUM 50 MCG: 0.05 TABLET ORAL at 05:24

## 2021-06-12 RX ADMIN — LACTULOSE 30 ML: 20 SOLUTION ORAL at 12:08

## 2021-06-12 RX ADMIN — SODIUM CHLORIDE: 9 INJECTION, SOLUTION INTRAVENOUS at 08:29

## 2021-06-12 RX ADMIN — RIFAXIMIN 550 MG: 550 TABLET ORAL at 16:34

## 2021-06-12 RX ADMIN — CEFTRIAXONE SODIUM 1 G: 10 INJECTION, POWDER, FOR SOLUTION INTRAVENOUS at 10:30

## 2021-06-12 RX ADMIN — VANCOMYCIN HYDROCHLORIDE 2750 MG: 500 INJECTION, POWDER, LYOPHILIZED, FOR SOLUTION INTRAVENOUS at 17:35

## 2021-06-12 RX ADMIN — THERA TABS 1 TABLET: TAB at 05:23

## 2021-06-12 RX ADMIN — CYANOCOBALAMIN TAB 500 MCG 2000 MCG: 500 TAB at 05:23

## 2021-06-12 ASSESSMENT — ENCOUNTER SYMPTOMS
PALPITATIONS: 0
DIARRHEA: 0
FEVER: 0
CONSTIPATION: 0
CHILLS: 0
COUGH: 0
BACK PAIN: 0
HEADACHES: 0
NAUSEA: 0
ABDOMINAL PAIN: 0
SHORTNESS OF BREATH: 0
DIZZINESS: 0
VOMITING: 0

## 2021-06-12 ASSESSMENT — PATIENT HEALTH QUESTIONNAIRE - PHQ9
8. MOVING OR SPEAKING SO SLOWLY THAT OTHER PEOPLE COULD HAVE NOTICED. OR THE OPPOSITE, BEING SO FIGETY OR RESTLESS THAT YOU HAVE BEEN MOVING AROUND A LOT MORE THAN USUAL: NOT AT ALL
3. TROUBLE FALLING OR STAYING ASLEEP OR SLEEPING TOO MUCH: NOT AT ALL
4. FEELING TIRED OR HAVING LITTLE ENERGY: NOT AT ALL
9. THOUGHTS THAT YOU WOULD BE BETTER OFF DEAD, OR OF HURTING YOURSELF: NOT AT ALL
5. POOR APPETITE OR OVEREATING: NOT AT ALL
7. TROUBLE CONCENTRATING ON THINGS, SUCH AS READING THE NEWSPAPER OR WATCHING TELEVISION: NOT AT ALL
SUM OF ALL RESPONSES TO PHQ9 QUESTIONS 1 AND 2: 1
6. FEELING BAD ABOUT YOURSELF - OR THAT YOU ARE A FAILURE OR HAVE LET YOURSELF OR YOUR FAMILY DOWN: NOT AL ALL
1. LITTLE INTEREST OR PLEASURE IN DOING THINGS: NOT AT ALL
SUM OF ALL RESPONSES TO PHQ QUESTIONS 1-9: 1
2. FEELING DOWN, DEPRESSED, IRRITABLE, OR HOPELESS: SEVERAL DAYS

## 2021-06-12 ASSESSMENT — COGNITIVE AND FUNCTIONAL STATUS - GENERAL
CLIMB 3 TO 5 STEPS WITH RAILING: A LITTLE
MOBILITY SCORE: 21
STANDING UP FROM CHAIR USING ARMS: A LITTLE
DAILY ACTIVITIY SCORE: 21
MOVING FROM LYING ON BACK TO SITTING ON SIDE OF FLAT BED: A LITTLE
DRESSING REGULAR LOWER BODY CLOTHING: A LITTLE
SUGGESTED CMS G CODE MODIFIER MOBILITY: CJ
SUGGESTED CMS G CODE MODIFIER DAILY ACTIVITY: CJ
HELP NEEDED FOR BATHING: A LITTLE
TOILETING: A LITTLE

## 2021-06-12 ASSESSMENT — PAIN DESCRIPTION - PAIN TYPE
TYPE: ACUTE PAIN
TYPE: ACUTE PAIN

## 2021-06-12 ASSESSMENT — FIBROSIS 4 INDEX: FIB4 SCORE: 5.2

## 2021-06-12 NOTE — ASSESSMENT & PLAN NOTE
Patient   Will check procal, lactic acid, blood culture, uA and US-RUQ  On hold abx now and cont to monitor clinically

## 2021-06-12 NOTE — ED TRIAGE NOTES
"Chief Complaint   Patient presents with   • Abnormal Labs     Pt to [facility] by PCP for abnormal labs. Tx to [facility] for hepatorenal syndrome.      Pt BIB [facility]. Pts PCP requested she go to [facility] for abnormal labs. [facility] transferred pt to [facility] for further work up due to limited resources. Pt arrives A&O4. [facility] administered 500mL NS.     /57   Pulse (!) 101   Temp 36.6 °C (97.8 °F) (Temporal)   Resp 16   Ht 1.6 m (5' 3\")   Wt 107 kg (235 lb)   SpO2 97%     "

## 2021-06-12 NOTE — ASSESSMENT & PLAN NOTE
She has been sober for 4 months. Alcohol level neg  MELD Na score: 27, indicates 27-32% 90 days mortality  Ammonia 74, mild elevation  Continue monitoring of CMP for liver, renal function  Boost plus supplement for albumin of 2 nutritional support,   Leg elevation when in bed and ambulation 3 times daily  Continue thiamine and folic along with multivitamin.  Cont rifaximin and lactulose  Consulting [facility] for recommendations

## 2021-06-12 NOTE — H&P
Hospital Medicine History & Physical Note    Date of Service  6/11/2021    Primary Care Physician  Sai Wang M.D.    Consultants  none    Code Status  Full Code    Chief Complaint  Chief Complaint   Patient presents with   • Abnormal Labs     Pt to  [facility] by PCP for abnormal labs. Tx to [facility] for hepatorenal syndrome.        History of Presenting Illness  41 y.o. female with past medical history of alcohol abuse, who presented 6/11/2021 by air transport for abnormal blood work related to alcoholism from [facility]. She has a known history of alcoholic cirrhosis, with last alcohol intake 4 months ago when she got sober.  Since then, she has been following with her primary care doctor for routine laboratory tests.  She had blood work drawn on Monday and got results today and was advised to go to her nearest emergency department. Her labs showed worsening renal function compared to baseline. The patient had a creatinine of 2.4 last week, and apparently has been seen and treated for dehydration presumed to be due to her Trulicity medication in the past.  At the transferring hospital, she had a negative Covid test, urobilinogen in her urine, negative urine drug screen, elevated PT at 16.8, and elevated PTT at 38.8.  Her alcohol was negative.  Her white blood cell count was elevated at 21.7, though this was noted to be chronic.  She had a normal platelet count of 237.  Her creatinine was elevated at 1.5, no longer the 2.4 previously noted.  AST was 172, ALT 42, and total bilirubin of 13.7.  Because of reported occasional altered mental status, she had a head CT performed, which was negative.  Given concern for early hepatorenal syndrome, she was referred and transferred here.  She reports BLE swelling for the past month and she has been on lasix and reports no significant improvements on lasix. Otherwise she denies fever, chills, chest pain, n/v/abd pain.   Here lab remarkable for wbc 18.8, Cr 1.12, AST  157, ALT 38, Ammonia 74, INR 2.2.  Patient is admitted for further evaluations and managements.    Review of Systems  Review of Systems   Cardiovascular: Positive for leg swelling.   Skin:        jaundice   All other systems reviewed and are negative.      Past Medical History   has a past medical history of DM (diabetes mellitus) (HCC).    Surgical History   has no past surgical history on file.     Family History  Family history reviewed and not pertinent    Social History   reports that she has quit smoking. She has quit using smokeless tobacco. She reports previous alcohol use of about 8.4 oz of alcohol per week. She reports that she does not use drugs.    Allergies  No Known Allergies    Medications  Prior to Admission Medications   Prescriptions Last Dose Informant Patient Reported? Taking?   Canagliflozin (INVOKANA) 100 MG Tab  Patient Yes No   Sig: Take 100 mg by mouth every day.   cyanocobalamin (VITAMIN B12) 2500 MCG SL Tab  Patient Yes No   Sig: Take 2,500 mcg by mouth every day. Under the tongue   folic acid (FOLVITE) 1 MG Tab   No No   Sig: Take 1 tablet by mouth every day.   furosemide (LASIX) 40 MG Tab  Patient Yes No   Sig: Take 40 mg by mouth every morning.   ibuprofen (MOTRIN) 400 MG Tab  Patient Yes No   Sig: Take 400 mg by mouth every 6 hours as needed.   insulin glargine (LANTUS SOLOSTAR) 100 UNIT/ML Solution Pen-injector injection  Patient Yes No   Sig: Inject 20 Units under the skin every day.   lansoprazole (PREVACID) 30 MG CAPSULE DELAYED RELEASE  Patient Yes No   Sig: Take 30 mg by mouth every day.   multivitamin (THERAGRAN) Tab   No No   Sig: Take 1 tablet by mouth every day.   ondansetron (ZOFRAN ODT) 4 MG TABLET DISPERSIBLE  Patient Yes No   Sig: Take 8 mg by mouth 3 times a day as needed for Nausea. 2 tabs = 8 mg   pregabalin (LYRICA) 50 MG capsule  Patient Yes No   Sig: Take 50 mg by mouth 2 times a day.   spironolactone (ALDACTONE) 100 MG Tab   No No   Sig: Take 1 tablet by mouth  every day.   traMADol (ULTRAM) 50 MG Tab  Patient Yes No   Sig: Take 50 mg by mouth 3 times a day as needed (Pain).      Facility-Administered Medications: None       Physical Exam  Temp:  [36.6 °C (97.8 °F)] 36.6 °C (97.8 °F)  Pulse:  [101] 101  Resp:  [15-16] 15  BP: (108)/(57) 108/57  SpO2:  [97 %] 97 %    Physical Exam  Vitals and nursing note reviewed.   Constitutional:       General: She is not in acute distress.     Appearance: Normal appearance. She is obese.   HENT:      Head: Normocephalic and atraumatic.      Nose: Nose normal.      Mouth/Throat:      Mouth: Mucous membranes are dry.      Pharynx: Oropharynx is clear.   Eyes:      Extraocular Movements: Extraocular movements intact.      Conjunctiva/sclera: Conjunctivae normal.      Pupils: Pupils are equal, round, and reactive to light.   Cardiovascular:      Rate and Rhythm: Normal rate and regular rhythm.      Pulses: Normal pulses.      Heart sounds: Normal heart sounds.   Pulmonary:      Effort: Pulmonary effort is normal.      Breath sounds: Normal breath sounds.   Abdominal:      General: Abdomen is flat. Bowel sounds are normal.      Palpations: Abdomen is soft.   Musculoskeletal:         General: Swelling present. Normal range of motion.      Cervical back: Normal range of motion and neck supple.      Right lower leg: Edema present.      Left lower leg: Edema present.   Skin:     General: Skin is warm and dry.      Coloration: Skin is jaundiced.   Neurological:      General: No focal deficit present.      Mental Status: She is alert and oriented to person, place, and time. Mental status is at baseline.   Psychiatric:         Mood and Affect: Mood normal.         Behavior: Behavior normal.         Laboratory:  Recent Labs     06/11/21 2014   WBC 18.8*   RBC 3.31*   HEMOGLOBIN 11.4*   HEMATOCRIT 33.6*   .5*   MCH 34.4*   MCHC 33.9   RDW 51.3*   PLATELETCT 191   MPV 11.1         No results for input(s): ALTSGPT, ASTSGOT, ALKPHOSPHAT,  TBILIRUBIN, DBILIRUBIN, GAMMAGT, AMYLASE, LIPASE, ALB, PREALBUMIN, GLUCOSE in the last 72 hours.      No results for input(s): NTPROBNP in the last 72 hours.      No results for input(s): TROPONINT in the last 72 hours.    Imaging:  OUTSIDE IMAGES-CT HEAD   Final Result            Assessment/Plan:  I anticipate this patient is appropriate for observation status at this time.    * ABBEY (acute kidney injury) (HCC)  Assessment & Plan  ?prerenal vs. Hepatorenal syndrome. Patient's Cr here 1.12, however her previous Cr last month was 0.2-0.3  Will start gentle IVF hydration to 1L and assess response  On hold diuretics now  Consider nephrology consult if Cr continues worsening        Hypothyroidism  Assessment & Plan  Cont levothyroxine    Leukocytosis  Assessment & Plan  No sign of infection, no sign of large ascites  Will check procal, lactic acid, blood culture, uA and US-RUQ  On hold abx now and cont to monitor clinically    Alcoholic cirrhosis of liver without ascites (HCC)- (present on admission)  Assessment & Plan  She has been sober for 4 months. Alcohol level neg  MELD Na score: 27, indicates 27-32% 90 days mortality  Ammonia 74, mild elevation  Continue monitoring of CMP for liver, renal function  Boost plus supplement for albumin of 2 nutritional support,   Leg elevation when in bed and ambulation 3 times daily  Continue thiamine and folic along with multivitamin.  Cont rifaximin and lactulose     Macrocytic anemia- (present on admission)  Assessment & Plan  Likely sec to alcohol abuse  No sign of active bleeding  Cont folate, MV, vitB1    Type 2 diabetes mellitus with neurologic complication, without long-term current use of insulin (HCC)- (present on admission)  Assessment & Plan  A1c 9.5 4/2021  On SSI  Accucheck and hypoglycemic protocol

## 2021-06-12 NOTE — PROGRESS NOTES
Hospital Medicine Daily Progress Note    Date of Service  6/12/2021    Chief Complaint  41 y.o. female admitted 6/11/2021 with abnormal labs sent as a transfer from [facility]  41-year-old female with a past medical history of liver cirrhosis due to alcohol use, diabetes admitted 6/11/2021 brought in by air transport from [facility].  Patient's labs were indicating have possible cardiorenal syndrome.  On presentation patient's creatinine was 1.5, prior to then had 2.4 at outside facility.    In the ED patient's lab showed creatinine of 1.12, , ALT 38, , total bilirubin 12.6.  Ammonia level 74.  Lactic acid 2.2.    Interval Problem Update  6/12-patient seen and evaluated at bedside, patient stated she did have edema and there was a concern for hepatorenal syndrome.  Patient stated her last drink was back in May when she was told about her liver failure.  She has not been able to follow-up with gastroenterology as there is no physician at [place], her nearest is either [facility] or [place].  Patient reported she was being treated for a staph infection, Bactrim listed in EMR, unclear source from patient.  Called [facility] in [place], no answer.  Patient denies having any prior EGD, no known hx of esophageal varices.  Consulting [facility]    Consultants/Specialty  [facility] gastroenterology    Code Status  Full Code    Disposition  TBD    Review of Systems  Review of Systems   Constitutional: Negative for chills, fever and malaise/fatigue.   Respiratory: Negative for cough and shortness of breath.    Cardiovascular: Negative for chest pain and palpitations.   Gastrointestinal: Negative for abdominal pain, constipation, diarrhea, nausea and vomiting.   Musculoskeletal: Negative for back pain and joint pain.   Neurological: Negative for dizziness and headaches.   All other systems reviewed and are negative.       Physical Exam  Temp:  [35.8 °C (96.5 °F)-36.6 °C (97.8 °F)]  36.4 °C (97.6 °F)  Pulse:  [] 100  Resp:  [15-18] 15  BP: ()/(45-72) 101/61  SpO2:  [94 %-99 %] 96 %    Physical Exam  Vitals and nursing note reviewed.   Constitutional:       General: She is not in acute distress.     Appearance: Normal appearance. She is obese. She is not ill-appearing.   Eyes:      General: Scleral icterus present.      Extraocular Movements: Extraocular movements intact.   Cardiovascular:      Rate and Rhythm: Normal rate.      Pulses: Normal pulses.      Heart sounds: Normal heart sounds. No murmur heard.     Pulmonary:      Effort: Pulmonary effort is normal. No respiratory distress.      Breath sounds: Normal breath sounds. No wheezing.   Abdominal:      General: Abdomen is flat. Bowel sounds are normal. There is no distension.      Palpations: Abdomen is soft.      Tenderness: There is no abdominal tenderness.   Musculoskeletal:         General: No swelling or tenderness. Normal range of motion.      Right lower leg: Edema present.      Left lower leg: Edema present.   Skin:     General: Skin is warm.      Capillary Refill: Capillary refill takes less than 2 seconds.      Coloration: Skin is jaundiced.      Findings: No erythema.   Neurological:      General: No focal deficit present.      Mental Status: She is alert and oriented to person, place, and time. Mental status is at baseline.      Motor: No weakness.   Psychiatric:         Mood and Affect: Mood normal.         Behavior: Behavior normal.         Thought Content: Thought content normal.         Judgment: Judgment normal.       Fluids    Intake/Output Summary (Last 24 hours) at 6/12/2021 1530  Last data filed at 6/12/2021 0400  Gross per 24 hour   Intake 0 ml   Output --   Net 0 ml       Laboratory  Recent Labs     06/11/21 2014 06/12/21  0149   WBC 18.8* 16.8*   RBC 3.31* 3.14*   HEMOGLOBIN 11.4* 10.4*   HEMATOCRIT 33.6* 32.7*   .5* 104.1*   MCH 34.4* 33.1*   MCHC 33.9 31.8*   RDW 51.3* 53.7*   PLATELETCT 191  185   MPV 11.1 11.6     Recent Labs     06/11/21 1955 06/12/21  0149   SODIUM 136 135   POTASSIUM 4.3 4.4   CHLORIDE 109 111   CO2 16* 15*   GLUCOSE 118* 136*   BUN 18 16   CREATININE 1.12 0.92   CALCIUM 8.1* 7.9*     Recent Labs     06/11/21 2052   APTT 58.7*   INR 2.20*               Imaging  US-RUQ   Final Result      1.  No acute sonographic abnormality. Prior cholecystectomy.   2.  Increased hepatic echogenicity, suggestive of steatosis possible underlying hepatocellular disease. No hepatic mass lesions detected.   3.  No color flow in the portal vein. This may be due to sluggish versus static flow or portal venous thrombosis.   4.  No ascites.      OUTSIDE IMAGES-CT HEAD   Final Result           Assessment/Plan  * ABBEY (acute kidney injury) (HCC)- (present on admission)  Assessment & Plan  prerenal vs. Hepatorenal syndrome.   Patient's Cr here 1.12, however her previous Cr last month was 0.2-0.3.  Patient noted to have Ibuprofen and Invokana as single agent on her EMR    D/C IVF, patient appears volume overloaded  On hold diuretics now, BP unable to tolerate at this time, likely due to sepsis  Consider nephrology consult if Cr continues worsening    Sepsis with acute renal failure without septic shock (HCC)- (present on admission)  Assessment & Plan  This is Sepsis Present on admission  SIRS criteria identified on my evaluation include: Tachycardia, with heart rate greater than 90 BPM and Leukocytosis, with WBC greater than 12,000  Source is unclear, patient was being treated with Bactrim for Staph infection as outpatient by her PCP at [facility]  Sepsis protocol initiated  Fluid resuscitation ordered per protocol  IV antibiotics as appropriate for source of sepsis - IV ceftriaxone  While organ dysfunction may be noted elsewhere in this problem list or in the chart, degree of organ dysfunction does not meet CMS criteria for severe sepsis    Abnormal INR- (present on admission)  Assessment & Plan  INR 2.2  on admission  Monitor for bleeding  Continue lovenox, high risk for thrombosis    Alcoholic cirrhosis of liver without ascites (HCC)- (present on admission)  Assessment & Plan  She has been sober for 4 months. Alcohol level neg  MELD Na score: 27, indicates 27-32% 90 days mortality  Ammonia 74, mild elevation  Continue monitoring of CMP for liver, renal function  Boost plus supplement for albumin of 2 nutritional support,   Leg elevation when in bed and ambulation 3 times daily  Continue thiamine and folic along with multivitamin.  Cont rifaximin and lactulose  Consulting [facility] for recommendations    Macrocytic anemia- (present on admission)  Assessment & Plan  Likely sec to alcohol abuse  No sign of active bleeding  Cont folate, MV, vitB1    Type 2 diabetes mellitus with neurologic complication, without long-term current use of insulin (HCC)- (present on admission)  Assessment & Plan  A1c 9.5 4/2021  On ISS  Accucheck and hypoglycemic protocol  No home/chronic insulin use  Hold Invokana    Class 3 severe obesity due to excess calories with serious comorbidity and body mass index (BMI) of 40.0 to 44.9 in adult (HCC)- (present on admission)  Assessment & Plan  Recommending patient to follow up with their PCP on weight management plan  Recommending polysomnography to PCP given elevated BMI  Counseled patient on weight control, diet management, following up with PCP    Hypothyroidism- (present on admission)  Assessment & Plan  Cont levothyroxine     VTE prophylaxis: Lovenox

## 2021-06-12 NOTE — CARE PLAN
The patient is Stable - Low risk of patient condition declining or worsening    Shift Goals  Clinical Goals: Pt's edema will improve during shift    Progress made toward(s) clinical / shift goals:  IV maintenance fluids were held due to 3 + pitting edema in BLE, MD was made aware.    Patient is not progressing towards the following goals:

## 2021-06-12 NOTE — ASSESSMENT & PLAN NOTE
INR 2.2 on admission  Monitor for bleeding  Vitamin K to be given  Continue lovenox, high risk for thrombosis

## 2021-06-12 NOTE — ASSESSMENT & PLAN NOTE
prerenal vs. Hepatorenal syndrome.   Patient's Cr here 1.12, however her previous Cr last month was 0.2-0.3.  Patient noted to have Ibuprofen and Invokana as single agent on her EMR    D/C IVF, patient appears volume overloaded  Due to ibuprofen use, counseled patient and she acknowledged not to use NSAIDs given her liver condition and renal effects

## 2021-06-12 NOTE — CARE PLAN
The patient is Stable - Low risk of patient condition declining or worsening    Shift Goals  Clinical Goals: pt will be oriented to room, staff and poc    Progress made toward(s) clinical / shift goals:  Pt oriented to new room and staff. Admission profile started. Pt updated on plan of care.    Problem: Knowledge Deficit - Standard  Goal: Patient and family/care givers will demonstrate understanding of plan of care, disease process/condition, diagnostic tests and medications  Outcome: Progressing

## 2021-06-12 NOTE — PROGRESS NOTES
4 Eyes Skin Assessment Completed by ROSALIND Perry and ROSALIND Watts.    Head WDL  Ears WDL  Nose WDL  Mouth WDL  Neck WDL  Breast/Chest Left breast with cyst, beeding  Shoulder Blades WDL  Spine WDL  (R) Arm/Elbow/Hand WDL  (L) Arm/Elbow/Hand WDL  Abdomen WDL  Groin WDL  Scrotum/Coccyx/Buttocks WDL  (R) Leg WDL  (L) Leg WDL  (R) Heel/Foot/Toe WDL  (L) Heel/Foot/Toe WDL          Devices In Places : PIV      Interventions In Place PIV care    Possible Skin Injury No    Pictures Uploaded Into Epic Yes  Wound Consult Placed N/A  RN Wound Prevention Protocol Ordered No

## 2021-06-12 NOTE — ASSESSMENT & PLAN NOTE
A1c 9.5 4/2021  On ISS  Accucheck and hypoglycemic protocol  No home/chronic insulin use  Hold Invokana

## 2021-06-12 NOTE — PROGRESS NOTES
Assessment/description of ears jaundice, blanching?  Which preventative measures are in place for the ears?n/a    Assessment/description of elbows? Jaundice, blanching  Which preventative measures are in place for the elbows? n/a    Assessment/description of sacrum? Jaundice, blanching  Which preventative measures are in place for the sacrum? n/a    Assessment/description of heels? blanching  Which preventative measures are in place for the heels?   none  Which devices are in place? PIV  Description of skin under devices:  Which preventative measures are in place under devices? n/a

## 2021-06-12 NOTE — PROGRESS NOTES
Pt alert and oriented x4, on room air. Medications administered per MAR. Admission profile started. PIV dressing in place, CDI, infusing with NS at 75 ml/hr. 4 eyes skin check done. Pt ambulates up to bathroom with steady gait.     Lactic acid 2.2 reported to MD, BP check hypotensive, new order of bolus 500 ml administered. BP recheck of 101/72 mmHg.    Pt for U/S RUQ.

## 2021-06-12 NOTE — ED PROVIDER NOTES
ED Provider Note    Scribed for Charles Ayala M.D. by Charles Ayala M.D.. 6/11/2021,  7:36 PM.    CHIEF COMPLAINT  Chief Complaint   Patient presents with   • Abnormal Labs     Pt to [facility] by PCP for abnormal labs. Tx to [facility] for hepatorenal syndrome.        HPI  Hien Alfaro is a 41 y.o. female who presents to the Emergency Department since 2 hours by air transport for abnormal blood work related to alcoholism, because of lack of specialty care at the transferring hospital including lack of of gastroenterology, hepatology, nephrology.  She has a known history of alcoholic cirrhosis, with last alcohol intake 4 months ago when she got sober.  Since then, she has been following with her primary care doctor in [facility] for routine laboratory tests.  She had blood work drawn on Monday and got results today and was advised to go to her nearest emergency department, who sent her to us.  She says that she feels great, actually, though she has noticed some increasing lower extremity swelling over the past several days.  Her labs showed worsening renal function compared to baseline.  She has been seen by that local emergency department several times previously for liver and renal abnormalities.  The patient had a creatinine of 2.4 last week, and apparently has been seen and treated for dehydration presumed to be due to her Trulicity medication in the past.  At the [facility] hospital, she had a negative Covid test, urobilinogen in her urine, negative urine drug screen, elevated PT at 16.8, and elevated PTT at 38.8.  Her alcohol was negative.  Her white blood cell count was elevated at 21.7, though this was noted to be chronic.  She had a normal platelet count of 237.  Her creatinine was elevated at 1.5, no longer the 2.4 previously noted.  AST was 172, ALT 42, and total bilirubin of 13.7.  Because of reported occasional altered mental status, she had a head CT performed, which was negative.  Out of  "concern for early hepatorenal syndrome, she was referred and transferred here.  REVIEW OF SYSTEMS  See HPI for further details. All other systems are negative.     PAST MEDICAL HISTORY   has a past medical history of DM (diabetes mellitus) (HCC).    SOCIAL HISTORY  Social History     Tobacco Use   • Smoking status: Former Smoker   • Smokeless tobacco: Former User   Substance and Sexual Activity   • Alcohol use: Not Currently     Alcohol/week: 8.4 oz     Types: 14 Cans of beer per week     Comment: last drink on 5/15   • Drug use: Never   • Sexual activity: Not on file     Social History     Substance and Sexual Activity   Drug Use Never       SURGICAL HISTORY  patient denies any surgical history    CURRENT MEDICATIONS  Home Medications    **Home medications have not yet been reviewed for this encounter**         ALLERGIES  No Known Allergies    PHYSICAL EXAM  VITAL SIGNS: /57   Pulse (!) 101   Temp 36.6 °C (97.8 °F) (Temporal)   Resp 15   Ht 1.6 m (5' 3\")   Wt 107 kg (235 lb)   SpO2 97%   BMI 41.63 kg/m²   Pulse ox interpretation: I interpret this pulse ox as normal.  Constitutional: Alert in no apparent distress.  HENT: No signs of trauma, Bilateral external ears normal, Nose normal.   Eyes: Conjunctiva severely jaundiced, Non-icteric.   Neck: Normal range of motion, Supple, No stridor.   Lymphatic: No lymphadenopathy noted.   Cardiovascular: Regular rate and rhythm, no murmurs.   Thorax & Lungs: Normal breath sounds, No respiratory distress, No wheezing, No chest tenderness.   Abdomen: Bowel sounds normal, Soft, No tenderness, No masses, No pulsatile masses. No peritoneal signs.  Skin: Jaundiced, warm, Dry, No erythema, No rash.   Extremities: Intact distal pulses, symmetric diffuse edema in all extremities, No cyanosis.  Musculoskeletal: Good range of motion in all major joints. No or major deformities noted.   Neurologic: Alert , Normal motor function, Normal sensory function, No focal deficits " noted.   Psychiatric: Affect normal, Judgment normal, Mood normal.     DIAGNOSTIC STUDIES / PROCEDURES      LABS  Labs Reviewed   COMP METABOLIC PANEL   LIPASE   DIAGNOSTIC ALCOHOL   SARS-COV-2, PCR (IN-HOUSE)    Narrative:     Have you been in close contact with a person who is suspected  or known to be positive for COVID-19 within the last 30 days  (e.g. last seen that person < 30 days ago)->Unknown   URINALYSIS   HCG QUALITATIVE UR   AMMONIA   CBC WITH DIFFERENTIAL     All labs reviewed by me.    RADIOLOGY  OUTSIDE IMAGES-CT HEAD   Final Result        The radiologist's interpretation of all radiological studies have been reviewed by me.    COURSE & MEDICAL DECISION MAKING  Nursing notes, VS, PMSFHx reviewed in chart.     7:36 PM Patient seen and examined at bedside. Differential diagnosis includes but is not limited to hepatorenal syndrome, known liver disease, dehydration, alcohol relapse, the patient denies this. Ordered for repeat labs to compare to those from earlier today and earlier this week.  She has no acute needs in the emergency department.  She will be treated with a fluid bolus.      7:54 PM I spoke with the admitting hospitalist, who will assess the patient for admission.    DISPOSITION:  Patient will be admitted to the hospitalist service in stable condition.      FINAL IMPRESSION  1. Alcoholic cirrhosis  2. Elevated creatinine  3. (rule out) hepatorenal syndrome

## 2021-06-12 NOTE — ED NOTES
Med Rec complete per Pt at bedside  Allergies reviewed.  Pt states she started taking Septra DS 1 tablet daily, this morning.    Pt states she has only take morning doses and has not had second dose of Xifaxan or Lyrica.

## 2021-06-12 NOTE — PROGRESS NOTES
Dr. Gomes notified of sepsis BPA. New order for BCXs, lactic acid and procalcitonin. Labs drawn and sent. NS infusing at 75 ml. Transfer order for pt to move to S6. Report given to Vicky BOYER. Pt transported via wheelchair.

## 2021-06-12 NOTE — PROGRESS NOTES
4 Eyes Skin Assessment Completed by ROSALIND Erwin and ROSALIND Sheridan.    Head WDL  Ears WDL  Nose WDL  Mouth WDL  Neck WDL  Breast/Chest WDL  Shoulder Blades WDL  Spine WDL  (R) Arm/Elbow/Hand WDL  (L) Arm/Elbow/Hand WDL  Abdomen WDL  Groin WDL  Scrotum/Coccyx/Buttocks WDL  (R) Leg Swelling  (L) Leg Swelling  (R) Heel/Foot/Toe WDL  (L) Heel/Foot/Toe WDL          Devices In Places Blood Pressure Cuff      Interventions In Place N/A    Possible Skin Injury No    Pictures Uploaded Into Epic N/A  Wound Consult Placed N/A  RN Wound Prevention Protocol Ordered No

## 2021-06-12 NOTE — PROGRESS NOTES
Pt is requesting not to have visitors, please do not allow visitors. Yellow sticky note placed in patient's summary page.

## 2021-06-12 NOTE — ASSESSMENT & PLAN NOTE
She has been sober for 4 months. Alcohol level neg  MELD Na score: 27, indicates 27-32% 90 days mortality  Ammonia 74, mild elevation  Continue monitoring of CMP for liver, renal function  Boost plus supplement for albumin of 2 nutritional support,   Leg elevation when in bed and ambulation 3 times daily  Continue thiamine and folic along with multivitamin.

## 2021-06-12 NOTE — ASSESSMENT & PLAN NOTE
This is Sepsis Present on admission  SIRS criteria identified on my evaluation include: Tachycardia, with heart rate greater than 90 BPM and Leukocytosis, with WBC greater than 12,000  Source is unclear, patient was being treated with Bactrim for Staph infection as outpatient by her PCP at [facility]  Sepsis protocol initiated  Fluid resuscitation ordered per protocol  IV antibiotics as appropriate for source of sepsis - IV ceftriaxone and vancomycin  While organ dysfunction may be noted elsewhere in this problem list or in the chart, degree of organ dysfunction does not meet CMS criteria for severe sepsis    Patient was being treated for an unknown Staph infection by her PCP, was taking Bactrim, had taken Bactrim for 1 day prior to admission.  Patient is unable to provide further information as to the species of the staph infection.  Multiple attempts to call patient's [facility] PCP but no answer over the weekend.

## 2021-06-12 NOTE — PROGRESS NOTES
Bedside report received. Pt is A&Ox4, pt is resting in bed most of the day. Continuous IV fluids were held, pt had 3 + pitting edema in BLE. Pt appears jaundice. Pt had a few incontinence episodes this morning. POC discussed with pt; all questions answered at this time.

## 2021-06-13 ENCOUNTER — APPOINTMENT (OUTPATIENT)
Dept: RADIOLOGY | Facility: MEDICAL CENTER | Age: 41
DRG: 871 | End: 2021-06-13
Attending: INTERNAL MEDICINE
Payer: OTHER GOVERNMENT

## 2021-06-13 PROBLEM — E55.9 VITAMIN D DEFICIENCY: Chronic | Status: ACTIVE | Noted: 2021-06-13

## 2021-06-13 LAB
25(OH)D3 SERPL-MCNC: 9 NG/ML (ref 30–100)
ALBUMIN SERPL BCP-MCNC: 1.8 G/DL (ref 3.2–4.9)
ALBUMIN/GLOB SERPL: 0.4 G/DL
ALP SERPL-CCNC: 182 U/L (ref 30–99)
ALT SERPL-CCNC: 33 U/L (ref 2–50)
ANION GAP SERPL CALC-SCNC: 8 MMOL/L (ref 7–16)
AST SERPL-CCNC: 152 U/L (ref 12–45)
BILIRUB SERPL-MCNC: 10.7 MG/DL (ref 0.1–1.5)
BUN SERPL-MCNC: 11 MG/DL (ref 8–22)
CALCIUM SERPL-MCNC: 8.1 MG/DL (ref 8.5–10.5)
CHLORIDE SERPL-SCNC: 112 MMOL/L (ref 96–112)
CO2 SERPL-SCNC: 16 MMOL/L (ref 20–33)
CREAT SERPL-MCNC: 0.57 MG/DL (ref 0.5–1.4)
ERYTHROCYTE [DISTWIDTH] IN BLOOD BY AUTOMATED COUNT: 53.4 FL (ref 35.9–50)
GLOBULIN SER CALC-MCNC: 4.7 G/DL (ref 1.9–3.5)
GLUCOSE BLD-MCNC: 111 MG/DL (ref 65–99)
GLUCOSE BLD-MCNC: 123 MG/DL (ref 65–99)
GLUCOSE BLD-MCNC: 94 MG/DL (ref 65–99)
GLUCOSE SERPL-MCNC: 98 MG/DL (ref 65–99)
HCT VFR BLD AUTO: 31.2 % (ref 37–47)
HGB BLD-MCNC: 10.4 G/DL (ref 12–16)
INR PPP: 2.16 (ref 0.87–1.13)
MAGNESIUM SERPL-MCNC: 2.1 MG/DL (ref 1.5–2.5)
MCH RBC QN AUTO: 34.3 PG (ref 27–33)
MCHC RBC AUTO-ENTMCNC: 33.3 G/DL (ref 33.6–35)
MCV RBC AUTO: 103 FL (ref 81.4–97.8)
PHOSPHATE SERPL-MCNC: 3.2 MG/DL (ref 2.5–4.5)
PLATELET # BLD AUTO: 195 K/UL (ref 164–446)
PMV BLD AUTO: 11.7 FL (ref 9–12.9)
POTASSIUM SERPL-SCNC: 4.3 MMOL/L (ref 3.6–5.5)
PROT SERPL-MCNC: 6.5 G/DL (ref 6–8.2)
PROTHROMBIN TIME: 23.4 SEC (ref 12–14.6)
RBC # BLD AUTO: 3.03 M/UL (ref 4.2–5.4)
SODIUM SERPL-SCNC: 136 MMOL/L (ref 135–145)
VANCOMYCIN SERPL-MCNC: 22.1 UG/ML
WBC # BLD AUTO: 16.2 K/UL (ref 4.8–10.8)

## 2021-06-13 PROCEDURE — 700111 HCHG RX REV CODE 636 W/ 250 OVERRIDE (IP): Performed by: INTERNAL MEDICINE

## 2021-06-13 PROCEDURE — 80053 COMPREHEN METABOLIC PANEL: CPT

## 2021-06-13 PROCEDURE — A9270 NON-COVERED ITEM OR SERVICE: HCPCS | Performed by: INTERNAL MEDICINE

## 2021-06-13 PROCEDURE — A9270 NON-COVERED ITEM OR SERVICE: HCPCS | Performed by: STUDENT IN AN ORGANIZED HEALTH CARE EDUCATION/TRAINING PROGRAM

## 2021-06-13 PROCEDURE — 700102 HCHG RX REV CODE 250 W/ 637 OVERRIDE(OP): Performed by: INTERNAL MEDICINE

## 2021-06-13 PROCEDURE — 700105 HCHG RX REV CODE 258: Performed by: INTERNAL MEDICINE

## 2021-06-13 PROCEDURE — 99225 PR SUBSEQUENT OBSERVATION CARE,LEVEL II: CPT | Performed by: INTERNAL MEDICINE

## 2021-06-13 PROCEDURE — 80202 ASSAY OF VANCOMYCIN: CPT

## 2021-06-13 PROCEDURE — 85027 COMPLETE CBC AUTOMATED: CPT

## 2021-06-13 PROCEDURE — 700105 HCHG RX REV CODE 258: Performed by: STUDENT IN AN ORGANIZED HEALTH CARE EDUCATION/TRAINING PROGRAM

## 2021-06-13 PROCEDURE — 83735 ASSAY OF MAGNESIUM: CPT

## 2021-06-13 PROCEDURE — 700102 HCHG RX REV CODE 250 W/ 637 OVERRIDE(OP): Performed by: STUDENT IN AN ORGANIZED HEALTH CARE EDUCATION/TRAINING PROGRAM

## 2021-06-13 PROCEDURE — 96366 THER/PROPH/DIAG IV INF ADDON: CPT

## 2021-06-13 PROCEDURE — 36415 COLL VENOUS BLD VENIPUNCTURE: CPT

## 2021-06-13 PROCEDURE — 82962 GLUCOSE BLOOD TEST: CPT

## 2021-06-13 PROCEDURE — 96376 TX/PRO/DX INJ SAME DRUG ADON: CPT

## 2021-06-13 PROCEDURE — 700111 HCHG RX REV CODE 636 W/ 250 OVERRIDE (IP): Performed by: STUDENT IN AN ORGANIZED HEALTH CARE EDUCATION/TRAINING PROGRAM

## 2021-06-13 PROCEDURE — 84100 ASSAY OF PHOSPHORUS: CPT

## 2021-06-13 PROCEDURE — 85610 PROTHROMBIN TIME: CPT

## 2021-06-13 PROCEDURE — 82306 VITAMIN D 25 HYDROXY: CPT

## 2021-06-13 PROCEDURE — 76705 ECHO EXAM OF ABDOMEN: CPT

## 2021-06-13 PROCEDURE — G0378 HOSPITAL OBSERVATION PER HR: HCPCS

## 2021-06-13 RX ORDER — LACTULOSE 20 G/30ML
15 SOLUTION ORAL 2 TIMES DAILY
Status: DISCONTINUED | OUTPATIENT
Start: 2021-06-14 | End: 2021-06-15 | Stop reason: HOSPADM

## 2021-06-13 RX ORDER — FUROSEMIDE 40 MG/1
40 TABLET ORAL
Status: DISCONTINUED | OUTPATIENT
Start: 2021-06-13 | End: 2021-06-15 | Stop reason: HOSPADM

## 2021-06-13 RX ORDER — PHYTONADIONE 5 MG/1
5 TABLET ORAL ONCE
Status: COMPLETED | OUTPATIENT
Start: 2021-06-13 | End: 2021-06-13

## 2021-06-13 RX ORDER — ERGOCALCIFEROL 1.25 MG/1
50000 CAPSULE ORAL
Status: DISCONTINUED | OUTPATIENT
Start: 2021-06-13 | End: 2021-06-15 | Stop reason: HOSPADM

## 2021-06-13 RX ORDER — MIDODRINE HYDROCHLORIDE 5 MG/1
5 TABLET ORAL
Status: DISCONTINUED | OUTPATIENT
Start: 2021-06-13 | End: 2021-06-15 | Stop reason: HOSPADM

## 2021-06-13 RX ADMIN — LEVOTHYROXINE SODIUM 50 MCG: 0.05 TABLET ORAL at 06:01

## 2021-06-13 RX ADMIN — RIFAXIMIN 550 MG: 550 TABLET ORAL at 06:01

## 2021-06-13 RX ADMIN — OMEPRAZOLE 20 MG: 20 CAPSULE, DELAYED RELEASE ORAL at 06:01

## 2021-06-13 RX ADMIN — ERGOCALCIFEROL 50000 UNITS: 1.25 CAPSULE ORAL at 09:05

## 2021-06-13 RX ADMIN — RIFAXIMIN 550 MG: 550 TABLET ORAL at 16:32

## 2021-06-13 RX ADMIN — CEFTRIAXONE SODIUM 1 G: 10 INJECTION, POWDER, FOR SOLUTION INTRAVENOUS at 07:59

## 2021-06-13 RX ADMIN — LACTULOSE 30 ML: 20 SOLUTION ORAL at 11:55

## 2021-06-13 RX ADMIN — VANCOMYCIN HYDROCHLORIDE 500 MG: 500 INJECTION, POWDER, LYOPHILIZED, FOR SOLUTION INTRAVENOUS at 16:33

## 2021-06-13 RX ADMIN — MIDODRINE HYDROCHLORIDE 5 MG: 5 TABLET ORAL at 09:05

## 2021-06-13 RX ADMIN — PHYTONADIONE 5 MG: 5 TABLET ORAL at 14:50

## 2021-06-13 RX ADMIN — FOLIC ACID 1 MG: 1 TABLET ORAL at 06:01

## 2021-06-13 RX ADMIN — MIDODRINE HYDROCHLORIDE 5 MG: 5 TABLET ORAL at 11:55

## 2021-06-13 RX ADMIN — LACTULOSE 30 ML: 20 SOLUTION ORAL at 06:03

## 2021-06-13 RX ADMIN — THIAMINE HYDROCHLORIDE 100 MG: 100 INJECTION, SOLUTION INTRAMUSCULAR; INTRAVENOUS at 06:09

## 2021-06-13 RX ADMIN — LACTULOSE 30 ML: 20 SOLUTION ORAL at 16:32

## 2021-06-13 RX ADMIN — CYANOCOBALAMIN TAB 500 MCG 2000 MCG: 500 TAB at 06:01

## 2021-06-13 RX ADMIN — PREGABALIN 50 MG: 25 CAPSULE ORAL at 20:58

## 2021-06-13 RX ADMIN — THERA TABS 1 TABLET: TAB at 06:01

## 2021-06-13 RX ADMIN — MIDODRINE HYDROCHLORIDE 5 MG: 5 TABLET ORAL at 16:32

## 2021-06-13 ASSESSMENT — ENCOUNTER SYMPTOMS
NAUSEA: 0
ABDOMINAL PAIN: 0
HEADACHES: 0
FEVER: 0
DIZZINESS: 0
PALPITATIONS: 0
VOMITING: 0
COUGH: 0
CONSTIPATION: 0
CHILLS: 0
SHORTNESS OF BREATH: 0
BACK PAIN: 0
DIARRHEA: 0

## 2021-06-13 ASSESSMENT — PAIN DESCRIPTION - PAIN TYPE
TYPE: ACUTE PAIN
TYPE: ACUTE PAIN

## 2021-06-13 NOTE — PROGRESS NOTES
"Pharmacy Vancomycin Kinetics Note for 6/13/2021     41 y.o. female on Vancomycin day # 2     Vancomycin Indication (AUC Dosing):    Vancomycin Indication (Two level/Trough based Dosing): Skin/skin structure infection (goal trough 10-15)    Provider specified end date:  (TBD)    Active Antibiotics (From admission, onward)    Ordered     Ordering Provider       Sat Jun 12, 2021  4:15 PM    06/12/21 1615  MD Alert...Vancomycin per Pharmacy  PHARMACY TO DOSE     Note to Pharmacy: Pt was being treated for Staph infection unknown source with Bactrim outpatient, treating here for sepsis. likely failed bactrim.   Question:  Indication(s) for vancomycin?  Answer:  Unknown source of infection    Durga Sheriff M.D.       Sat Jun 12, 2021  9:27 AM    06/12/21 0927  cefTRIAXone (Rocephin) syringe 1 g  EVERY 24 HOURS     Note to Pharmacy: Unclear source of sepsis in liver cirrhosis    Durga Sheriff M.D.       Fri Jun 11, 2021 10:12 PM    06/11/21 2212  riFAXIMin (XIFAXAN) tablet 550 mg  2 TIMES DAILY     Question:  Indication  Answer:  Hepatic Encephalopathy    Mihir Gomes M.D.          Dosing Weight: 109 kg (240 lb 4.8 oz)      Admission History: Admitted on 6/11/2021 for ABBEY (acute kidney injury) (HCC) [N17.9]  Pertinent history: Was given diagnosis of \"Staph infection\" and Bactrim PO Rx at [place]. No supporting data. Sent to [facility] for liver labs and then given Dx of sepsis here; source unclear. Alcoholic liver disease, R/O hepatorenal syndrome, BMI ~ 43. Baseline Cr 0.3-> 1.12 on admission to [facility]. Per med rec, patient takes 1 DS Bactrim daily, which can be used for SBP prophylaxis    Allergies:     Patient has no known allergies.     Pertinent cultures to date:     Results     Procedure Component Value Units Date/Time    SARS-CoV-2 PCR (24 hour In-House): Collect NP swab in [facility] [[id number]] Collected: 06/11/21 1955    Order Status: Completed Specimen: Respirate Updated: 06/12/21 1126     SARS-CoV-2 Source " "NP Swab     SARS-CoV-2 by PCR NotDetected     Comment: PATIENTS: Important information regarding your results and instructions can  be found at [email or web address]   \"After your  Covid-19 Test\"    [facility] providers: PLEASE REFER TO DE-ESCALATION AND RETESTING PROTOCOL  on [email or web address]    **The TaqPath COVID-19 SARS-CoV-2 RT-PCR test has been made available for  use under the Emergency Use Authorization (EUA) only.         Narrative:      Have you been in close contact with a person who is suspected  or known to be positive for COVID-19 within the last 30 days  (e.g. last seen that person < 30 days ago)->Unknown    BLOOD CULTURE [[id number]] Collected: 06/11/21 2255    Order Status: Completed Specimen: Blood from Peripheral Updated: 06/12/21 0843     Significant Indicator NEG     Source BLD     Site PERIPHERAL     Culture Result No Growth  Note: Blood cultures are incubated for 5 days and  are monitored continuously.Positive blood cultures  are called to the RN and reported as soon as  they are identified.      Narrative:      Per Hospital Policy: Only change Specimen Src: to \"Line\" if  specified by physician order.  Left AC    BLOOD CULTURE [[id number]] Collected: 06/11/21 2257    Order Status: Completed Specimen: Blood from Peripheral Updated: 06/12/21 0843     Significant Indicator NEG     Source BLD     Site PERIPHERAL     Culture Result No Growth  Note: Blood cultures are incubated for 5 days and  are monitored continuously.Positive blood cultures  are called to the RN and reported as soon as  they are identified.      Narrative:      Per Hospital Policy: Only change Specimen Src: to \"Line\" if  specified by physician order.  Right AC    URINALYSIS [[id number]]  (Abnormal) Collected: 06/11/21 1955    Order Status: Completed Specimen: Urine Updated: 06/11/21 2054     Color DK Yellow     Character Clear     Specific Gravity 1.018     Ph 6.0     Glucose >=1000 mg/dL      Ketones Negative " "mg/dL      Protein Negative mg/dL      Bilirubin Large     Urobilinogen, Urine 2.0     Nitrite Negative     Leukocyte Esterase Trace     Occult Blood Negative     Micro Urine Req Microscopic          Labs:     Estimated Creatinine Clearance: 156.9 mL/min (by C-G formula based on SCr of 0.57 mg/dL).  Recent Labs     21  0149 21  0543   WBC 18.8* 16.8* 16.2*   NEUTSPOLYS 75.60* 77.30*  --      Recent Labs     21  0149 21  0543   BUN 18 16 11   CREATININE 1.12 0.92 0.57   ALBUMIN 2.3* 1.7* 1.8*       Intake/Output Summary (Last 24 hours) at 2021 1107  Last data filed at 2021 0900  Gross per 24 hour   Intake 640 ml   Output --   Net 640 ml      BP (!) 98/64   Pulse (!) 106   Temp 36.7 °C (98.1 °F) (Temporal)   Resp 18   Ht 1.6 m (5' 3\")   Wt 109 kg (240 lb 8.4 oz)   SpO2 96%  Temp (24hrs), Av.6 °C (97.9 °F), Min:36.2 °C (97.2 °F), Max:37 °C (98.6 °F)      List concerns for Vancomycin clearance:     Abnormal LFTs;Obesity;Malnutrition/Low albumin;ABBEY    Pharmacokinetics:    Trough kinetics:   Recent Labs     21  0543   VANCORANDOM 22.1       A/P:     -  Vancomycin dose: pulse dosing, will give a one time dose of 500 mg IV and reassess with vancomycin random level tomorrow.     -  Next vancomycin level(s):    - Vancomycin random at 0300 ()    -  Comments: 12.5 hours level is suprathepeutic after a loading dose. Baseline Scr 0.2-0.3, Scr is trending from from admission lab, but still elevated from baseline. Will continue with pulse dosing. Indication for vancomycin is unclear at this point, continue with vancomycin for now and deescalate as appropriate.     Cady Rubio, PharmD  "

## 2021-06-13 NOTE — PROGRESS NOTES
Bedside report received. Pt is A&Ox4, pt is resting in bed. BP today was 98/64, Midorodrine was administered per MAR. Pt has BLE edema, BLE elevated at this time.  POC discussed with pt; all questions answered at this time.

## 2021-06-13 NOTE — PROGRESS NOTES
Pt's BP was 84/54, Lasix was held.MD Sheriff was notified. Pt is NPO at this time for RUQ ultrasound, all food and beverage items have been removed from bedside table. Pt was educated and verbalizes understanding.

## 2021-06-13 NOTE — PROGRESS NOTES
Pt is resting in bed. Denied pain. Fluids offered. Pt denied further needs. Pt ambulates steady and independently.      Assessment/description of ears Jaundice, blanching  Which preventative measures are in place for the ears?n/a     Assessment/description of elbows? Jaundice, blanching  Which preventative measures are in place for the elbows? n/a     Assessment/description of sacrum? Jaundice, blanching  Which preventative measures are in place for the sacrum? n/a     Assessment/description of heels? Blanching, intact  Which preventative measures are in place for the heels?   none  Which devices are in place? PIV  Description of skin under devices:  Which preventative measures are in: Dressing care    Cyst on left breast, dressing in place. Bilateral lower extremity edema.

## 2021-06-13 NOTE — CONSULTS
Date of Service 6/13/21  /  Consult Requested By: Durga Sheriff M.D.    Reason for Consultation: elevated liver enzymes    History of Present Illness:   Hien Alfaro is a 41 y.o. female admitted 6/11/2021.  We were called to see this 41-year-old female who is a past medical history of alcohol abuse who initially presented to [facility] where she was then later airlifted here for further evaluation with concerns of alcoholic cirrhosis and hepatorenal syndrome concerns.  She states her last alcoholic consumption was approximately 4 months ago where at that time she also presented to [facility].  During her blood work evaluation it was noted that her renal function had been 2.4 and subsequently was treated for dehydration and felt that this may be secondary to her Trulicity medication in the past.  After transferring finding that she was negative Covid and with IV hydration her creatinine normalized and has returned to almost baseline.  AST ALT alkaline phosphatase along with T bili are all elevated.  She presently during my evaluation denies any type of abdominal complaints of heartburn indigestion dysphagia odynophagia hematemesis coffee-ground emesis.  She denies any type of melena.  She states that her bowel movements are loose running about a cups worth fairly frequently throughout the day numbering close to about 10.  She is noted some lower extremity swelling.  She is noticeably jaundiced but eating her breakfast and lunch without any difficulty and feels fine.    She does admit to me that she was taking significant amounts of NSAIDs for generalized joint pain which may have contributed to her elevated creatinine.    Review Of Systems:  She denies any headaches visual changes auditory abnormalities dysphagia odynophagia indigestion heartburn hematemesis coffee-ground emesis.  She does notice some shortness of breath on exertion denies any abdominal discomfort urinary  abnormalities bowel abnormalities joint pain.    PMH:   Past Medical History:   Diagnosis Date   • DM (diabetes mellitus) (HCC)          PSH:  History reviewed. No pertinent surgical history.    FAMILY HX:  History reviewed. No pertinent family history.    SOCIAL HX:  Social History     Socioeconomic History   • Marital status:      Spouse name: Not on file   • Number of children: Not on file   • Years of education: Not on file   • Highest education level: Not on file   Occupational History   • Not on file   Tobacco Use   • Smoking status: Former Smoker   • Smokeless tobacco: Former User   Substance and Sexual Activity   • Alcohol use: Not Currently     Alcohol/week: 8.4 oz     Types: 14 Cans of beer per week     Comment: last drink on 5/15   • Drug use: Never   • Sexual activity: Not on file   Other Topics Concern   • Not on file   Social History Narrative   • Not on file     Social Determinants of Health     Financial Resource Strain:    • Difficulty of Paying Living Expenses:    Food Insecurity:    • Worried About Running Out of Food in the Last Year:    • Ran Out of Food in the Last Year:    Transportation Needs:    • Lack of Transportation (Medical):    • Lack of Transportation (Non-Medical):    Physical Activity:    • Days of Exercise per Week:    • Minutes of Exercise per Session:    Stress:    • Feeling of Stress :    Social Connections:    • Frequency of Communication with Friends and Family:    • Frequency of Social Gatherings with Friends and Family:    • Attends [employer] Services:    • Active Member of Clubs or Organizations:    • Attends Club or Organization Meetings:    • Marital Status:    Intimate Partner Violence:    • Fear of Current or Ex-Partner:    • Emotionally Abused:    • Physically Abused:    • Sexually Abused:      Social History     Tobacco Use   Smoking Status Former Smoker   Smokeless Tobacco Former User     Social History     Substance and Sexual Activity   Alcohol Use Not  Currently   • Alcohol/week: 8.4 oz   • Types: 14 Cans of beer per week    Comment: last drink on 5/15       Allergies/Intolerances:  No Known Allergies    History reviewed with the patient    Other Current Medications:    Current Facility-Administered Medications:   •  midodrine (PROAMATINE) tablet 5 mg, 5 mg, Oral, TID WITH MEALS, Durga Sheriff M.D., 5 mg at 06/13/21 1155  •  vitamin D (Ergocalciferol) (DRISDOL) capsule 50,000 Units, 50,000 Units, Oral, Q7 DAYS, Durga Sheriff M.D., 50,000 Units at 06/13/21 0905  •  vancomycin (VANCOCIN) 500 mg in  mL IVPB, 500 mg, Intravenous, Once, Durga Sheriff M.D.  •  furosemide (LASIX) tablet 40 mg, 40 mg, Oral, BID DIURETIC, Durga Sheriff M.D.  •  cefTRIAXone (Rocephin) syringe 1 g, 1 g, Intravenous, Q24HRS, Durga Sheriff M.D., 1 g at 06/13/21 0759  •  [Held by provider] enoxaparin (LOVENOX) inj 40 mg, 40 mg, Subcutaneous, DAILY, Durga Sheriff M.D.  •  MD Alert...Vancomycin per Pharmacy, , Other, PHARMACY TO DOSE, Durga Sheriff M.D.  •  folic acid (FOLVITE) tablet 1 mg, 1 mg, Oral, DAILY, Mihir Gomes M.D., 1 mg at 06/13/21 0601  •  cyanocobalamin (VITAMIN B-12) tablet 2,000 mcg, 2,000 mcg, Oral, DAILY, Mihir Gomes M.D., 2,000 mcg at 06/13/21 0601  •  omeprazole (PRILOSEC) capsule 20 mg, 20 mg, Oral, DAILY, Mihir Gomes M.D., 20 mg at 06/13/21 0601  •  multivitamin (THERAGRAN) tablet 1 tablet, 1 tablet, Oral, DAILY, Mihir Gomes M.D., 1 tablet at 06/13/21 0601  •  traMADol (ULTRAM) 50 MG tablet 50 mg, 50 mg, Oral, TID PRN, Mihir Gomes M.D.  •  ondansetron (ZOFRAN) syringe/vial injection 4 mg, 4 mg, Intravenous, Q4HRS PRN, Mihir Gomes M.D.  •  ondansetron (ZOFRAN ODT) dispertab 4 mg, 4 mg, Oral, Q4HRS PRN, Mihir Gomes M.D.  •  promethazine (PHENERGAN) tablet 12.5-25 mg, 12.5-25 mg, Oral, Q4HRS PRN, Mihir Gomes M.D.  •  promethazine (PHENERGAN) suppository 12.5-25 mg, 12.5-25 mg, Rectal, Q4HRS PRN, Mihir Gomes,  "M.D.  •  prochlorperazine (COMPAZINE) injection 5-10 mg, 5-10 mg, Intravenous, Q4HRS PRN, Mihir Gomes M.D.  •  [DISCONTINUED] insulin glargine (Semglee) injection, 0.2 Units/kg/day, Subcutaneous, Q EVENING **AND** insulin lispro (AdmeLOG) injection, 2-9 Units, Subcutaneous, Q6HRS **AND** POC blood glucose manual result, , , Q6H **AND** NOTIFY MD and PharmD, , , Once **AND** glucose 4 g chewable tablet 16 g, 16 g, Oral, Q15 MIN PRN **AND** dextrose 50% (D50W) injection 50 mL, 50 mL, Intravenous, Q15 MIN PRN, Mihir Gomes M.D.  •  lactulose 20 GM/30ML solution 30 mL, 30 mL, Oral, TID, Mihir Gomes M.D., 30 mL at 21 1155  •  pregabalin (LYRICA) capsule 50 mg, 50 mg, Oral, Nightly, Mihir Gomes M.D., 50 mg at 21  •  levothyroxine (SYNTHROID) tablet 50 mcg, 50 mcg, Oral, AM ES, Mihir Gomes M.D., 50 mcg at 21  •  riFAXIMin (XIFAXAN) tablet 550 mg, 550 mg, Oral, BID, Mihir Gomes M.D., 550 mg at 21  •  thiamine (B-1) IVPB 100 mg in 100 mL D5W (premix), 100 mg, Intravenous, DAILY, Mihir Gomes M.D., Stopped at 21 0639  •  [Held by provider] NS infusion, , Intravenous, Continuous, Mihir Gomes M.D., Paused at 21 0900  [unfilled]    Most Recent Vital Signs:  BP (!) 84/54 Comment: RN NOTIFIED  Pulse 98   Temp 36.4 °C (97.5 °F) (Temporal)   Resp 18   Ht 1.6 m (5' 3\")   Wt 109 kg (240 lb 8.4 oz)   SpO2 97%   BMI 42.61 kg/m²   Temp  Av.4 °C (97.5 °F)  Min: 35.8 °C (96.5 °F)  Max: 37 °C (98.6 °F)    Physical Exam:  General: Nontoxic, no acute distress  HEENT: sclera icteric, PERRL, EOMI, MMM, no oral lesions  Neck: supple, no lymphadenopathy  Chest: CTAB, no r/r/w, normal work of breathing.  Cardiac: Regular, no murmurs no gallops heard  Abdomen: + bowel sounds, soft, non-tender, non-distended, no HSM  Extremities: bilateral edema below the knee in both legs +2. No joint swelling.  Skin: no rashes or erythema  Neuro: Alert and oriented times 3, " "non-focal exam    Pertinent Lab Results:  Recent Labs     06/11/21 2014 06/12/21 0149 06/13/21  0543   WBC 18.8* 16.8* 16.2*      Recent Labs     06/11/21 2014 06/12/21 0149 06/13/21  0543   HEMOGLOBIN 11.4* 10.4* 10.4*   HEMATOCRIT 33.6* 32.7* 31.2*   .5* 104.1* 103.0*   MCH 34.4* 33.1* 34.3*   PLATELETCT 191 185 195         Recent Labs     06/11/21 1955 06/12/21 0149 06/13/21  0543   SODIUM 136 135 136   POTASSIUM 4.3 4.4 4.3   CHLORIDE 109 111 112   CO2 16* 15* 16*   CREATININE 1.12 0.92 0.57        Recent Labs     06/11/21 1955 06/12/21 0149 06/13/21  0543   ALBUMIN 2.3* 1.7* 1.8*      Recent Labs     06/11/21 1955 06/11/21 2052 06/12/21 0149 06/13/21  0543   ASTSGOT 157*  --  145* 152*   ALTSGPT 38  --  29 33   TBILIRUBIN 12.6*  --  11.2* 10.7*   ALKPHOSPHAT 218*  --  189* 182*   GLOBULIN 5.2*  --  4.9* 4.7*   INR  --  2.20*  --  2.16*   AMMONIA 74*  --   --   --        [unfilled]      Pertinent Micro:  Results     Procedure Component Value Units Date/Time    SARS-CoV-2 PCR (24 hour In-House): Collect NP swab in [facility] [[id number]] Collected: 06/11/21 1955    Order Status: Completed Specimen: Respirate Updated: 06/12/21 1126     SARS-CoV-2 Source NP Swab     SARS-CoV-2 by PCR NotDetected     Comment: PATIENTS: Important information regarding your results and instructions can  be found at [email or web address]   \"After your  Covid-19 Test\"    [facility] providers: PLEASE REFER TO DE-ESCALATION AND RETESTING PROTOCOL  on [email or web address]    **The TaqPath COVID-19 SARS-CoV-2 RT-PCR test has been made available for  use under the Emergency Use Authorization (EUA) only.         Narrative:      Have you been in close contact with a person who is suspected  or known to be positive for COVID-19 within the last 30 days  (e.g. last seen that person < 30 days ago)->Unknown    BLOOD CULTURE [[id number]] Collected: 06/11/21 5509    Order Status: Completed Specimen: Blood from Peripheral " "Updated: 06/12/21 0843     Significant Indicator NEG     Source BLD     Site PERIPHERAL     Culture Result No Growth  Note: Blood cultures are incubated for 5 days and  are monitored continuously.Positive blood cultures  are called to the RN and reported as soon as  they are identified.      Narrative:      Per Hospital Policy: Only change Specimen Src: to \"Line\" if  specified by physician order.  Left AC    BLOOD CULTURE [[id number]] Collected: 06/11/21 2257    Order Status: Completed Specimen: Blood from Peripheral Updated: 06/12/21 0843     Significant Indicator NEG     Source BLD     Site PERIPHERAL     Culture Result No Growth  Note: Blood cultures are incubated for 5 days and  are monitored continuously.Positive blood cultures  are called to the RN and reported as soon as  they are identified.      Narrative:      Per Hospital Policy: Only change Specimen Src: to \"Line\" if  specified by physician order.  Right AC    URINALYSIS [[id number]]  (Abnormal) Collected: 06/11/21 1955    Order Status: Completed Specimen: Urine Updated: 06/11/21 2054     Color DK Yellow     Character Clear     Specific Gravity 1.018     Ph 6.0     Glucose >=1000 mg/dL      Ketones Negative mg/dL      Protein Negative mg/dL      Bilirubin Large     Urobilinogen, Urine 2.0     Nitrite Negative     Leukocyte Esterase Trace     Occult Blood Negative     Micro Urine Req Microscopic        No results found for: BLOODCULTU, BLDCULT, BCHOLD     Studies:                                                 IMPRESSION:     Alcoholic cirrhosis without ascites   Agree with current plan by the primary team.    Elevated liver enzymes   Recommend trending her liver enzymes, her liver enzymes seem suggestive of continued alcohol use with a 3:1 ratio her work-up in the past has essentially been negative.  Ultimately if still concerned recommend transjugular liver biopsy.  Recommend continued follow-up she can follow-up with us in the outpatient setting, our " telephone #[phone].    Abnormal ultrasound   Repeat abdominal ultrasound to evaluate for portal hypertension and possible portal vein thrombosis, argumentative Andrea she will require perhaps anticoagulation if it is positive given her circumstances and that she lives in [place] where medical care is somewhat difficult to ascertain I would request hematological consultation as well in the scenario.    Coagulopathy         No signs of active bleeding correct this with vitamin K this is secondary to her underlying liver disease.    Encephalopathy   Continue with Xifaxan, consider stopping lactulose scheduled to the multiple bowel movements.    PLAN:     She states that she has been sober for the last 4 months alcohol level is noted to be negative.  Though lab work reflects recent alcohol consumption with her AST ALT ratio being 3-1.  She has had an extensive work-up in the past for her hepatitis panel has been negative AARON antimitochondrial antibody iron studies along with thyroid evaluation are all within normal limits.  Recent abdominal ultrasound demonstrated concerns for possible portal vein thrombosis or slow flow through the portal vein.  4 months ago the portal vein apparently was patent.  Recommend repeating abdominal ultrasound which was discussed with the primary team.  She has been initiated on appropriate alcohol supplementation and monitoring by the primary team would recommend continuing this.  Would continue rifaximin perhaps stopping the lactulose given the multiple bowel movements that she has and she is able to perform serial sevens nicely.    Discussed with IM. Will continue to follow    Randolph Hernandez M.D.

## 2021-06-13 NOTE — PROGRESS NOTES
Hospital Medicine Daily Progress Note    Date of Service  6/13/2021    Chief Complaint  41 y.o. female admitted 6/11/2021 with abnormal labs sent as a transfer from [facility] Course  41-year-old female with a past medical history of liver cirrhosis due to alcohol use, diabetes admitted 6/11/2021 brought in by air transport from [facility].  Patient's labs were indicating have possible cardiorenal syndrome.  On presentation patient's creatinine was 1.5, prior to then had 2.4 at outside facility.    In the ED patient's lab showed creatinine of 1.12, , ALT 38, , total bilirubin 12.6.  Ammonia level 74.  Lactic acid 2.2.    Interval Problem Update  6/12-patient seen and evaluated at bedside, patient stated she did have edema and there was a concern for hepatorenal syndrome.  Patient stated her last drink was back in May when she was told about her liver failure.  She has not been able to follow-up with gastroenterology as there is no physician at [place], her nearest is either [facility] or [place].  Patient reported she was being treated for a staph infection, Bactrim listed in EMR, unclear source from patient.  Called [facility] clinic in [place], no answer.  Patient denies having any prior EGD, no known hx of esophageal varices.  Consulting [facility] GI    6/13-patient seen and evaluated today, no acute complaints.  Patient was evaluated by gastroenterology, evaluated right upper quadrant ultrasound concerning for portal vein thrombosis.  Recommending a repeat ultrasound to dedicate to confirm thrombosis or not, order has been placed.  Patient continued on IV vancomycin, still unable to confirm patient's Staphylococcus infection being treated by her PCP.  INR today 2.16.    Consultants/Specialty  [facility] gastroenterology    Code Status  Full Code    Disposition  TBD, possible discharge in the next 24 to 48 hours    Review of Systems  Review of Systems   Constitutional: Negative for  chills, fever and malaise/fatigue.   Respiratory: Negative for cough and shortness of breath.    Cardiovascular: Negative for chest pain and palpitations.   Gastrointestinal: Negative for abdominal pain, constipation, diarrhea, nausea and vomiting.   Musculoskeletal: Negative for back pain and joint pain.   Neurological: Negative for dizziness and headaches.   All other systems reviewed and are negative.     Physical Exam  Temp:  [36.2 °C (97.2 °F)-37 °C (98.6 °F)] 36.7 °C (98.1 °F)  Pulse:  [] 106  Resp:  [16-18] 18  BP: ()/(60-68) 98/64  SpO2:  [96 %-97 %] 96 %    Physical Exam  Vitals and nursing note reviewed.   Constitutional:       General: She is not in acute distress.     Appearance: Normal appearance. She is obese. She is not ill-appearing.   Eyes:      General: Scleral icterus present.      Extraocular Movements: Extraocular movements intact.   Cardiovascular:      Rate and Rhythm: Normal rate.      Pulses: Normal pulses.      Heart sounds: Normal heart sounds. No murmur heard.     Pulmonary:      Effort: Pulmonary effort is normal. No respiratory distress.      Breath sounds: Normal breath sounds. No wheezing.   Abdominal:      General: Abdomen is flat. Bowel sounds are normal. There is no distension.      Palpations: Abdomen is soft.      Tenderness: There is no abdominal tenderness.   Musculoskeletal:         General: No swelling or tenderness. Normal range of motion.      Right lower leg: Edema present.      Left lower leg: Edema present.   Skin:     General: Skin is warm.      Capillary Refill: Capillary refill takes less than 2 seconds.      Coloration: Skin is jaundiced.      Findings: No erythema.   Neurological:      General: No focal deficit present.      Mental Status: She is alert and oriented to person, place, and time. Mental status is at baseline.      Motor: No weakness.   Psychiatric:         Mood and Affect: Mood normal.         Behavior: Behavior normal.         Thought  Content: Thought content normal.         Judgment: Judgment normal.       Fluids    Intake/Output Summary (Last 24 hours) at 6/13/2021 1359  Last data filed at 6/13/2021 0900  Gross per 24 hour   Intake 240 ml   Output --   Net 240 ml       Laboratory  Recent Labs     06/11/21 2014 06/12/21 0149 06/13/21  0543   WBC 18.8* 16.8* 16.2*   RBC 3.31* 3.14* 3.03*   HEMOGLOBIN 11.4* 10.4* 10.4*   HEMATOCRIT 33.6* 32.7* 31.2*   .5* 104.1* 103.0*   MCH 34.4* 33.1* 34.3*   MCHC 33.9 31.8* 33.3*   RDW 51.3* 53.7* 53.4*   PLATELETCT 191 185 195   MPV 11.1 11.6 11.7     Recent Labs     06/11/21 1955 06/12/21 0149 06/13/21  0543   SODIUM 136 135 136   POTASSIUM 4.3 4.4 4.3   CHLORIDE 109 111 112   CO2 16* 15* 16*   GLUCOSE 118* 136* 98   BUN 18 16 11   CREATININE 1.12 0.92 0.57   CALCIUM 8.1* 7.9* 8.1*     Recent Labs     06/11/21 2052 06/13/21  0543   APTT 58.7*  --    INR 2.20* 2.16*               Imaging  US-RUQ   Final Result      1.  No acute sonographic abnormality. Prior cholecystectomy.   2.  Increased hepatic echogenicity, suggestive of steatosis possible underlying hepatocellular disease. No hepatic mass lesions detected.   3.  No color flow in the portal vein. This may be due to sluggish versus static flow or portal venous thrombosis.   4.  No ascites.      OUTSIDE IMAGES-CT HEAD   Final Result      US-RUQ    (Results Pending)        Assessment/Plan  * ABBEY (acute kidney injury) (HCC)- (present on admission)  Assessment & Plan  prerenal vs. Hepatorenal syndrome.   Patient's Cr here 1.12, however her previous Cr last month was 0.2-0.3.  Patient noted to have Ibuprofen and Invokana as single agent on her EMR    D/C IVF, patient appears volume overloaded  Due to ibuprofen use, counseled patient and she acknowledged not to use NSAIDs given her liver condition and renal effects    Sepsis with acute renal failure without septic shock (HCC)- (present on admission)  Assessment & Plan  This is Sepsis Present on  admission  SIRS criteria identified on my evaluation include: Tachycardia, with heart rate greater than 90 BPM and Leukocytosis, with WBC greater than 12,000  Source is unclear, patient was being treated with Bactrim for Staph infection as outpatient by her PCP at [facility]  Sepsis protocol initiated  Fluid resuscitation ordered per protocol  IV antibiotics as appropriate for source of sepsis - IV ceftriaxone and vancomycin  While organ dysfunction may be noted elsewhere in this problem list or in the chart, degree of organ dysfunction does not meet CMS criteria for severe sepsis    Patient was being treated for an unknown Staph infection by her PCP, was taking Bactrim, had taken Bactrim for 1 day prior to admission.  Patient is unable to provide further information as to the species of the staph infection.  Multiple attempts to call patient's [facility] PCP but no answer over the weekend.    Abnormal INR- (present on admission)  Assessment & Plan  INR 2.2 on admission  Monitor for bleeding  Vitamin K to be given  Continue lovenox, high risk for thrombosis    Alcoholic cirrhosis of liver without ascites (HCC)- (present on admission)  Assessment & Plan  She has been sober for 4 months. Alcohol level neg  MELD Na score: 27, indicates 27-32% 90 days mortality  Ammonia 74, mild elevation  Continue monitoring of CMP for liver, renal function  Boost plus supplement for albumin of 2 nutritional support,   Leg elevation when in bed and ambulation 3 times daily  Continue thiamine and folic along with multivitamin.  Cont rifaximin and lactulose  Consulting [facility] for recommendations    Macrocytic anemia- (present on admission)  Assessment & Plan  Likely sec to alcohol abuse  No sign of active bleeding  Cont folate, MV, vitB1    Type 2 diabetes mellitus with neurologic complication, without long-term current use of insulin (HCC)- (present on admission)  Assessment & Plan  A1c 9.5 4/2021  On ISS  Accucheck and  hypoglycemic protocol  No home/chronic insulin use  Hold Invokana    Vitamin D deficiency- (present on admission)  Assessment & Plan  Vit D 25 level = 9  - start ergo 50,000IU weekly dose    Class 3 severe obesity due to excess calories with serious comorbidity and body mass index (BMI) of 40.0 to 44.9 in adult (HCC)- (present on admission)  Assessment & Plan  Recommending patient to follow up with their PCP on weight management plan  Recommending polysomnography to PCP given elevated BMI  Counseled patient on weight control, diet management, following up with PCP    Hypothyroidism- (present on admission)  Assessment & Plan  Cont levothyroxine     VTE prophylaxis: Lovenox

## 2021-06-13 NOTE — CARE PLAN
The patient is Stable - Low risk of patient condition declining or worsening    Shift Goals  Clinical Goals: Pt's BP will increase during shift    Progress made toward(s) clinical / shift goals:  Yes, Pt's BP has increased to 98/64, Midodrine was administered per MAR.     Patient is not progressing towards the following goals:

## 2021-06-13 NOTE — PROGRESS NOTES
"Pharmacy Vancomycin Kinetics Note for 6/12/2021     41 y.o. female on Vancomycin day # 1      Vancomycin Indication (Two level/Trough based Dosing): Sepsis (goal trough 15-20)    Provider specified end date:  (TBD)    Active Antibiotics (From admission, onward)    Ordered     Ordering Provider       Sat Jun 12, 2021  4:55 PM    06/12/21 1655  vancomycin (VANCOCIN) 2,750 mg in  mL IVPB  (vancomycin (VANCOCIN) IV (LD + Maintenance))  ONCE      Durga Sheriff M.D.       Sat Jun 12, 2021  4:15 PM    06/12/21 1615  MD Alert...Vancomycin per Pharmacy  PHARMACY TO DOSE     Note to Pharmacy: Pt was being treated for Staph infection unknown source with Bactrim outpatient, treating here for sepsis. likely failed bactrim.   Question:  Indication(s) for vancomycin?  Answer:  Unknown source of infection    Durga Sheriff M.D.       Sat Jun 12, 2021  9:27 AM    06/12/21 0927  cefTRIAXone (Rocephin) syringe 1 g  EVERY 24 HOURS     Note to Pharmacy: Unclear source of sepsis in liver cirrhosis    Durga Sheriff M.D.       Fri Jun 11, 2021 10:12 PM    06/11/21 2212  riFAXIMin (XIFAXAN) tablet 550 mg  2 TIMES DAILY     Question:  Indication  Answer:  Hepatic Encephalopathy    Mihir Gomes M.D.          Dosing Weight: 109 kg (240 lb 4.8 oz)    Admission History: Admitted on 6/11/2021 for ABBEY (acute kidney injury) (HCC) [N17.9]  Pertinent history: Was given diagnosis of \"Staph infection\" and Bactrim PO Rx at [facility]. No supporting data. Sent to [facility] for liver labs and then given Dx of sepsis here; source unclear. Alcoholic liver disease, R/O hepatorenal syndrome, BMI ~ 43. Baseline Cr 0.3-> 1.12 on admission to [facility]. Will give loading vanco dose and check 12 hour level tomorrow AM.    Allergies:     Patient has no known allergies.     Pertinent cultures to date:     Results     Procedure Component Value Units Date/Time    SARS-CoV-2 PCR (24 hour In-House): Collect NP swab in [facility] [[id number]] Collected: " "06/11/21 1955    Order Status: Completed Specimen: Respirate Updated: 06/12/21 1126     SARS-CoV-2 Source NP Swab     SARS-CoV-2 by PCR NotDetected     Comment: PATIENTS: Important information regarding your results and instructions can  be found at [email or web address]   \"After your  Covid-19 Test\"    [facility] providers: PLEASE REFER TO DE-ESCALATION AND RETESTING PROTOCOL  on [email or web address]    **The TaqPath COVID-19 SARS-CoV-2 RT-PCR test has been made available for  use under the Emergency Use Authorization (EUA) only.         Narrative:      Have you been in close contact with a person who is suspected  or known to be positive for COVID-19 within the last 30 days  (e.g. last seen that person < 30 days ago)->Unknown    BLOOD CULTURE [[id number]] Collected: 06/11/21 2255    Order Status: Completed Specimen: Blood from Peripheral Updated: 06/12/21 0843     Significant Indicator NEG     Source BLD     Site PERIPHERAL     Culture Result No Growth  Note: Blood cultures are incubated for 5 days and  are monitored continuously.Positive blood cultures  are called to the RN and reported as soon as  they are identified.      Narrative:      Per Hospital Policy: Only change Specimen Src: to \"Line\" if  specified by physician order.  Left AC    BLOOD CULTURE [[id number]] Collected: 06/11/21 2257    Order Status: Completed Specimen: Blood from Peripheral Updated: 06/12/21 0843     Significant Indicator NEG     Source BLD     Site PERIPHERAL     Culture Result No Growth  Note: Blood cultures are incubated for 5 days and  are monitored continuously.Positive blood cultures  are called to the RN and reported as soon as  they are identified.      Narrative:      Per Hospital Policy: Only change Specimen Src: to \"Line\" if  specified by physician order.  Right AC    URINALYSIS [[id number]]  (Abnormal) Collected: 06/11/21 1955    Order Status: Completed Specimen: Urine Updated: 06/11/21 2054     Color DK " "Yellow     Character Clear     Specific Gravity 1.018     Ph 6.0     Glucose >=1000 mg/dL      Ketones Negative mg/dL      Protein Negative mg/dL      Bilirubin Large     Urobilinogen, Urine 2.0     Nitrite Negative     Leukocyte Esterase Trace     Occult Blood Negative     Micro Urine Req Microscopic          Labs:   CrCl not accurate due to liver disease and previous baseline Cr ~ 0.3  Estimated Creatinine Clearance: 97.2 mL/min (by C-G formula based on SCr of 0.92 mg/dL).  Recent Labs     21  014   WBC 18.8* 16.8*   NEUTSPOLYS 75.60* 77.30*     Recent Labs     21   BUN 18 16   CREATININE 1.12 0.92   ALBUMIN 2.3* 1.7*       Intake/Output Summary (Last 24 hours) at 2021 1711  Last data filed at 2021 0400  Gross per 24 hour   Intake 0 ml   Output --   Net 0 ml      /68   Pulse (!) 105   Temp 37 °C (98.6 °F) (Temporal)   Resp 17   Ht 1.6 m (5' 3\")   Wt 109 kg (240 lb 8.4 oz)   SpO2 97%  Temp (24hrs), Av.4 °C (97.5 °F), Min:35.8 °C (96.5 °F), Max:37 °C (98.6 °F)      List concerns for Vancomycin clearance:   Abnormal LFTs;Malnutrition/Low albumin;Hypermetabolic State (SIRS);Obesity    Pharmacokinetics:   Trough kinetics:   No results for input(s): VANCOTROUGH, VANCOPEAK, VANCORANDOM in the last 72 hours.    A/P:     -  Vancomycin dose: 2750 mg loading dose  ~ 1800    -  Next vancomycin level(s):    - Vanco Random (12 hour) at 0600    -  Comments: Multiple risk factors for impaired vancomycin clearance. Unclear source and type of infection. Clarify status tomorrow and adjust therapy as indicated.     Kashif Rosa, PharmD    "

## 2021-06-13 NOTE — CARE PLAN
The patient is Watcher - Medium risk of patient condition declining or worsening    Shift Goals  Clinical Goals: Pt's blood sugar will be managed    Progress made toward(s) clinical / shift goals: Q6hr glucose checks. Pt has not required insulin coverage at this time.    Patient is not progressing towards the following goals:

## 2021-06-14 PROBLEM — I81 PORTAL VEIN THROMBOSIS: Status: ACTIVE | Noted: 2021-06-14

## 2021-06-14 LAB
GLUCOSE BLD-MCNC: 101 MG/DL (ref 65–99)
GLUCOSE BLD-MCNC: 108 MG/DL (ref 65–99)
GLUCOSE BLD-MCNC: 115 MG/DL (ref 65–99)
GLUCOSE BLD-MCNC: 116 MG/DL (ref 65–99)
GLUCOSE BLD-MCNC: 97 MG/DL (ref 65–99)
VANCOMYCIN SERPL-MCNC: 13.9 UG/ML

## 2021-06-14 PROCEDURE — A9270 NON-COVERED ITEM OR SERVICE: HCPCS | Performed by: STUDENT IN AN ORGANIZED HEALTH CARE EDUCATION/TRAINING PROGRAM

## 2021-06-14 PROCEDURE — 82962 GLUCOSE BLOOD TEST: CPT

## 2021-06-14 PROCEDURE — 700105 HCHG RX REV CODE 258: Performed by: STUDENT IN AN ORGANIZED HEALTH CARE EDUCATION/TRAINING PROGRAM

## 2021-06-14 PROCEDURE — 700111 HCHG RX REV CODE 636 W/ 250 OVERRIDE (IP): Performed by: STUDENT IN AN ORGANIZED HEALTH CARE EDUCATION/TRAINING PROGRAM

## 2021-06-14 PROCEDURE — 96366 THER/PROPH/DIAG IV INF ADDON: CPT

## 2021-06-14 PROCEDURE — 80202 ASSAY OF VANCOMYCIN: CPT

## 2021-06-14 PROCEDURE — 700102 HCHG RX REV CODE 250 W/ 637 OVERRIDE(OP): Performed by: INTERNAL MEDICINE

## 2021-06-14 PROCEDURE — 700102 HCHG RX REV CODE 250 W/ 637 OVERRIDE(OP): Performed by: STUDENT IN AN ORGANIZED HEALTH CARE EDUCATION/TRAINING PROGRAM

## 2021-06-14 PROCEDURE — 99232 SBSQ HOSP IP/OBS MODERATE 35: CPT | Performed by: INTERNAL MEDICINE

## 2021-06-14 PROCEDURE — 96376 TX/PRO/DX INJ SAME DRUG ADON: CPT

## 2021-06-14 PROCEDURE — 700111 HCHG RX REV CODE 636 W/ 250 OVERRIDE (IP): Performed by: INTERNAL MEDICINE

## 2021-06-14 PROCEDURE — A9270 NON-COVERED ITEM OR SERVICE: HCPCS | Performed by: INTERNAL MEDICINE

## 2021-06-14 PROCEDURE — 770006 HCHG ROOM/CARE - MED/SURG/GYN SEMI*

## 2021-06-14 RX ORDER — OMEPRAZOLE 20 MG/1
20 CAPSULE, DELAYED RELEASE ORAL DAILY
Qty: 30 CAPSULE | Refills: 0 | Status: SHIPPED | OUTPATIENT
Start: 2021-06-15 | End: 2021-07-15

## 2021-06-14 RX ORDER — MIDODRINE HYDROCHLORIDE 5 MG/1
5 TABLET ORAL
Qty: 90 TABLET | Refills: 2 | Status: SHIPPED | OUTPATIENT
Start: 2021-06-14 | End: 2021-07-14

## 2021-06-14 RX ORDER — FUROSEMIDE 40 MG/1
40 TABLET ORAL 2 TIMES DAILY
Qty: 60 TABLET | Refills: 2 | Status: SHIPPED | OUTPATIENT
Start: 2021-06-14 | End: 2021-07-14

## 2021-06-14 RX ORDER — ERGOCALCIFEROL 1.25 MG/1
50000 CAPSULE ORAL
Qty: 7 CAPSULE | Refills: 0 | Status: SHIPPED | OUTPATIENT
Start: 2021-06-20 | End: 2021-08-01

## 2021-06-14 RX ADMIN — OMEPRAZOLE 20 MG: 20 CAPSULE, DELAYED RELEASE ORAL at 06:29

## 2021-06-14 RX ADMIN — FUROSEMIDE 40 MG: 40 TABLET ORAL at 17:47

## 2021-06-14 RX ADMIN — PREGABALIN 50 MG: 25 CAPSULE ORAL at 20:33

## 2021-06-14 RX ADMIN — FOLIC ACID 1 MG: 1 TABLET ORAL at 06:29

## 2021-06-14 RX ADMIN — LEVOTHYROXINE SODIUM 50 MCG: 0.05 TABLET ORAL at 06:29

## 2021-06-14 RX ADMIN — LACTULOSE 15 ML: 20 SOLUTION ORAL at 06:29

## 2021-06-14 RX ADMIN — LACTULOSE 15 ML: 20 SOLUTION ORAL at 17:47

## 2021-06-14 RX ADMIN — APIXABAN 10 MG: 5 TABLET, FILM COATED ORAL at 17:47

## 2021-06-14 RX ADMIN — MIDODRINE HYDROCHLORIDE 5 MG: 5 TABLET ORAL at 08:59

## 2021-06-14 RX ADMIN — THIAMINE HYDROCHLORIDE 100 MG: 100 INJECTION, SOLUTION INTRAMUSCULAR; INTRAVENOUS at 06:28

## 2021-06-14 RX ADMIN — CEFTRIAXONE SODIUM 1 G: 10 INJECTION, POWDER, FOR SOLUTION INTRAVENOUS at 08:59

## 2021-06-14 RX ADMIN — RIFAXIMIN 550 MG: 550 TABLET ORAL at 06:29

## 2021-06-14 RX ADMIN — CYANOCOBALAMIN TAB 500 MCG 2000 MCG: 500 TAB at 06:29

## 2021-06-14 RX ADMIN — MIDODRINE HYDROCHLORIDE 5 MG: 5 TABLET ORAL at 17:47

## 2021-06-14 RX ADMIN — RIFAXIMIN 550 MG: 550 TABLET ORAL at 17:47

## 2021-06-14 RX ADMIN — TRAMADOL HYDROCHLORIDE 50 MG: 50 TABLET, FILM COATED ORAL at 13:00

## 2021-06-14 RX ADMIN — THERA TABS 1 TABLET: TAB at 06:29

## 2021-06-14 RX ADMIN — MIDODRINE HYDROCHLORIDE 5 MG: 5 TABLET ORAL at 12:15

## 2021-06-14 ASSESSMENT — ENCOUNTER SYMPTOMS
CONSTIPATION: 0
COUGH: 0
RESPIRATORY NEGATIVE: 1
DIARRHEA: 0
PALPITATIONS: 0
NEUROLOGICAL NEGATIVE: 1
VOMITING: 0
FEVER: 0
ABDOMINAL PAIN: 0
DIZZINESS: 0
MUSCULOSKELETAL NEGATIVE: 1
BACK PAIN: 0
CHILLS: 0
CARDIOVASCULAR NEGATIVE: 1
NAUSEA: 0
HEADACHES: 0
SHORTNESS OF BREATH: 0
EYES NEGATIVE: 1
GASTROINTESTINAL NEGATIVE: 1

## 2021-06-14 NOTE — PROGRESS NOTES
Hospital Medicine Daily Progress Note    Date of Service  6/14/2021    Chief Complaint  41 y.o. female admitted 6/11/2021 with abnormal labs sent as a transfer from [facility]  41-year-old female with a past medical history of liver cirrhosis due to alcohol use, diabetes admitted 6/11/2021 brought in by air transport from [facility].  Patient's labs were indicating have possible cardiorenal syndrome.  On presentation patient's creatinine was 1.5, prior to then had 2.4 at outside facility.    In the ED patient's lab showed creatinine of 1.12, , ALT 38, , total bilirubin 12.6.  Ammonia level 74.  Lactic acid 2.2.    Interval Problem Update  6/12-patient seen and evaluated at bedside, patient stated she did have edema and there was a concern for hepatorenal syndrome.  Patient stated her last drink was back in May when she was told about her liver failure.  She has not been able to follow-up with gastroenterology as there is no physician at [place], her nearest is either [facility] or [place].  Patient reported she was being treated for a staph infection, Bactrim listed in EMR, unclear source from patient.  Called [facility] in [place], no answer.  Patient denies having any prior EGD, no known hx of esophageal varices.  Consulting [facility]    6/13-patient seen and evaluated today, no acute complaints.  Patient was evaluated by gastroenterology, evaluated right upper quadrant ultrasound concerning for portal vein thrombosis.  Recommending a repeat ultrasound to dedicate to confirm thrombosis or not, order has been placed.  Patient continued on IV vancomycin, still unable to confirm patient's Staphylococcus infection being treated by her PCP.  INR today 2.16.    6/14-patient seen evaluated at bedside, today patient had no acute complaints. Patient stated no abdominal pain. Right upper quadrant ultrasound repeat showed again no blood flow through the main portal vein.  High concern that patient does have portal vein thrombosis given her decompensated liver cirrhosis. Starting Eliquis. Patient denied hx of EGD, variceal bleeds, lower GIB.    Spoke with Dr. Montenegro (hematologist) who at this time agreed anticoagulation was warranted as patient did come with elevated LFTs, decompensated cirrhosis.  He will see and evaluate patient tomorrow, but states Eliquis is reasonable as patient has no hx of variceal bleeds or other bleeding.    Calling [facility] for medication cost to patient. Pharmacist explained patients there do not pay for medications, Eliquis will be at no cost to the patient. Confirmed patient will be able to receive it there.    Consultants/Specialty  [facility]  Hematology Dr. Montenegro    Code Status  Full Code    Disposition  TBD, possible discharge in the next 24 to 48 hours    Review of Systems  Review of Systems   Constitutional: Negative for chills, fever and malaise/fatigue.   Respiratory: Negative for cough and shortness of breath.    Cardiovascular: Negative for chest pain and palpitations.   Gastrointestinal: Negative for abdominal pain, constipation, diarrhea, nausea and vomiting.   Musculoskeletal: Negative for back pain and joint pain.   Neurological: Negative for dizziness and headaches.   All other systems reviewed and are negative.     Physical Exam  Temp:  [36.3 °C (97.4 °F)-36.7 °C (98 °F)] 36.6 °C (97.8 °F)  Pulse:  [] 100  Resp:  [16-17] 16  BP: ()/(57-62) 100/57  SpO2:  [96 %-100 %] 96 %    Physical Exam  Vitals and nursing note reviewed.   Constitutional:       General: She is not in acute distress.     Appearance: Normal appearance. She is obese. She is not ill-appearing.   Eyes:      General: Scleral icterus present.      Extraocular Movements: Extraocular movements intact.   Cardiovascular:      Rate and Rhythm: Normal rate.      Pulses: Normal pulses.      Heart sounds: Normal heart sounds. No murmur heard.      Pulmonary:      Effort: Pulmonary effort is normal. No respiratory distress.      Breath sounds: Normal breath sounds. No wheezing.   Abdominal:      General: Abdomen is flat. Bowel sounds are normal. There is no distension.      Palpations: Abdomen is soft.      Tenderness: There is no abdominal tenderness.   Musculoskeletal:         General: No swelling or tenderness. Normal range of motion.      Right lower leg: Edema present.      Left lower leg: Edema present.   Skin:     General: Skin is warm.      Capillary Refill: Capillary refill takes less than 2 seconds.      Coloration: Skin is jaundiced.      Findings: No erythema.   Neurological:      General: No focal deficit present.      Mental Status: She is alert and oriented to person, place, and time. Mental status is at baseline.      Motor: No weakness.   Psychiatric:         Mood and Affect: Mood normal.         Behavior: Behavior normal.         Thought Content: Thought content normal.         Judgment: Judgment normal.       Fluids    Intake/Output Summary (Last 24 hours) at 6/14/2021 1427  Last data filed at 6/14/2021 0930  Gross per 24 hour   Intake 540 ml   Output --   Net 540 ml       Laboratory  Recent Labs     06/11/21 2014 06/12/21 0149 06/13/21  0543   WBC 18.8* 16.8* 16.2*   RBC 3.31* 3.14* 3.03*   HEMOGLOBIN 11.4* 10.4* 10.4*   HEMATOCRIT 33.6* 32.7* 31.2*   .5* 104.1* 103.0*   MCH 34.4* 33.1* 34.3*   MCHC 33.9 31.8* 33.3*   RDW 51.3* 53.7* 53.4*   PLATELETCT 191 185 195   MPV 11.1 11.6 11.7     Recent Labs     06/11/21 1955 06/12/21 0149 06/13/21  0543   SODIUM 136 135 136   POTASSIUM 4.3 4.4 4.3   CHLORIDE 109 111 112   CO2 16* 15* 16*   GLUCOSE 118* 136* 98   BUN 18 16 11   CREATININE 1.12 0.92 0.57   CALCIUM 8.1* 7.9* 8.1*     Recent Labs     06/11/21 2052 06/13/21  0543   APTT 58.7*  --    INR 2.20* 2.16*               Imaging  US-RUQ   Final Result         1.  Flow is not definitively demonstrated in the main portal vein,  concerning for portal vein thrombosis, corresponding with reported history concerning for portal vein thrombosis.   2.  Hepatomegaly and echogenic liver, compatible with fatty change versus fibrosis.      US-RUQ   Final Result      1.  No acute sonographic abnormality. Prior cholecystectomy.   2.  Increased hepatic echogenicity, suggestive of steatosis possible underlying hepatocellular disease. No hepatic mass lesions detected.   3.  No color flow in the portal vein. This may be due to sluggish versus static flow or portal venous thrombosis.   4.  No ascites.      OUTSIDE IMAGES-CT HEAD   Final Result           Assessment/Plan  * ABBEY (acute kidney injury) (HCC)- (present on admission)  Assessment & Plan  prerenal vs. Hepatorenal syndrome.   Patient's Cr here 1.12, however her previous Cr last month was 0.2-0.3.  Patient noted to have Ibuprofen and Invokana as single agent on her EMR    D/C IVF, patient appears volume overloaded  Due to ibuprofen use, counseled patient and she acknowledged not to use NSAIDs given her liver condition and renal effects    Sepsis with acute renal failure without septic shock (HCC)- (present on admission)  Assessment & Plan  This is Sepsis Present on admission  SIRS criteria identified on my evaluation include: Tachycardia, with heart rate greater than 90 BPM and Leukocytosis, with WBC greater than 12,000  Source is unclear, patient was being treated with Bactrim for Staph infection as outpatient by her PCP at [facility]  Sepsis protocol initiated  Fluid resuscitation ordered per protocol  IV antibiotics as appropriate for source of sepsis - IV ceftriaxone and vancomycin  While organ dysfunction may be noted elsewhere in this problem list or in the chart, degree of organ dysfunction does not meet CMS criteria for severe sepsis    Patient was being treated for an unknown Staph infection by her PCP, was taking Bactrim, had taken Bactrim for 1 day prior to admission.  Patient is unable  to provide further information as to the species of the staph infection.  Multiple attempts to call patient's [facility] PCP but no answer over the weekend.    Portal vein thrombosis- (present on admission)  Assessment & Plan  Patient likely has had possible chronic portal vein thrombosis but has not been actively treated  -Eliquis  -Confirmed Eliquis will be free for patient  --hematology to evaluate patient in the morning    Abnormal INR- (present on admission)  Assessment & Plan  INR 2.2 on admission  Monitor for bleeding  Vitamin K to be given  Continue lovenox, high risk for thrombosis    Alcoholic cirrhosis of liver without ascites (HCC)- (present on admission)  Assessment & Plan  She has been sober for 4 months. Alcohol level neg  MELD Na score: 27, indicates 27-32% 90 days mortality  Ammonia 74, mild elevation  Continue monitoring of CMP for liver, renal function  Boost plus supplement for albumin of 2 nutritional support,   Leg elevation when in bed and ambulation 3 times daily  Continue thiamine and folic along with multivitamin.  Cont rifaximin and lactulose  Consulting [facility] for recommendations    Macrocytic anemia- (present on admission)  Assessment & Plan  Likely sec to alcohol abuse  No sign of active bleeding  Cont folate, MV, vitB1    Type 2 diabetes mellitus with neurologic complication, without long-term current use of insulin (HCC)- (present on admission)  Assessment & Plan  A1c 9.5 4/2021  On ISS  Accucheck and hypoglycemic protocol  No home/chronic insulin use  Hold Invokana    Vitamin D deficiency- (present on admission)  Assessment & Plan  Vit D 25 level = 9  - continue ergo 50,000IU weekly dose    Class 3 severe obesity due to excess calories with serious comorbidity and body mass index (BMI) of 40.0 to 44.9 in adult (HCC)- (present on admission)  Assessment & Plan  Recommending patient to follow up with their PCP on weight management plan  Recommending polysomnography to PCP  given elevated BMI  Counseled patient on weight control, diet management, following up with PCP    Hypothyroidism- (present on admission)  Assessment & Plan  Cont levothyroxine     VTE prophylaxis: Eliquis

## 2021-06-14 NOTE — CARE PLAN
The patient is Watcher - Medium risk of patient condition declining or worsening    Shift Goals  Clinical Goals: SBP will be above 90    Progress made toward(s) clinical / shift goals:  Promote oral intake as appropriate. Recorded intake. Pt is voiding, without assistance. SBP was 92 at the start of shift.    Patient is not progressing towards the following goals:

## 2021-06-14 NOTE — DISCHARGE PLANNING
Anticipated Discharge Disposition: Home    Action: Discussed patient's needs during IDT rounds.  Consult to ID, Dr Sheriff will contact [facility] clinic to see what medications they could cover    Barriers to Discharge: medical clearance    Plan: continue to monitor for discharge barriers

## 2021-06-14 NOTE — ASSESSMENT & PLAN NOTE
Patient likely has had possible chronic portal vein thrombosis but has not been actively treated  -Eliquis  -Confirmed Eliquis will be free for patient  --hematology to evaluate patient in the morning

## 2021-06-14 NOTE — CARE PLAN
The patient is Stable - Low risk of patient condition declining or worsening    Shift Goals  Clinical Goals: Pt will have adequate pain control   Patient Goals: To rest    Progress made toward(s) clinical / shift goals:  pt has maintained adequate pain control with use of heat packs as needed which has allowed pt to received adequate rest     Patient is not progressing towards the following goals:  N/a

## 2021-06-14 NOTE — PROGRESS NOTES
Gastroenterology Progress Note     Author: Fausto Correa M.D.   Date & Time Created: 6/14/2021 12:11 PM    Chief Complaint:  Alcoholic cirrhosis    Interval History:  Tolerating orals.        Review of Systems:  Review of Systems   Constitutional: Positive for malaise/fatigue.   HENT: Negative.    Eyes: Negative.    Respiratory: Negative.    Cardiovascular: Negative.    Gastrointestinal: Negative.    Genitourinary: Negative.    Musculoskeletal: Negative.    Skin: Negative.    Neurological: Negative.        Physical Exam:  Physical Exam  Constitutional:       Appearance: She is ill-appearing.   HENT:      Head: Normocephalic.      Mouth/Throat:      Mouth: Mucous membranes are dry.   Eyes:      Pupils: Pupils are equal, round, and reactive to light.   Cardiovascular:      Rate and Rhythm: Normal rate.   Pulmonary:      Effort: Pulmonary effort is normal.   Abdominal:      General: Abdomen is flat.   Neurological:      General: No focal deficit present.      Mental Status: She is alert and oriented to person, place, and time.   Psychiatric:         Mood and Affect: Mood normal.         Behavior: Behavior normal.         Thought Content: Thought content normal.         Judgment: Judgment normal.         Labs:          Recent Labs     06/11/21 1955 06/12/21 0149 06/13/21  0543   SODIUM 136 135 136   POTASSIUM 4.3 4.4 4.3   CHLORIDE 109 111 112   CO2 16* 15* 16*   BUN 18 16 11   CREATININE 1.12 0.92 0.57   MAGNESIUM  --   --  2.1   PHOSPHORUS  --   --  3.2   CALCIUM 8.1* 7.9* 8.1*     Recent Labs     06/11/21 1955 06/12/21 0149 06/13/21  0543   ALTSGPT 38 29 33   ASTSGOT 157* 145* 152*   ALKPHOSPHAT 218* 189* 182*   TBILIRUBIN 12.6* 11.2* 10.7*   LIPASE 20  --   --    GLUCOSE 118* 136* 98     Recent Labs     06/11/21 2014 06/11/21 2052 06/12/21 0149 06/13/21  0543   RBC 3.31*  --  3.14* 3.03*   HEMOGLOBIN 11.4*  --  10.4* 10.4*   HEMATOCRIT 33.6*  --  32.7* 31.2*   PLATELETCT 191  --  185 195   PROTHROMBTM  --   23.7*  --  23.4*   APTT  --  58.7*  --   --    INR  --  2.20*  --  2.16*     Recent Labs     21  0543   WBC  --  18.8* 16.8* 16.2*   NEUTSPOLYS  --  75.60* 77.30*  --    LYMPHOCYTES  --  13.10* 11.60*  --    MONOCYTES  --  8.10 7.50  --    EOSINOPHILS  --  1.30 1.40  --    BASOPHILS  --  1.20 1.30  --    ASTSGOT 157*  --  145* 152*   ALTSGPT 38  --  29 33   ALKPHOSPHAT 218*  --  189* 182*   TBILIRUBIN 12.6*  --  11.2* 10.7*     Hemodynamics:  Temp (24hrs), Av.5 °C (97.7 °F), Min:36.3 °C (97.4 °F), Max:36.7 °C (98 °F)  Temperature: 36.6 °C (97.8 °F)  Pulse  Av.6  Min: 88  Max: 106   Blood Pressure: 100/57     Respiratory:    Respiration: 16, Pulse Oximetry: 96 %           Fluids:    Intake/Output Summary (Last 24 hours) at 2021 1211  Last data filed at 2021 0930  Gross per 24 hour   Intake 540 ml   Output --   Net 540 ml        GI/Nutrition:  Orders Placed This Encounter   Procedures   • Diet Order Diet: Renal; Second Modifier: (optional): Consistent CHO (Diabetic)     Standing Status:   Standing     Number of Occurrences:   1     Order Specific Question:   Diet:     Answer:   Renal [8]     Order Specific Question:   Second Modifier: (optional)     Answer:   Consistent CHO (Diabetic) [4]     Medical Decision Making, by Problem:  Active Hospital Problems    Diagnosis    • *ABBEY (acute kidney injury) ([facility]) [N17.9]    • Vitamin D deficiency [E55.9]    • Abnormal INR [R79.1]    • Sepsis with acute renal failure without septic shock ([facility]) [A41.9, R65.20, N17.9]    • Class 3 severe obesity due to excess calories with serious comorbidity and body mass index (BMI) of 40.0 to 44.9 in adult (HCC) [E66.01, Z68.41]    • Hypothyroidism [E03.9]    • Alcoholic cirrhosis of liver without ascites (HCC) [K70.30]    • Type 2 diabetes mellitus with neurologic complication, without long-term current use of insulin (HCC) [E11.49]    • Macrocytic anemia [D53.9]   "      Plan:  1. ESLD 2 2 Alcohol induced cirrhosis: Concern for this is raised. Although hepatomegaly is appreciated on the ABD US. This may be related to acute alcoholic hepatitis. Abstinence recommended.   2. Hepatitis: 4:1 AST:ALT ration highly consistent with continue alcohol use. Alcohol abstinence recommended.   3. Cholestasis: Acute on chronic alcohol related liver injury. Alcohol abstinence recommended.   4. Acute Kidney injury: Reported HRS or NSAID induced kidney injury. Kidney function is now normal.   5. Vitamin D deficiency: Please replete.  6. Anemia: Macrocytic. Improve nutrition. B12 supplementation  7. Nutrition: Advanced to renal diet.   8. Portal vein thrombosis: Repeat ABD US.  \"Flow is not definitively demonstrated in the main portal vein, concerning for portal vein thrombosis, corresponding with reported history concerning for portal vein thrombosis.\":    Anticoagulation -- The role of anticoagulation in patients with chronic PVT is unclear. The goal of anticoagulation in this setting is to prevent recurrent thrombosis, prevent thrombus extension, and promote recanalization. However, while patients with chronic PVT are often at risk for recurrent thrombosis, they are also at risk for variceal bleeding. As a result, the decision to start anticoagulation must be made on a case-by-case basis.    Selecting patients for treatment -- We base the decision to anticoagulate on the following:    ?What is the patient's risk of bleeding? Possibility increased. Is the patient still drinking alcohol? This is still a possibility.     ?What is the patient's risk of a thrombotic event (eg, does the patient have an underlying prothrombotic disorder)?  Consider hematology evaluation.     ?How likely is the patient to survive a bleed or a thrombotic event? Patient's access to medical care is currently limited. Case management to assess insurance.     ?Is the patient awaiting liver transplantation? Patient is not " on liver transplant list.     In general, we offer anticoagulation to patients who are at increased risk for recurrent thrombosis based on their clinical history or laboratory studies. In such patients, we suggest long-term anticoagulation. We only give anticoagulation to patients with a history of gastrointestinal variceal bleeding or large varices who are at increased risk for bleeding (particularly in those with cirrhosis) if adequate prophylactic measures to prevent recurrent bleeding can be implemented. We prefer beta blockers over variceal ligation for prophylaxis in such patients because of the risk of bleeding from esophageal ulcers that form when the bands used for variceal ligation slough off. This approach is consistent with 2009 guidelines from the American Association for the Study of Liver Diseases.     We also offer anticoagulation to patients with PVT and advanced cirrhosis who are awaiting transplantation and do not have contraindications to anticoagulation. This patient is not in this category. Anticoagulation may also be considered on a case-by-case basis in those with portal vein thrombosis without cavernous transformation but where the duration of the thrombosis is indeterminate. The goal of treatment is to achieve complete or partial recanalization of the portal vein and prevent thrombosis progression.    Choice of agent -- When treating with anticoagulation, we often use enoxaparin rather than warfarin because of its shorter duration of action, less variability in anticoagulation, decreased need for monitoring, and decreased difficulty when managing patients around the time of liver transplantation. An alternative is to use an oral anticoagulant (if warfarin is used, our goal international normalized ratio [INR] is 2 to 3).    Please obtain answers to the above questions with hematology consult and further clarification with case management.     Total time to provide care was 54 minutes.        .Quality-Core Measures

## 2021-06-14 NOTE — PROGRESS NOTES
Pt is resting in bed. Denied pain. Pt was NPO for RU US. Dinner provided post completion of imaging. Fluids and heat packs provide per request. Pt denied nausea/vomiting or further needs. Pt ambulates steady and independently.        Assessment/description of ears Jaundice, blanching  Which preventative measures are in place for the ears?n/a     Assessment/description of elbows? Jaundice, blanching  Which preventative measures are in place for the elbows? n/a     Assessment/description of sacrum? Jaundice, blanching  Which preventative measures are in place for the sacrum? n/a     Assessment/description of heels? Blanching, intact  Which preventative measures are in place for the heels?   none  Which devices are in place? PIV  Description of skin under devices:  Which preventative measures are in: Dressing care     Cyst on left breast, dressing in place. Bilateral lower extremity edema.

## 2021-06-14 NOTE — PROGRESS NOTES
Received bedside report and assumed care of pt at change of shift. Pt is resting in bed, complaints of pain in abdominal area. Requested heat pack which was given at this time. Updated pt on POC for the day. Discussed goals for the sift. Pt states no other needs at this time. Bed is locked and in lowest position with water and call light within reach.     Yousif Score: 20    Assessment/description of ears: dry and intact   Preventative measures in place for the ears: q shift assessments      Assessment/description of elbows: intact and blanching. Bruising throughout arms   Preventative measures in place for the elbows: pillows for repositioning, encouraged frequent turns while in bed     Assessment/description of sacrum: intact, blanching  Preventative measures in place for the sacrum:  pillows for repositioning, encouraged frequent turns while in bed     Assessment/description of heels: dry, flaky intact   Preventative measures in place for the heels: pillows for repositioning, encouraged frequent turns while in bed, encouraged ambulation or up in chair throughout day      Which devices are in place: PIV (PTA IV)  Description of skin under devices: intact and blanching   Preventative measures in place under devices: q shift assessment     Other: Jaundice throughout as well as ble edema

## 2021-06-14 NOTE — DIETARY
NUTRITION SERVICES: BMI - Pt with BMI >40 (=Body mass index is 42.61 kg/m².), Class III obesity. Pt noted with 3+ pitting edema (RLE,LLE). Weight loss counseling not appropriate in acute care setting. RECOMMEND - If appropriate at DC please refer to outpatient nutrition services for weight management.

## 2021-06-15 ENCOUNTER — PATIENT OUTREACH (OUTPATIENT)
Dept: HEALTH INFORMATION MANAGEMENT | Facility: OTHER | Age: 41
End: 2021-06-15

## 2021-06-15 VITALS
OXYGEN SATURATION: 98 % | SYSTOLIC BLOOD PRESSURE: 100 MMHG | BODY MASS INDEX: 42.62 KG/M2 | HEIGHT: 63 IN | RESPIRATION RATE: 17 BRPM | WEIGHT: 240.52 LBS | DIASTOLIC BLOOD PRESSURE: 60 MMHG | HEART RATE: 98 BPM | TEMPERATURE: 98.1 F

## 2021-06-15 LAB
ALBUMIN SERPL BCP-MCNC: 1.8 G/DL (ref 3.2–4.9)
BUN SERPL-MCNC: 8 MG/DL (ref 8–22)
CALCIUM SERPL-MCNC: 8.2 MG/DL (ref 8.5–10.5)
CHLORIDE SERPL-SCNC: 111 MMOL/L (ref 96–112)
CO2 SERPL-SCNC: 17 MMOL/L (ref 20–33)
CREAT SERPL-MCNC: 0.63 MG/DL (ref 0.5–1.4)
ERYTHROCYTE [DISTWIDTH] IN BLOOD BY AUTOMATED COUNT: 52.9 FL (ref 35.9–50)
GLUCOSE BLD-MCNC: 115 MG/DL (ref 65–99)
GLUCOSE BLD-MCNC: 116 MG/DL (ref 65–99)
GLUCOSE SERPL-MCNC: 109 MG/DL (ref 65–99)
HCT VFR BLD AUTO: 31.3 % (ref 37–47)
HGB BLD-MCNC: 10.3 G/DL (ref 12–16)
MAGNESIUM SERPL-MCNC: 1.9 MG/DL (ref 1.5–2.5)
MCH RBC QN AUTO: 33.9 PG (ref 27–33)
MCHC RBC AUTO-ENTMCNC: 32.9 G/DL (ref 33.6–35)
MCV RBC AUTO: 103 FL (ref 81.4–97.8)
PHOSPHATE SERPL-MCNC: 3.5 MG/DL (ref 2.5–4.5)
PLATELET # BLD AUTO: 195 K/UL (ref 164–446)
PMV BLD AUTO: 11.1 FL (ref 9–12.9)
POTASSIUM SERPL-SCNC: 3.9 MMOL/L (ref 3.6–5.5)
RBC # BLD AUTO: 3.04 M/UL (ref 4.2–5.4)
SODIUM SERPL-SCNC: 136 MMOL/L (ref 135–145)
WBC # BLD AUTO: 15.8 K/UL (ref 4.8–10.8)

## 2021-06-15 PROCEDURE — 99238 HOSP IP/OBS DSCHRG MGMT 30/<: CPT | Performed by: STUDENT IN AN ORGANIZED HEALTH CARE EDUCATION/TRAINING PROGRAM

## 2021-06-15 PROCEDURE — 83735 ASSAY OF MAGNESIUM: CPT

## 2021-06-15 PROCEDURE — A9270 NON-COVERED ITEM OR SERVICE: HCPCS | Performed by: INTERNAL MEDICINE

## 2021-06-15 PROCEDURE — 82962 GLUCOSE BLOOD TEST: CPT

## 2021-06-15 PROCEDURE — 80069 RENAL FUNCTION PANEL: CPT

## 2021-06-15 PROCEDURE — 700102 HCHG RX REV CODE 250 W/ 637 OVERRIDE(OP): Performed by: STUDENT IN AN ORGANIZED HEALTH CARE EDUCATION/TRAINING PROGRAM

## 2021-06-15 PROCEDURE — 85027 COMPLETE CBC AUTOMATED: CPT

## 2021-06-15 PROCEDURE — A9270 NON-COVERED ITEM OR SERVICE: HCPCS | Performed by: STUDENT IN AN ORGANIZED HEALTH CARE EDUCATION/TRAINING PROGRAM

## 2021-06-15 PROCEDURE — 700102 HCHG RX REV CODE 250 W/ 637 OVERRIDE(OP): Performed by: INTERNAL MEDICINE

## 2021-06-15 RX ADMIN — MIDODRINE HYDROCHLORIDE 5 MG: 5 TABLET ORAL at 08:51

## 2021-06-15 RX ADMIN — THERA TABS 1 TABLET: TAB at 04:27

## 2021-06-15 RX ADMIN — APIXABAN 10 MG: 5 TABLET, FILM COATED ORAL at 04:27

## 2021-06-15 RX ADMIN — OMEPRAZOLE 20 MG: 20 CAPSULE, DELAYED RELEASE ORAL at 04:27

## 2021-06-15 RX ADMIN — TRAMADOL HYDROCHLORIDE 50 MG: 50 TABLET, FILM COATED ORAL at 14:47

## 2021-06-15 RX ADMIN — FUROSEMIDE 40 MG: 40 TABLET ORAL at 04:27

## 2021-06-15 RX ADMIN — CYANOCOBALAMIN TAB 500 MCG 2000 MCG: 500 TAB at 04:27

## 2021-06-15 RX ADMIN — RIFAXIMIN 550 MG: 550 TABLET ORAL at 04:27

## 2021-06-15 RX ADMIN — LACTULOSE 15 ML: 20 SOLUTION ORAL at 04:26

## 2021-06-15 RX ADMIN — FOLIC ACID 1 MG: 1 TABLET ORAL at 04:27

## 2021-06-15 RX ADMIN — MIDODRINE HYDROCHLORIDE 5 MG: 5 TABLET ORAL at 12:37

## 2021-06-15 RX ADMIN — LEVOTHYROXINE SODIUM 50 MCG: 0.05 TABLET ORAL at 04:27

## 2021-06-15 ASSESSMENT — ENCOUNTER SYMPTOMS
DIARRHEA: 0
PALPITATIONS: 0
DIZZINESS: 0
SHORTNESS OF BREATH: 0
VOMITING: 0
NAUSEA: 0
HEADACHES: 0
FEVER: 0
BACK PAIN: 0
CONSTIPATION: 0
ABDOMINAL PAIN: 0
COUGH: 0
CHILLS: 0

## 2021-06-15 ASSESSMENT — PAIN DESCRIPTION - PAIN TYPE: TYPE: ACUTE PAIN

## 2021-06-15 NOTE — CONSULTS
Consult Note: Hematology/Oncology    Date of consultation: 6/15/2021 2:10 PM    Referring provider: Dr. LETITIA Sheriff    Reason for consultation: Alcoholic liver cirrhosis found to have signs of portal vein thrombosis      History of presenting illness:   41-year-old woman admitted on 6/11/2021 with a PMH of alcohol abuse who presented to [facility] and was fetor airlifted here for the evaluation of alcoholic cirrhosis and hepatorenal syndrome concerns.  Patient is admitting last alcohol consumption was 75 days ago, initial work-up found to have a creatinine of 2.4.  Her creatinine did normalize with IV fluids.  The patient never had a history of GI bleed, there is no history of esophageal varices there is also a concern about acute alcoholic hepatitis due to recent liver enlargement.  She is now found to have rising AST and ALT.  She underwent for ultrasound of the liver and biliary tree with no acute sonographic abnormality, increased hepatic echogenicity and no color flow in the portal vein probably related to sluggish versus static flow or portal vein thrombosis and no ascites..    Her total bilirubin is already improving max of 12.6 currently in the range of 6    She underwent for a repeated ultrasound of the liver and biliary tract showing no definitive flow in the main portal vein concerning for portal vein thrombosis corresponding to previous finding.      I was consulted for the evaluation of anticoagulation  in the setting of portal vein thrombosis and worsening liver function test was started on Eliquis on 6/14/2021    Past Medical History:    Past Medical History:   Diagnosis Date   • DM (diabetes mellitus) (HCC)        Past surgical history:  History reviewed. No pertinent surgical history.    Allergies:  Patient has no known allergies.    Medications:    Current Facility-Administered Medications   Medication Dose Route Frequency Provider Last Rate Last Admin   • apixaban (ELIQUIS) tablet  10 mg  10 mg Oral BID Durga Sheriff M.D.   10 mg at 06/15/21 0427    Followed by   • [START ON 6/21/2021] apixaban (ELIQUIS) tablet 5 mg  5 mg Oral BID Durga Sheriff M.D.       • midodrine (PROAMATINE) tablet 5 mg  5 mg Oral TID WITH MEALS Durga Sheriff M.D.   5 mg at 06/15/21 1237   • vitamin D (Ergocalciferol) (DRISDOL) capsule 50,000 Units  50,000 Units Oral Q7 DAYS Durga Sheriff M.D.   50,000 Units at 06/13/21 0905   • furosemide (LASIX) tablet 40 mg  40 mg Oral BID DIURETIC Durga Sheriff M.D.   40 mg at 06/15/21 0427   • lactulose 20 GM/30ML solution 15 mL  15 mL Oral BID Durga Sheriff M.D.   15 mL at 06/15/21 0426   • folic acid (FOLVITE) tablet 1 mg  1 mg Oral DAILY Mihir Gomes M.D.   1 mg at 06/15/21 0427   • cyanocobalamin (VITAMIN B-12) tablet 2,000 mcg  2,000 mcg Oral DAILY Mihir Gomes M.D.   2,000 mcg at 06/15/21 0427   • omeprazole (PRILOSEC) capsule 20 mg  20 mg Oral DAILY Mihir Gomes M.D.   20 mg at 06/15/21 0427   • multivitamin (THERAGRAN) tablet 1 tablet  1 tablet Oral DAILY Mihir Gomes M.D.   1 tablet at 06/15/21 0427   • traMADol (ULTRAM) 50 MG tablet 50 mg  50 mg Oral TID PRN Mihir Gomes M.D.   50 mg at 06/14/21 1300   • ondansetron (ZOFRAN) syringe/vial injection 4 mg  4 mg Intravenous Q4HRS PRVIDHYA Gomes M.D.       • ondansetron (ZOFRAN ODT) dispertab 4 mg  4 mg Oral Q4HRS PRN Mihir Gomes M.D.       • promethazine (PHENERGAN) tablet 12.5-25 mg  12.5-25 mg Oral Q4HRS PRN Mihir Gomes M.D.       • promethazine (PHENERGAN) suppository 12.5-25 mg  12.5-25 mg Rectal Q4HRS PRVIDHYA Gomes M.D.       • prochlorperazine (COMPAZINE) injection 5-10 mg  5-10 mg Intravenous Q4HRS PRVIDHYA Gomes M.D.       • insulin lispro (AdmeLOG) injection  2-9 Units Subcutaneous Q6HRS Mihir Gomes M.D.        And   • glucose 4 g chewable tablet 16 g  16 g Oral Q15 MIN PRN Mihir Gomes M.D.        And   • dextrose 50% (D50W) injection 50 mL  50 mL Intravenous  Q15 MIN PRN Mihir Gomes M.D.       • pregabalin (LYRICA) capsule 50 mg  50 mg Oral Nightly Mihir Gomes M.D.   50 mg at 06/14/21 2033   • levothyroxine (SYNTHROID) tablet 50 mcg  50 mcg Oral AM ES Mihir Gomes M.D.   50 mcg at 06/15/21 0427   • riFAXIMin (XIFAXAN) tablet 550 mg  550 mg Oral BID Mihir Gomes M.D.   550 mg at 06/15/21 0427       Social History:     Social History     Socioeconomic History   • Marital status:      Spouse name: Not on file   • Number of children: Not on file   • Years of education: Not on file   • Highest education level: Not on file   Occupational History   • Not on file   Tobacco Use   • Smoking status: Former Smoker   • Smokeless tobacco: Former User   Substance and Sexual Activity   • Alcohol use: Not Currently     Alcohol/week: 8.4 oz     Types: 14 Cans of beer per week     Comment: last drink on 5/15   • Drug use: Never   • Sexual activity: Not on file   Other Topics Concern   • Not on file   Social History Narrative   • Not on file     Social Determinants of Health     Financial Resource Strain:    • Difficulty of Paying Living Expenses:    Food Insecurity:    • Worried About Running Out of Food in the Last Year:    • Ran Out of Food in the Last Year:    Transportation Needs:    • Lack of Transportation (Medical):    • Lack of Transportation (Non-Medical):    Physical Activity:    • Days of Exercise per Week:    • Minutes of Exercise per Session:    Stress:    • Feeling of Stress :    Social Connections:    • Frequency of Communication with Friends and Family:    • Frequency of Social Gatherings with Friends and Family:    • Attends [employer] Services:    • Active Member of Clubs or Organizations:    • Attends Club or Organization Meetings:    • Marital Status:    Intimate Partner Violence:    • Fear of Current or Ex-Partner:    • Emotionally Abused:    • Physically Abused:    • Sexually Abused:        Family History:   History reviewed. No pertinent family  "history.    Review of Systems:  All other review of systems are negative except what was mentioned above in the HPI.    Constitutional: No fever, chills, weight loss ,malaise/fatigue.    HEENT: No new auditory or visual complaints. No sore throat and neck pain.     Respiratory:No new cough, sputum production, shortness of breath and wheezing.    Cardiovascular: No new chest pain, palpitations, orthopnea. She has bilateral leg swelling   Gastrointestinal: No heartburn, nausea, vomiting ,abdominal pain, hematochezia or melena     Genitourinary: Negative for dysuria, hematuria    Musculoskeletal: No new arthralgias or myalgias   Skin: Negative for rash and itching.    Neurological: Negative for focal weakness or headaches.    Endo/Heme/Allergies: No abnormal bleed/bruise.    Psychiatric/Behavioral: No new depression/anxiety.    Physical Exam:  Vitals:   /60   Pulse 98   Temp 36.7 °C (98.1 °F) (Temporal)   Resp 17   Ht 1.6 m (5' 3\")   Wt 109 kg (240 lb 8.4 oz)   SpO2 98%   BMI 42.61 kg/m²     General: Not in acute distress, alert and oriented x 3  HEENT: No pallor. She has  icterus. Oropharynx clear.   Neck: Supple, no palpable masses.  Lymph nodes: No palpable cervical, supraclavicular, axillary or inguinal lymphadenopathy.    CVS: regular rate and rhythm, no rubs or gallops  RESP: Clear to auscultate bilaterally, no wheezing or crackles.   ABD: Soft, non tender, non distended, positive bowel sounds, no palpable organomegaly  EXT: Bilateral lower extremity swelling  CNS: Alert and oriented x3, No focal deficits.  Skin- No rash.  jaundice      Labs:   Recent Labs     06/13/21  0543 06/15/21  0520   RBC 3.03* 3.04*   HEMOGLOBIN 10.4* 10.3*   HEMATOCRIT 31.2* 31.3*   PLATELETCT 195 195   PROTHROMBTM 23.4*  --    INR 2.16*  --      Lab Results   Component Value Date/Time    SODIUM 136 06/15/2021 05:20 AM    POTASSIUM 3.9 06/15/2021 05:20 AM    CHLORIDE 111 06/15/2021 05:20 AM    CO2 17 (L) 06/15/2021 05:20 AM "    GLUCOSE 109 (H) 06/15/2021 05:20 AM    BUN 8 06/15/2021 05:20 AM    CREATININE 0.63 06/15/2021 05:20 AM        Assessment and Plan:  1.  Portal vein thrombosis with no flow in the portal vein likely chronic  -6/14/2021 started Eliquis    2.  Alcoholic liver cirrhosis    3.  No history of GI bleed, no history of esophageal varices    4.  Alcoholism, patient admitted to be drinking last in late April    5.  Acute kidney injury, resolved    Plan  -Probably has a chronic portal vein thrombosis, currently the patient does not have symptoms of pain chronic portal vein thrombosis.  She does not have any history of GI bleed, no history of esophageal varices..    -Do agree with the management of chronic portal vein thrombosis, this could prevent recurrent thrombosis, thrombus extension and promote recanalization in a case-by-case basis  -The patient lives in [place] and following with a primary care doctor over there at the [facility]  -Patient understands she must stop drinking  -She understands severe complications can occur while on anticoagulation with bleeding.     Addendum, I discussed case with the pharmacist Yoselyn Lim.  There is no studies performed on Eliquis in patients with significant liver dysfunction.  Also other oral anticoagulants were not studied in patients with such as significant liver dysfunction.  Recommendations from the pharmacist was to be on Lovenox  but probably this will not be the best option given she was admitted with acute kidney injury during this hospital stay.    I discussed case with Dr. Briscoe and given there is no absolute recommendation for anticoagulation in patients with chronic portal vein thrombosis,  I will preferred to hold on anticoagulation.    Patient will be discharged without anticoagulation,    We will sign off, please call back if there is any questions or concerns            She agreed and verbalized her agreement and understanding with the current  plan.  I answered all questions and concerns she has at this time.              Thank you for allowing me to participate in her care.    Please note that this dictation was created using voice recognition software. I have made every reasonable attempt to correct obvious errors, but I expect that there are errors of grammar and possibly content that I did not discover before finalizing the note.      SIGNATURES:  Triston Montenegro III, M.D.

## 2021-06-15 NOTE — CARE PLAN
The patient is Stable - Low risk of patient condition declining or worsening    Shift Goals  Clinical Goals: Pt will discharge today   Patient Goals:    Progress made toward(s) clinical / shift goals:  Pt discharging today. Pending hematology to assess pt prior to discharge.     Patient is not progressing towards the following goals:

## 2021-06-15 NOTE — PROGRESS NOTES
Received bedside report and assumed pt care. Pt is A&Ox4 and denies any pain at this moment. VSS on RA. Assessment complete. Pt ambulated up to bathroom independently with steady gait and returned back to bed safely. Pt currently sitting EOB eating breakfast. Pending on hematology to round on pt prior to discharge. Pt is aware she is to discharge today. All questions answered. Pt denies any needs at this time. Safety precautions in place. Hourly rounding in place.

## 2021-06-15 NOTE — PROGRESS NOTES
Arrived to DC lounge, not in any distress. DC to home with  in stable condition. DC instructions given, verbalized understanding.

## 2021-06-15 NOTE — CARE PLAN
The patient is Stable - Low risk of patient condition declining or worsening    Shift Goals  Clinical Goals: comfort  Patient Goals: pt will be able to sleep comfotably    Progress made toward(s) clinical / shift goals:      Patient is not progressing towards the following goals:

## 2021-06-15 NOTE — PROGRESS NOTES
Assessment/description of ears? Intact and blanching  Which preventative measures are in place for the ears?  Qshift assessment     Assessment/description of elbows? Intact and blanching  Which preventative measures are in place for the elbows?  Pt turns self frequently, pt able to ambulate independently, and pillows available for support and postioning    Assessment/description of sacrum? Intact and blanching   Which preventative measures are in place for the sacrum?  Pt turns self frequently, pt able to ambulate independently, pillows available for support and postioning, and pt is contx2    Assessment/description of heels? Intact, dry, and blanching  Which preventative measures are in place for the heels?  Pt turns self frequently, pt able to ambulate independently, and pillows available for support and postioning    Which devices are in place? PIV  Description of skin under devices: Intact and blanching  Which preventative measures are in place under devices?  Qshift assessment     Other:  Pt has both generalized jaundice and bruising.

## 2021-06-15 NOTE — DISCHARGE INSTRUCTIONS
Acute Kidney Injury, Adult    Acute kidney injury is a sudden worsening of kidney function. The kidneys are organs that have several jobs. They filter the blood to remove waste products and extra fluid. They also maintain a healthy balance of minerals and hormones in the body, which helps control blood pressure and keep bones strong. With this condition, your kidneys do not do their jobs as well as they should.  This condition ranges from mild to severe. Over time it may develop into long-lasting (chronic) kidney disease. Early detection and treatment may prevent acute kidney injury from developing into a chronic condition.  What are the causes?  Common causes of this condition include:  · A problem with blood flow to the kidneys. This may be caused by:  ? Low blood pressure (hypotension) or shock.  ? Blood loss.  ? Heart and blood vessel (cardiovascular) disease.  ? Severe burns.  ? Liver disease.  · Direct damage to the kidneys. This may be caused by:  ? Certain medicines.  ? A kidney infection.  ? Poisoning.  ? Being around or in contact with toxic substances.  ? A surgical wound.  ? A hard, direct hit to the kidney area.  · A sudden blockage of urine flow. This may be caused by:  ? Cancer.  ? Kidney stones.  ? An enlarged prostate in males.  What are the signs or symptoms?  Symptoms of this condition may not be obvious until the condition becomes severe. Symptoms of this condition can include:  · Tiredness (lethargy), or difficulty staying awake.  · Nausea or vomiting.  · Swelling (edema) of the face, legs, ankles, or feet.  · Problems with urination, such as:  ? Abdominal pain, or pain along the side of your stomach (flank).  ? Decreased urine production.  ? Decrease in the force of urine flow.  · Muscle twitches and cramps, especially in the legs.  · Confusion or trouble concentrating.  · Loss of appetite.  · Fever.  How is this diagnosed?  This condition may be diagnosed with tests, including:  · Blood  tests.  · Urine tests.  · Imaging tests.  · A test in which a sample of tissue is removed from the kidneys to be examined under a microscope (kidney biopsy).  How is this treated?  Treatment for this condition depends on the cause and how severe the condition is. In mild cases, treatment may not be needed. The kidneys may heal on their own. In more severe cases, treatment will involve:  · Treating the cause of the kidney injury. This may involve changing any medicines you are taking or adjusting your dosage.  · Fluids. You may need specialized IV fluids to balance your body's needs.  · Having a catheter placed to drain urine and prevent blockages.  · Preventing problems from occurring. This may mean avoiding certain medicines or procedures that can cause further injury to the kidneys.  In some cases treatment may also require:  · A procedure to remove toxic wastes from the body (dialysis or continuous renal replacement therapy - CRRT).  · Surgery. This may be done to repair a torn kidney, or to remove the blockage from the urinary system.  Follow these instructions at home:  Medicines  · Take over-the-counter and prescription medicines only as told by your health care provider.  · Do not take any new medicines without your health care provider's approval. Many medicines can worsen your kidney damage.  · Do not take any vitamin and mineral supplements without your health care provider's approval. Many nutritional supplements can worsen your kidney damage.  Lifestyle  · If your health care provider prescribed changes to your diet, follow them. You may need to decrease the amount of protein you eat.  · Achieve and maintain a healthy weight. If you need help with this, ask your health care provider.  · Start or continue an exercise plan. Try to exercise at least 30 minutes a day, 5 days a week.  · Do not use any tobacco products, such as cigarettes, chewing tobacco, and e-cigarettes. If you need help quitting, ask your  health care provider.  General instructions  · Keep track of your blood pressure. Report changes in your blood pressure as told by your health care provider.  · Stay up to date with immunizations. Ask your health care provider which immunizations you need.  · Keep all follow-up visits as told by your health care provider. This is important.  Where to find more information  · American Association of Kidney Patients: www.aakp.org  · National Kidney Foundation: www.kidney.org  · American Kidney Fund: www.akfinc.org  · Life Options Rehabilitation Program:  ? www.lifeoptions.org  ? www.kidneyschool.org  Contact a health care provider if:  · Your symptoms get worse.  · You develop new symptoms.  Get help right away if:  · You develop symptoms of worsening kidney disease, which include:  ? Headaches.  ? Abnormally dark or light skin.  ? Easy bruising.  ? Frequent hiccups.  ? Chest pain.  ? Shortness of breath.  ? End of menstruation in women.  ? Seizures.  ? Confusion or altered mental status.  ? Abdominal or back pain.  ? Itchiness.  · You have a fever.  · Your body is producing less urine.  · You have pain or bleeding when you urinate.  Summary  · Acute kidney injury is a sudden worsening of kidney function.  · Acute kidney injury can be caused by problems with blood flow to the kidneys, direct damage to the kidneys, and sudden blockage of urine flow.  · Symptoms of this condition may not be obvious until it becomes severe. Symptoms may include edema, lethargy, confusion, nausea or vomiting, and problems passing urine.  · This condition can usually be diagnosed with blood tests, urine tests, and imaging tests. Sometimes a kidney biopsy is done to diagnose this condition.  · Treatment for this condition often involves treating the underlying cause. It is treated with fluids, medicines, dialysis, diet changes, or surgery.  This information is not intended to replace advice given to you by your health care provider. Make  sure you discuss any questions you have with your health care provider.  Document Released: 07/02/2012 Document Revised: 11/30/2018 Document Reviewed: 12/08/2017  Elsevier Patient Education © 2020 [employer]  Cirrhosis    Cirrhosis is long-term (chronic) liver injury. The liver is the body's largest internal organ, and it performs many functions. It converts food into energy, removes toxic material from the blood, makes important proteins, and absorbs necessary vitamins from food.  In cirrhosis, healthy liver cells are replaced by scar tissue. This prevents blood from flowing through the liver, making it difficult for the liver to function. Scarring of the liver cannot be reversed, but treatment can prevent it from getting worse.  What are the causes?  Common causes of this condition are hepatitis C and long-term alcohol abuse. Other causes include:  · Nonalcoholic fatty liver disease. This happens when fat is deposited in the liver by causes other than alcohol.  · Hepatitis B infection.  · Autoimmune hepatitis. In this condition, the body's defense system (immune system) mistakenly attacks the liver cells, causing irritation and swelling (inflammation).  · Diseases that cause blockage of ducts inside the liver.  · Inherited liver diseases, such as hemochromatosis. This is one of the most common inherited liver diseases. In this disease, deposits of iron collect in the liver and other organs.  · Reactions to certain long-term medicines, such as amiodarone, a heart medicine.  · Parasitic infections. These include schistosomiasis, which is caused by a flatworm.  · Long-term contact to certain toxins. These toxins include certain organic solvents, such as toluene and chloroform.  What increases the risk?  You are more likely to develop this condition if:  · You have certain types of viral hepatitis.  · You abuse alcohol, especially if you are female.  · You are overweight.  · You share needles.  · You have  unprotected sex with someone who has viral hepatitis.  What are the signs or symptoms?  You may not have any signs and symptoms at first. Symptoms may not develop until the damage to your liver starts to get worse.  Early symptoms may include:  · Weakness and tiredness (fatigue).  · Changes in sleep patterns or having trouble sleeping.  · Itchiness.  · Tenderness in the right-upper part of your abdomen.  · Weight loss and muscle loss.  · Nausea.  · Loss of appetite.  · Appearance of tiny blood vessels under the skin.  Later symptoms may include:  · Fatigue or weakness that is getting worse.  · Yellow skin and eyes (jaundice).  · Buildup of fluid in the abdomen (ascites). You may notice that your clothes are tight around your waist.  · Weight gain.  · Swelling of the feet and ankles (edema).  · Trouble breathing.  · Easy bruising and bleeding.  · Vomiting blood.  · Black or bloody stool.  · Mental confusion.  How is this diagnosed?  Your health care provider may suspect cirrhosis based on your symptoms and medical history, especially if you have other medical conditions or a history of alcohol abuse. Your health care provider will do a physical exam to feel your liver and to check for signs of cirrhosis. He or she may perform other tests, including:  · Blood tests to check:  ? For hepatitis B or C.  ? Kidney function.  ? Liver function.  · Imaging tests such as:  ? MRI or CT scan to look for changes seen in advanced cirrhosis.  ? Ultrasound to see if normal liver tissue is being replaced by scar tissue.  · A procedure in which a long needle is used to take a sample of liver tissue to be checked in a lab (biopsy). Liver biopsy can confirm the diagnosis of cirrhosis.  How is this treated?  Treatment for this condition depends on how damaged your liver is and what caused the damage. It may include treating the symptoms of cirrhosis, or treating the underlying causes in order to slow the damage. Treatment may  include:  · Making lifestyle changes, such as:  ? Eating a healthy diet. You may need to work with your health care provider or a diet and nutrition specialist (dietitian) to develop an eating plan.  ? Restricting salt intake.  ? Maintaining a healthy weight.  ? Not abusing drugs or alcohol.  · Taking medicines to:  ? Treat liver infections or other infections.  ? Control itching.  ? Reduce fluid buildup.  ? Reduce certain blood toxins.  ? Reduce risk of bleeding from enlarged blood vessels in the stomach or esophagus (varices).  · Liver transplant. In this procedure, a liver from a donor is used to replace your diseased liver. This is done if cirrhosis has caused liver failure.  Other treatments and procedures may be done depending on the problems that you get from cirrhosis. Common problems include liver-related kidney failure (hepatorenal syndrome).  Follow these instructions at home:    · Take medicines only as told by your health care provider. Do not use medicines that are toxic to your liver. Ask your health care provider before taking any new medicines, including over-the-counter medicines.  · Rest as needed.  · Eat a well-balanced diet. Ask your health care provider or dietitian for more information.  · Limit your salt or water intake, if your health care provider asks you to do this.  · Do not drink alcohol. This is especially important if you are taking acetaminophen.  · Keep all follow-up visits as told by your health care provider. This is important.  Contact a health care provider if you:  · Have fatigue or weakness that is getting worse.  · Develop swelling of the hands, feet, legs, or face.  · Have a fever.  · Develop loss of appetite.  · Have nausea or vomiting.  · Develop jaundice.  · Develop easy bruising or bleeding.  Get help right away if you:  · Vomit bright red blood or a material that looks like coffee grounds.  · Have blood in your stools.  · Notice that your stools appear black and  tarry.  · Become confused.  · Have chest pain or trouble breathing.  Summary  · Cirrhosis is chronic liver injury. Liver damage cannot be reversed. Common causes are hepatitis C and long-term alcohol abuse.  · Tests used to diagnose cirrhosis include blood tests, imaging tests, and liver biopsy.  · Treatment for this condition involves treating the underlying cause. Avoid alcohol, drugs, salt, and medicines that may damage your liver.  · Contact your health care provider if you develop ascites, edema, jaundice, fever, nausea or vomiting, easy bruising or bleeding, or worsening fatigue.  This information is not intended to replace advice given to you by your health care provider. Make sure you discuss any questions you have with your health care provider.  Document Released: 12/18/2006 Document Revised: 04/08/2020 Document Reviewed: 11/07/2018  [employer] Patient Education © 2020 [employer]  Discharge Instructions    Discharged to home by car with relative. Discharged via wheelchair, hospital escort: Yes.  Special equipment needed: Not Applicable    Be sure to schedule a follow-up appointment with your primary care doctor or any specialists as instructed.     Discharge Plan:   Diet Plan: Discussed  Activity Level: Discussed  Confirmed Follow up Appointment: Patient to Call and Schedule Appointment  Confirmed Symptoms Management: Discussed  Medication Reconciliation Updated: Yes    I understand that a diet low in cholesterol, fat, and sodium is recommended for good health. Unless I have been given specific instructions below for another diet, I accept this instruction as my diet prescription.   Other diet:     Special Instructions: None    · Is patient discharged on Warfarin / Coumadin?   No     Depression / Suicide Risk    As you are discharged from this [facility] facility, it is important to learn how to keep safe from harming yourself.    Recognize the warning signs:  · Abrupt changes in personality, positive  or negative- including increase in energy   · Giving away possessions  · Change in eating patterns- significant weight changes-  positive or negative  · Change in sleeping patterns- unable to sleep or sleeping all the time   · Unwillingness or inability to communicate  · Depression  · Unusual sadness, discouragement and loneliness  · Talk of wanting to die  · Neglect of personal appearance   · Rebelliousness- reckless behavior  · Withdrawal from people/activities they love  · Confusion- inability to concentrate     If you or a loved one observes any of these behaviors or has concerns about self-harm, here's what you can do:  · Talk about it- your feelings and reasons for harming yourself  · Remove any means that you might use to hurt yourself (examples: pills, rope, extension cords, firearm)  · Get professional help from the community (Mental Health, Substance Abuse, psychological counseling)  · Do not be alone:Call your Safe Contact- someone whom you trust who will be there for you.  · Call your local CRISIS HOTLINE 549-5565 or 704-934-7269  · Call your local Children's Mobile Crisis Response Team [place] (103) 109-8029 or www.Alarm.com  · Call the toll free National Suicide Prevention Hotlines   · National Suicide Prevention Lifeline 731-964-BRBA (9994)  · National Hope Line Network 800-SUICIDE (561-4694)

## 2021-06-15 NOTE — PROGRESS NOTES
Ears: intact  Which preventative measures are in place for the ears?  n/a    Elbows: with bruises  Which preventative measures are in place for the elbows?  n/a    Sacrum: intact  Which preventative measures are in place for the sacrum?  Encouraged to turn to sides.    Heels: dry and flaky  Which preventative measures are in place for the heels?  n/a    Which devices are in place? PIV  Description of skin under devices: intact  Which preventative measures are in place under devices? Skin assessment  Other: generalized jaundice

## 2021-06-15 NOTE — PROGRESS NOTES
[facility] Medicine Daily Progress Note    Date of Service  6/15/2021    Chief Complaint  41 y.o. female admitted 6/11/2021 with abnormal labs sent as a transfer from [facility]  41-year-old female with a past medical history of liver cirrhosis due to alcohol use, diabetes admitted 6/11/2021 brought in by air transport from [facility].  Patient's labs were indicating have possible cardiorenal syndrome.  On presentation patient's creatinine was 1.5, prior to then had 2.4 at outside facility.    In the ED patient's lab showed creatinine of 1.12, , ALT 38, , total bilirubin 12.6.  Ammonia level 74.  Lactic acid 2.2.    Interval Problem Update  Patient seen and examined at bedside this morning.  No acute events overnight.     Awaiting hematology consultation this morning.  We will plan for discharge home today without AC for portal vein thrombosis due to high risks for overt bleeding with end stage liver dx. Patient will follow-up with the [facility].    Consultants/Specialty  Critical access hospital gastroenterology  Hematology Dr. Montenegro    Code Status  Full Code    Disposition  TBD, possible discharge in the next 24 to 48 hours    Review of Systems  Review of Systems   Constitutional: Negative for chills, fever and malaise/fatigue.   Respiratory: Negative for cough and shortness of breath.    Cardiovascular: Negative for chest pain and palpitations.   Gastrointestinal: Negative for abdominal pain, constipation, diarrhea, nausea and vomiting.   Musculoskeletal: Negative for back pain and joint pain.   Neurological: Negative for dizziness and headaches.   All other systems reviewed and are negative.     Physical Exam  Temp:  [36.5 °C (97.7 °F)-36.7 °C (98.1 °F)] 36.7 °C (98.1 °F)  Pulse:  [] 98  Resp:  [17-20] 17  BP: ()/(54-64) 100/60  SpO2:  [95 %-98 %] 98 %    Physical Exam  Vitals and nursing note reviewed.   Constitutional:       General: She is not in acute  distress.     Appearance: Normal appearance. She is obese. She is not ill-appearing.   Eyes:      General: Scleral icterus present.      Extraocular Movements: Extraocular movements intact.   Cardiovascular:      Rate and Rhythm: Normal rate.      Pulses: Normal pulses.      Heart sounds: Normal heart sounds. No murmur heard.     Pulmonary:      Effort: Pulmonary effort is normal. No respiratory distress.      Breath sounds: Normal breath sounds. No wheezing.   Abdominal:      General: Abdomen is flat. Bowel sounds are normal. There is no distension.      Palpations: Abdomen is soft.      Tenderness: There is no abdominal tenderness.   Musculoskeletal:         General: No swelling or tenderness. Normal range of motion.      Right lower leg: Edema present.      Left lower leg: Edema present.   Skin:     General: Skin is warm.      Capillary Refill: Capillary refill takes less than 2 seconds.      Coloration: Skin is jaundiced.      Findings: No erythema.   Neurological:      General: No focal deficit present.      Mental Status: She is alert and oriented to person, place, and time. Mental status is at baseline.      Motor: No weakness.   Psychiatric:         Mood and Affect: Mood normal.         Behavior: Behavior normal.         Thought Content: Thought content normal.         Judgment: Judgment normal.       Fluids    Intake/Output Summary (Last 24 hours) at 6/15/2021 1237  Last data filed at 6/15/2021 0600  Gross per 24 hour   Intake 200 ml   Output --   Net 200 ml       Laboratory  Recent Labs     06/13/21  0543 06/15/21  0520   WBC 16.2* 15.8*   RBC 3.03* 3.04*   HEMOGLOBIN 10.4* 10.3*   HEMATOCRIT 31.2* 31.3*   .0* 103.0*   MCH 34.3* 33.9*   MCHC 33.3* 32.9*   RDW 53.4* 52.9*   PLATELETCT 195 195   MPV 11.7 11.1     Recent Labs     06/13/21  0543 06/15/21  0520   SODIUM 136 136   POTASSIUM 4.3 3.9   CHLORIDE 112 111   CO2 16* 17*   GLUCOSE 98 109*   BUN 11 8   CREATININE 0.57 0.63   CALCIUM 8.1* 8.2*      Recent Labs     06/13/21  0543   INR 2.16*               Imaging  US-RUQ   Final Result         1.  Flow is not definitively demonstrated in the main portal vein, concerning for portal vein thrombosis, corresponding with reported history concerning for portal vein thrombosis.   2.  Hepatomegaly and echogenic liver, compatible with fatty change versus fibrosis.      US-RUQ   Final Result      1.  No acute sonographic abnormality. Prior cholecystectomy.   2.  Increased hepatic echogenicity, suggestive of steatosis possible underlying hepatocellular disease. No hepatic mass lesions detected.   3.  No color flow in the portal vein. This may be due to sluggish versus static flow or portal venous thrombosis.   4.  No ascites.      OUTSIDE IMAGES-CT HEAD   Final Result           Assessment/Plan  * ABBEY (acute kidney injury) (HCC)- (present on admission)  Assessment & Plan  prerenal vs. Hepatorenal syndrome.   Patient's Cr here 1.12, however her previous Cr last month was 0.2-0.3.  Patient noted to have Ibuprofen and Invokana as single agent on her EMR    D/C IVF, patient appears volume overloaded  Due to ibuprofen use, counseled patient and she acknowledged not to use NSAIDs given her liver condition and renal effects    Portal vein thrombosis- (present on admission)  Assessment & Plan  Patient likely has had possible chronic portal vein thrombosis but has not been actively treated  -Eliquis  -Confirmed Eliquis will be free for patient  --hematology to evaluate patient in the morning    Vitamin D deficiency- (present on admission)  Assessment & Plan  Vit D 25 level = 9  - continue ergo 50,000IU weekly dose    Class 3 severe obesity due to excess calories with serious comorbidity and body mass index (BMI) of 40.0 to 44.9 in adult (HCC)- (present on admission)  Assessment & Plan  Recommending patient to follow up with their PCP on weight management plan  Recommending polysomnography to PCP given elevated BMI  Counseled  patient on weight control, diet management, following up with PCP    Sepsis with acute renal failure without septic shock (HCC)- (present on admission)  Assessment & Plan  This is Sepsis Present on admission  SIRS criteria identified on my evaluation include: Tachycardia, with heart rate greater than 90 BPM and Leukocytosis, with WBC greater than 12,000  Source is unclear, patient was being treated with Bactrim for Staph infection as outpatient by her PCP at [facility]  Sepsis protocol initiated  Fluid resuscitation ordered per protocol  IV antibiotics as appropriate for source of sepsis - IV ceftriaxone and vancomycin  While organ dysfunction may be noted elsewhere in this problem list or in the chart, degree of organ dysfunction does not meet CMS criteria for severe sepsis    Patient was being treated for an unknown Staph infection by her PCP, was taking Bactrim, had taken Bactrim for 1 day prior to admission.  Patient is unable to provide further information as to the species of the staph infection.  Multiple attempts to call patient's General acute hospital PCP but no answer over the weekend.    Abnormal INR- (present on admission)  Assessment & Plan  INR 2.2 on admission  Monitor for bleeding  Vitamin K to be given  Continue lovenox, high risk for thrombosis    Hypothyroidism- (present on admission)  Assessment & Plan  Cont levothyroxine    Alcoholic cirrhosis of liver without ascites (HCC)- (present on admission)  Assessment & Plan  She has been sober for 4 months. Alcohol level neg  MELD Na score: 27, indicates 27-32% 90 days mortality  Ammonia 74, mild elevation  Continue monitoring of CMP for liver, renal function  Boost plus supplement for albumin of 2 nutritional support,   Leg elevation when in bed and ambulation 3 times daily  Continue thiamine and folic along with multivitamin.  Cont rifaximin and lactulose  Consulting [facility] for recommendations    Macrocytic anemia- (present on  admission)  Assessment & Plan  Likely sec to alcohol abuse  No sign of active bleeding  Cont folate, MV, vitB1    Type 2 diabetes mellitus with neurologic complication, without long-term current use of insulin (HCC)- (present on admission)  Assessment & Plan  A1c 9.5 4/2021  On ISS  Accucheck and hypoglycemic protocol  No home/chronic insulin use  Hold Invokana     VTE prophylaxis: Eliquis

## 2021-06-15 NOTE — DISCHARGE SUMMARY
Discharge Summary    CHIEF COMPLAINT ON ADMISSION  Chief Complaint   Patient presents with   • Abnormal Labs     Pt to [facility] by PCP for abnormal labs. Tx to [facility] for hepatorenal syndrome.        Reason for Admission  EMS     Admission Date  6/11/2021    CODE STATUS  Prior    HPI & HOSPITAL COURSE  This is a 41 y.o. female admitted on 6/11/2021 with complications related to history of alcoholic liver cirrhosis.  She was transferred here from outside hospital.  On presentation, she had multiple lab abnormalities including elevated creatinine, elevated liver profile, elevated bilirubin and elevated ammonia.  She was treated in the hospital for decompensated alcoholic liver cirrhosis and GI was consulted to help with management. Right upper quadrant ultrasound ordered by GI did reveal concerns for portal vein thrombosis. Repeat imaging had similar findings. Hematology was also consulted to help evaluate for treatment using anticoagulation.  After their evaluation, this was likely thought to be chronic for the vein thrombosis and ideally could be treated with Lovenox or warfarin. Due to her presenting symptoms of ABBEY and poor outpatient follow-up, she was not discharged home on any anticoagulation and told to follow-up with her PCP outpatient and gastroenterologist.  She would likely need repeat imaging in a few weeks to few months.    Therefore, she is discharged in fair and stable condition to home with close outpatient follow-up.    The patient met 2-midnight criteria for an inpatient stay at the time of discharge.    Discharge Date  6/15/21    FOLLOW UP ITEMS POST DISCHARGE  PCP    DISCHARGE DIAGNOSES  Principal Problem:    ABBEY (acute kidney injury) (HCC) POA: Yes  Active Problems:    Type 2 diabetes mellitus with neurologic complication, without long-term current use of insulin (HCC) POA: Yes    Macrocytic anemia POA: Yes    Alcoholic cirrhosis of liver without ascites (HCC) POA: Yes    Hypothyroidism POA: Yes     Abnormal INR POA: Yes    Sepsis with acute renal failure without septic shock (HCC) POA: Yes    Class 3 severe obesity due to excess calories with serious comorbidity and body mass index (BMI) of 40.0 to 44.9 in adult (HCC) (Chronic) POA: Yes    Vitamin D deficiency (Chronic) POA: Yes    Portal vein thrombosis POA: Yes  Resolved Problems:    * No resolved hospital problems. *      FOLLOW UP  No future appointments.  Dr. Sai Wang  [facility]    [address] [place] [address]  On 6/22/2021  Please go to your hospitall follow up appointment on 6/22 at 11am. Thank you.    Sai Wang M.D.  [address] [facility] [id number] [phone]            MEDICATIONS ON DISCHARGE     Medication List      START taking these medications      Instructions   furosemide 40 MG Tabs  Commonly known as: LASIX   Take 1 tablet by mouth 2 times a day for 30 days.  Dose: 40 mg     midodrine 5 MG Tabs  Commonly known as: PROAMATINE   Take 1 tablet by mouth 3 times a day with meals for 30 days.  Dose: 5 mg     omeprazole 20 MG delayed-release capsule  Commonly known as: PRILOSEC  Replaces: lansoprazole 30 MG Cpdr   Take 1 capsule by mouth every day for 30 days.  Dose: 20 mg     vitamin D (Ergocalciferol) 1.25 MG (63210 UT) Caps capsule  Start taking on: June 20, 2021  Commonly known as: DRISDOL   Take 1 capsule by mouth every 7 days for 7 doses.  Dose: 50,000 Units        CONTINUE taking these medications      Instructions   cyanocobalamin 2500 MCG Subl  Commonly known as: vitamin b12   Take 2,500 mcg by mouth every day. Under the tongue  Dose: 2,500 mcg     folic acid 1 MG Tabs  Commonly known as: FOLVITE   Take 1 tablet by mouth every day.  Dose: 1 mg     levothyroxine 50 MCG Tabs  Commonly known as: SYNTHROID   Take 50 mcg by mouth every morning on an empty stomach.  Dose: 50 mcg     multivitamin Tabs   Take 1 tablet by mouth every day.  Dose: 1 tablet     pregabalin 50 MG capsule  Commonly known as: LYRICA   Take 50  mg by mouth 2 times a day.  Dose: 50 mg     riFAXIMin 550 MG Tabs tablet  Commonly known as: XIFAXAN   Take 550 mg by mouth 2 times a day.  Dose: 550 mg     spironolactone 100 MG Tabs  Commonly known as: ALDACTONE   Take 1 tablet by mouth every day.  Dose: 100 mg     sulfamethoxazole-trimethoprim 800-160 MG tablet  Commonly known as: BACTRIM DS   Take 1 tablet by mouth one time.  Dose: 1 tablet        STOP taking these medications    ibuprofen 400 MG Tabs  Commonly known as: MOTRIN     Invokana 100 MG Tabs  Generic drug: Canagliflozin     lansoprazole 30 MG Cpdr  Commonly known as: PREVACID  Replaced by: omeprazole 20 MG delayed-release capsule            Allergies  No Known Allergies    DIET  No orders of the defined types were placed in this encounter.      ACTIVITY  As tolerated.  Weight bearing as tolerated    CONSULTATIONS  GI  Hematology    PROCEDURES  None    LABORATORY  Lab Results   Component Value Date    SODIUM 136 06/15/2021    POTASSIUM 3.9 06/15/2021    CHLORIDE 111 06/15/2021    CO2 17 (L) 06/15/2021    GLUCOSE 109 (H) 06/15/2021    BUN 8 06/15/2021    CREATININE 0.63 06/15/2021        Lab Results   Component Value Date    WBC 15.8 (H) 06/15/2021    HEMOGLOBIN 10.3 (L) 06/15/2021    HEMATOCRIT 31.3 (L) 06/15/2021    PLATELETCT 195 06/15/2021        Total time of the discharge process exceeds 15 minutes.

## 2021-06-16 LAB — PHYTONADIONE SERPL-MCNC: 2.36 NMOL/L (ref 0.22–4.88)

## 2021-06-17 LAB
BACTERIA BLD CULT: NORMAL
BACTERIA BLD CULT: NORMAL
SIGNIFICANT IND 70042: NORMAL
SIGNIFICANT IND 70042: NORMAL
SITE SITE: NORMAL
SITE SITE: NORMAL
SOURCE SOURCE: NORMAL
SOURCE SOURCE: NORMAL

## 2021-06-18 LAB — GLUCOSE BLD-MCNC: 105 MG/DL (ref 65–99)

## 2021-08-01 ENCOUNTER — HOSPITAL ENCOUNTER (INPATIENT)
Facility: MEDICAL CENTER | Age: 41
LOS: 14 days | DRG: 871 | End: 2021-08-15
Attending: EMERGENCY MEDICINE | Admitting: INTERNAL MEDICINE
Payer: COMMERCIAL

## 2021-08-01 ENCOUNTER — HOSPITAL ENCOUNTER (OUTPATIENT)
Dept: RADIOLOGY | Facility: MEDICAL CENTER | Age: 41
End: 2021-08-01
Payer: COMMERCIAL

## 2021-08-01 DIAGNOSIS — A41.9 SEPSIS, DUE TO UNSPECIFIED ORGANISM, UNSPECIFIED WHETHER ACUTE ORGAN DYSFUNCTION PRESENT (HCC): ICD-10-CM

## 2021-08-01 DIAGNOSIS — R60.1 ANASARCA: ICD-10-CM

## 2021-08-01 DIAGNOSIS — I81 PORTAL VEIN THROMBOSIS: ICD-10-CM

## 2021-08-01 DIAGNOSIS — E43 SEVERE PROTEIN-CALORIE MALNUTRITION (HCC): ICD-10-CM

## 2021-08-01 DIAGNOSIS — D68.9 COAGULOPATHY (HCC): ICD-10-CM

## 2021-08-01 DIAGNOSIS — K72.10 CHRONIC LIVER FAILURE WITHOUT HEPATIC COMA (HCC): ICD-10-CM

## 2021-08-01 DIAGNOSIS — M79.3 PANNICULITIS: ICD-10-CM

## 2021-08-01 DIAGNOSIS — L03.311 ABDOMINAL WALL CELLULITIS: ICD-10-CM

## 2021-08-01 DIAGNOSIS — F41.9 ANXIETY: ICD-10-CM

## 2021-08-01 DIAGNOSIS — E55.9 VITAMIN D DEFICIENCY: Chronic | ICD-10-CM

## 2021-08-01 DIAGNOSIS — R62.7 FAILURE TO THRIVE IN ADULT: ICD-10-CM

## 2021-08-01 DIAGNOSIS — K21.9 GASTROESOPHAGEAL REFLUX DISEASE WITHOUT ESOPHAGITIS: ICD-10-CM

## 2021-08-01 DIAGNOSIS — D53.9 MACROCYTIC ANEMIA: ICD-10-CM

## 2021-08-01 DIAGNOSIS — E80.6 HYPERBILIRUBINEMIA: ICD-10-CM

## 2021-08-01 DIAGNOSIS — K72.10 END STAGE LIVER DISEASE (HCC): ICD-10-CM

## 2021-08-01 DIAGNOSIS — E66.01 CLASS 3 SEVERE OBESITY DUE TO EXCESS CALORIES WITH SERIOUS COMORBIDITY AND BODY MASS INDEX (BMI) OF 40.0 TO 44.9 IN ADULT (HCC): Chronic | ICD-10-CM

## 2021-08-01 DIAGNOSIS — R65.10 SIRS (SYSTEMIC INFLAMMATORY RESPONSE SYNDROME) (HCC): ICD-10-CM

## 2021-08-01 PROBLEM — K52.9 COLITIS: Status: ACTIVE | Noted: 2021-08-01

## 2021-08-01 LAB
ALBUMIN SERPL BCP-MCNC: 1.8 G/DL (ref 3.2–4.9)
ALBUMIN/GLOB SERPL: 0.3 G/DL
ALP SERPL-CCNC: 171 U/L (ref 30–99)
ALT SERPL-CCNC: 20 U/L (ref 2–50)
ANION GAP SERPL CALC-SCNC: 9 MMOL/L (ref 7–16)
APTT PPP: 63 SEC (ref 24.7–36)
AST SERPL-CCNC: 66 U/L (ref 12–45)
BASOPHILS # BLD AUTO: 0.4 % (ref 0–1.8)
BASOPHILS # BLD: 0.05 K/UL (ref 0–0.12)
BILIRUB SERPL-MCNC: 4.1 MG/DL (ref 0.1–1.5)
BUN SERPL-MCNC: 8 MG/DL (ref 8–22)
CALCIUM SERPL-MCNC: 7.7 MG/DL (ref 8.5–10.5)
CHLORIDE SERPL-SCNC: 101 MMOL/L (ref 96–112)
CO2 SERPL-SCNC: 20 MMOL/L (ref 20–33)
CREAT SERPL-MCNC: 0.77 MG/DL (ref 0.5–1.4)
EOSINOPHIL # BLD AUTO: 0.07 K/UL (ref 0–0.51)
EOSINOPHIL NFR BLD: 0.5 % (ref 0–6.9)
ERYTHROCYTE [DISTWIDTH] IN BLOOD BY AUTOMATED COUNT: 64 FL (ref 35.9–50)
EST. AVERAGE GLUCOSE BLD GHB EST-MCNC: 68 MG/DL
GLOBULIN SER CALC-MCNC: 5.4 G/DL (ref 1.9–3.5)
GLUCOSE SERPL-MCNC: 69 MG/DL (ref 65–99)
HBA1C MFR BLD: 4 % (ref 4–5.6)
HCT VFR BLD AUTO: 25.8 % (ref 37–47)
HGB BLD-MCNC: 8.7 G/DL (ref 12–16)
IMM GRANULOCYTES # BLD AUTO: 0.18 K/UL (ref 0–0.11)
IMM GRANULOCYTES NFR BLD AUTO: 1.3 % (ref 0–0.9)
INR PPP: 2.82 (ref 0.87–1.13)
LACTATE BLD-SCNC: 1.8 MMOL/L (ref 0.5–2)
LACTATE BLD-SCNC: 2.4 MMOL/L (ref 0.5–2)
LACTATE BLD-SCNC: 3.1 MMOL/L (ref 0.5–2)
LYMPHOCYTES # BLD AUTO: 1.66 K/UL (ref 1–4.8)
LYMPHOCYTES NFR BLD: 12.4 % (ref 22–41)
MCH RBC QN AUTO: 33.6 PG (ref 27–33)
MCHC RBC AUTO-ENTMCNC: 33.7 G/DL (ref 33.6–35)
MCV RBC AUTO: 99.6 FL (ref 81.4–97.8)
MONOCYTES # BLD AUTO: 0.97 K/UL (ref 0–0.85)
MONOCYTES NFR BLD AUTO: 7.2 % (ref 0–13.4)
NEUTROPHILS # BLD AUTO: 10.5 K/UL (ref 2–7.15)
NEUTROPHILS NFR BLD: 78.2 % (ref 44–72)
NRBC # BLD AUTO: 0.02 K/UL
NRBC BLD-RTO: 0.1 /100 WBC
PLATELET # BLD AUTO: 188 K/UL (ref 164–446)
PMV BLD AUTO: 9.5 FL (ref 9–12.9)
POTASSIUM SERPL-SCNC: 3.9 MMOL/L (ref 3.6–5.5)
PROT SERPL-MCNC: 7.2 G/DL (ref 6–8.2)
PROTHROMBIN TIME: 28.8 SEC (ref 12–14.6)
RBC # BLD AUTO: 2.59 M/UL (ref 4.2–5.4)
SODIUM SERPL-SCNC: 130 MMOL/L (ref 135–145)
WBC # BLD AUTO: 13.4 K/UL (ref 4.8–10.8)

## 2021-08-01 PROCEDURE — 700101 HCHG RX REV CODE 250: Performed by: INTERNAL MEDICINE

## 2021-08-01 PROCEDURE — 36415 COLL VENOUS BLD VENIPUNCTURE: CPT

## 2021-08-01 PROCEDURE — 99223 1ST HOSP IP/OBS HIGH 75: CPT | Performed by: INTERNAL MEDICINE

## 2021-08-01 PROCEDURE — 99285 EMERGENCY DEPT VISIT HI MDM: CPT

## 2021-08-01 PROCEDURE — 85610 PROTHROMBIN TIME: CPT

## 2021-08-01 PROCEDURE — 83605 ASSAY OF LACTIC ACID: CPT | Mod: 91

## 2021-08-01 PROCEDURE — 87040 BLOOD CULTURE FOR BACTERIA: CPT | Mod: 91

## 2021-08-01 PROCEDURE — 85025 COMPLETE CBC W/AUTO DIFF WBC: CPT

## 2021-08-01 PROCEDURE — 700105 HCHG RX REV CODE 258: Performed by: INTERNAL MEDICINE

## 2021-08-01 PROCEDURE — 83036 HEMOGLOBIN GLYCOSYLATED A1C: CPT

## 2021-08-01 PROCEDURE — 85730 THROMBOPLASTIN TIME PARTIAL: CPT

## 2021-08-01 PROCEDURE — A9270 NON-COVERED ITEM OR SERVICE: HCPCS | Performed by: INTERNAL MEDICINE

## 2021-08-01 PROCEDURE — 700111 HCHG RX REV CODE 636 W/ 250 OVERRIDE (IP): Performed by: INTERNAL MEDICINE

## 2021-08-01 PROCEDURE — 770006 HCHG ROOM/CARE - MED/SURG/GYN SEMI*

## 2021-08-01 PROCEDURE — 96367 TX/PROPH/DG ADDL SEQ IV INF: CPT

## 2021-08-01 PROCEDURE — 700102 HCHG RX REV CODE 250 W/ 637 OVERRIDE(OP): Performed by: INTERNAL MEDICINE

## 2021-08-01 PROCEDURE — 80053 COMPREHEN METABOLIC PANEL: CPT

## 2021-08-01 PROCEDURE — 96365 THER/PROPH/DIAG IV INF INIT: CPT

## 2021-08-01 RX ORDER — BISACODYL 10 MG
10 SUPPOSITORY, RECTAL RECTAL
Status: DISCONTINUED | OUTPATIENT
Start: 2021-08-01 | End: 2021-08-01

## 2021-08-01 RX ORDER — LACTOBACILLUS RHAMNOSUS GG 10B CELL
1 CAPSULE ORAL
Status: DISCONTINUED | OUTPATIENT
Start: 2021-08-02 | End: 2021-08-05

## 2021-08-01 RX ORDER — CHOLECALCIFEROL (VITAMIN D3) 125 MCG
2500 CAPSULE ORAL DAILY
Status: DISCONTINUED | OUTPATIENT
Start: 2021-08-01 | End: 2021-08-05

## 2021-08-01 RX ORDER — LEVOTHYROXINE SODIUM 0.05 MG/1
50 TABLET ORAL
Status: DISCONTINUED | OUTPATIENT
Start: 2021-08-01 | End: 2021-08-05

## 2021-08-01 RX ORDER — SODIUM CHLORIDE, SODIUM LACTATE, POTASSIUM CHLORIDE, CALCIUM CHLORIDE 600; 310; 30; 20 MG/100ML; MG/100ML; MG/100ML; MG/100ML
INJECTION, SOLUTION INTRAVENOUS CONTINUOUS
Status: DISCONTINUED | OUTPATIENT
Start: 2021-08-01 | End: 2021-08-02

## 2021-08-01 RX ORDER — OXYCODONE HYDROCHLORIDE 5 MG/1
2.5 TABLET ORAL
Status: DISCONTINUED | OUTPATIENT
Start: 2021-08-01 | End: 2021-08-04

## 2021-08-01 RX ORDER — ACETAMINOPHEN 500 MG
1000 TABLET ORAL EVERY 6 HOURS PRN
COMMUNITY

## 2021-08-01 RX ORDER — SODIUM CHLORIDE, SODIUM LACTATE, POTASSIUM CHLORIDE, AND CALCIUM CHLORIDE .6; .31; .03; .02 G/100ML; G/100ML; G/100ML; G/100ML
500 INJECTION, SOLUTION INTRAVENOUS
Status: COMPLETED | OUTPATIENT
Start: 2021-08-01 | End: 2021-08-07

## 2021-08-01 RX ORDER — PREGABALIN 25 MG/1
50 CAPSULE ORAL 2 TIMES DAILY
Status: DISCONTINUED | OUTPATIENT
Start: 2021-08-01 | End: 2021-08-01

## 2021-08-01 RX ORDER — SODIUM CHLORIDE, SODIUM LACTATE, POTASSIUM CHLORIDE, AND CALCIUM CHLORIDE .6; .31; .03; .02 G/100ML; G/100ML; G/100ML; G/100ML
30 INJECTION, SOLUTION INTRAVENOUS
Status: DISCONTINUED | OUTPATIENT
Start: 2021-08-01 | End: 2021-08-12

## 2021-08-01 RX ORDER — HYDROMORPHONE HYDROCHLORIDE 1 MG/ML
0.25 INJECTION, SOLUTION INTRAMUSCULAR; INTRAVENOUS; SUBCUTANEOUS
Status: DISCONTINUED | OUTPATIENT
Start: 2021-08-01 | End: 2021-08-07

## 2021-08-01 RX ORDER — VITAMIN B COMPLEX
1000 TABLET ORAL DAILY
Status: DISCONTINUED | OUTPATIENT
Start: 2021-08-01 | End: 2021-08-05

## 2021-08-01 RX ORDER — VITAMIN B COMPLEX
2500 TABLET ORAL DAILY
Status: DISCONTINUED | OUTPATIENT
Start: 2021-08-01 | End: 2021-08-01

## 2021-08-01 RX ORDER — POLYETHYLENE GLYCOL 3350 17 G/17G
1 POWDER, FOR SOLUTION ORAL
Status: DISCONTINUED | OUTPATIENT
Start: 2021-08-01 | End: 2021-08-01

## 2021-08-01 RX ORDER — OXYCODONE HYDROCHLORIDE 5 MG/1
5 TABLET ORAL
Status: DISCONTINUED | OUTPATIENT
Start: 2021-08-01 | End: 2021-08-04

## 2021-08-01 RX ORDER — LANSOPRAZOLE 30 MG/1
30 CAPSULE, DELAYED RELEASE ORAL DAILY
COMMUNITY

## 2021-08-01 RX ORDER — TRAMADOL HYDROCHLORIDE 50 MG/1
100 TABLET ORAL EVERY 4 HOURS PRN
COMMUNITY

## 2021-08-01 RX ORDER — AMOXICILLIN 250 MG
2 CAPSULE ORAL 2 TIMES DAILY
Status: DISCONTINUED | OUTPATIENT
Start: 2021-08-01 | End: 2021-08-01

## 2021-08-01 RX ORDER — CLINDAMYCIN PHOSPHATE 900 MG/50ML
900 INJECTION, SOLUTION INTRAVENOUS EVERY 8 HOURS
Status: DISCONTINUED | OUTPATIENT
Start: 2021-08-01 | End: 2021-08-02

## 2021-08-01 RX ORDER — SPIRONOLACTONE 25 MG/1
100 TABLET ORAL DAILY
Status: DISCONTINUED | OUTPATIENT
Start: 2021-08-01 | End: 2021-08-07

## 2021-08-01 RX ORDER — HEPARIN SODIUM 5000 [USP'U]/ML
5000 INJECTION, SOLUTION INTRAVENOUS; SUBCUTANEOUS EVERY 8 HOURS
Status: DISCONTINUED | OUTPATIENT
Start: 2021-08-02 | End: 2021-08-01

## 2021-08-01 RX ORDER — FOLIC ACID 1 MG/1
1 TABLET ORAL DAILY
Status: DISCONTINUED | OUTPATIENT
Start: 2021-08-01 | End: 2021-08-05

## 2021-08-01 RX ORDER — OMEPRAZOLE 40 MG/1
40 CAPSULE, DELAYED RELEASE ORAL DAILY
COMMUNITY

## 2021-08-01 RX ADMIN — LEVOTHYROXINE SODIUM 50 MCG: 0.05 TABLET ORAL at 15:24

## 2021-08-01 RX ADMIN — THERA TABS 1 TABLET: TAB at 15:25

## 2021-08-01 RX ADMIN — RIFAXIMIN 550 MG: 550 TABLET ORAL at 18:11

## 2021-08-01 RX ADMIN — OXYCODONE 5 MG: 5 TABLET ORAL at 23:18

## 2021-08-01 RX ADMIN — VANCOMYCIN HYDROCHLORIDE 125 MG: KIT ORAL at 21:59

## 2021-08-01 RX ADMIN — CYANOCOBALAMIN TAB 500 MCG 2500 MCG: 500 TAB at 16:32

## 2021-08-01 RX ADMIN — Medication 1000 UNITS: at 15:25

## 2021-08-01 RX ADMIN — PIPERACILLIN AND TAZOBACTAM 4.5 G: 4; .5 INJECTION, POWDER, LYOPHILIZED, FOR SOLUTION INTRAVENOUS; PARENTERAL at 12:25

## 2021-08-01 RX ADMIN — FOLIC ACID 1 MG: 1 TABLET ORAL at 15:30

## 2021-08-01 RX ADMIN — CLINDAMYCIN IN 5 PERCENT DEXTROSE 900 MG: 18 INJECTION, SOLUTION INTRAVENOUS at 16:28

## 2021-08-01 RX ADMIN — SPIRONOLACTONE 100 MG: 25 TABLET ORAL at 15:24

## 2021-08-01 RX ADMIN — SODIUM CHLORIDE, POTASSIUM CHLORIDE, SODIUM LACTATE AND CALCIUM CHLORIDE: 600; 310; 30; 20 INJECTION, SOLUTION INTRAVENOUS at 15:26

## 2021-08-01 RX ADMIN — PIPERACILLIN AND TAZOBACTAM 4.5 G: 4; .5 INJECTION, POWDER, LYOPHILIZED, FOR SOLUTION INTRAVENOUS; PARENTERAL at 23:13

## 2021-08-01 RX ADMIN — PIPERACILLIN AND TAZOBACTAM 4.5 G: 4; .5 INJECTION, POWDER, LYOPHILIZED, FOR SOLUTION INTRAVENOUS; PARENTERAL at 15:26

## 2021-08-01 RX ADMIN — VANCOMYCIN HYDROCHLORIDE 2500 MG: 500 INJECTION, POWDER, LYOPHILIZED, FOR SOLUTION INTRAVENOUS at 12:49

## 2021-08-01 RX ADMIN — VANCOMYCIN HYDROCHLORIDE 125 MG: 500 INJECTION, POWDER, LYOPHILIZED, FOR SOLUTION INTRAVENOUS at 18:11

## 2021-08-01 RX ADMIN — CLINDAMYCIN IN 5 PERCENT DEXTROSE 900 MG: 18 INJECTION, SOLUTION INTRAVENOUS at 21:20

## 2021-08-01 ASSESSMENT — LIFESTYLE VARIABLES
TOTAL SCORE: 0
HAVE PEOPLE ANNOYED YOU BY CRITICIZING YOUR DRINKING: NO
EVER FELT BAD OR GUILTY ABOUT YOUR DRINKING: NO
ON A TYPICAL DAY WHEN YOU DRINK ALCOHOL HOW MANY DRINKS DO YOU HAVE: 0
TOTAL SCORE: 0
HAVE YOU EVER FELT YOU SHOULD CUT DOWN ON YOUR DRINKING: NO
ALCOHOL_USE: NO
CONSUMPTION TOTAL: NEGATIVE
HOW MANY TIMES IN THE PAST YEAR HAVE YOU HAD 5 OR MORE DRINKS IN A DAY: 0
EVER HAD A DRINK FIRST THING IN THE MORNING TO STEADY YOUR NERVES TO GET RID OF A HANGOVER: NO
TOTAL SCORE: 0
AVERAGE NUMBER OF DAYS PER WEEK YOU HAVE A DRINK CONTAINING ALCOHOL: 0
DOES PATIENT WANT TO STOP DRINKING: NO

## 2021-08-01 ASSESSMENT — ENCOUNTER SYMPTOMS
DIARRHEA: 0
HEADACHES: 0
WEAKNESS: 0
ABDOMINAL PAIN: 0
VOMITING: 0
COUGH: 0
SEIZURES: 0
FALLS: 0
TREMORS: 0
NERVOUS/ANXIOUS: 0
EYE REDNESS: 0
LOSS OF CONSCIOUSNESS: 0
WHEEZING: 0
FEVER: 0
NAUSEA: 0
EYE PAIN: 0
MYALGIAS: 1
SHORTNESS OF BREATH: 0
PALPITATIONS: 0
INSOMNIA: 0
DIZZINESS: 0
CHILLS: 1
HEMOPTYSIS: 0
BLOOD IN STOOL: 0
CONSTIPATION: 0
FOCAL WEAKNESS: 0

## 2021-08-01 ASSESSMENT — COGNITIVE AND FUNCTIONAL STATUS - GENERAL
WALKING IN HOSPITAL ROOM: A LITTLE
HELP NEEDED FOR BATHING: A LOT
CLIMB 3 TO 5 STEPS WITH RAILING: A LOT
SUGGESTED CMS G CODE MODIFIER DAILY ACTIVITY: CK
DAILY ACTIVITIY SCORE: 16
STANDING UP FROM CHAIR USING ARMS: A LITTLE
TURNING FROM BACK TO SIDE WHILE IN FLAT BAD: A LITTLE
MOBILITY SCORE: 17
MOVING TO AND FROM BED TO CHAIR: A LITTLE
MOVING FROM LYING ON BACK TO SITTING ON SIDE OF FLAT BED: A LITTLE
PERSONAL GROOMING: A LITTLE
DRESSING REGULAR LOWER BODY CLOTHING: A LOT
DRESSING REGULAR UPPER BODY CLOTHING: A LITTLE
SUGGESTED CMS G CODE MODIFIER MOBILITY: CK
TOILETING: A LOT

## 2021-08-01 ASSESSMENT — PATIENT HEALTH QUESTIONNAIRE - PHQ9
9. THOUGHTS THAT YOU WOULD BE BETTER OFF DEAD, OR OF HURTING YOURSELF: NOT AT ALL
6. FEELING BAD ABOUT YOURSELF - OR THAT YOU ARE A FAILURE OR HAVE LET YOURSELF OR YOUR FAMILY DOWN: NOT AL ALL
SUM OF ALL RESPONSES TO PHQ9 QUESTIONS 1 AND 2: 0
4. FEELING TIRED OR HAVING LITTLE ENERGY: NOT AT ALL
8. MOVING OR SPEAKING SO SLOWLY THAT OTHER PEOPLE COULD HAVE NOTICED. OR THE OPPOSITE, BEING SO FIGETY OR RESTLESS THAT YOU HAVE BEEN MOVING AROUND A LOT MORE THAN USUAL: NOT AT ALL
7. TROUBLE CONCENTRATING ON THINGS, SUCH AS READING THE NEWSPAPER OR WATCHING TELEVISION: NOT AT ALL
5. POOR APPETITE OR OVEREATING: NOT AT ALL
SUM OF ALL RESPONSES TO PHQ QUESTIONS 1-9: 0
1. LITTLE INTEREST OR PLEASURE IN DOING THINGS: NOT AT ALL
2. FEELING DOWN, DEPRESSED, IRRITABLE, OR HOPELESS: NOT AT ALL
3. TROUBLE FALLING OR STAYING ASLEEP OR SLEEPING TOO MUCH: NOT AT ALL

## 2021-08-01 ASSESSMENT — PAIN DESCRIPTION - PAIN TYPE: TYPE: ACUTE PAIN

## 2021-08-01 ASSESSMENT — FIBROSIS 4 INDEX: FIB4 SCORE: 5.29

## 2021-08-01 NOTE — CONSULTS
DATE OF CONSULTATION:  8/1/2021     REFERRING PHYSICIAN:   KATE Christiansen M.D.     CONSULTING PHYSICIAN:  Aime Shin M.D.     REASON FOR CONSULTATION:  Possible necrotizing fasciitis.   I have been asked by Dr. Christiansen to see the patient in surgical consultation for evaluation of possible necrotizing fasciitis.    HISTORY OF PRESENT ILLNESS: The patient is a 41 year-old  woman who presents to the Emergency Department with a 2-week history of infection involving her lower abdominal wall.  She been started previously on oral antibiotics for this and several days ago she noticed some areas turning black and some weeping.  This involves her lower abdominal wall up into both flanks as well as into the upper medial thighs and both legs.  She said no fevers or chills.    Was transferred to this facility from the [facility].  CT there showed flatus as well as a panniculitis.    Patient with complex medical history including alcoholic cirrhosis with portal vein thrombosis.  He was admitted with hepatic decompensation in June of this year    PAST MEDICAL HISTORY:  has a past medical history of DM (diabetes mellitus) (HCC).    PAST SURGICAL HISTORY: patient denies any surgical history     ALLERGIES: No Known Allergies     CURRENT MEDICATIONS:   Home Medications     Reviewed by Kristal Roberts (Pharmacy Tech) on 08/01/21 at 1154  Med List Status: Complete   Medication Last Dose Status   acetaminophen (TYLENOL) 500 MG Tab 7/31/2021 Active   cyanocobalamin (VITAMIN B12) 2500 MCG SL Tab 7/31/2021 Active   folic acid (FOLVITE) 1 MG Tab 7/31/2021 Active   lansoprazole (PREVACID) 30 MG CAPSULE DELAYED RELEASE 7/31/2021 Active   levothyroxine (SYNTHROID) 50 MCG Tab 7/31/2021 Active   multivitamin (THERAGRAN) Tab 7/31/2021 Active   omeprazole (PRILOSEC) 40 MG delayed-release capsule 7/31/2021 Active   riFAXIMin (XIFAXAN) 550 MG Tab tablet 7/31/2021 Active   spironolactone (ALDACTONE) 100 MG Tab  "7/31/2021 Active   sulfamethoxazole-trimethoprim (BACTRIM DS) 800-160 MG tablet > 1 week Active   traMADol (ULTRAM) 50 MG Tab 7/31/2021 Active                FAMILY HISTORY: No family history on file.    SOCIAL HISTORY:   Social History     Tobacco Use   • Smoking status: Former Smoker   • Smokeless tobacco: Former User   Substance and Sexual Activity   • Alcohol use: Not Currently     Alcohol/week: 8.4 oz     Types: 14 Cans of beer per week     Comment: last drink on 5/15   • Drug use: Never   • Sexual activity: Not on file       REVIEW OF SYSTEMS: Comprehensive review of systems is negative with the exception of the aforementioned HPI, PMH, and PSH bullets in accordance with CMS guidelines.     PHYSICAL EXAMINATION:   General Appearance: The patient is a pleasant , cooperative and calm appearing woman in no critical distress.  VITAL SIGNS: /59   Pulse (!) 110   Temp 36.6 °C (97.9 °F) (Temporal)   Resp 18   Ht 1.6 m (5' 3\")   Wt 104 kg (230 lb)   SpO2 92%   HEAD AND NECK: Demonstrates symmetric, reactive pupils. Extraocular muscles are intact. Nares and oropharynx are clear.  Sclera is icteric.  NECK: Supple. No adenopathy.  CHEST:    Inspection: Unlabored respirations, no intercostal retractions, paradoxical motion, or accessory muscle use.   Palpation:  The chest is nontender.    Auscultation: normal.  CARDIOVASCULAR:   Inspection: The skin is warm and dry.  Auscultation: Sinus tachycardia.   Peripheral Pulses: Normal.    ABDOMEN:   Inspection: Abdominal inspection reveals there is thickening of her lower abdomen and pannus extending across to both sides extending up into both flanks there are areas on both the left and right with black eschar present with some serous drainage.  There is no odor present.  There is minimal tenderness present.    Palpation: Palpation is remarkable for no significant tenderness, guarding, or peritoneal findings. No abdominal wall hernias.  EXTREMITIES:   Examination of " the upper and lower extremities demonstrates No cyanosis, edema, or clubbing of the nails.  There is thickening in the proximal medial thighs with small area of black eschar present.  These are areas are minimally tender    LABORATORY VALUES:   Recent Labs     08/01/21  1038   WBC 13.4*   RBC 2.59*   HEMOGLOBIN 8.7*   HEMATOCRIT 25.8*   MCV 99.6*   MCH 33.6*   MCHC 33.7   RDW 64.0*   PLATELETCT 188   MPV 9.5                    IMAGING:   OUTSIDE IMAGES-CT ABDOMEN /PELVIS   Final Result      OUTSIDE IMAGES-DX CHEST   Final Result      OUTSIDE IMAGES-CT ABDOMEN /PELVIS   Final Result          IMPRESSION AND PLAN:    Abdominal panniculitis/cellulitis w/ concern for necrosis  Assessment & Plan      Cellulitis present for 2 weeks -treated with oral antibiotics       CT shows panniculitis extension both left and right flanks  On IV Zosyn, vancomycin and clindamycin.  No acute indication for surgery  Wound care consult recommended    Assessment/Plan  Alcoholic cirrhosis of liver without ascites (HCC)- (present on admission)  Assessment & Plan  8/1 MELD 27     Class 3 severe obesity due to excess calories with serious comorbidity and body mass index (BMI) of 40.0 to 44.9 in adult (HCC)- (present on admission)  Assessment & Plan  8/1 BMI 40.74       ____________________________________     Aime Shin M.D.    DD: 8/1/2021  12:05 PM

## 2021-08-01 NOTE — ASSESSMENT & PLAN NOTE
This is Sepsis Present on admission  SIRS criteria identified on my evaluation include: Leukocytosis, with WBC greater than 12,000  Source is pannus  Sepsis protocol initiated  Fluid resuscitation ordered per protocol  IV antibiotics as appropriate for source of sepsis  While organ dysfunction may be noted elsewhere in this problem list or in the chart, degree of organ dysfunction does not meet CMS criteria for severe sepsis    On IVF  Abx  Follow up lactic acid and blood culture    RESOLVED

## 2021-08-01 NOTE — ASSESSMENT & PLAN NOTE
"Uncontrolled  Start on insulin sliding scale with serial Accu-Checks, nutritional and long acting insulin  Hypoglycemic protocol in place\"    Recent Labs     08/12/21  1710 08/12/21  2025 08/13/21  0906 08/13/21  1248 08/13/21  1701 08/13/21  2031 08/14/21  0736 08/14/21  1204   POCGLUCOSE 175* 137* 126* 131* 132* 118* 121* 132*     Stable      "

## 2021-08-01 NOTE — ASSESSMENT & PLAN NOTE
"CT AP at another facility colitis, panniculitis with no gas/abscess  Surgery f/u appreciated -- no indication for surgery at this time  ID is consulted. Pt is on zosyn and zyvox, Stop date 8/11/2021  Low threshold for CT imaging if septic shock/deteriorates  Follow blood culture  Wound care  Advised on sterile technique for insulin administration     RESOLVED\"    SNF planned.  On 8/15 WBC has been slowly creeping up since discontinuation of antibiotics. Will ask ID meanwhile repeat sepsis work-up. Nursing reported diarrhea yestarday and now on bowel mangement, will get C. Diff.  "

## 2021-08-01 NOTE — ASSESSMENT & PLAN NOTE
Suspect chronic PV throbosis -- in which case there is no clear benefit for anticoagulation -- case was previously noted by hematology (Dr. Montenegro)    Will avoid chronic anticoagulation due to ongoing bleeding

## 2021-08-01 NOTE — ED PROVIDER NOTES
"ED Provider Note    Scribed for Alda Christiansen M.D. by Quyen Woodward. 8/1/2021  10:37 AM    Primary care provider: Sai Wang M.D.  Means of arrival: EMS  History obtained from: patient   History limited by: none    CHIEF COMPLAINT  Chief Complaint   Patient presents with    Wound Check     Pt reports redness in lower abd started ~2 weeks ago, now presenting with blackened open wounds across lower abd to b/l groin.        HPI  Hien Alfaro is a 41 y.o. female who presents to the Emergency Department as a transfer from [facility] for evaluation of abdominal pain onset 2 weeks ago. She describes that her pain was \"internal then became external.\" Patient has associated sores to her lower abdomen with drainage. Patient had a CT at the outside facility and was transferred here for concerns of necrotizing fasciitis. Denies fevers, chills, or cough. She has a past medical history of liver cirrhosis and hep C. Patient denies no drinking or smoking. She was treated with antibiotics at the outside facility and ws also found to be hypoglycemic and treated with Dextrose.    REVIEW OF SYSTEMS  HEENT:  No ear pain, congestion, or sore throat   EYES: no discharge, redness, or vision changes  CARDIAC: no chest pain, no palpitations    PULMONARY: no dyspnea, cough, or congestion   GI: + abdominal pain with open sores and discharge from sores. no vomiting, diarrhea  : no dysuria, back pain, or hematuria   Neuro: no weakness, numbness, aphasia, or headache  Musculoskeletal: no swelling, deformity, pain, or joint swelling  Endocrine: no fevers, sweating, or weight loss   SKIN: no rash, erythema, or contusions     See history of present illness. All other systems are negative. C.    PAST MEDICAL HISTORY   has a past medical history of DM (diabetes mellitus) (HCC).    SURGICAL HISTORY  patient denies any surgical history    SOCIAL HISTORY  Social History     Tobacco Use    Smoking status: Former Smoker    Smokeless tobacco: " "Former User   Substance Use Topics    Alcohol use: Not Currently     Alcohol/week: 8.4 oz     Types: 14 Cans of beer per week     Comment: last drink on 5/15    Drug use: Never      Social History     Substance and Sexual Activity   Drug Use Never       FAMILY HISTORY  No family history on file.    CURRENT MEDICATIONS  Home Medications       Reviewed by Kristal Roberts (Pharmacy Tech) on 08/01/21 at 1154  Med List Status: Complete     Medication Last Dose Status   acetaminophen (TYLENOL) 500 MG Tab 7/31/2021 Active   cyanocobalamin (VITAMIN B12) 2500 MCG SL Tab 7/31/2021 Active   folic acid (FOLVITE) 1 MG Tab 7/31/2021 Active   lansoprazole (PREVACID) 30 MG CAPSULE DELAYED RELEASE 7/31/2021 Active   levothyroxine (SYNTHROID) 50 MCG Tab 7/31/2021 Active   multivitamin (THERAGRAN) Tab 7/31/2021 Active   omeprazole (PRILOSEC) 40 MG delayed-release capsule 7/31/2021 Active   riFAXIMin (XIFAXAN) 550 MG Tab tablet 7/31/2021 Active   spironolactone (ALDACTONE) 100 MG Tab 7/31/2021 Active   sulfamethoxazole-trimethoprim (BACTRIM DS) 800-160 MG tablet > 1 week Active   traMADol (ULTRAM) 50 MG Tab 7/31/2021 Active                    ALLERGIES  No Known Allergies    PHYSICAL EXAM  VITAL SIGNS: BP (!) 96/55   Pulse (!) 109   Temp 36.6 °C (97.9 °F) (Temporal)   Resp 20   Ht 1.6 m (5' 3\")   Wt 104 kg (230 lb)   SpO2 95%   BMI 40.74 kg/m²     Constitutional: Well developed, Well nourished, No acute distress, Non-toxic appearance.   HEENT: Normocephalic, Atraumatic,  external ears normal, pharynx pink,  Mucous  Membranes moist, No rhinorrhea or mucosal edema  Eyes: PERRL, EOMI, Conjunctiva normal, No discharge.   Neck: Normal range of motion, No tenderness, Supple, No stridor.   Lymphatic: No lymphadenopathy    Cardiovascular: Regular Rate and Rhythm, No murmurs,  rubs, or gallops.   Thorax & Lungs: Lungs clear to auscultation bilaterally, No respiratory distress, No wheezes, rhales or rhonchi, No chest wall " tenderness.   Abdomen: Bowel sounds normal, Soft, Skin thickening, distention, and tenderness bilateral lower abdominal quadrants of her pannus.  No purulent drainage or foul odor.  No crepitance.  She does have some ulcerations of the first layer of skin off of the center of these lesions as well as pressure ulcerations in between her thighs bilaterally  Skin: Warm, Dry, No erythema, No rash,   Back:  No CVA tenderness,  No spinal tenderness, bony crepitance, step offs, or instability.   Neurologic: Alert & oriented clear speech no focal deficits  Extremities: Equal, intact distal pulses, No cyanosis, clubbing or edema,  No tenderness.   Musculoskeletal: Good range of motion in all major joints. No tenderness to palpation or major deformities noted.     DIAGNOSTIC STUDIES / PROCEDURES    LABS  Results for orders placed or performed during the hospital encounter of 08/01/21   LACTIC ACID   Result Value Ref Range    Lactic Acid 2.4 (H) 0.5 - 2.0 mmol/L   LACTIC ACID   Result Value Ref Range    Lactic Acid 3.1 (H) 0.5 - 2.0 mmol/L   CBC WITH DIFFERENTIAL   Result Value Ref Range    WBC 13.4 (H) 4.8 - 10.8 K/uL    RBC 2.59 (L) 4.20 - 5.40 M/uL    Hemoglobin 8.7 (L) 12.0 - 16.0 g/dL    Hematocrit 25.8 (L) 37.0 - 47.0 %    MCV 99.6 (H) 81.4 - 97.8 fL    MCH 33.6 (H) 27.0 - 33.0 pg    MCHC 33.7 33.6 - 35.0 g/dL    RDW 64.0 (H) 35.9 - 50.0 fL    Platelet Count 188 164 - 446 K/uL    MPV 9.5 9.0 - 12.9 fL    Neutrophils-Polys 78.20 (H) 44.00 - 72.00 %    Lymphocytes 12.40 (L) 22.00 - 41.00 %    Monocytes 7.20 0.00 - 13.40 %    Eosinophils 0.50 0.00 - 6.90 %    Basophils 0.40 0.00 - 1.80 %    Immature Granulocytes 1.30 (H) 0.00 - 0.90 %    Nucleated RBC 0.10 /100 WBC    Neutrophils (Absolute) 10.50 (H) 2.00 - 7.15 K/uL    Lymphs (Absolute) 1.66 1.00 - 4.80 K/uL    Monos (Absolute) 0.97 (H) 0.00 - 0.85 K/uL    Eos (Absolute) 0.07 0.00 - 0.51 K/uL    Baso (Absolute) 0.05 0.00 - 0.12 K/uL    Immature Granulocytes (abs) 0.18 (H)  0.00 - 0.11 K/uL    NRBC (Absolute) 0.02 K/uL   COMP METABOLIC PANEL   Result Value Ref Range    Sodium 130 (L) 135 - 145 mmol/L    Potassium 3.9 3.6 - 5.5 mmol/L    Chloride 101 96 - 112 mmol/L    Co2 20 20 - 33 mmol/L    Anion Gap 9.0 7.0 - 16.0    Glucose 69 65 - 99 mg/dL    Bun 8 8 - 22 mg/dL    Creatinine 0.77 0.50 - 1.40 mg/dL    Calcium 7.7 (L) 8.5 - 10.5 mg/dL    AST(SGOT) 66 (H) 12 - 45 U/L    ALT(SGPT) 20 2 - 50 U/L    Alkaline Phosphatase 171 (H) 30 - 99 U/L    Total Bilirubin 4.1 (H) 0.1 - 1.5 mg/dL    Albumin 1.8 (L) 3.2 - 4.9 g/dL    Total Protein 7.2 6.0 - 8.2 g/dL    Globulin 5.4 (H) 1.9 - 3.5 g/dL    A-G Ratio 0.3 g/dL   Prothrombin Time   Result Value Ref Range    PT 28.8 (H) 12.0 - 14.6 sec    INR 2.82 (H) 0.87 - 1.13   APTT   Result Value Ref Range    APTT 63.0 (H) 24.7 - 36.0 sec   ESTIMATED GFR   Result Value Ref Range    GFR If African American >60 >60 mL/min/1.73 m 2    GFR If Non African American >60 >60 mL/min/1.73 m 2     All labs reviewed by me.    COURSE & MEDICAL DECISION MAKING  Nursing notes, VS, PMSFHx reviewed in chart.      Reviewed patient's transferring facility records which showed the patient had a CT on July 8th and was admitted. CT on 7/8 which showed colitis in the transverse colon. She was started on doxycycline, rifampin, bactrim during this visit after being diagnosed with abdominal cellulis. Patient presented to [facility] again today and was treated with Rocephin, clindamycin, and meropenum. Review of workup form today shows an EKG sinus tach, CT abdomen showed worsening colitits in the cecum. Labs showed lactate 2.7, WBC 13.8, hemoglobin 9.4, sodium 132, platetlets 181, phosphorus 221, bilirubin 4.5, troponin normal, , Lipase 17, and COVID negative.    10:37 AM Patient seen and examined at bedside. I will review the patients transfer paperwork. We will obtain labs. Paged surgery and hospitalist to discuss the patients case. Patient is hypotensive and  tachycardic therefore we will initiate sepsis protocol. Intravenous fluids administered for sepsis. Ordered lactic acid, CBC w/ diff, CMP, UA, urine culture, and blood cultures to evaluate her symptoms. The differential diagnoses include but are not limited to: cellulits vs necrotizing fasciitis but less likely.    11:31 AM - I discussed the patient's case and the above findings with Dr. Abraham (Hospitalist) who agreed to evaluate patient for hospitalization. Patients care will be transferred at this time.  I spoke with Dr. Shin who will consult on the patient's case though with her CT finding and physical exam she does not appear to have necrotizing fasciitis.  He states he will see her in consult on the case.     HYDRATION: Based on the patient's presentation of Sepsis the patient was given IV fluids. IV Hydration was used because oral hydration was not adequate alone. Upon recheck following hydration, the patient was improved.      Critical Care  Due to the real possibility of a deterioration of this patient's condition required the highest level of my preparedness for sudden emergent intervention. I provided critical care services which included medication orders, frequent reevaluations of the patient's condition and response to treatment, ordering and reviewing test results and discussing the case with various consultants. The critical care time associated with the care of the patient was 40 minutes. Review chart for interventions. This time is exclusive of any other billable procedures.    DISPOSITION:  Patient will be hospitalized by Dr. Abraham in guarded condition.     FINAL IMPRESSION  1. Abdominal wall cellulitis    2. SIRS (systemic inflammatory response syndrome) ([facility])          Quyen BUTLER (Scrgabrielle), am scribing for, and in the presence of, Alda Christiansen M.D..    Electronically signed by: Quyen Mariee), 8/1/2021    Alda BUTLER M.D. personally performed the services described in this  documentation, as scribed by Quyen Woodward in my presence, and it is both accurate and complete. C    The note accurately reflects work and decisions made by me.  Alda Christiansen M.D.  8/1/2021  3:04 PM

## 2021-08-01 NOTE — H&P
Hospital Medicine History & Physical Note    Date of Service  8/1/2021    Primary Care Physician  Sai Wang M.D.    Consultants  general surgery    Specialist Names: Dr. Shin    Code Status  Full Code    Chief Complaint  Chief Complaint   Patient presents with   • Wound Check     Pt reports redness in lower abd started ~2 weeks ago, now presenting with blackened open wounds across lower abd to b/l groin.        History of Presenting Illness  Hien Alfaro is a 41 y.o. female who presented 8/1/2021 with Wound Check (Pt reports redness in lower abd started ~2 weeks ago, now presenting with blackened open wounds across lower abd to b/l groin. )  She is morbidly obese. She has a history of alcoholic cirrhosis, portal vein thrombosis NOT on anticoagulation (seen and decided by Dr. Montenegro, [facility]) and was hospitalized in June for decompensated alcoholic cirrhosis with hyperammonemia, bilirubinemia and hepatorenal syndrome with poor outpatient follow-up. She was placed on antibiotics for her pannus infections. Despite antibiotics, she noticed black open wounds along her abdomen to bilateral groin and also a lump/mass that started 2 weeks now RLQ region. She denied fevers but endorses chills.  She is not COVID vaccinated but she mentions she was checked at [facility] and she was told she is negative.  She was seen at [facility]. According to accepting EDP, CT showed colitis aside from the panniculitis. No gas or abscess mentioned. Leukocytosis, lactic acidosis and a concern for necrotizing fasciitis. Thus the patient was transferred here.  At our ED, she is afebrile but TACHYCARDIC.   Leukocytosis, lactic acid 2.4.  Dr. Christiansen, EDP paged Dr. hSin, General Surgery.     I discussed the plan of care with patient and bedside RN.    Review of Systems  Review of Systems   Constitutional: Positive for chills. Negative for fever.   HENT: Negative for congestion, hearing loss and nosebleeds.    Eyes: Negative for  pain and redness.   Respiratory: Negative for cough, hemoptysis, shortness of breath and wheezing.    Cardiovascular: Negative for chest pain and palpitations.   Gastrointestinal: Negative for abdominal pain, blood in stool, constipation, diarrhea, nausea and vomiting.   Genitourinary: Negative for dysuria, frequency and hematuria.   Musculoskeletal: Positive for myalgias. Negative for falls and joint pain.   Skin: Negative for rash.   Neurological: Negative for dizziness, tremors, focal weakness, seizures, loss of consciousness, weakness and headaches.   Psychiatric/Behavioral: The patient is not nervous/anxious and does not have insomnia.    All other systems reviewed and are negative.      Past Medical History   has a past medical history of DM (diabetes mellitus) (HCC).    Surgical History   has no past surgical history on file.     Family History  family history is not on file.   No family history of alcoholism or cirrhosis.   Family history reviewed with patient. There is no family history that is pertinent to the chief complaint.     Social History   reports that she has quit smoking. She has quit using smokeless tobacco. She reports previous alcohol use of about 8.4 oz of alcohol per week. She reports that she does not use drugs.    Allergies  No Known Allergies    Medications  Prior to Admission Medications   Prescriptions Last Dose Informant Patient Reported? Taking?   cyanocobalamin (VITAMIN B12) 2500 MCG SL Tab  Patient Yes No   Sig: Take 2,500 mcg by mouth every day. Under the tongue   folic acid (FOLVITE) 1 MG Tab  Patient No No   Sig: Take 1 tablet by mouth every day.   levothyroxine (SYNTHROID) 50 MCG Tab  Patient Yes No   Sig: Take 50 mcg by mouth every morning on an empty stomach.   multivitamin (THERAGRAN) Tab  Patient No No   Sig: Take 1 tablet by mouth every day.   pregabalin (LYRICA) 50 MG capsule  Patient Yes No   Sig: Take 50 mg by mouth 2 times a day.   riFAXIMin (XIFAXAN) 550 MG Tab tablet   Patient Yes No   Sig: Take 550 mg by mouth 2 times a day.   spironolactone (ALDACTONE) 100 MG Tab  Patient No No   Sig: Take 1 tablet by mouth every day.   sulfamethoxazole-trimethoprim (BACTRIM DS) 800-160 MG tablet  Patient Yes No   Sig: Take 1 tablet by mouth one time.   vitamin D, Ergocalciferol, (DRISDOL) 1.25 MG (14003 UT) Cap capsule   No No   Sig: Take 1 capsule by mouth every 7 days for 7 doses.      Facility-Administered Medications: None       Physical Exam  Temp:  [36.6 °C (97.9 °F)] 36.6 °C (97.9 °F)  Pulse:  [109-111] 110  Resp:  [17-20] 17  BP: ()/(55-61) 102/61  SpO2:  [95 %-99 %] 96 %    Physical Exam  Vitals and nursing note reviewed.   Constitutional:       General: She is not in acute distress.  HENT:      Head: Normocephalic and atraumatic.      Right Ear: External ear normal.      Left Ear: External ear normal.      Nose: Nose normal.      Mouth/Throat:      Mouth: Mucous membranes are moist.   Eyes:      General: Scleral icterus present.      Conjunctiva/sclera: Conjunctivae normal.   Cardiovascular:      Rate and Rhythm: Normal rate and regular rhythm.      Pulses: Normal pulses.      Heart sounds: No murmur heard.   No friction rub. No gallop.    Pulmonary:      Effort: Pulmonary effort is normal. No respiratory distress.      Breath sounds: Normal breath sounds. No stridor. No wheezing, rhonchi or rales.   Chest:      Chest wall: No tenderness.   Abdominal:      General: Abdomen is flat. Bowel sounds are normal. There is no distension.      Palpations: Abdomen is soft.      Tenderness: There is no abdominal tenderness. There is no guarding or rebound.   Musculoskeletal:         General: No swelling, tenderness or deformity. Normal range of motion.      Cervical back: Normal range of motion and neck supple. No rigidity.      Right lower leg: Edema present.      Left lower leg: Edema present.   Skin:     General: Skin is warm.      Coloration: Skin is jaundiced.      Findings: Bruising,  erythema, lesion and rash present.      Comments: Aside from below, inner thigh close to groin showed blackish area, and a superficial ulcer; the area may be necrotic versus ecchymoses. She has a RLQ mass on palpation.   Neurological:      General: No focal deficit present.      Mental Status: She is alert and oriented to person, place, and time. Mental status is at baseline.   Psychiatric:         Mood and Affect: Mood normal.         Behavior: Behavior normal.         Thought Content: Thought content normal.         Judgment: Judgment normal.         Laboratory:  Recent Labs     08/01/21  1038   WBC 13.4*   RBC 2.59*   HEMOGLOBIN 8.7*   HEMATOCRIT 25.8*   MCV 99.6*   MCH 33.6*   MCHC 33.7   RDW 64.0*   PLATELETCT 188   MPV 9.5         No results for input(s): ALTSGPT, ASTSGOT, ALKPHOSPHAT, TBILIRUBIN, DBILIRUBIN, GAMMAGT, AMYLASE, LIPASE, ALB, PREALBUMIN, GLUCOSE in the last 72 hours.      No results for input(s): NTPROBNP in the last 72 hours.      No results for input(s): TROPONINT in the last 72 hours.    Imaging:  OUTSIDE IMAGES-CT ABDOMEN /PELVIS   Final Result          no X-Ray or EKG requiring interpretation    Assessment/Plan:  I anticipate this patient will require at least two midnights for appropriate medical management, necessitating inpatient admission.    * Abdominal panniculitis/cellulitis w/ concern for necrosis  Assessment & Plan  Dr. Shin, General Surgery consulted  Wound Team consulted. Ordered wound cultures.  Discussed with Pharmacy. I ordered IV Zosyn, vancomycin and clindamycin.  ID consult in the AM  Treat other issues    Colitis  Assessment & Plan  Seen on outside CT  She doesn't seem to have symptoms for this  Will be on antibiotics that can cover this.  Since she is on strong antibiotics and I cannot depend on diarrhea (need to have diarrhea for hepatic encephalopathy prevention) and there is colitis on CT, I have opted to start C. Difficile prophylaxis with BID oral vancomycin. Of  note, rifaximin can also help.  Dr. Shin, consulted, aside from evaluating pannicultis vs nec jorge luis may also comment on the colitis.    Portal vein thrombosis- (present on admission)  Assessment & Plan  Check renal function and CBC. While inpatient once this is resulted and no bleeding risk, consider starting acute anticoagulation.  She is getting her MediCal insurance approved (aside from poor follow-up), which was a barrier to getting anticoagulants past hospitalization  Once above has been reviewed, consider now adding warfarin vs DOAC vs Lovenox SQ      Class 3 severe obesity due to excess calories with serious comorbidity and body mass index (BMI) of 40.0 to 44.9 in adult (HCC)- (present on admission)  Assessment & Plan  When better, advised exercise, healthy lifestyle, helathy hepatic diet, weight loss.    Sepsis ([facility])  Assessment & Plan  This is Sepsis Present on admission  SIRS criteria identified on my evaluation include: Leukocytosis, with WBC greater than 12,000  Source is pannus  Sepsis protocol initiated  Fluid resuscitation ordered per protocol  IV antibiotics as appropriate for source of sepsis  While organ dysfunction may be noted elsewhere in this problem list or in the chart, degree of organ dysfunction does not meet CMS criteria for severe sepsis          Hypothyroidism- (present on admission)  Assessment & Plan  Continue Synthroid  Ordered TSH    Alcoholic cirrhosis of liver without ascites (HCC)- (present on admission)  Assessment & Plan  She mentioned she has MediCal approved so she will be able to see her GI doctor in [place], [place]. They can evaluate her for liver transplant. She is close to 6mo alcohol sobriety.  Continue her spironalactone, rifaximin and she will be on antiobiotics anyway (when infection is done she will need to return on rifaximin for hepatic encephalopathy prophylaxis and Bactrim for SBP prophylaxis.  Currently she seems compensated but I ordered hepatic function panel,  CBC, INR/PTT  Last hospitalization, though bilirubin was elevated it was commented that it was better (down to 6 from 12). Labs above pending, nonurgent GI consult in the AM recommended    Type 2 diabetes mellitus with neurologic complication, without long-term current use of insulin (HCC)- (present on admission)  Assessment & Plan  Ordered A1c and monitor BG for now      VTE prophylaxis: heparin ppx however waiting on CBC, INR/PTT and renal function, may be a candidate for acute anticoagulation (for portaql vein thrombosis

## 2021-08-01 NOTE — ED TRIAGE NOTES
"Chief Complaint   Patient presents with   • Wound Check     Pt reports redness in lower abd started ~2 weeks ago, now presenting with blackened open wounds across lower abd to b/l groin.      /58   Pulse (!) 111   Temp 36.6 °C (97.9 °F) (Temporal)   Resp 18   Ht 1.6 m (5' 3\")   Wt 104 kg (230 lb)   SpO2 99%   BMI 40.74 kg/m²     Pt brought in by EMS, transfer from [facility] to [address], mask in place. Sent to r/o cellulitis vs necrotizing fasciitis. Pt in a gown and on monitor. Chart flagged for ERP to see. Hx of cirrhosis, DM II, Hep C.    PIV established x2, 1 gm rocephin, 500 mg meropenem, 900 mg clindamycin, D50, 4 mg zofran pta.   "

## 2021-08-01 NOTE — ED NOTES
Med Rec complete per pt and RX bottles (returned)  Allergies Reviewed    Pt takes XIFAXAN and BACTRIM ( no known stop date)  Pt has been out of BACTRIM, last dose > 1 week ago.    Pt takes both OMEPRAZOLE and LANSOPRAZOLE daily.

## 2021-08-01 NOTE — ASSESSMENT & PLAN NOTE
Decompensated.   reports patient has not been drinking since May  MELD score 28 on admission, 27-32% 90 day mortality rate  She has upcoming appointment with GI as outpatient

## 2021-08-01 NOTE — ASSESSMENT & PLAN NOTE
Action Requested: Summary for Provider     []  Critical Lab, Recommendations Needed  [] Need Additional Advice  []   FYI    []   Need Orders  [] Need Medications Sent to Pharmacy  []  Other     SUMMARY: 1.  Ophthal Triage Process--Unable to transfer, no ans As noted in panniculitis    RESOLVED   Reason for Disposition  • Patient wants to be seen    Protocols used: EYE - PUS OR [id number]

## 2021-08-02 ENCOUNTER — APPOINTMENT (OUTPATIENT)
Dept: RADIOLOGY | Facility: MEDICAL CENTER | Age: 41
DRG: 871 | End: 2021-08-02
Attending: INTERNAL MEDICINE
Payer: COMMERCIAL

## 2021-08-02 PROBLEM — R60.1 ANASARCA: Status: ACTIVE | Noted: 2021-08-02

## 2021-08-02 PROBLEM — E43 SEVERE PROTEIN-CALORIE MALNUTRITION (HCC): Status: ACTIVE | Noted: 2021-08-02

## 2021-08-02 PROBLEM — G93.40 ACUTE ENCEPHALOPATHY: Status: ACTIVE | Noted: 2021-08-02

## 2021-08-02 PROBLEM — E16.2 HYPOGLYCEMIA: Status: ACTIVE | Noted: 2021-08-02

## 2021-08-02 PROBLEM — F41.9 ANXIETY: Status: ACTIVE | Noted: 2021-08-02

## 2021-08-02 PROBLEM — R62.7 FAILURE TO THRIVE IN ADULT: Status: ACTIVE | Noted: 2021-08-02

## 2021-08-02 PROBLEM — K72.10 END STAGE LIVER DISEASE (HCC): Status: ACTIVE | Noted: 2021-08-02

## 2021-08-02 LAB
ALBUMIN SERPL BCP-MCNC: 2 G/DL (ref 3.2–4.9)
BUN SERPL-MCNC: 8 MG/DL (ref 8–22)
CALCIUM SERPL-MCNC: 7.6 MG/DL (ref 8.5–10.5)
CHLORIDE SERPL-SCNC: 103 MMOL/L (ref 96–112)
CO2 SERPL-SCNC: 20 MMOL/L (ref 20–33)
CREAT SERPL-MCNC: 0.86 MG/DL (ref 0.5–1.4)
GLUCOSE BLD-MCNC: 131 MG/DL (ref 65–99)
GLUCOSE BLD-MCNC: 46 MG/DL (ref 65–99)
GLUCOSE BLD-MCNC: 56 MG/DL (ref 65–99)
GLUCOSE SERPL-MCNC: 56 MG/DL (ref 65–99)
LACTATE BLD-SCNC: 1.8 MMOL/L (ref 0.5–2)
PHOSPHATE SERPL-MCNC: 3.5 MG/DL (ref 2.5–4.5)
POTASSIUM SERPL-SCNC: 3.8 MMOL/L (ref 3.6–5.5)
SODIUM SERPL-SCNC: 133 MMOL/L (ref 135–145)
T4 FREE SERPL-MCNC: 0.97 NG/DL (ref 0.93–1.7)
TSH SERPL DL<=0.005 MIU/L-ACNC: 7.2 UIU/ML (ref 0.38–5.33)

## 2021-08-02 PROCEDURE — A9270 NON-COVERED ITEM OR SERVICE: HCPCS | Performed by: STUDENT IN AN ORGANIZED HEALTH CARE EDUCATION/TRAINING PROGRAM

## 2021-08-02 PROCEDURE — 80069 RENAL FUNCTION PANEL: CPT

## 2021-08-02 PROCEDURE — 36415 COLL VENOUS BLD VENIPUNCTURE: CPT

## 2021-08-02 PROCEDURE — 700111 HCHG RX REV CODE 636 W/ 250 OVERRIDE (IP): Performed by: STUDENT IN AN ORGANIZED HEALTH CARE EDUCATION/TRAINING PROGRAM

## 2021-08-02 PROCEDURE — 700105 HCHG RX REV CODE 258: Performed by: INTERNAL MEDICINE

## 2021-08-02 PROCEDURE — 700105 HCHG RX REV CODE 258: Performed by: STUDENT IN AN ORGANIZED HEALTH CARE EDUCATION/TRAINING PROGRAM

## 2021-08-02 PROCEDURE — 99232 SBSQ HOSP IP/OBS MODERATE 35: CPT | Performed by: STUDENT IN AN ORGANIZED HEALTH CARE EDUCATION/TRAINING PROGRAM

## 2021-08-02 PROCEDURE — 84443 ASSAY THYROID STIM HORMONE: CPT

## 2021-08-02 PROCEDURE — 84439 ASSAY OF FREE THYROXINE: CPT

## 2021-08-02 PROCEDURE — A9270 NON-COVERED ITEM OR SERVICE: HCPCS | Performed by: INTERNAL MEDICINE

## 2021-08-02 PROCEDURE — 700102 HCHG RX REV CODE 250 W/ 637 OVERRIDE(OP): Performed by: INTERNAL MEDICINE

## 2021-08-02 PROCEDURE — 700101 HCHG RX REV CODE 250: Performed by: INTERNAL MEDICINE

## 2021-08-02 PROCEDURE — 700111 HCHG RX REV CODE 636 W/ 250 OVERRIDE (IP): Performed by: INTERNAL MEDICINE

## 2021-08-02 PROCEDURE — 700101 HCHG RX REV CODE 250: Performed by: STUDENT IN AN ORGANIZED HEALTH CARE EDUCATION/TRAINING PROGRAM

## 2021-08-02 PROCEDURE — 82962 GLUCOSE BLOOD TEST: CPT | Mod: 91

## 2021-08-02 PROCEDURE — 700102 HCHG RX REV CODE 250 W/ 637 OVERRIDE(OP): Performed by: STUDENT IN AN ORGANIZED HEALTH CARE EDUCATION/TRAINING PROGRAM

## 2021-08-02 PROCEDURE — 770001 HCHG ROOM/CARE - MED/SURG/GYN PRIV*

## 2021-08-02 RX ORDER — HEPARIN SODIUM 5000 [USP'U]/ML
5000 INJECTION, SOLUTION INTRAVENOUS; SUBCUTANEOUS EVERY 8 HOURS
Status: DISCONTINUED | OUTPATIENT
Start: 2021-08-02 | End: 2021-08-03

## 2021-08-02 RX ORDER — DEXTROSE MONOHYDRATE 25 G/50ML
50 INJECTION, SOLUTION INTRAVENOUS
Status: DISCONTINUED | OUTPATIENT
Start: 2021-08-02 | End: 2021-08-05

## 2021-08-02 RX ORDER — DEXTROSE, SODIUM CHLORIDE, SODIUM LACTATE, POTASSIUM CHLORIDE, AND CALCIUM CHLORIDE 5; .6; .31; .03; .02 G/100ML; G/100ML; G/100ML; G/100ML; G/100ML
INJECTION, SOLUTION INTRAVENOUS CONTINUOUS
Status: ACTIVE | OUTPATIENT
Start: 2021-08-02 | End: 2021-08-03

## 2021-08-02 RX ORDER — FUROSEMIDE 40 MG/1
40 TABLET ORAL
Status: DISCONTINUED | OUTPATIENT
Start: 2021-08-02 | End: 2021-08-15 | Stop reason: HOSPADM

## 2021-08-02 RX ORDER — LORAZEPAM 2 MG/ML
0.25 INJECTION INTRAMUSCULAR ONCE
Status: COMPLETED | OUTPATIENT
Start: 2021-08-02 | End: 2021-08-02

## 2021-08-02 RX ORDER — HYDROXYZINE HYDROCHLORIDE 25 MG/1
50 TABLET, FILM COATED ORAL 3 TIMES DAILY PRN
Status: DISCONTINUED | OUTPATIENT
Start: 2021-08-02 | End: 2021-08-05

## 2021-08-02 RX ORDER — LACTULOSE 20 G/30ML
30 SOLUTION ORAL 3 TIMES DAILY
Status: DISCONTINUED | OUTPATIENT
Start: 2021-08-02 | End: 2021-08-03

## 2021-08-02 RX ADMIN — PIPERACILLIN AND TAZOBACTAM 4.5 G: 4; .5 INJECTION, POWDER, LYOPHILIZED, FOR SOLUTION INTRAVENOUS; PARENTERAL at 14:57

## 2021-08-02 RX ADMIN — FUROSEMIDE 40 MG: 40 TABLET ORAL at 11:29

## 2021-08-02 RX ADMIN — PIPERACILLIN AND TAZOBACTAM 4.5 G: 4; .5 INJECTION, POWDER, LYOPHILIZED, FOR SOLUTION INTRAVENOUS; PARENTERAL at 05:34

## 2021-08-02 RX ADMIN — VANCOMYCIN HYDROCHLORIDE 1500 MG: 500 INJECTION, POWDER, LYOPHILIZED, FOR SOLUTION INTRAVENOUS at 01:30

## 2021-08-02 RX ADMIN — OXYCODONE 5 MG: 5 TABLET ORAL at 08:22

## 2021-08-02 RX ADMIN — SPIRONOLACTONE 100 MG: 25 TABLET ORAL at 05:36

## 2021-08-02 RX ADMIN — Medication 1 CAPSULE: at 08:22

## 2021-08-02 RX ADMIN — CLINDAMYCIN IN 5 PERCENT DEXTROSE 900 MG: 18 INJECTION, SOLUTION INTRAVENOUS at 05:34

## 2021-08-02 RX ADMIN — SODIUM CHLORIDE, POTASSIUM CHLORIDE, SODIUM LACTATE AND CALCIUM CHLORIDE: 600; 310; 30; 20 INJECTION, SOLUTION INTRAVENOUS at 15:02

## 2021-08-02 RX ADMIN — DEXTROSE MONOHYDRATE 50 ML: 25 INJECTION, SOLUTION INTRAVENOUS at 17:19

## 2021-08-02 RX ADMIN — PIPERACILLIN AND TAZOBACTAM 4.5 G: 4; .5 INJECTION, POWDER, LYOPHILIZED, FOR SOLUTION INTRAVENOUS; PARENTERAL at 21:19

## 2021-08-02 RX ADMIN — SODIUM CHLORIDE, SODIUM LACTATE, POTASSIUM CHLORIDE, CALCIUM CHLORIDE AND DEXTROSE MONOHYDRATE: 5; 600; 310; 30; 20 INJECTION, SOLUTION INTRAVENOUS at 18:01

## 2021-08-02 RX ADMIN — HYDROMORPHONE HYDROCHLORIDE 0.25 MG: 1 INJECTION, SOLUTION INTRAMUSCULAR; INTRAVENOUS; SUBCUTANEOUS at 21:19

## 2021-08-02 RX ADMIN — RIFAXIMIN 550 MG: 550 TABLET ORAL at 05:36

## 2021-08-02 RX ADMIN — LACTULOSE 30 ML: 20 SOLUTION ORAL at 17:20

## 2021-08-02 RX ADMIN — OXYCODONE 5 MG: 5 TABLET ORAL at 03:58

## 2021-08-02 RX ADMIN — OXYCODONE 5 MG: 5 TABLET ORAL at 23:24

## 2021-08-02 RX ADMIN — LORAZEPAM 0.26 MG: 2 INJECTION INTRAMUSCULAR; INTRAVENOUS at 16:33

## 2021-08-02 RX ADMIN — HEPARIN SODIUM 5000 UNITS: 5000 INJECTION, SOLUTION INTRAVENOUS; SUBCUTANEOUS at 15:01

## 2021-08-02 RX ADMIN — OXYCODONE 5 MG: 5 TABLET ORAL at 19:05

## 2021-08-02 RX ADMIN — Medication 16 G: at 16:57

## 2021-08-02 RX ADMIN — Medication 1000 UNITS: at 05:35

## 2021-08-02 RX ADMIN — VANCOMYCIN HYDROCHLORIDE 125 MG: KIT ORAL at 05:35

## 2021-08-02 RX ADMIN — RIFAXIMIN 550 MG: 550 TABLET ORAL at 17:19

## 2021-08-02 RX ADMIN — HYDROXYZINE HYDROCHLORIDE 50 MG: 25 TABLET, FILM COATED ORAL at 11:46

## 2021-08-02 ASSESSMENT — ENCOUNTER SYMPTOMS
HEADACHES: 0
FEVER: 0
COUGH: 0
BLURRED VISION: 0
MYALGIAS: 0
FALLS: 0
CHILLS: 0
DEPRESSION: 0
ABDOMINAL PAIN: 1
BRUISES/BLEEDS EASILY: 0
DIZZINESS: 0
DOUBLE VISION: 0
HEARTBURN: 0
SHORTNESS OF BREATH: 0
NAUSEA: 0
FLANK PAIN: 0

## 2021-08-02 ASSESSMENT — PAIN DESCRIPTION - PAIN TYPE
TYPE: ACUTE PAIN

## 2021-08-02 NOTE — PROGRESS NOTES
Pharmacy Vancomycin Kinetics Note for 8/1/2021     41 y.o. female on Vancomycin day # 1     Vancomycin Indication (AUC Dosing): Severe or complicated infection (Abdominal wound - possible necrotizing fasciitis)    Provider specified end date: 08/15/21    Active Antibiotics (From admission, onward)    Ordered     Ordering Provider       Sun Aug 1, 2021  3:25 PM    08/01/21 1525  vancomycin 50 mg/mL oral soln 125 mg  ONCE      JOE Schwartz Aug 1, 2021 12:16 PM    08/01/21 1216  vancomycin 50 mg/mL oral soln 125 mg  EVERY 12 HOURS      OJE Schwartz Aug 1, 2021 12:06 PM    08/01/21 1206  vancomycin (VANCOCIN) 2,500 mg in  mL IVPB  (vancomycin (VANCOCIN) IV (LD + Maintenance))  ONCE     Note to Pharmacy: Per P&T Kinetics Protocol    JOE Schwartz Aug 1, 2021 11:44 AM    08/01/21 1144  clindamycin (CLEOCIN) IVPB premix 900 mg  (Skin and Soft Tissue Infection (Severe Sepsis))  EVERY 8 HOURS      Jonathan Abraham M.D.    08/01/21 1144  MD Alert...Vancomycin per Pharmacy  (Skin and Soft Tissue Infection (Severe Sepsis))  PHARMACY TO DOSE     Question:  Indication(s) for vancomycin?  Answer:  Skin and soft tissue infection    Jonathan Abraham M.D.    08/01/21 1144  piperacillin-tazobactam (ZOSYN) 4.5 g in  mL IVPB  (Skin and Soft Tissue Infection (Severe Sepsis))  EVERY 8 HOURS      JOE Schwartz Aug 1, 2021 11:42 AM    08/01/21 1142  riFAXIMin (XIFAXAN) tablet 550 mg  2 TIMES DAILY     Question:  Indication  Answer:  Hepatic Encephalopathy    Jonathan Abraham M.D.          Dosing Weight: 104 kg (229 lb 4.5 oz)      Admission History: Admitted on 8/1/2021 for Panniculitis [M79.3]  Pertinent history: Abdominal/pannus infection for ~ 2 weeks.  Initially red with lumps bilaterally, nw with blackened open wounds across lower abd and bilateral groin.  Hx of liver cirrhosis.    Allergies:     Patient has no known allergies.     Pertinent  "cultures to date:     Results     Procedure Component Value Units Date/Time    C Diff by PCR rflx Toxin [[id number]]     Order Status: No result Specimen: Stool     BLOOD CULTURE [[id number]] Collected: 21 1213    Order Status: Completed Specimen: Blood from Peripheral Updated: 21 1341    Narrative:      Per Hospital Policy: Only change Specimen Src: to \"Line\" if  specified by physician order.    Blood Culture [[id number]]     Order Status: Sent Specimen: Blood from Peripheral     Blood Culture [[id number]]     Order Status: Sent Specimen: Blood from Peripheral     Urinalysis [[id number]]     Order Status: Canceled Specimen: Urine     Culture Wound W/ Gram Stain [[id number]]     Order Status: Sent Specimen: Wound from Abdominal     BLOOD CULTURE [[id number]] Collected: 21 1113    Order Status: Completed Specimen: Blood from Peripheral Updated: 21 1134    Narrative:      Per Hospital Policy: Only change Specimen Src: to \"Line\" if  specified by physician order.    URINALYSIS [[id number]]     Order Status: Sent Specimen: Urine     URINE CULTURE(NEW) [[id number]]     Order Status: Sent Specimen: Urine           Labs:     Estimated Creatinine Clearance: 113 mL/min (by C-G formula based on SCr of 0.77 mg/dL).  Recent Labs     21  1038   WBC 13.4*   NEUTSPOLYS 78.20*     Recent Labs     21  1038   BUN 8   CREATININE 0.77   ALBUMIN 1.8*     No intake or output data in the 24 hours ending 21 1719   /64   Pulse (!) 113   Temp 36.4 °C (97.6 °F) (Temporal)   Resp 16   Ht 1.6 m (5' 3\")   Wt 104 kg (230 lb)   SpO2 95%  Temp (24hrs), Av.6 °C (97.8 °F), Min:36.4 °C (97.6 °F), Max:36.6 °C (97.9 °F)      List concerns for Vancomycin clearance:     Abnormal LFTs;Malnutrition/Low albumin;Obesity    Pharmacokinetics:     AUC kinetics:   Ke (hr ^-1): 0.0982 hr^-1  Half life: 7.06 hr  Clearance: 6.37  Estimated TDD: 3185  Estimated Dose: 1208  Estimated interval: 9.1      A/P:     -  " Vancomycin dose: 1500 mg IV q 12 hr    -  Next vancomycin level(s): peak and trough around 4th maintenance dose 8/3/21-8/4/21 (not currently ordered)    -  Predicted vancomycin AUC from initial AUC test calculator: 471 mg·hr/L    -  Comments: Surgery/ID to consult.  Confirmed with OSH that no vancomycin given prior to arrival.  Renal labs daily x 3 days ordered.  C.diff ordered to be collected, prophylactic oral vancomycin at this time, per MD will increase dose if + C.diff as well.  Pharmacy to monitor and adjust as needed.            Zee Moore, PharmD, BCPS

## 2021-08-02 NOTE — PROGRESS NOTES
"    DATE: 8/2/2021    Hospital Day 2 possible nec fasc.    Interval Events:  Some confusion this morning per nursing. Cellulitis unchanged.     PHYSICAL EXAMINATION:  Constitutional:     Vital Signs: BP (!) 99/55   Pulse (!) 107   Temp 36.1 °C (97 °F) (Temporal)   Resp 18   Ht 1.6 m (5' 3\")   Wt 104 kg (230 lb)   SpO2 100%    HEAD AND NECK: Demonstrates symmetric, reactive pupils. Extraocular muscles are intact. Nares and oropharynx are clear.  Sclera is icteric.  NECK: Supple. No adenopathy.  CHEST:               Inspection: Unlabored respirations, no intercostal retractions, paradoxical motion, or accessory muscle use.              Palpation:  The chest is nontender.               Auscultation: normal.  CARDIOVASCULAR:   Inspection: The skin is warm and dry.  Auscultation: Sinus tachycardia.              Peripheral Pulses: Normal.    ABDOMEN:   Inspection: Abdominal inspection reveals there is thickening of her lower abdomen and pannus extending across to both sides extending up into both flanks there are areas on both the left and right with black eschar present with some serous drainage.  There is no odor present.  There is minimal tenderness present.               Palpation: Palpation is remarkable for no significant tenderness, guarding, or peritoneal findings. No abdominal wall hernias.  EXTREMITIES:   Examination of the upper and lower extremities demonstrates No cyanosis, edema, or clubbing of the nails.  There is thickening in the proximal medial thighs with small area of black eschar present.  These are areas are minimally tender  Laboratory Values:   Recent Labs     08/01/21  1038   WBC 13.4*   RBC 2.59*   HEMOGLOBIN 8.7*   HEMATOCRIT 25.8*   MCV 99.6*   MCH 33.6*   MCHC 33.7   RDW 64.0*   PLATELETCT 188   MPV 9.5     Recent Labs     08/01/21  1038 08/02/21  0457   SODIUM 130* 133*   POTASSIUM 3.9 3.8   CHLORIDE 101 103   CO2 20 20   GLUCOSE 69 56*   BUN 8 8   CREATININE 0.77 0.86   CALCIUM 7.7* " 7.6*     Recent Labs     08/01/21  1038 08/01/21  1213   ASTSGOT 66*  --    ALTSGPT 20  --    TBILIRUBIN 4.1*  --    ALKPHOSPHAT 171*  --    GLOBULIN 5.4*  --    INR  --  2.82*     Recent Labs     08/01/21  1213   APTT 63.0*   INR 2.82*        Imaging:   OUTSIDE IMAGES-CT ABDOMEN /PELVIS   Final Result      OUTSIDE IMAGES-DX CHEST   Final Result      OUTSIDE IMAGES-CT ABDOMEN /PELVIS   Final Result      US-EXTREMITY VENOUS LOWER BILAT    (Results Pending)       ASSESSMENT AND PLAN:     * Abdominal panniculitis/cellulitis w/ concern for necrosis- (present on admission)  Assessment & Plan  Cellulitis present for 2 weeks -treated with oral antibiotics  CT shows panniculitis extension both left and right flanks  8/2 cont abx, no need for surgery at the moment        Alcoholic cirrhosis of liver without ascites (HCC)- (present on admission)  Assessment & Plan  8/1 MELD 27    Class 3 severe obesity due to excess calories with serious comorbidity and body mass index (BMI) of 40.0 to 44.9 in adult (HCC)- (present on admission)  Assessment & Plan  8/1 BMI 40.74      DISPOSITION: Continue nonoperative management and close clinical observation.       ____________________________________     Yesi Lion M.D.    DD: 8/2/2021  9:59 AM

## 2021-08-02 NOTE — CARE PLAN
Problem: Pain - Standard  Goal: Alleviation of pain or a reduction in pain to the patient’s comfort goal  Outcome: Progressing     Problem: Knowledge Deficit - Standard  Goal: Patient and family/care givers will demonstrate understanding of plan of care, disease process/condition, diagnostic tests and medications  Outcome: Progressing   The patient is Stable - Low risk of patient condition declining or worsening    Shift Goals  Clinical Goals: Wound care to come see patient  Patient Goals: rest    Progress made toward(s) clinical / shift goals:  ABx started and labs completed      Patient is not progressing towards the following goals:

## 2021-08-02 NOTE — ASSESSMENT & PLAN NOTE
Likely hepatic encephalopathy  Ordered CT head, unremarkable  Ammonia elevated, trending down   On lactulose and Rifaximin

## 2021-08-02 NOTE — PROGRESS NOTES
Patient refused lactulose, extensive education on purpose of lactulose, her ongoing confusion liver disease. Patient still refused.  MD made aware asked documention be done.

## 2021-08-02 NOTE — DIETARY
NUTRITION SERVICES: BMI - Pt with BMI >40 (=Body mass index is 40.74 kg/m².), Class III obesity. Weight loss counseling not appropriate in acute care setting.   RECOMMEND - If appropriate at DC please refer to outpatient nutrition services for weight management.

## 2021-08-02 NOTE — PROGRESS NOTES
Pt is A&O 4  Pain medicated per MAR   + nausea  Tolerating a CHO  diet with 2000 mL fluid restriction   Incision -  - Drains  + Voids  + flatus  Up SBA  SCD's off  Bed alarm off, pt low fall risk per christian canales  Reviewed plan of care with patient, bed in lowest position and locked, pt resting comfortably now, call light within reach, all needs met at this time. Interventions will be executed per plan of care

## 2021-08-02 NOTE — PROGRESS NOTES
"Hospital Medicine Daily Progress Note    Date of Service  8/2/2021    Chief Complaint  Hien Alfaro is a 41 y.o. female admitted 8/1/2021 with abdominal discomfort.    Hospital Course  Per admitting provider:  \"Hien Alfaro is a 41 y.o. female who presented 8/1/2021 with Wound Check (Pt reports redness in lower abd started ~2 weeks ago, now presenting with blackened open wounds across lower abd to b/l groin. )  She is morbidly obese. She has a history of alcoholic cirrhosis, portal vein thrombosis NOT on anticoagulation (seen and decided by Dr. Montenegro, [facility]) and was hospitalized in June for decompensated alcoholic cirrhosis with hyperammonemia, bilirubinemia and hepatorenal syndrome with poor outpatient follow-up. She was placed on antibiotics for her pannus infections. Despite antibiotics, she noticed black open wounds along her abdomen to bilateral groin and also a lump/mass that started 2 weeks now RLQ region.\"     CT AP at another facility noted colitis and panniculitis, no gas or abscess noted. Transferred to [facility] for higher level of care, admitted to medicine service, surgery was consulted.      Interval Problem Update  8/2: Continue Zosyn, wound care.  PT OT evaluation as patient appears appropriate.  She has alcoholic cirrhosis with decompensation, however continues to refuse lactulose.  Continue Lasix/spironolactone.  I do not see indication for clindamycin and vancomycin-both antibiotics have been discontinued.  Continue Zosyn. Continue Xifaxan and lactulose.  Palliative care also consulted for ACP as she has MELD score of 27 and high likelihood of further decompensation.    I have personally seen and examined the patient at bedside. I discussed the plan of care with patient and bedside RN.    Consultants/Specialty  Surgery  Wound care  Palliative care    Code Status  Full Code    Disposition  Patient is not medically cleared.   Anticipate discharge to to skilled nursing facility.  I have placed " the appropriate orders for post-discharge needs.    Review of Systems  Review of Systems   Constitutional: Positive for malaise/fatigue. Negative for chills and fever.   HENT: Negative for hearing loss and tinnitus.    Eyes: Negative for blurred vision and double vision.   Respiratory: Negative for cough and shortness of breath.    Cardiovascular: Positive for leg swelling. Negative for chest pain.   Gastrointestinal: Positive for abdominal pain. Negative for heartburn and nausea.   Genitourinary: Negative for dysuria and flank pain.   Musculoskeletal: Negative for falls and myalgias.   Skin: Negative for itching and rash.   Neurological: Negative for dizziness and headaches.   Endo/Heme/Allergies: Negative for environmental allergies. Does not bruise/bleed easily.   Psychiatric/Behavioral: Negative for depression and suicidal ideas.   All other systems reviewed and are negative.       Physical Exam  Temp:  [36.1 °C (97 °F)-36.9 °C (98.4 °F)] 36.4 °C (97.6 °F)  Pulse:  [] 106  Resp:  [16-18] 18  BP: ()/(55-69) 104/66  SpO2:  [97 %-100 %] 100 %    Physical Exam  Vitals and nursing note reviewed.   Constitutional:       Appearance: She is obese. She is ill-appearing.   HENT:      Head: Normocephalic and atraumatic.      Nose: Nose normal.   Eyes:      General: Scleral icterus present.      Extraocular Movements: Extraocular movements intact.   Cardiovascular:      Rate and Rhythm: Normal rate and regular rhythm.      Pulses: Normal pulses.      Heart sounds: No friction rub.   Pulmonary:      Effort: Pulmonary effort is normal.      Breath sounds: No wheezing.      Comments: Decreased bibasilar breath sounds  Abdominal:      General: There is distension.      Palpations: Abdomen is soft.      Tenderness: There is abdominal tenderness. There is no guarding or rebound.   Musculoskeletal:         General: No tenderness. Normal range of motion.      Cervical back: Neck supple. No tenderness.      Right lower  leg: Edema present.      Left lower leg: Edema present.   Skin:     General: Skin is dry.      Capillary Refill: Capillary refill takes less than 2 seconds.      Coloration: Skin is jaundiced.   Neurological:      Mental Status: She is alert.      Comments: AAO x1  Minimally verbal  Does follow some command  Uncooperative with care plan to nursing staff   Psychiatric:      Comments: Unable to evaluate         Fluids    Intake/Output Summary (Last 24 hours) at 8/2/2021 1444  Last data filed at 8/2/2021 0246  Gross per 24 hour   Intake 1168 ml   Output --   Net 1168 ml       Laboratory  Recent Labs     08/01/21  1038   WBC 13.4*   RBC 2.59*   HEMOGLOBIN 8.7*   HEMATOCRIT 25.8*   MCV 99.6*   MCH 33.6*   MCHC 33.7   RDW 64.0*   PLATELETCT 188   MPV 9.5     Recent Labs     08/01/21  1038 08/02/21  0457   SODIUM 130* 133*   POTASSIUM 3.9 3.8   CHLORIDE 101 103   CO2 20 20   GLUCOSE 69 56*   BUN 8 8   CREATININE 0.77 0.86   CALCIUM 7.7* 7.6*     Recent Labs     08/01/21  1213   APTT 63.0*   INR 2.82*               Imaging  OUTSIDE IMAGES-CT ABDOMEN /PELVIS   Final Result      OUTSIDE IMAGES-DX CHEST   Final Result      OUTSIDE IMAGES-CT ABDOMEN /PELVIS   Final Result           Assessment/Plan  * Abdominal panniculitis/cellulitis w/ concern for necrosis- (present on admission)  Assessment & Plan  CT AP at another facility colitis, panniculitis with no gas/abscess    D/c vancomycin and clindamycin 8/2  Abx (8/1-now): Zosyn  F/u BCx    Wound care  Surgery f/u appreciated -- no indication for surgery at this time    Acute encephalopathy  Assessment & Plan  Likely hepatic encephalopathy  Lactulose ordered but patient refuses    End stage liver disease (HCC)  Assessment & Plan  MELD score: 27  Secondary to ETOH and HCV -- not a transplant candidate    Lactulose, Xifaxan, lasix, spironolactone as tolerated  Poor prognosis    Alcoholic cirrhosis of liver without ascites (HCC)- (present on admission)  Assessment &  Plan  Decompensated  As noted in ESLD    Portal vein thrombosis- (present on admission)  Assessment & Plan  Suspect chronic PV throbosis -- in which case there is no clear benefit for anticoagulation -- case was previously noted by hematology (Dr. Montenegro)    Will avoid chronic anticoagulation  VTE ppx with heparin SQ while inpatient    Colitis  Assessment & Plan  As noted in panniculitis    Hypothyroidism- (present on admission)  Assessment & Plan  Synthroid    Anxiety  Assessment & Plan  PRN atarax    Severe protein-calorie malnutrition (HCC)  Assessment & Plan  Ensure    Failure to thrive in adult  Assessment & Plan  PT/OT, PO diet as able to tolerated    Anasarca  Assessment & Plan  Secondary to ESLD  Supportive care    Hypoglycemia  Assessment & Plan  Hypoglycemia protocol    Class 3 severe obesity due to excess calories with serious comorbidity and body mass index (BMI) of 40.0 to 44.9 in adult (HCC)- (present on admission)  Assessment & Plan  Diet and lifestyle modifications    Sepsis (HCC)  Assessment & Plan  This is Sepsis Present on admission  SIRS criteria identified on my evaluation include: Leukocytosis, with WBC greater than 12,000  Source is pannus  Sepsis protocol initiated  Fluid resuscitation ordered per protocol  IV antibiotics as appropriate for source of sepsis  While organ dysfunction may be noted elsewhere in this problem list or in the chart, degree of organ dysfunction does not meet CMS criteria for severe sepsis          Type 2 diabetes mellitus with neurologic complication, without long-term current use of insulin (HCC)- (present on admission)  Assessment & Plan  No ISS as pt has been hypoglycemic       VTE prophylaxis: heparin ppx    I have performed a physical exam and reviewed and updated ROS and Plan today (8/2/2021). In review of yesterday's note (8/1/2021), there are no changes except as documented above.

## 2021-08-02 NOTE — ASSESSMENT & PLAN NOTE
Cellulitis present for 2 weeks -treated with oral antibiotics  CT shows panniculitis extension both left and right flanks  8/2 cont abx, no need for surgery at the moment

## 2021-08-02 NOTE — PROGRESS NOTES
2 Rn skin check.    Patient with Bilateral Pannus hardened skin, left pannus with black eschar and open skin areas. Covered with mepitel one.    Bilateral inner thighs hardened, left thigh with open blistered area, anmd skin open area, mepitel one placed on open areas. Left abdomen harden area, skin tear, blackened tissue mepitel one placed.  Right abdomen blackened hard area noted. Left hp small black area noted.   All other skin intact.  Pictures of all areas of concern and black, hard skin area in epic, wound care consult already in place.

## 2021-08-03 ENCOUNTER — APPOINTMENT (OUTPATIENT)
Dept: RADIOLOGY | Facility: MEDICAL CENTER | Age: 41
DRG: 871 | End: 2021-08-03
Attending: STUDENT IN AN ORGANIZED HEALTH CARE EDUCATION/TRAINING PROGRAM
Payer: COMMERCIAL

## 2021-08-03 LAB
ALBUMIN SERPL BCP-MCNC: 1.7 G/DL (ref 3.2–4.9)
ALBUMIN/GLOB SERPL: 0.3 G/DL
ALP SERPL-CCNC: 155 U/L (ref 30–99)
ALT SERPL-CCNC: 22 U/L (ref 2–50)
AMMONIA PLAS-SCNC: 101 UMOL/L (ref 11–45)
ANION GAP SERPL CALC-SCNC: 11 MMOL/L (ref 7–16)
AST SERPL-CCNC: 75 U/L (ref 12–45)
BASOPHILS # BLD AUTO: 0.3 % (ref 0–1.8)
BASOPHILS # BLD: 0.04 K/UL (ref 0–0.12)
BILIRUB SERPL-MCNC: 4.1 MG/DL (ref 0.1–1.5)
BUN SERPL-MCNC: 8 MG/DL (ref 8–22)
CALCIUM SERPL-MCNC: 7.7 MG/DL (ref 8.5–10.5)
CHLORIDE SERPL-SCNC: 106 MMOL/L (ref 96–112)
CO2 SERPL-SCNC: 19 MMOL/L (ref 20–33)
CREAT SERPL-MCNC: 0.8 MG/DL (ref 0.5–1.4)
EOSINOPHIL # BLD AUTO: 0.11 K/UL (ref 0–0.51)
EOSINOPHIL NFR BLD: 0.8 % (ref 0–6.9)
ERYTHROCYTE [DISTWIDTH] IN BLOOD BY AUTOMATED COUNT: 58.1 FL (ref 35.9–50)
GLOBULIN SER CALC-MCNC: 5.1 G/DL (ref 1.9–3.5)
GLUCOSE BLD-MCNC: 129 MG/DL (ref 65–99)
GLUCOSE SERPL-MCNC: 132 MG/DL (ref 65–99)
HCT VFR BLD AUTO: 24.2 % (ref 37–47)
HGB BLD-MCNC: 8.4 G/DL (ref 12–16)
IMM GRANULOCYTES # BLD AUTO: 0.15 K/UL (ref 0–0.11)
IMM GRANULOCYTES NFR BLD AUTO: 1.1 % (ref 0–0.9)
LYMPHOCYTES # BLD AUTO: 2.27 K/UL (ref 1–4.8)
LYMPHOCYTES NFR BLD: 16.2 % (ref 22–41)
MAGNESIUM SERPL-MCNC: 1.7 MG/DL (ref 1.5–2.5)
MCH RBC QN AUTO: 33.2 PG (ref 27–33)
MCHC RBC AUTO-ENTMCNC: 34.7 G/DL (ref 33.6–35)
MCV RBC AUTO: 95.7 FL (ref 81.4–97.8)
MONOCYTES # BLD AUTO: 1.1 K/UL (ref 0–0.85)
MONOCYTES NFR BLD AUTO: 7.9 % (ref 0–13.4)
NEUTROPHILS # BLD AUTO: 10.31 K/UL (ref 2–7.15)
NEUTROPHILS NFR BLD: 73.7 % (ref 44–72)
NRBC # BLD AUTO: 0 K/UL
NRBC BLD-RTO: 0 /100 WBC
PHOSPHATE SERPL-MCNC: 3.3 MG/DL (ref 2.5–4.5)
PLATELET # BLD AUTO: 164 K/UL (ref 164–446)
PMV BLD AUTO: 9.7 FL (ref 9–12.9)
POTASSIUM SERPL-SCNC: 3.7 MMOL/L (ref 3.6–5.5)
PROT SERPL-MCNC: 6.8 G/DL (ref 6–8.2)
RBC # BLD AUTO: 2.53 M/UL (ref 4.2–5.4)
SODIUM SERPL-SCNC: 136 MMOL/L (ref 135–145)
WBC # BLD AUTO: 14 K/UL (ref 4.8–10.8)

## 2021-08-03 PROCEDURE — 82962 GLUCOSE BLOOD TEST: CPT

## 2021-08-03 PROCEDURE — 700111 HCHG RX REV CODE 636 W/ 250 OVERRIDE (IP): Performed by: STUDENT IN AN ORGANIZED HEALTH CARE EDUCATION/TRAINING PROGRAM

## 2021-08-03 PROCEDURE — 84100 ASSAY OF PHOSPHORUS: CPT

## 2021-08-03 PROCEDURE — A9270 NON-COVERED ITEM OR SERVICE: HCPCS | Performed by: STUDENT IN AN ORGANIZED HEALTH CARE EDUCATION/TRAINING PROGRAM

## 2021-08-03 PROCEDURE — 700105 HCHG RX REV CODE 258: Performed by: STUDENT IN AN ORGANIZED HEALTH CARE EDUCATION/TRAINING PROGRAM

## 2021-08-03 PROCEDURE — 700102 HCHG RX REV CODE 250 W/ 637 OVERRIDE(OP): Performed by: STUDENT IN AN ORGANIZED HEALTH CARE EDUCATION/TRAINING PROGRAM

## 2021-08-03 PROCEDURE — 85025 COMPLETE CBC W/AUTO DIFF WBC: CPT

## 2021-08-03 PROCEDURE — 770001 HCHG ROOM/CARE - MED/SURG/GYN PRIV*

## 2021-08-03 PROCEDURE — 83735 ASSAY OF MAGNESIUM: CPT

## 2021-08-03 PROCEDURE — 700111 HCHG RX REV CODE 636 W/ 250 OVERRIDE (IP): Performed by: INTERNAL MEDICINE

## 2021-08-03 PROCEDURE — 82140 ASSAY OF AMMONIA: CPT

## 2021-08-03 PROCEDURE — 700102 HCHG RX REV CODE 250 W/ 637 OVERRIDE(OP): Performed by: INTERNAL MEDICINE

## 2021-08-03 PROCEDURE — 700105 HCHG RX REV CODE 258: Performed by: INTERNAL MEDICINE

## 2021-08-03 PROCEDURE — 70450 CT HEAD/BRAIN W/O DYE: CPT

## 2021-08-03 PROCEDURE — 99253 IP/OBS CNSLTJ NEW/EST LOW 45: CPT | Mod: GC | Performed by: INTERNAL MEDICINE

## 2021-08-03 PROCEDURE — 99233 SBSQ HOSP IP/OBS HIGH 50: CPT | Performed by: STUDENT IN AN ORGANIZED HEALTH CARE EDUCATION/TRAINING PROGRAM

## 2021-08-03 PROCEDURE — 80053 COMPREHEN METABOLIC PANEL: CPT

## 2021-08-03 PROCEDURE — A9270 NON-COVERED ITEM OR SERVICE: HCPCS | Performed by: INTERNAL MEDICINE

## 2021-08-03 PROCEDURE — 36415 COLL VENOUS BLD VENIPUNCTURE: CPT

## 2021-08-03 RX ORDER — HALOPERIDOL 5 MG/ML
2-5 INJECTION INTRAMUSCULAR
Status: DISCONTINUED | OUTPATIENT
Start: 2021-08-03 | End: 2021-08-09

## 2021-08-03 RX ORDER — LACTULOSE 20 G/30ML
45 SOLUTION ORAL 3 TIMES DAILY
Status: DISCONTINUED | OUTPATIENT
Start: 2021-08-03 | End: 2021-08-05

## 2021-08-03 RX ORDER — LACTULOSE 10 G/15ML
300 SOLUTION ORAL EVERY 4 HOURS PRN
Status: DISCONTINUED | OUTPATIENT
Start: 2021-08-03 | End: 2021-08-15

## 2021-08-03 RX ORDER — LINEZOLID 600 MG/1
600 TABLET, FILM COATED ORAL EVERY 12 HOURS
Status: DISCONTINUED | OUTPATIENT
Start: 2021-08-03 | End: 2021-08-05

## 2021-08-03 RX ORDER — LORAZEPAM 2 MG/ML
1 INJECTION INTRAMUSCULAR
Status: DISCONTINUED | OUTPATIENT
Start: 2021-08-03 | End: 2021-08-03

## 2021-08-03 RX ORDER — LORAZEPAM 2 MG/ML
0.5 INJECTION INTRAMUSCULAR
Status: DISCONTINUED | OUTPATIENT
Start: 2021-08-03 | End: 2021-08-07

## 2021-08-03 RX ADMIN — LACTULOSE 30 ML: 20 SOLUTION ORAL at 12:36

## 2021-08-03 RX ADMIN — HYDROMORPHONE HYDROCHLORIDE 0.25 MG: 1 INJECTION, SOLUTION INTRAMUSCULAR; INTRAVENOUS; SUBCUTANEOUS at 15:06

## 2021-08-03 RX ADMIN — HALOPERIDOL LACTATE 4 MG: 5 INJECTION, SOLUTION INTRAMUSCULAR at 00:28

## 2021-08-03 RX ADMIN — LACTULOSE 45 ML: 20 SOLUTION ORAL at 20:56

## 2021-08-03 RX ADMIN — LACTULOSE 30 ML: 20 SOLUTION ORAL at 06:34

## 2021-08-03 RX ADMIN — PIPERACILLIN AND TAZOBACTAM 4.5 G: 4; .5 INJECTION, POWDER, LYOPHILIZED, FOR SOLUTION INTRAVENOUS; PARENTERAL at 12:36

## 2021-08-03 RX ADMIN — LORAZEPAM 0.5 MG: 2 INJECTION INTRAMUSCULAR; INTRAVENOUS at 18:54

## 2021-08-03 RX ADMIN — LORAZEPAM 0.5 MG: 2 INJECTION INTRAMUSCULAR; INTRAVENOUS at 22:12

## 2021-08-03 RX ADMIN — HEPARIN SODIUM 5000 UNITS: 5000 INJECTION, SOLUTION INTRAVENOUS; SUBCUTANEOUS at 14:55

## 2021-08-03 RX ADMIN — PIPERACILLIN AND TAZOBACTAM 4.5 G: 4; .5 INJECTION, POWDER, LYOPHILIZED, FOR SOLUTION INTRAVENOUS; PARENTERAL at 20:58

## 2021-08-03 RX ADMIN — SODIUM CHLORIDE, SODIUM LACTATE, POTASSIUM CHLORIDE, CALCIUM CHLORIDE AND DEXTROSE MONOHYDRATE: 5; 600; 310; 30; 20 INJECTION, SOLUTION INTRAVENOUS at 08:05

## 2021-08-03 RX ADMIN — LORAZEPAM 1 MG: 2 INJECTION INTRAMUSCULAR; INTRAVENOUS at 01:31

## 2021-08-03 ASSESSMENT — ENCOUNTER SYMPTOMS
TINGLING: 0
CHILLS: 0
FEVER: 0
PHOTOPHOBIA: 0
BACK PAIN: 0
PALPITATIONS: 0
NECK PAIN: 0
SPUTUM PRODUCTION: 0
DOUBLE VISION: 0
ABDOMINAL PAIN: 1
NAUSEA: 1
BRUISES/BLEEDS EASILY: 0
HEMOPTYSIS: 0
TREMORS: 0

## 2021-08-03 ASSESSMENT — PAIN DESCRIPTION - PAIN TYPE
TYPE: ACUTE PAIN

## 2021-08-03 ASSESSMENT — LIFESTYLE VARIABLES: SUBSTANCE_ABUSE: 0

## 2021-08-03 NOTE — CONSULTS
ADULT INFECTIOUS DISEASE CONSULT     Date of Service: 08/03/21    Consult Requested By: Mihir Gomes M.D.    Reason for Consultation: abdominal panniculitis /cellulitis with concern for necrotizing fasciitis    History of Present Illness:   Hien Alfaro is a 41 y.o.female with PMH of alcoholic cirrhosis of liver with a recent hx of abdominal wall cellulitis and colitis , was given 2 weeks abx (doxy, rifampin, bactrim) and at [facility] where she came as a transfer was treated with c3, clinda, and meropenem. CT on admission concerning for colitis, leukocytosis, lactic acidosis. Was admitted for concerns of cellulitis vs necrotizing fasciitis. Surgery assessed, advised abdominal panniculitis/cellulitis with necrosis treatment with abx and no surgical intervention. Blood cultures 8/1 negative thus far. WBC 14,000, patient consistently tachycardic.     Review Of Systems:  Review of Systems   Constitutional: Negative for chills and fever.   HENT: Negative for ear pain and tinnitus.    Eyes: Negative for double vision and photophobia.   Respiratory: Negative for hemoptysis and sputum production.    Cardiovascular: Negative for chest pain and palpitations.   Gastrointestinal: Positive for abdominal pain and nausea.   Genitourinary: Negative for frequency and urgency.   Musculoskeletal: Negative for back pain and neck pain.   Skin: Negative for itching and rash.   Neurological: Negative for tingling and tremors.   Endo/Heme/Allergies: Negative for environmental allergies. Does not bruise/bleed easily.   Psychiatric/Behavioral: Negative for substance abuse and suicidal ideas.       PMH:   Past Medical History:   Diagnosis Date   • DM (diabetes mellitus) (HCC)          PSH:  No past surgical history on file.    FAMILY HX:  No family history on file.    SOCIAL HX:  Social History     Socioeconomic History   • Marital status:      Spouse name: Not on file   • Number of children: Not on file   • Years of  education: Not on file   • Highest education level: Not on file   Occupational History   • Not on file   Tobacco Use   • Smoking status: Former Smoker   • Smokeless tobacco: Former User   Substance and Sexual Activity   • Alcohol use: Not Currently     Alcohol/week: 8.4 oz     Types: 14 Cans of beer per week     Comment: last drink on 5/15   • Drug use: Never   • Sexual activity: Not on file   Other Topics Concern   • Not on file   Social History Narrative   • Not on file     Social Determinants of Health     Financial Resource Strain:    • Difficulty of Paying Living Expenses:    Food Insecurity:    • Worried About Running Out of Food in the Last Year:    • Ran Out of Food in the Last Year:    Transportation Needs:    • Lack of Transportation (Medical):    • Lack of Transportation (Non-Medical):    Physical Activity:    • Days of Exercise per Week:    • Minutes of Exercise per Session:    Stress:    • Feeling of Stress :    Social Connections:    • Frequency of Communication with Friends and Family:    • Frequency of Social Gatherings with Friends and Family:    • Attends [employer] Services:    • Active Member of Clubs or Organizations:    • Attends Club or Organization Meetings:    • Marital Status:    Intimate Partner Violence:    • Fear of Current or Ex-Partner:    • Emotionally Abused:    • Physically Abused:    • Sexually Abused:      Social History     Tobacco Use   Smoking Status Former Smoker   Smokeless Tobacco Former User     Social History     Substance and Sexual Activity   Alcohol Use Not Currently   • Alcohol/week: 8.4 oz   • Types: 14 Cans of beer per week    Comment: last drink on 5/15       Allergies/Intolerances:  No Known Allergies    History reviewed with the patient    Other Current Medications:    Current Facility-Administered Medications:   •  haloperidol lactate (HALDOL) injection 2-5 mg, 2-5 mg, Intravenous, Q3HRS PRN, Jayshree Callaway M.D., 4 mg at 08/03/21 0028  •  lactulose 10 GM/15ML  solution 300 mL, 300 mL, Rectal, Q4HRS PRN, Mihir Gomes M.D.  •  LORazepam (ATIVAN) injection 0.5 mg, 0.5 mg, Intravenous, Q3HRS PRN, Mihir Gomes M.D.  •  lactulose 20 GM/30ML solution 45 mL, 45 mL, Oral, TID, Mihir Gomes M.D.  •  furosemide (LASIX) tablet 40 mg, 40 mg, Oral, Q DAY, Cash Mejia M.D., 40 mg at 08/02/21 1129  •  hydrOXYzine HCl (ATARAX) tablet 50 mg, 50 mg, Oral, TID PRN, Cash Mejia M.D., 50 mg at 08/02/21 1146  •  Notify, , , Once **AND** glucose 4 g chewable tablet 16 g, 16 g, Oral, Q15 MIN PRN, 16 g at 08/02/21 1657 **AND** dextrose 50% (D50W) injection 50 mL, 50 mL, Intravenous, Q15 MIN PRN, Cash Mejia M.D., 50 mL at 08/02/21 1719  •  D5LR infusion, , Intravenous, Continuous, Cash Mejia M.D., Last Rate: 75 mL/hr at 08/03/21 0805, New Bag at 08/03/21 0805  •  lactated ringers infusion (BOLUS): BMI less than or equal to 30, 30 mL/kg, Intravenous, Once PRN, Alda Christiansen M.D.  •  folic acid (FOLVITE) tablet 1 mg, 1 mg, Oral, DAILY, Jonathan Abraham M.D., 1 mg at 08/01/21 1530  •  levothyroxine (SYNTHROID) tablet 50 mcg, 50 mcg, Oral, AM ES, Jonathan Abraham M.D., 50 mcg at 08/01/21 1524  •  multivitamin (THERAGRAN) tablet 1 tablet, 1 tablet, Oral, DAILY, Jonathan Abraham M.D., 1 tablet at 08/01/21 1525  •  riFAXIMin (XIFAXAN) tablet 550 mg, 550 mg, Oral, BID, Jonathan Abraham M.D., 550 mg at 08/02/21 1719  •  spironolactone (ALDACTONE) tablet 100 mg, 100 mg, Oral, DAILY, Jonathan Abraham M.D., 100 mg at 08/02/21 0536  •  vitamin D (cholecalciferol) tablet 1,000 Units, 1,000 Units, Oral, DAILY, Jonathan Abraham M.D., 1,000 Units at 08/02/21 0535  •  Notify provider if pain remains uncontrolled, , , CONTINUOUS **AND** Use the Numeric Rating Scale (NRS), Monroy-Baker Faces (WBF), or FLACC on regular floors and Critical-Care Pain Observation Tool (CPOT) on ICUs/Trauma to assess pain, , , CONTINUOUS **AND** Pulse Ox, , , CONTINUOUS **AND** Pharmacy Consult Request ...Pain Management  "Review 1 Each, 1 Each, Other, PHARMACY TO DOSE **AND** If patient difficult to arouse and/or has respiratory depression (respiratory rate of 10 or less), stop any opiates that are currently infusing and call a Rapid Response., , , CONTINUOUS, Jonathan Abraham M.D.  •  oxyCODONE immediate-release (ROXICODONE) tablet 2.5 mg, 2.5 mg, Oral, Q3HRS PRN **OR** oxyCODONE immediate-release (ROXICODONE) tablet 5 mg, 5 mg, Oral, Q3HRS PRN, 5 mg at 21 2324 **OR** HYDROmorphone (Dilaudid) injection 0.25 mg, 0.25 mg, Intravenous, Q3HRS PRN, Jonathan Abraham M.D., 0.25 mg at 21 1506  •  lactated ringers infusion (BOLUS), 500 mL, Intravenous, Once PRN, Jonathan Abraham M.D.  •  [COMPLETED] piperacillin-tazobactam (ZOSYN) 4.5 g in  mL IVPB, 4.5 g, Intravenous, Once, Stopped at 21 1255 **AND** piperacillin-tazobactam (ZOSYN) 4.5 g in  mL IVPB, 4.5 g, Intravenous, Q8HRS, Jonathan Abraham M.D., Last Rate: 25 mL/hr at 21 1236, 4.5 g at 21 1236  •  lactobacillus rhamnosus (CULTURELLE) capsule 1 capsule, 1 capsule, Oral, QDAY with Breakfast, Jonathan Abraham M.D., 1 capsule at 21 0822  •  cyanocobalamin (VITAMIN B-12) tablet 2,500 mcg, 2,500 mcg, Oral, DAILY, Jonathan Abraham M.D., 2,500 mcg at 21 1632  [unfilled]    Most Recent Vital Signs:  /60   Pulse (!) 119 Comment: RN notified  Temp 36.4 °C (97.5 °F) (Temporal)   Resp 18   Ht 1.6 m (5' 3\")   Wt 104 kg (230 lb)   SpO2 95%   BMI 40.74 kg/m²   Temp  Av.5 °C (97.7 °F)  Min: 35.8 °C (96.5 °F)  Max: 37.1 °C (98.8 °F)    Physical Exam:  Physical Exam  Vitals and nursing note reviewed.   Constitutional:       General: She is not in acute distress.     Appearance: She is not toxic-appearing.   HENT:      Head: Normocephalic and atraumatic.      Right Ear: Tympanic membrane normal.      Left Ear: Tympanic membrane normal.      Nose: No congestion or rhinorrhea.      Mouth/Throat:      Mouth: Mucous " membranes are moist.      Pharynx: No oropharyngeal exudate or posterior oropharyngeal erythema.   Eyes:      Extraocular Movements: Extraocular movements intact.      Pupils: Pupils are equal, round, and reactive to light.   Cardiovascular:      Rate and Rhythm: Normal rate and regular rhythm.      Heart sounds: No murmur heard.   No friction rub.   Pulmonary:      Effort: No respiratory distress.      Breath sounds: No stridor. No wheezing.   Abdominal:      Tenderness: There is no abdominal tenderness. There is no guarding or rebound.   Genitourinary:     Rectum: Guaiac result negative.   Musculoskeletal:         General: No swelling, tenderness or deformity.      Cervical back: No rigidity. No muscular tenderness.   Skin:     Coloration: Skin is not jaundiced or pale.      Findings: No bruising or erythema.      Comments: Skin lower abdomen and medial lE superficiallye eroded, no discharge, painful to touch, erythematous   Neurological:      Mental Status: She is alert. Mental status is at baseline.      Sensory: No sensory deficit.      Motor: No weakness.      Gait: Gait normal.         Pertinent Lab Results:  Recent Labs     08/01/21  1038 08/03/21  0642   WBC 13.4* 14.0*      Recent Labs     08/01/21  1038 08/03/21  0642   HEMOGLOBIN 8.7* 8.4*   HEMATOCRIT 25.8* 24.2*   MCV 99.6* 95.7   MCH 33.6* 33.2*   PLATELETCT 188 164         Recent Labs     08/01/21  1038 08/02/21  0457 08/03/21  0642   SODIUM 130* 133* 136   POTASSIUM 3.9 3.8 3.7   CHLORIDE 101 103 106   CO2 20 20 19*   CREATININE 0.77 0.86 0.80        Recent Labs     08/01/21  1038 08/02/21  0457 08/03/21  0642   ALBUMIN 1.8* 2.0* 1.7*        Pertinent Micro:  Results     Procedure Component Value Units Date/Time    MRSA By PCR (Amp) [[id number]]     Order Status: No result Specimen: Respirate from Nares     BLOOD CULTURE [[id number]] Collected: 08/01/21 1113    Order Status: Completed Specimen: Blood from Peripheral Updated: 08/02/21 6248      "Significant Indicator NEG     Source BLD     Site PERIPHERAL     Culture Result No Growth  Note: Blood cultures are incubated for 5 days and  are monitored continuously.Positive blood cultures  are called to the RN and reported as soon as  they are identified.      Narrative:      Per Hospital Policy: Only change Specimen Src: to \"Line\" if  specified by physician order.  No site indicated    BLOOD CULTURE [[id number]] Collected: 08/01/21 1213    Order Status: Completed Specimen: Blood from Peripheral Updated: 08/02/21 0723     Significant Indicator NEG     Source BLD     Site PERIPHERAL     Culture Result No Growth  Note: Blood cultures are incubated for 5 days and  are monitored continuously.Positive blood cultures  are called to the RN and reported as soon as  they are identified.      Narrative:      Per Hospital Policy: Only change Specimen Src: to \"Line\" if  specified by physician order.  Right AC    C Diff by PCR rflx Toxin [[id number]]     Order Status: Canceled Specimen: Stool     Urinalysis [[id number]]     Order Status: Canceled Specimen: Urine     Culture Wound W/ Gram Stain [[id number]]     Order Status: Sent Specimen: Wound from Abdominal     URINALYSIS [[id number]]     Order Status: Sent Specimen: Urine     URINE CULTURE(NEW) [[id number]]     Order Status: Sent Specimen: Urine     Blood Culture [[id number]] Collected: 08/01/21 0000    Order Status: Canceled Specimen: Other from Peripheral     Blood Culture [[id number]] Collected: 08/01/21 0000    Order Status: Canceled Specimen: Other from Peripheral         No results found for: BLOODCULTU, BLDCULT, BCHOLD     Studies:    IMPRESSION/PLAN:     #abdominal and LE medial thigh cellulitis/panniculitis  #leukocytosisHien Dominic is a 41 y.o.female with PMH of alcoholic cirrhosis of liver with a recent hx of abdominal wall cellulitis and colitis , was given 2 weeks abx (doxy, rifampin, bactrim) and at [facility] where she came as a transfer was treated " with c3, clinda, and meropenem. CT on admission concerning for colitis, leukocytosis, lactic acidosis. Was admitted for concerns of cellulitis vs necrotizing fasciitis. Surgery assessed, advised abdominal panniculitis/cellulitis with necrosis treatment with abx and no surgical intervention. Blood cultures 8/1 negative thus far. WBC 14,000, patient consistently tachycardic. Blood cultures drawn 8/1 NGTD. Was placed on IV zosyn, vanc, and clinca (the latter two discontinued).     Will advise adding linezolid to regime to cover for staph/strep/antixtoxin prophylaxis . May need repeat imaging later (will continue to follow and determine need)    Plan:  Abx: Zosyn, Linezolid (staph, strep  MRSA nares  CTM labs  CTM culture      Discussed with IM Dr. Gomes. Will continue to follow    Aparna Garces M.D.   [facility]    Plan discussed with Dr. Gomes  Thank for you allowing us to take part in your patient's care, please call should you have any questions or would like to discuss this patient.

## 2021-08-03 NOTE — PROGRESS NOTES
Pt is A&O 1. To self   Non compliant with instructions.   Pt  at bedside  Pain medicated per MAR   - nausea  Pt on a regular diet. Pt refuses to eat    Bilateral inner thigh blistering and blackening. Xeroform applied pt refused ABD pat and dry gauze to be applied per order.   Bed alarm on, pt high  fall risk per christian canales  Reviewed plan of care with patient, bed in lowest position and locked, pt resting comfortably now, call light within reach, all needs met at this time. Interventions will be executed per plan of care

## 2021-08-03 NOTE — PROGRESS NOTES
Pt screaming in room constantly. Pt screams she is in pain, will not take oral medications. Unable to reorient and educate. Paged MD regarding pts current state. Orders received. Pt medicated per MAR

## 2021-08-03 NOTE — WOUND TEAM
[facility]  Inpatient Services  Initial Wound and Skin Care Evaluation    Admission Date: 8/1/2021     Last order of IP CONSULT TO WOUND CARE was found on 8/1/2021 from Hospital Encounter on 8/1/2021     HPI, PMH, SH: Reviewed    No past surgical history on file.  Social History     Tobacco Use   • Smoking status: Former Smoker   • Smokeless tobacco: Former User   Substance Use Topics   • Alcohol use: Not Currently     Alcohol/week: 8.4 oz     Types: 14 Cans of beer per week     Comment: last drink on 5/15     Chief Complaint   Patient presents with   • Wound Check     Pt reports redness in lower abd started ~2 weeks ago, now presenting with blackened open wounds across lower abd to b/l groin.      Diagnosis: Panniculitis [M79.3]    Unit where seen by Wound Team: [id number]/01     WOUND CONSULT/FOLLOW UP RELATED TO:  Left pannus/bilateral thighs/chest      WOUND HISTORY:  Unknown history.  Patient states it started about a week or 2 ago.    Per Dr. Lion 08/02: Cellulitis present for 2 weeks -treated with oral antibiotics  CT shows panniculitis extension both left and right flanks  8/2 cont abx, no need for surgery at the moment    WOUND ASSESSMENT/LDA      Wound 08/01/21 Soft Tissue Necrosis Abdomen;Pannus;Thigh Lower Left eschar  (Active)   Wound Image     08/02/21 2124   Site Assessment Black    Periwound Assessment Painful;Induration    Margins Attached edges    Closure None    Drainage Amount Moderate    Drainage Description Serosanguineous;Yellow    Treatments Site care    Periwound Protectant Barrier Paste    Dressing Cleansing/Solutions Not Applicable    Dressing Options Petrolatum Gauze (yellow);Absorbent Abdominal Pad;Dry Roll Gauze    Dressing Changed Other (Comment)    Dressing Status Clean;Dry;Intact    Dressing Change/Treatment Frequency Daily, and As Needed    NEXT Dressing Change/Treatment Date 08/02/21    WOUND NURSE ONLY - Time Spent with Patient (mins) 45    Number of days: 1        Wound 08/01/21 Soft Tissue Necrosis Thigh Inner;Upper Left (Active)   Wound Image    08/02/21 1754   Site Assessment Red;Purple;Fragile    Periwound Assessment Edema;Fragile;Painful    Margins Unattached edges    Closure None    Drainage Amount Moderate    Drainage Description Serosanguineous;Yellow    Treatments Site care    Wound Cleansing Foam Cleanser/Washcloth    Periwound Protectant Barrier Paste    Dressing Cleansing/Solutions Not Applicable    Dressing Options Petrolatum Gauze (yellow);Absorbent Abdominal Pad;Dry Roll Gauze    Dressing Changed Other (Comment)    Dressing Change/Treatment Frequency Daily, and As Needed    NEXT Dressing Change/Treatment Date 08/03/21    NEXT Weekly Photo (Inpatient Only) 08/09/21    Number of days: 1      Vascular:    LLOYD:   No results found.    Lab Values:    Lab Results   Component Value Date/Time    WBC 13.4 (H) 08/01/2021 10:38 AM    RBC 2.59 (L) 08/01/2021 10:38 AM    HEMOGLOBIN 8.7 (L) 08/01/2021 10:38 AM    HEMATOCRIT 25.8 (L) 08/01/2021 10:38 AM    HBA1C 4.0 08/01/2021 10:38 AM      Culture Results show:  No results found for this or any previous visit (from the past 720 hour(s)).    Pain Level/Medicated:  Pain to gentle touch.  Unable to fully assess.         INTERVENTIONS BY WOUND TEAM:  Chart and images reviewed. Discussed with bedside RN. All areas of concern (based on picture review, LDA review and discussion with bedside RN) have been thoroughly assessed. Documentation of areas based on significant findings. This RN in to assess patient. Performed standard wound care which includes appropriate positioning, dressing removal and non-selective debridement. Pictures and measurements obtained weekly if/when required.  Preparation for Dressing removal: NA  Cleansed with:  NA   Sharp debridement: NA  Nursing to apply if able and patient allows   [place] wound: barrier paste   Primary Dressing: Xeroform guaze   Secondary (Outer) Dressing: ABD and roll gauze      Interdisciplinary consultation: Patient, Bedside RN (Moni), wound RN (Lito)     EVALUATION / RATIONALE FOR TREATMENT:  Most Recent Date:  08/02/21: patient refusing care and medications.  Confused and needs re-orienting.  Not allowing wounds cleansed or dressing at this time.  Will not lay down for full assessment.  Dressings to be applied if able per nursing.  Wound team to follow up.       Goals: Steady decrease in wound area and depth weekly.    WOUND TEAM PLAN OF CARE ([X] for frequency of wound follow up,):   Nursing to follow orders written for wound care. Contact wound team if area fails to progress, deteriorates or with any questions/concerns  Dressing changes by wound team:                   Follow up 3 times weekly:                NPWT change 3 times weekly:     Follow up 1-2 times weekly:    X  Follow up Bi-Monthly:                   Follow up as needed:     Other (explain):     NURSING PLAN OF CARE ORDERS (X):  Dressing changes: See Dressing Care orders: X  Skin care: See Skin Care orders:   RN Prevention Protocol: patient moving self   Rectal tube care: See Rectal Tube Care orders:   Other orders:    RSKIN:   CURRENTLY IN PLACE (X), APPLIED THIS VISIT (A), ORDERED (O):   Q shift Yousif:  X  Q shift pressure point assessments:  X    Surface/Positioning   Pressure redistribution mattress          X  Low Airloss          Bariatric foam      Bariatric LUCITA     Waffle cushion        Waffle Overlay          Reposition q 2 hours    Ensure patient turning   TAPs Turning system     Z Bill Pillow     Offloading/Redistribution   Sacral Mepilex (Silicone dressing)     Heel Mepilex (Silicone dressing)         Heel float boots (Prevalon boot)             Float Heels off Bed with Pillows           Respiratory   Silicone O2 tubing         Gray Foam Ear protectors     Cannula fixation Device (Tender )          High flow offloading Clip    Elastic head band offloading device      Anchorfast                                                          Trach with Optifoam split foam             Containment/Moisture Prevention     Rectal tube or BMS    Purwick/Condom Cath        Charlton Catheter    Barrier wipes           Barrier paste       Antifungal tx      Interdry        Mobilization       Up to chair        Ambulate      PT/OT      Nutrition       Dietician        Diabetes Education      PO   X  TF     TPN     NPO   # days     Other        Anticipated discharge plans: TBA will need ongoing wound care post DC  LTACH:        SNF/Rehab:                  Home Health Care:           Outpatient Wound Center:            Self/Family Care:        Other:

## 2021-08-03 NOTE — CARE PLAN
The patient is Watcher - Medium risk of patient condition declining or worsening    Shift Goals  Clinical Goals: increase cognition, MAR compliance   Patient Goals: MICHI    Progress made toward(s) clinical / shift goals:      Patient is not progressing towards the following goals: Pt refuses oral lactulose. Education provided, no learning evidenced       Problem: Pain - Standard  Goal: Alleviation of pain or a reduction in pain to the patient’s comfort goal  Outcome: Not Progressing  Note: Pt pain medicated per MAR. Pt screaming in pain post administration      Problem: Fall Risk  Goal: Patient will remain free from falls  Outcome: Progressing  Note: Safety precautions in place

## 2021-08-03 NOTE — PROGRESS NOTES
"[facility] Medicine Daily Progress Note    Date of Service  8/3/2021    Chief Complaint  Hien Alfaro is a 41 y.o. female admitted 8/1/2021 with abdominal discomfort.    Hospital Course  Per admitting provider:  \"Hien Alfaro is a 41 y.o. female who presented 8/1/2021 with Wound Check (Pt reports redness in lower abd started ~2 weeks ago, now presenting with blackened open wounds across lower abd to b/l groin. )  She is morbidly obese. She has a history of alcoholic cirrhosis, portal vein thrombosis NOT on anticoagulation (seen and decided by Dr. Montenegro, [facility]) and was hospitalized in June for decompensated alcoholic cirrhosis with hyperammonemia, bilirubinemia and hepatorenal syndrome with poor outpatient follow-up. She was placed on antibiotics for her pannus infections. Despite antibiotics, she noticed black open wounds along her abdomen to bilateral groin and also a lump/mass that started 2 weeks now RLQ region.\"     CT AP at another facility noted colitis and panniculitis, no gas or abscess noted. Transferred to [facility] for higher level of care, admitted to medicine service, surgery was consulted, no surgery indicated, cont abx.     ID is consulted. On zosyn. Check MRSA swab.     Palliative consulted.     Hepatic encephalopathy, ammonia elevated. On lactulose po and rectal prn unable to take po.     Interval Problem Update  8/3: Patient is noted to be confused, agitated intermittently.  at the bedside.  is feeding her with lactulose. Ammonia this morning 101.     I have personally seen and examined the patient at bedside. I discussed the plan of care with patient, family and bedside RN.    Consultants/Specialty  Surgery  Wound care  Palliative care    Code Status  Full Code    Disposition  Patient is not medically cleared.   Anticipate discharge to to skilled nursing facility.  I have placed the appropriate orders for post-discharge needs.    Review of Systems  Review of Systems   Unable to " perform ROS: Mental acuity        Physical Exam  Temp:  [36.4 °C (97.5 °F)-37.1 °C (98.8 °F)] 36.4 °C (97.5 °F)  Pulse:  [110-134] 119  Resp:  [16-20] 18  BP: (100-122)/(60-77) 104/60  SpO2:  [95 %-100 %] 95 %    Physical Exam  Vitals and nursing note reviewed.   Constitutional:       Appearance: She is obese. She is ill-appearing.   HENT:      Head: Normocephalic and atraumatic.      Nose: Nose normal.   Eyes:      General: Scleral icterus present.      Extraocular Movements: Extraocular movements intact.   Cardiovascular:      Rate and Rhythm: Normal rate and regular rhythm.      Pulses: Normal pulses.      Heart sounds: No friction rub.   Pulmonary:      Effort: Pulmonary effort is normal.      Breath sounds: No wheezing.      Comments: Decreased bibasilar breath sounds  Abdominal:      General: There is distension.      Palpations: Abdomen is soft.      Tenderness: There is abdominal tenderness. There is no guarding or rebound.   Musculoskeletal:         General: No tenderness. Normal range of motion.      Cervical back: Neck supple. No tenderness.      Right lower leg: Edema present.      Left lower leg: Edema present.   Skin:     General: Skin is dry.      Capillary Refill: Capillary refill takes less than 2 seconds.      Coloration: Skin is jaundiced.   Neurological:      Mental Status: She is alert.      Comments: Confused and agitated intermittently  Minimally verbal     Psychiatric:      Comments: Unable to evaluate         Fluids    Intake/Output Summary (Last 24 hours) at 8/3/2021 1534  Last data filed at 8/3/2021 0000  Gross per 24 hour   Intake 448.75 ml   Output --   Net 448.75 ml       Laboratory  Recent Labs     08/01/21  1038 08/03/21  0642   WBC 13.4* 14.0*   RBC 2.59* 2.53*   HEMOGLOBIN 8.7* 8.4*   HEMATOCRIT 25.8* 24.2*   MCV 99.6* 95.7   MCH 33.6* 33.2*   MCHC 33.7 34.7   RDW 64.0* 58.1*   PLATELETCT 188 164   MPV 9.5 9.7     Recent Labs     08/01/21  1038 08/02/21  0457 08/03/21  0642   SODIUM  130* 133* 136   POTASSIUM 3.9 3.8 3.7   CHLORIDE 101 103 106   CO2 20 20 19*   GLUCOSE 69 56* 132*   BUN 8 8 8   CREATININE 0.77 0.86 0.80   CALCIUM 7.7* 7.6* 7.7*     Recent Labs     08/01/21  1213   APTT 63.0*   INR 2.82*               Imaging  OUTSIDE IMAGES-CT ABDOMEN /PELVIS   Final Result      OUTSIDE IMAGES-DX CHEST   Final Result      OUTSIDE IMAGES-CT ABDOMEN /PELVIS   Final Result           Assessment/Plan  * Abdominal panniculitis/cellulitis w/ concern for necrosis- (present on admission)  Assessment & Plan  CT AP at another facility colitis, panniculitis with no gas/abscess  Surgery f/u appreciated -- no indication for surgery at this time    D/c vancomycin and clindamycin 8/2, check MRSA  Abx (8/1-now): Zosyn  F/u BCx and wound culture    Wound care  ID is consulted      Anxiety  Assessment & Plan  PRN atarax    Acute encephalopathy  Assessment & Plan  Likely hepatic encephalopathy  Ordered CT head  Ammonia elevated   Lactulose ordered, patient refused.  at the bedside assists to feed her lactulose. Ordered lactulose rectal if patient is unable to take it. Discussed with RN    End stage liver disease (HCC)  Assessment & Plan  MELD score: 28 on admission  Secondary to ETOH  Hepatitis panel neg  Lactulose, Xifaxan, lasix, spironolactone as tolerated  Poor prognosis    Severe protein-calorie malnutrition (HCC)  Assessment & Plan  Ensure    Failure to thrive in adult  Assessment & Plan  PT/OT, PO diet as able to tolerated  Palliative care consult    Anasarca  Assessment & Plan  Secondary to ESLD  Supportive care    Hypoglycemia  Assessment & Plan  Hypoglycemia protocol    Colitis  Assessment & Plan  As noted in panniculitis    Portal vein thrombosis- (present on admission)  Assessment & Plan  Suspect chronic PV throbosis -- in which case there is no clear benefit for anticoagulation -- case was previously noted by hematology (Dr. Montenegro)    Will avoid chronic anticoagulation    Class 3 severe  obesity due to excess calories with serious comorbidity and body mass index (BMI) of 40.0 to 44.9 in adult (HCC)- (present on admission)  Assessment & Plan  Diet and lifestyle modifications    Sepsis (HCC)  Assessment & Plan  This is Sepsis Present on admission  SIRS criteria identified on my evaluation include: Leukocytosis, with WBC greater than 12,000  Source is pannus  Sepsis protocol initiated  Fluid resuscitation ordered per protocol  IV antibiotics as appropriate for source of sepsis  While organ dysfunction may be noted elsewhere in this problem list or in the chart, degree of organ dysfunction does not meet CMS criteria for severe sepsis          Hypothyroidism- (present on admission)  Assessment & Plan  TSH 7.2, elevated, but improved from TSH in April, free T4 in normal range  Synthroid    Alcoholic cirrhosis of liver without ascites (HCC)- (present on admission)  Assessment & Plan  Decompensated.   reports patient has not been drinking since May  MELD score 28 on admission, 27-32% 90 day mortality rate      Type 2 diabetes mellitus with neurologic complication, without long-term current use of insulin (HCC)- (present on admission)  Assessment & Plan  No ISS as pt has been hypoglycemic       VTE prophylaxis: SCDs/TEDs given elevated INR    I have performed a physical exam and reviewed and updated ROS and Plan today (8/3/2021). In review of yesterday's note (8/2/2021), there are no changes except as documented above.

## 2021-08-03 NOTE — PROGRESS NOTES
Bedside report received.  Assessment complete.  MICHI orientation, patient alternates between being lethargic and screaming  Patient up with x 1-2 assist,  at bedside. Bed alarm on.   MICHI pain at this time  Denies N&V. Regular diabetic diet ordered, 2000 ml fluid restriction in place  Skin tear to right flank, GUNNAR. Eschar to left flank with dressing in place, CDI. Black eschar present to BL inner thighs, GUNNAR  - void, - flatus, - BM.  Patient denies SOB.  SCD's off.    Review plan with of care with patient. Call light and personal belongings within reach. Hourly rounding in place. All needs met at this time.

## 2021-08-04 ENCOUNTER — APPOINTMENT (OUTPATIENT)
Dept: RADIOLOGY | Facility: MEDICAL CENTER | Age: 41
DRG: 871 | End: 2021-08-04
Attending: STUDENT IN AN ORGANIZED HEALTH CARE EDUCATION/TRAINING PROGRAM
Payer: COMMERCIAL

## 2021-08-04 PROBLEM — E80.6 HYPERBILIRUBINEMIA: Status: ACTIVE | Noted: 2021-08-04

## 2021-08-04 LAB
ALBUMIN SERPL BCP-MCNC: 2 G/DL (ref 3.2–4.9)
ALBUMIN/GLOB SERPL: 0.4 G/DL
ALP SERPL-CCNC: 168 U/L (ref 30–99)
ALT SERPL-CCNC: 30 U/L (ref 2–50)
AMMONIA PLAS-SCNC: 77 UMOL/L (ref 11–45)
ANION GAP SERPL CALC-SCNC: 15 MMOL/L (ref 7–16)
AST SERPL-CCNC: 95 U/L (ref 12–45)
BASOPHILS # BLD AUTO: 0.2 % (ref 0–1.8)
BASOPHILS # BLD: 0.04 K/UL (ref 0–0.12)
BILIRUB SERPL-MCNC: 4.7 MG/DL (ref 0.1–1.5)
BUN SERPL-MCNC: 9 MG/DL (ref 8–22)
CALCIUM SERPL-MCNC: 8.3 MG/DL (ref 8.5–10.5)
CHLORIDE SERPL-SCNC: 105 MMOL/L (ref 96–112)
CO2 SERPL-SCNC: 17 MMOL/L (ref 20–33)
CREAT SERPL-MCNC: 0.97 MG/DL (ref 0.5–1.4)
EKG IMPRESSION: NORMAL
EOSINOPHIL # BLD AUTO: 0.13 K/UL (ref 0–0.51)
EOSINOPHIL NFR BLD: 0.7 % (ref 0–6.9)
ERYTHROCYTE [DISTWIDTH] IN BLOOD BY AUTOMATED COUNT: 60.8 FL (ref 35.9–50)
GLOBULIN SER CALC-MCNC: 5.2 G/DL (ref 1.9–3.5)
GLUCOSE BLD-MCNC: 142 MG/DL (ref 65–99)
GLUCOSE SERPL-MCNC: 144 MG/DL (ref 65–99)
HCT VFR BLD AUTO: 24.4 % (ref 37–47)
HGB BLD-MCNC: 8.5 G/DL (ref 12–16)
IMM GRANULOCYTES # BLD AUTO: 0.19 K/UL (ref 0–0.11)
IMM GRANULOCYTES NFR BLD AUTO: 1 % (ref 0–0.9)
INR PPP: 2.78 (ref 0.87–1.13)
LACTATE BLD-SCNC: 5.1 MMOL/L (ref 0.5–2)
LACTATE BLD-SCNC: 5.6 MMOL/L (ref 0.5–2)
LYMPHOCYTES # BLD AUTO: 2.02 K/UL (ref 1–4.8)
LYMPHOCYTES NFR BLD: 11.1 % (ref 22–41)
MCH RBC QN AUTO: 33.9 PG (ref 27–33)
MCHC RBC AUTO-ENTMCNC: 34.8 G/DL (ref 33.6–35)
MCV RBC AUTO: 97.2 FL (ref 81.4–97.8)
MONOCYTES # BLD AUTO: 1.39 K/UL (ref 0–0.85)
MONOCYTES NFR BLD AUTO: 7.6 % (ref 0–13.4)
NEUTROPHILS # BLD AUTO: 14.43 K/UL (ref 2–7.15)
NEUTROPHILS NFR BLD: 79.4 % (ref 44–72)
NRBC # BLD AUTO: 0 K/UL
NRBC BLD-RTO: 0 /100 WBC
PHOSPHATE SERPL-MCNC: 3.1 MG/DL (ref 2.5–4.5)
PLATELET # BLD AUTO: 165 K/UL (ref 164–446)
PMV BLD AUTO: 9.7 FL (ref 9–12.9)
POTASSIUM SERPL-SCNC: 3.3 MMOL/L (ref 3.6–5.5)
PROT SERPL-MCNC: 7.2 G/DL (ref 6–8.2)
PROTHROMBIN TIME: 28.5 SEC (ref 12–14.6)
RBC # BLD AUTO: 2.51 M/UL (ref 4.2–5.4)
SODIUM SERPL-SCNC: 137 MMOL/L (ref 135–145)
WBC # BLD AUTO: 18.2 K/UL (ref 4.8–10.8)

## 2021-08-04 PROCEDURE — 700111 HCHG RX REV CODE 636 W/ 250 OVERRIDE (IP): Performed by: INTERNAL MEDICINE

## 2021-08-04 PROCEDURE — 82140 ASSAY OF AMMONIA: CPT

## 2021-08-04 PROCEDURE — 84100 ASSAY OF PHOSPHORUS: CPT

## 2021-08-04 PROCEDURE — 80053 COMPREHEN METABOLIC PANEL: CPT

## 2021-08-04 PROCEDURE — 700102 HCHG RX REV CODE 250 W/ 637 OVERRIDE(OP): Performed by: STUDENT IN AN ORGANIZED HEALTH CARE EDUCATION/TRAINING PROGRAM

## 2021-08-04 PROCEDURE — 99291 CRITICAL CARE FIRST HOUR: CPT | Performed by: STUDENT IN AN ORGANIZED HEALTH CARE EDUCATION/TRAINING PROGRAM

## 2021-08-04 PROCEDURE — 83605 ASSAY OF LACTIC ACID: CPT | Mod: 91

## 2021-08-04 PROCEDURE — 82962 GLUCOSE BLOOD TEST: CPT

## 2021-08-04 PROCEDURE — 36415 COLL VENOUS BLD VENIPUNCTURE: CPT

## 2021-08-04 PROCEDURE — 85610 PROTHROMBIN TIME: CPT

## 2021-08-04 PROCEDURE — 700111 HCHG RX REV CODE 636 W/ 250 OVERRIDE (IP): Performed by: STUDENT IN AN ORGANIZED HEALTH CARE EDUCATION/TRAINING PROGRAM

## 2021-08-04 PROCEDURE — 97166 OT EVAL MOD COMPLEX 45 MIN: CPT

## 2021-08-04 PROCEDURE — 700105 HCHG RX REV CODE 258: Performed by: INTERNAL MEDICINE

## 2021-08-04 PROCEDURE — A9270 NON-COVERED ITEM OR SERVICE: HCPCS | Performed by: STUDENT IN AN ORGANIZED HEALTH CARE EDUCATION/TRAINING PROGRAM

## 2021-08-04 PROCEDURE — 770020 HCHG ROOM/CARE - TELE (206)

## 2021-08-04 PROCEDURE — 700105 HCHG RX REV CODE 258: Performed by: STUDENT IN AN ORGANIZED HEALTH CARE EDUCATION/TRAINING PROGRAM

## 2021-08-04 PROCEDURE — 93005 ELECTROCARDIOGRAM TRACING: CPT | Performed by: STUDENT IN AN ORGANIZED HEALTH CARE EDUCATION/TRAINING PROGRAM

## 2021-08-04 PROCEDURE — 700102 HCHG RX REV CODE 250 W/ 637 OVERRIDE(OP): Performed by: INTERNAL MEDICINE

## 2021-08-04 PROCEDURE — 93010 ELECTROCARDIOGRAM REPORT: CPT | Performed by: STUDENT IN AN ORGANIZED HEALTH CARE EDUCATION/TRAINING PROGRAM

## 2021-08-04 PROCEDURE — 85025 COMPLETE CBC W/AUTO DIFF WBC: CPT

## 2021-08-04 PROCEDURE — 99233 SBSQ HOSP IP/OBS HIGH 50: CPT | Mod: GC | Performed by: INTERNAL MEDICINE

## 2021-08-04 PROCEDURE — A9270 NON-COVERED ITEM OR SERVICE: HCPCS | Performed by: INTERNAL MEDICINE

## 2021-08-04 RX ORDER — SODIUM CHLORIDE, SODIUM LACTATE, POTASSIUM CHLORIDE, AND CALCIUM CHLORIDE .6; .31; .03; .02 G/100ML; G/100ML; G/100ML; G/100ML
1000 INJECTION, SOLUTION INTRAVENOUS ONCE
Status: COMPLETED | OUTPATIENT
Start: 2021-08-04 | End: 2021-08-04

## 2021-08-04 RX ORDER — HALOPERIDOL 5 MG/ML
5 INJECTION INTRAMUSCULAR ONCE
Status: COMPLETED | OUTPATIENT
Start: 2021-08-04 | End: 2021-08-05

## 2021-08-04 RX ORDER — SODIUM CHLORIDE 9 MG/ML
1000 INJECTION, SOLUTION INTRAVENOUS ONCE
Status: COMPLETED | OUTPATIENT
Start: 2021-08-04 | End: 2021-08-04

## 2021-08-04 RX ORDER — SODIUM CHLORIDE, SODIUM LACTATE, POTASSIUM CHLORIDE, CALCIUM CHLORIDE 600; 310; 30; 20 MG/100ML; MG/100ML; MG/100ML; MG/100ML
1000 INJECTION, SOLUTION INTRAVENOUS CONTINUOUS
Status: DISCONTINUED | OUTPATIENT
Start: 2021-08-04 | End: 2021-08-07

## 2021-08-04 RX ORDER — POTASSIUM CHLORIDE 20 MEQ/1
40 TABLET, EXTENDED RELEASE ORAL ONCE
Status: COMPLETED | OUTPATIENT
Start: 2021-08-04 | End: 2021-08-04

## 2021-08-04 RX ADMIN — LACTULOSE 45 ML: 20 SOLUTION ORAL at 04:45

## 2021-08-04 RX ADMIN — RIFAXIMIN 550 MG: 550 TABLET ORAL at 04:46

## 2021-08-04 RX ADMIN — SODIUM CHLORIDE 1000 ML: 9 INJECTION, SOLUTION INTRAVENOUS at 20:02

## 2021-08-04 RX ADMIN — HALOPERIDOL LACTATE 5 MG: 5 INJECTION, SOLUTION INTRAMUSCULAR at 18:48

## 2021-08-04 RX ADMIN — HALOPERIDOL LACTATE 5 MG: 5 INJECTION, SOLUTION INTRAMUSCULAR at 22:44

## 2021-08-04 RX ADMIN — LORAZEPAM 0.5 MG: 2 INJECTION INTRAMUSCULAR; INTRAVENOUS at 16:22

## 2021-08-04 RX ADMIN — LACTULOSE 45 ML: 20 SOLUTION ORAL at 13:13

## 2021-08-04 RX ADMIN — POTASSIUM CHLORIDE 40 MEQ: 1500 TABLET, EXTENDED RELEASE ORAL at 08:24

## 2021-08-04 RX ADMIN — HYDROMORPHONE HYDROCHLORIDE 0.25 MG: 1 INJECTION, SOLUTION INTRAMUSCULAR; INTRAVENOUS; SUBCUTANEOUS at 19:22

## 2021-08-04 RX ADMIN — LORAZEPAM 0.5 MG: 2 INJECTION INTRAMUSCULAR; INTRAVENOUS at 02:06

## 2021-08-04 RX ADMIN — PIPERACILLIN AND TAZOBACTAM 4.5 G: 4; .5 INJECTION, POWDER, LYOPHILIZED, FOR SOLUTION INTRAVENOUS; PARENTERAL at 04:45

## 2021-08-04 RX ADMIN — SODIUM CHLORIDE, POTASSIUM CHLORIDE, SODIUM LACTATE AND CALCIUM CHLORIDE 1000 ML: 600; 310; 30; 20 INJECTION, SOLUTION INTRAVENOUS at 19:00

## 2021-08-04 RX ADMIN — SODIUM CHLORIDE 1000 ML: 9 INJECTION, SOLUTION INTRAVENOUS at 22:50

## 2021-08-04 RX ADMIN — Medication 1 CAPSULE: at 08:24

## 2021-08-04 RX ADMIN — SODIUM CHLORIDE, POTASSIUM CHLORIDE, SODIUM LACTATE AND CALCIUM CHLORIDE 1000 ML: 600; 310; 30; 20 INJECTION, SOLUTION INTRAVENOUS at 11:52

## 2021-08-04 RX ADMIN — PIPERACILLIN AND TAZOBACTAM 4.5 G: 4; .5 INJECTION, POWDER, LYOPHILIZED, FOR SOLUTION INTRAVENOUS; PARENTERAL at 20:05

## 2021-08-04 RX ADMIN — PIPERACILLIN AND TAZOBACTAM 4.5 G: 4; .5 INJECTION, POWDER, LYOPHILIZED, FOR SOLUTION INTRAVENOUS; PARENTERAL at 13:13

## 2021-08-04 ASSESSMENT — PAIN DESCRIPTION - PAIN TYPE
TYPE: ACUTE PAIN

## 2021-08-04 ASSESSMENT — COGNITIVE AND FUNCTIONAL STATUS - GENERAL
SUGGESTED CMS G CODE MODIFIER DAILY ACTIVITY: CL
TOILETING: A LOT
EATING MEALS: A LOT
PERSONAL GROOMING: A LOT
DRESSING REGULAR LOWER BODY CLOTHING: TOTAL
DRESSING REGULAR UPPER BODY CLOTHING: A LOT
DAILY ACTIVITIY SCORE: 11
HELP NEEDED FOR BATHING: A LOT

## 2021-08-04 ASSESSMENT — ACTIVITIES OF DAILY LIVING (ADL): TOILETING: INDEPENDENT

## 2021-08-04 NOTE — CARE PLAN
The patient is Watcher - Medium risk of patient condition declining or worsening    Shift Goals  Clinical Goals: increase cognition   Patient Goals: MICHI  Family Goals: Increase cognition     Progress made toward(s) clinical / shift goals:  able to get patient to take morning and noon dose of lactulose; bed alarm on,  at bedside to help keep patient from unsafe mobilization     Patient is not progressing towards the following goals:      Problem: Knowledge Deficit - Standard  Goal: Patient and family/care givers will demonstrate understanding of plan of care, disease process/condition, diagnostic tests and medications  Outcome: Not Progressing    Problem: Fall Risk  Goal: Patient will remain free from falls  Outcome: Progressing

## 2021-08-04 NOTE — PROGRESS NOTES
"ADULT  INFECTIOUS DISEASES  CONSULT  FOLLOW UP NOTE     Reason for Consultation: abdominal panniculitis /cellulitis with concern for necrotizing     Interim History: Patient still in distress this morning, wbc uptrending, still tachycardic  when got up to ambulate to use bathroom at bedside, she scratched off gauze covering intermedial thigh wounds, wound at right this vesicular, left lower side of abdominal wound now crusting/scabbing    Allergies/Intolerances:  No Known Allergies    Most Recent Vital Signs:  /67   Pulse (!) 119   Temp 36.9 °C (98.4 °F) (Temporal)   Resp 20   Ht 1.6 m (5' 3\")   Wt 104 kg (230 lb)   SpO2 94%   BMI 40.74 kg/m²   Temp  Min: 36.4 °C (97.5 °F)  Max: 37.3 °C (99.1 °F)    Physical Exam:  General: still in distress, guarded to move   HEENT: sclera anicteric, MMM, no oral lesions  Neck: no LAD  Chest: CTAB with no r/r/w, normal work of breathing  Cardiac: RRR, normal S1 S2, no m/r/g   Abdomen: +bs, soft, NTND  Extremities: no edema, WWP  Skin: Skin lower abdomen and medial LE superficially eroded, right upper thigh vesicular formation at wound side, wound of lower left side of abdomen now scabbing over , no discharge, painful to touch, erythematous   Neuro: alert    Pertinent Lab Results:  Recent Labs     08/03/21  0642 08/04/21  0433   WBC 14.0* 18.2*      Recent Labs     08/03/21  0642 08/04/21  0433   HEMOGLOBIN 8.4* 8.5*   HEMATOCRIT 24.2* 24.4*   MCV 95.7 97.2   MCH 33.2* 33.9*   PLATELETCT 164 165         Recent Labs     08/02/21  0457 08/03/21  0642 08/04/21  0433   SODIUM 133* 136 137   POTASSIUM 3.8 3.7 3.3*   CHLORIDE 103 106 105   CO2 20 19* 17*   CREATININE 0.86 0.80 0.97        Invalid input(s): ALT, ALKPHOS, BILITOT, TOTALBILIRUB, BILIRUBINTOT, BILIRUBINDIR, BILIRUBININD, ALKALINEPHOS     Microbiology:  No results found for: BLOODCULTU, BLDCULT, BCHOLD     Studies:      IMPRESSSION/PLAN:   #abdominal and LE medial thigh " cellulitis/panniculitis  #leukocytosis  #encephalopathy  Hien Alfaro is a 41 y.o.female with PMH of alcoholic cirrhosis of liver with a recent hx of abdominal wall cellulitis and colitis , was given 2 weeks abx (doxy, rifampin, bactrim) and at [facility] facility where she came as a transfer was treated with c3, clinda, and meropenem. CT on admission concerning for colitis, leukocytosis, lactic acidosis. Was admitted for concerns of cellulitis vs necrotizing fasciitis. Surgery assessed, advised abdominal panniculitis/cellulitis with necrosis treatment with abx and no surgical intervention. Blood cultures 8/1 negative thus far. WBC 14,000, patient consistently tachycardic. Blood cultures drawn 8/1 NGTD. Was placed on IV zosyn, vanc, and clinda (the latter two discontinued).      Will advise adding linezolid to regime to cover for staph/strep/antixtoxin prophylaxis . May need repeat imaging later (will continue to follow and determine need)     Plan:  Abx: Zosyn, Linezolid   MRSA nares pending, follow up  CTM labs  CTM culture  Low threshold for CT imaging if septic shock/deteriorates  Maximize tx for encephalopathy (I.e. lactulose for hepatic etiology as consideration, make sure stooling 4x /day)     Aparna Garces M.D.   [facility]     Dr. Gomes updated with plan.  Thank for you allowing us to take part in your patient's care, please call should you have any questions or would like to discuss this patient.  Discussed with IM    Aparna Garces M.D.

## 2021-08-04 NOTE — PROGRESS NOTES
Report received from dayshift RN, assumed care at 1900.  Assessment complete.  A&O x 0-1. Is oriented to self intermittently.     Patient ambulates with max assist with FWW. Bed and chair alarm on.   Patient shows no signs of pain.   Denies N&V. Tolerating diabetic diet with 2000 ml fluid restriction.  Necrotic skin and blistering on pannus and bilateral inner thighs. Mepitel one scattered on wound, pt keeps removing dressings.  + void, + flatus, - BM.  Patient denies SOB.  SCD's off.  Patient's family at bedside.  Review plan with of care with patient and . Call light and personal belongings within reach. Hourly rounding in place. All needs met at this time.

## 2021-08-04 NOTE — THERAPY
PT Contact Note    Patient Name: Hien Alfaro  Age:  41 y.o., Sex:  female  Medical Record #: [id number]  Today's Date: 8/4/2021 08/04/21 1402   Total Time Spent   Total Time Spent (Mins) 10   Initial Contact Note    Initial Contact Note Order Received and Verified, Physical Therapy Evaluation in Progress with Full Report to Follow.   Interdisciplinary Plan of Care Collaboration   IDT Collaboration with  Family / Caregiver;Other (See Comments)  (EMR)   Patient Position at End of Therapy In Bed;Call Light within Reach;Tray Table within Reach;Phone within Reach;Family / Friend in Room   Collaboration Comments 8/4: PT Eval/consult received. PT attempt and pt not coherent/appropriate at this time with unable to respond consistently.  requesting pt not be disturbed. PT will attempt eval again as appropriate/able. -   Session Information   Date / Session Number  8/4-EVAL-(attempt, not appropriate at this time)

## 2021-08-04 NOTE — THERAPY
"Occupational Therapy   Initial Evaluation     Patient Name: Hien Alfaro  Age:  41 y.o., Sex:  female  Medical Record #: [id number]  Today's Date: 8/4/2021     Precautions: Fall Risk  Comments: very impulsive, borderline combative w/mobility and is very confused     Assessment  Patient is 41 y.o. female admitted for wound check d/t worsening abdominal wounds. PMHx: Alcoholic cirrhosis, portal vein thrombosis, hyperammonemia, bilirubinemia and hepatorenal syndrome. This admission pt is dx w/ abdominal panniculitis/cellulitis w/conern for necrosis, colitis, and sepsis. At this time pt is intermittently agitated borderline combative d/t confusion, expressing pain, w/generalized weakness and poor activity tolerance. Pts spouse presents pt was previously independent all all ADL's and mobility. OT will follow in this setting, at a low frequency and increase has pts behavior and medical status allows.  *of note pt was seen for OT anh, as RN was in room attempting to get pt to BSC and required increased assist to complete tasks.     Plan  Recommend Occupational Therapy 2 times per week until therapy goals are met for the following treatments:  Self Care/Activities of Daily Living, Therapeutic Activities and Therapeutic Exercises.    DC Equipment Recommendations: Unable to determine at this time  Discharge Recommendations: Recommend post-acute placement for additional occupational therapy services prior to discharge home     Subjective  \"Ow it hurts don't push me\"      Objective     08/04/21 1205   Prior Living Situation   Prior Services None   Housing / Facility [address]   Steps Into Home 0   Bathroom Set up Bathtub / Shower Combination   Equipment Owned None   Lives with - Patient's Self Care Capacity Spouse;Child Less than 18 Years of Age   Comments Spouse present and reports he lives w/Pt and his 2 children in [place], 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 and is able to assist pt as needed    Prior Level of ADL " Function   Self Feeding Independent   Grooming / Hygiene Independent   Bathing Independent   Dressing Independent   Toileting Independent   Comments per spouse report    Prior Level of IADL Function   Medication Management Independent   Laundry Independent   Kitchen Mobility Independent   Finances Independent   Home Management Independent   Shopping Independent   Prior Level Of Mobility Independent Without Device in Community   Comments all per spouse report pt could not provide her own hx    Precautions   Precautions Fall Risk   Comments very impulsive, borderline combative w/mobility and is very confused    Pain 0 - 10 Group   Location Abdomen   Location Orientation Left;Right;Lower   Therapist Pain Assessment During Activity;Nurse Notified  (not rated yelling out in pain during session )   Cognition    Cognition / Consciousness X   Orientation Level Not Oriented to Reason;Not Oriented to Place;Not Oriented to Time   Level of Consciousness Responds to pain   Ability To Follow Commands Unable to Follow 1 Step Commands   Safety Awareness Impaired;Impulsive   New Learning Impaired   Comments No awareness of whats happening, asking to get to BSC but could not motor plan the txf stood multiple times and was trying to push staff away    Passive ROM Upper Body   Passive ROM Upper Body WDL   Active ROM Upper Body   Active ROM Upper Body  WDL   Strength Upper Body   Upper Body Strength  X   Gross Strength Generalized Weakness, Equal Bilaterally.    Comments intermittent twitching of extremities observed    Sensation Upper Body   Upper Extremity Sensation  Not Tested   Upper Body Muscle Tone   Upper Body Muscle Tone  Not Tested   Neurological Concerns   Neurological Concerns No   Coordination Upper Body   Coordination X   Comments impaired by tremor/cognition    Balance Assessment   Sitting Balance (Static) Fair   Sitting Balance (Dynamic) Fair -   Standing Balance (Static) Fair -   Standing Balance (Dynamic) Poor +  "  Weight Shift Sitting Fair   Weight Shift Standing Poor   Comments HHA    Bed Mobility    Sit to Supine Maximal Assist   Comments up on BSC w/RN at start of session    ADL Assessment   Eating   (SO has been feeding pt )   Grooming Maximal Assist;Seated   Upper Body Dressing Maximal Assist   Lower Body Dressing Maximal Assist   Toileting Maximal Assist   How much help from another person does the patient currently need...   6 Clicks Daily Activity Score 11   Functional Mobility   Sit to Stand Moderate Assist   Bed, Chair, Wheelchair Transfer Moderate Assist   Toilet Transfers Moderate Assist   Mobility BSC sit>stand x4>BSc> BTB    Edema / Skin Assessment   Edema / Skin  X   Comments abdominal wounds   Activity Tolerance   Comments increased agitation and restlessness w/activity    Patient / Family Goals   Patient / Family Goal #1 per spouse \"to figure out whats wrong\"    Short Term Goals   Short Term Goal # 1 pt will complete grooming seated w/set up    Short Term Goal # 2 pt will complete txf to BSC w/min A    Education Group   Role of Occupational Therapist Patient Response Patient;Acceptance;Explanation   Problem List   Problem List Decreased Active Daily Living Skills;Decreased Upper Extremity Strength Right;Decreased Upper Extremity Strength Left;Decreased Functional Mobility;Decreased Activity Tolerance;Safety Awareness Deficits / Cognition;Impaired Vision;Impaired Postural Control / Balance;Impaired Cognitive Function;Impaired Coordination Right Upper Extremity;Impaired Posture / Trunk Alignment;Impaired Coordination Left Upper Extremity   Anticipated Discharge Equipment and Recommendations   DC Equipment Recommendations Unable to determine at this time   Discharge Recommendations Recommend post-acute placement for additional occupational therapy services prior to discharge home   Interdisciplinary Plan of Care Collaboration   IDT Collaboration with  Nursing;Family / Caregiver   Patient Position at End of " Therapy In Bed;Call Light within Reach;Tray Table within Reach;Phone within Reach;Bed Alarm On   Collaboration Comments RN aware of OT eval and pts efforts    Session Information   Date / Session Number  8/4 #1 (1/2, 8/10)   Priority 1  (monitor medical status )

## 2021-08-05 ENCOUNTER — APPOINTMENT (OUTPATIENT)
Dept: RADIOLOGY | Facility: MEDICAL CENTER | Age: 41
DRG: 871 | End: 2021-08-05
Attending: STUDENT IN AN ORGANIZED HEALTH CARE EDUCATION/TRAINING PROGRAM
Payer: COMMERCIAL

## 2021-08-05 ENCOUNTER — APPOINTMENT (OUTPATIENT)
Dept: RADIOLOGY | Facility: MEDICAL CENTER | Age: 41
DRG: 871 | End: 2021-08-05
Attending: INTERNAL MEDICINE
Payer: COMMERCIAL

## 2021-08-05 LAB
ALBUMIN SERPL BCP-MCNC: 1.7 G/DL (ref 3.2–4.9)
ALP SERPL-CCNC: 154 U/L (ref 30–99)
ALT SERPL-CCNC: 33 U/L (ref 2–50)
AMMONIA PLAS-SCNC: 82 UMOL/L (ref 11–45)
ANION GAP SERPL CALC-SCNC: 11 MMOL/L (ref 7–16)
AST SERPL-CCNC: 86 U/L (ref 12–45)
BASOPHILS # BLD AUTO: 0.3 % (ref 0–1.8)
BASOPHILS # BLD: 0.05 K/UL (ref 0–0.12)
BILIRUB CONJ SERPL-MCNC: 3.2 MG/DL (ref 0.1–0.5)
BILIRUB INDIRECT SERPL-MCNC: 1.9 MG/DL (ref 0–1)
BILIRUB SERPL-MCNC: 5.1 MG/DL (ref 0.1–1.5)
BUN SERPL-MCNC: 8 MG/DL (ref 8–22)
CALCIUM SERPL-MCNC: 7.9 MG/DL (ref 8.5–10.5)
CHLORIDE SERPL-SCNC: 112 MMOL/L (ref 96–112)
CO2 SERPL-SCNC: 18 MMOL/L (ref 20–33)
CREAT SERPL-MCNC: 0.74 MG/DL (ref 0.5–1.4)
EOSINOPHIL # BLD AUTO: 0.12 K/UL (ref 0–0.51)
EOSINOPHIL NFR BLD: 0.7 % (ref 0–6.9)
ERYTHROCYTE [DISTWIDTH] IN BLOOD BY AUTOMATED COUNT: 63.8 FL (ref 35.9–50)
GLUCOSE SERPL-MCNC: 142 MG/DL (ref 65–99)
HCT VFR BLD AUTO: 22.5 % (ref 37–47)
HGB BLD-MCNC: 7.7 G/DL (ref 12–16)
IMM GRANULOCYTES # BLD AUTO: 0.14 K/UL (ref 0–0.11)
IMM GRANULOCYTES NFR BLD AUTO: 0.8 % (ref 0–0.9)
LACTATE BLD-SCNC: 1.9 MMOL/L (ref 0.5–2)
LACTATE BLD-SCNC: 2.1 MMOL/L (ref 0.5–2)
LACTATE BLD-SCNC: 2.2 MMOL/L (ref 0.5–2)
LACTATE BLD-SCNC: 3.2 MMOL/L (ref 0.5–2)
LACTATE BLD-SCNC: 3.4 MMOL/L (ref 0.5–2)
LYMPHOCYTES # BLD AUTO: 2.18 K/UL (ref 1–4.8)
LYMPHOCYTES NFR BLD: 12.7 % (ref 22–41)
MCH RBC QN AUTO: 33.6 PG (ref 27–33)
MCHC RBC AUTO-ENTMCNC: 34.2 G/DL (ref 33.6–35)
MCV RBC AUTO: 98.3 FL (ref 81.4–97.8)
MONOCYTES # BLD AUTO: 1.61 K/UL (ref 0–0.85)
MONOCYTES NFR BLD AUTO: 9.4 % (ref 0–13.4)
NEUTROPHILS # BLD AUTO: 13.11 K/UL (ref 2–7.15)
NEUTROPHILS NFR BLD: 76.1 % (ref 44–72)
NRBC # BLD AUTO: 0 K/UL
NRBC BLD-RTO: 0 /100 WBC
PLATELET # BLD AUTO: 139 K/UL (ref 164–446)
PMV BLD AUTO: 10.1 FL (ref 9–12.9)
POTASSIUM SERPL-SCNC: 3.9 MMOL/L (ref 3.6–5.5)
PROT SERPL-MCNC: 6.9 G/DL (ref 6–8.2)
RBC # BLD AUTO: 2.29 M/UL (ref 4.2–5.4)
SODIUM SERPL-SCNC: 141 MMOL/L (ref 135–145)
WBC # BLD AUTO: 17.2 K/UL (ref 4.8–10.8)

## 2021-08-05 PROCEDURE — 36415 COLL VENOUS BLD VENIPUNCTURE: CPT

## 2021-08-05 PROCEDURE — 85025 COMPLETE CBC W/AUTO DIFF WBC: CPT

## 2021-08-05 PROCEDURE — 700111 HCHG RX REV CODE 636 W/ 250 OVERRIDE (IP): Performed by: INTERNAL MEDICINE

## 2021-08-05 PROCEDURE — 700111 HCHG RX REV CODE 636 W/ 250 OVERRIDE (IP): Performed by: STUDENT IN AN ORGANIZED HEALTH CARE EDUCATION/TRAINING PROGRAM

## 2021-08-05 PROCEDURE — 99233 SBSQ HOSP IP/OBS HIGH 50: CPT | Mod: GC | Performed by: INTERNAL MEDICINE

## 2021-08-05 PROCEDURE — 302136 NUTRITION PUMP: Performed by: STUDENT IN AN ORGANIZED HEALTH CARE EDUCATION/TRAINING PROGRAM

## 2021-08-05 PROCEDURE — 80076 HEPATIC FUNCTION PANEL: CPT

## 2021-08-05 PROCEDURE — 700117 HCHG RX CONTRAST REV CODE 255: Performed by: STUDENT IN AN ORGANIZED HEALTH CARE EDUCATION/TRAINING PROGRAM

## 2021-08-05 PROCEDURE — 700102 HCHG RX REV CODE 250 W/ 637 OVERRIDE(OP): Performed by: STUDENT IN AN ORGANIZED HEALTH CARE EDUCATION/TRAINING PROGRAM

## 2021-08-05 PROCEDURE — 80048 BASIC METABOLIC PNL TOTAL CA: CPT

## 2021-08-05 PROCEDURE — 76705 ECHO EXAM OF ABDOMEN: CPT

## 2021-08-05 PROCEDURE — 73701 CT LOWER EXTREMITY W/DYE: CPT | Mod: LT

## 2021-08-05 PROCEDURE — A9270 NON-COVERED ITEM OR SERVICE: HCPCS | Performed by: STUDENT IN AN ORGANIZED HEALTH CARE EDUCATION/TRAINING PROGRAM

## 2021-08-05 PROCEDURE — 74177 CT ABD & PELVIS W/CONTRAST: CPT

## 2021-08-05 PROCEDURE — 99233 SBSQ HOSP IP/OBS HIGH 50: CPT | Performed by: INTERNAL MEDICINE

## 2021-08-05 PROCEDURE — 700105 HCHG RX REV CODE 258: Performed by: INTERNAL MEDICINE

## 2021-08-05 PROCEDURE — 770020 HCHG ROOM/CARE - TELE (206)

## 2021-08-05 PROCEDURE — 82140 ASSAY OF AMMONIA: CPT

## 2021-08-05 PROCEDURE — 83605 ASSAY OF LACTIC ACID: CPT | Mod: 91

## 2021-08-05 RX ORDER — LINEZOLID 2 MG/ML
600 INJECTION, SOLUTION INTRAVENOUS EVERY 12 HOURS
Status: DISCONTINUED | OUTPATIENT
Start: 2021-08-05 | End: 2021-08-10

## 2021-08-05 RX ORDER — LORAZEPAM 2 MG/ML
1 INJECTION INTRAMUSCULAR ONCE
Status: COMPLETED | OUTPATIENT
Start: 2021-08-05 | End: 2021-08-05

## 2021-08-05 RX ORDER — LACTULOSE 20 G/30ML
45 SOLUTION ORAL 3 TIMES DAILY
Status: DISCONTINUED | OUTPATIENT
Start: 2021-08-05 | End: 2021-08-05

## 2021-08-05 RX ORDER — LEVOTHYROXINE SODIUM 0.05 MG/1
50 TABLET ORAL
Status: DISCONTINUED | OUTPATIENT
Start: 2021-08-05 | End: 2021-08-11

## 2021-08-05 RX ORDER — FOLIC ACID 1 MG/1
1 TABLET ORAL DAILY
Status: DISCONTINUED | OUTPATIENT
Start: 2021-08-05 | End: 2021-08-11

## 2021-08-05 RX ORDER — LORAZEPAM 2 MG/ML
2 INJECTION INTRAMUSCULAR
Status: DISCONTINUED | OUTPATIENT
Start: 2021-08-05 | End: 2021-08-05

## 2021-08-05 RX ORDER — CHOLECALCIFEROL (VITAMIN D3) 125 MCG
2500 CAPSULE ORAL DAILY
Status: DISCONTINUED | OUTPATIENT
Start: 2021-08-05 | End: 2021-08-11

## 2021-08-05 RX ORDER — LACTOBACILLUS RHAMNOSUS GG 10B CELL
1 CAPSULE ORAL
Status: DISCONTINUED | OUTPATIENT
Start: 2021-08-05 | End: 2021-08-11

## 2021-08-05 RX ORDER — LIDOCAINE HYDROCHLORIDE 20 MG/ML
20 INJECTION, SOLUTION INFILTRATION; PERINEURAL
Status: DISCONTINUED | OUTPATIENT
Start: 2021-08-05 | End: 2021-08-15 | Stop reason: HOSPADM

## 2021-08-05 RX ORDER — LORAZEPAM 2 MG/ML
2 INJECTION INTRAMUSCULAR
Status: COMPLETED | OUTPATIENT
Start: 2021-08-05 | End: 2021-08-05

## 2021-08-05 RX ORDER — DEXTROSE MONOHYDRATE 25 G/50ML
50 INJECTION, SOLUTION INTRAVENOUS
Status: DISCONTINUED | OUTPATIENT
Start: 2021-08-05 | End: 2021-08-08

## 2021-08-05 RX ORDER — MIDODRINE HYDROCHLORIDE 5 MG/1
10 TABLET ORAL
Status: DISCONTINUED | OUTPATIENT
Start: 2021-08-05 | End: 2021-08-10

## 2021-08-05 RX ORDER — KETOROLAC TROMETHAMINE 30 MG/ML
30 INJECTION, SOLUTION INTRAMUSCULAR; INTRAVENOUS EVERY 6 HOURS PRN
Status: DISCONTINUED | OUTPATIENT
Start: 2021-08-05 | End: 2021-08-06

## 2021-08-05 RX ORDER — LINEZOLID 600 MG/1
600 TABLET, FILM COATED ORAL EVERY 12 HOURS
Status: DISCONTINUED | OUTPATIENT
Start: 2021-08-05 | End: 2021-08-05

## 2021-08-05 RX ORDER — LIDOCAINE HYDROCHLORIDE 40 MG/ML
SOLUTION TOPICAL
Status: DISCONTINUED | OUTPATIENT
Start: 2021-08-05 | End: 2021-08-15 | Stop reason: HOSPADM

## 2021-08-05 RX ORDER — PREDNISONE 20 MG/1
40 TABLET ORAL DAILY
Status: DISCONTINUED | OUTPATIENT
Start: 2021-08-05 | End: 2021-08-06

## 2021-08-05 RX ORDER — HYDROXYZINE HYDROCHLORIDE 25 MG/1
50 TABLET, FILM COATED ORAL 3 TIMES DAILY PRN
Status: DISCONTINUED | OUTPATIENT
Start: 2021-08-05 | End: 2021-08-11

## 2021-08-05 RX ORDER — VITAMIN B COMPLEX
1000 TABLET ORAL DAILY
Status: DISCONTINUED | OUTPATIENT
Start: 2021-08-05 | End: 2021-08-11

## 2021-08-05 RX ORDER — ZIPRASIDONE MESYLATE 20 MG/ML
10 INJECTION, POWDER, LYOPHILIZED, FOR SOLUTION INTRAMUSCULAR ONCE
Status: COMPLETED | OUTPATIENT
Start: 2021-08-05 | End: 2021-08-05

## 2021-08-05 RX ORDER — LACTULOSE 20 G/30ML
45 SOLUTION ORAL 3 TIMES DAILY
Status: DISCONTINUED | OUTPATIENT
Start: 2021-08-05 | End: 2021-08-08

## 2021-08-05 RX ADMIN — Medication 1000 UNITS: at 05:52

## 2021-08-05 RX ADMIN — PIPERACILLIN AND TAZOBACTAM 4.5 G: 4; .5 INJECTION, POWDER, LYOPHILIZED, FOR SOLUTION INTRAVENOUS; PARENTERAL at 21:12

## 2021-08-05 RX ADMIN — LORAZEPAM 1 MG: 2 INJECTION INTRAMUSCULAR; INTRAVENOUS at 02:35

## 2021-08-05 RX ADMIN — IOHEXOL 100 ML: 350 INJECTION, SOLUTION INTRAVENOUS at 13:28

## 2021-08-05 RX ADMIN — LACTULOSE 45 ML: 20 SOLUTION ORAL at 03:45

## 2021-08-05 RX ADMIN — LACTULOSE 45 ML: 20 SOLUTION ORAL at 17:46

## 2021-08-05 RX ADMIN — THERA TABS 1 TABLET: TAB at 05:52

## 2021-08-05 RX ADMIN — MIDODRINE HYDROCHLORIDE 10 MG: 5 TABLET ORAL at 21:57

## 2021-08-05 RX ADMIN — LINEZOLID 600 MG: 600 INJECTION, SOLUTION INTRAVENOUS at 17:46

## 2021-08-05 RX ADMIN — HYDROMORPHONE HYDROCHLORIDE 0.25 MG: 1 INJECTION, SOLUTION INTRAMUSCULAR; INTRAVENOUS; SUBCUTANEOUS at 12:18

## 2021-08-05 RX ADMIN — HYDROXYZINE HYDROCHLORIDE 50 MG: 25 TABLET, FILM COATED ORAL at 07:42

## 2021-08-05 RX ADMIN — KETOROLAC TROMETHAMINE 30 MG: 30 INJECTION, SOLUTION INTRAMUSCULAR; INTRAVENOUS at 21:12

## 2021-08-05 RX ADMIN — LORAZEPAM 2 MG: 2 INJECTION INTRAMUSCULAR; INTRAVENOUS at 12:56

## 2021-08-05 RX ADMIN — RIFAXIMIN 550 MG: 550 TABLET ORAL at 17:46

## 2021-08-05 RX ADMIN — LEVOTHYROXINE SODIUM 50 MCG: 0.05 TABLET ORAL at 05:52

## 2021-08-05 RX ADMIN — RIFAXIMIN 550 MG: 550 TABLET ORAL at 03:45

## 2021-08-05 RX ADMIN — ZIPRASIDONE MESYLATE 10 MG: 20 INJECTION, POWDER, LYOPHILIZED, FOR SOLUTION INTRAMUSCULAR at 04:06

## 2021-08-05 RX ADMIN — PREDNISONE 40 MG: 20 TABLET ORAL at 05:51

## 2021-08-05 RX ADMIN — HYDROMORPHONE HYDROCHLORIDE 0.25 MG: 1 INJECTION, SOLUTION INTRAMUSCULAR; INTRAVENOUS; SUBCUTANEOUS at 00:39

## 2021-08-05 RX ADMIN — Medication 1 CAPSULE: at 07:42

## 2021-08-05 RX ADMIN — FOLIC ACID 1 MG: 1 TABLET ORAL at 05:52

## 2021-08-05 RX ADMIN — CYANOCOBALAMIN TAB 500 MCG 2500 MCG: 500 TAB at 05:52

## 2021-08-05 RX ADMIN — HALOPERIDOL LACTATE 5 MG: 5 INJECTION, SOLUTION INTRAMUSCULAR at 01:02

## 2021-08-05 RX ADMIN — PIPERACILLIN AND TAZOBACTAM 4.5 G: 4; .5 INJECTION, POWDER, LYOPHILIZED, FOR SOLUTION INTRAVENOUS; PARENTERAL at 04:49

## 2021-08-05 RX ADMIN — PIPERACILLIN AND TAZOBACTAM 4.5 G: 4; .5 INJECTION, POWDER, LYOPHILIZED, FOR SOLUTION INTRAVENOUS; PARENTERAL at 13:31

## 2021-08-05 RX ADMIN — LORAZEPAM 1 MG: 2 INJECTION INTRAMUSCULAR; INTRAVENOUS at 13:53

## 2021-08-05 RX ADMIN — LORAZEPAM 1 MG: 2 INJECTION INTRAMUSCULAR; INTRAVENOUS at 05:50

## 2021-08-05 RX ADMIN — LACTULOSE 45 ML: 20 SOLUTION ORAL at 11:32

## 2021-08-05 RX ADMIN — HALOPERIDOL LACTATE 5 MG: 5 INJECTION, SOLUTION INTRAMUSCULAR at 11:34

## 2021-08-05 ASSESSMENT — PAIN DESCRIPTION - PAIN TYPE
TYPE: ACUTE PAIN

## 2021-08-05 NOTE — PROGRESS NOTES
4 Eyes Skin Assessment Completed by ROSALIND Montenegro and ROSALIND Yang    Head Jaundice  Ears WDL  Nose WDL  Mouth Bleeding, Dry, Cracked  Neck WDL  Breast/Chest Upper right side lesion      Shoulder Blades WDL  Spine WDL  (R) Arm/Elbow/Hand WDL  (L) Arm/Elbow/Hand WDL  Abdomen LG necrotic wound, eschar      Groin Redness and Abrasion bilaterally, R leg reaised lesion      Scrotum/Coccyx/Buttocks Redness and Excoriation  (R) Leg Edema  (L) Leg Edema  (R) Heel/Foot/Toe Edema  (L) Heel/Foot/Toe Edema    Devices In Places Tele Box, Pulse Ox and Cortrak, Restraints, PIV      Interventions In Place Pillows and Pressure Redistribution Mattress    Possible Skin Injury Yes    Pictures Uploaded Into Epic Yes  Wound Consult Placed Yes  RN Wound Prevention Protocol Ordered Yes

## 2021-08-05 NOTE — PROGRESS NOTES
"[facility] Medicine Daily Progress Note    Date of Service  8/4/2021    Chief Complaint  Hien Alfaro is a 41 y.o. female admitted 8/1/2021 with abdominal discomfort.    Hospital Course  Per admitting provider:  \"Hien Alfaro is a 41 y.o. female who presented 8/1/2021 with Wound Check (Pt reports redness in lower abd started ~2 weeks ago, now presenting with blackened open wounds across lower abd to b/l groin. )  She is morbidly obese. She has a history of alcoholic cirrhosis, portal vein thrombosis NOT on anticoagulation (seen and decided by Dr. Montenegro, [facility]) and was hospitalized in June for decompensated alcoholic cirrhosis with hyperammonemia, bilirubinemia and hepatorenal syndrome with poor outpatient follow-up. She was placed on antibiotics for her pannus infections. Despite antibiotics, she noticed black open wounds along her abdomen to bilateral groin and also a lump/mass that started 2 weeks now RLQ region.\"     CT AP at another facility noted colitis and panniculitis, no gas or abscess noted. Transferred to [facility] ER for higher level of care, admitted to medicine service, surgery was consulted, no surgery indicated, cont abx.     ID is consulted. On zosyn and zyvox. Check MRSA swab pending. Blood culture NTD.    Palliative consulted.     Hepatic encephalopathy, ammonia elevated. On lactulose po and rifaximin. Ammonia slightly trending down    Interval Problem Update  8/3: Patient is noted to be confused, agitated intermittently.  at the bedside.  is feeding her with lactulose. Ammonia this morning 101.     8/4: patient is still very confused, agitated intermittently.  at the bedside, reports patient's mentation slightly improving. On lactulose and rifaximin and ammonia level trending down slightly.   Noted tachycardia. On hold diuretics. Check lactic acid.     I have personally seen and examined the patient at bedside. I discussed the plan of care with patient, family and bedside " RN.    Consultants/Specialty  Surgery  Wound care  Palliative care    Code Status  Full Code    Disposition  Patient is not medically cleared.   Anticipate discharge to to skilled nursing facility.  I have placed the appropriate orders for post-discharge needs.    Review of Systems  Review of Systems   Unable to perform ROS: Mental acuity        Physical Exam  Temp:  [36.8 °C (98.2 °F)-37.3 °C (99.1 °F)] 37.1 °C (98.8 °F)  Pulse:  [115-125] 120  Resp:  [18-20] 20  BP: (100-121)/(64-80) 104/80  SpO2:  [94 %-100 %] 97 %    Physical Exam  Vitals and nursing note reviewed.   Constitutional:       Appearance: She is obese. She is ill-appearing.   HENT:      Head: Normocephalic and atraumatic.      Nose: Nose normal.   Eyes:      General: Scleral icterus present.      Extraocular Movements: Extraocular movements intact.   Cardiovascular:      Rate and Rhythm: Regular rhythm. Tachycardia present.      Pulses: Normal pulses.      Heart sounds: No friction rub.   Pulmonary:      Effort: Pulmonary effort is normal.      Breath sounds: No wheezing.      Comments: Decreased bibasilar breath sounds  Abdominal:      General: There is distension.      Palpations: Abdomen is soft.      Tenderness: There is abdominal tenderness. There is no guarding or rebound.   Musculoskeletal:         General: No tenderness. Normal range of motion.      Cervical back: Neck supple. No tenderness.      Right lower leg: Edema present.      Left lower leg: Edema present.   Skin:     General: Skin is dry.      Capillary Refill: Capillary refill takes less than 2 seconds.      Coloration: Skin is jaundiced.   Neurological:      Mental Status: She is alert.      Comments: Confused and agitated intermittently  Minimally verbal     Psychiatric:      Comments: Unable to evaluate         Fluids    Intake/Output Summary (Last 24 hours) at 8/4/2021 1717  Last data filed at 8/3/2021 1923  Gross per 24 hour   Intake 120 ml   Output --   Net 120 ml        Laboratory  Recent Labs     08/03/21  0642 08/04/21  0433   WBC 14.0* 18.2*   RBC 2.53* 2.51*   HEMOGLOBIN 8.4* 8.5*   HEMATOCRIT 24.2* 24.4*   MCV 95.7 97.2   MCH 33.2* 33.9*   MCHC 34.7 34.8   RDW 58.1* 60.8*   PLATELETCT 164 165   MPV 9.7 9.7     Recent Labs     08/02/21  0457 08/03/21  0642 08/04/21  0433   SODIUM 133* 136 137   POTASSIUM 3.8 3.7 3.3*   CHLORIDE 103 106 105   CO2 20 19* 17*   GLUCOSE 56* 132* 144*   BUN 8 8 9   CREATININE 0.86 0.80 0.97   CALCIUM 7.6* 7.7* 8.3*     Recent Labs     08/04/21  0433   INR 2.78*               Imaging  CT-HEAD W/O   Final Result      No evidence of acute intracranial process.      OUTSIDE IMAGES-CT ABDOMEN /PELVIS   Final Result      OUTSIDE IMAGES-DX CHEST   Final Result      OUTSIDE IMAGES-CT ABDOMEN /PELVIS   Final Result           Assessment/Plan  * Abdominal panniculitis/cellulitis w/ concern for necrosis- (present on admission)  Assessment & Plan  CT AP at another facility colitis, panniculitis with no gas/abscess  Surgery f/u appreciated -- no indication for surgery at this time  ID is consulted and on zosyn and zyvox, MRSA screen pending.  Low threshold for CT imaging if septic shock/deteriorates  Follow blood culture  Wound care      Hyperbilirubinemia  Assessment & Plan  Sec to ESLD  Total bilirubin 4. Her total bilirubin was 11 in June    Anxiety  Assessment & Plan  PRN atarax    Acute encephalopathy  Assessment & Plan  Likely hepatic encephalopathy  Ordered CT head, unremarkable  Ammonia elevated, trending down   On lactulose and Rifaximin    End stage liver disease (HCC)  Assessment & Plan  MELD score: 28 on admission  Secondary to ETOH  Hepatitis panel neg  Poor prognosis    Severe protein-calorie malnutrition (HCC)  Assessment & Plan  Ensure    Failure to thrive in adult  Assessment & Plan  PT/OT, PO diet as able to tolerated  Palliative care consult    Anasarca  Assessment & Plan  Secondary to ESLD  Supportive care    Hypoglycemia  Assessment &  Plan  Hypoglycemia protocol    Colitis  Assessment & Plan  As noted in panniculitis    Portal vein thrombosis- (present on admission)  Assessment & Plan  Suspect chronic PV throbosis -- in which case there is no clear benefit for anticoagulation -- case was previously noted by hematology (Dr. Montenegro)    Will avoid chronic anticoagulation    Class 3 severe obesity due to excess calories with serious comorbidity and body mass index (BMI) of 40.0 to 44.9 in adult ([facility])- (present on admission)  Assessment & Plan  Diet and lifestyle modifications    Sepsis ([facility])  Assessment & Plan  This is Sepsis Present on admission  SIRS criteria identified on my evaluation include: Leukocytosis, with WBC greater than 12,000  Source is pannus  Sepsis protocol initiated  Fluid resuscitation ordered per protocol  IV antibiotics as appropriate for source of sepsis  While organ dysfunction may be noted elsewhere in this problem list or in the chart, degree of organ dysfunction does not meet CMS criteria for severe sepsis    On IVF  Abx  Follow up lactic acid and blood culture          Hypothyroidism- (present on admission)  Assessment & Plan  TSH 7.2, elevated, but improved from TSH in April, free T4 in normal range  Synthroid    Alcoholic cirrhosis of liver without ascites ([facility])- (present on admission)  Assessment & Plan  Decompensated.   reports patient has not been drinking since May  MELD score 28 on admission, 27-32% 90 day mortality rate  She has upcoming appointment with GI as outpatient      Type 2 diabetes mellitus with neurologic complication, without long-term current use of insulin ([facility])- (present on admission)  Assessment & Plan  No ISS as pt has been hypoglycemic       VTE prophylaxis: SCDs/TEDs given elevated INR    I have performed a physical exam and reviewed and updated ROS and Plan today (8/4/2021). In review of yesterday's note (8/3/2021), there are no changes except as documented above.

## 2021-08-05 NOTE — PROGRESS NOTES
4 Eyes Skin Assessment Completed by ROSALIND Cole and ROSALIND Calderon.    Head Jaundice  Ears WDL  Nose WDL, cortrak in right nare.   Mouth WDL, dry  Neck WDL  Breast/Chest Discoloration, Jaundice and Edema  Shoulder Blades WDL  Spine WDL  (R) Arm/Elbow/Hand Bruising  (L) Arm/Elbow/Hand WDL  Abdomen Scab and Bruising, known wound  Groin Redness, Blanching and Excoriation  Scrotum/Coccyx/Buttocks Redness, Blanching and Excoriation, BMS system in place.  (R) Leg Bruising, Jaundice and Edema  (L) Leg Bruising, Jaundice and Edema  (R) Heel/Foot/Toe Blanching and Boggy  (L) Heel/Foot/Toe Blanching and Boggy          Devices In Places ECG, Tele Box, Blood Pressure Cuff and BMS      Interventions In Place Pillows, Q2 Turns, Barrier Cream, Dri-Bill Pads and Pressure Redistribution Mattress    Possible Skin Injury Yes    Pictures Uploaded Into Epic N/A  Wound Consult Placed Yes  RN Wound Prevention Protocol Ordered Yes

## 2021-08-05 NOTE — CARE PLAN
Problem: Knowledge Deficit - Standard  Goal: Patient and family/care givers will demonstrate understanding of plan of care, disease process/condition, diagnostic tests and medications  Outcome: Not Progressing     Problem: Fall Risk  Goal: Patient will remain free from falls  Outcome: Progressing     Problem: Skin Integrity  Goal: Skin integrity is maintained or improved  Outcome: Not Progressing     The patient is Watcher - Medium risk of patient condition declining or worsening    Shift Goals  Clinical Goals: Lower ammonia levels   Patient Goals: MICHI  Family Goals: Increase cognition

## 2021-08-05 NOTE — CARE PLAN
The patient is Watcher - Medium risk of patient condition declining or worsening    Shift Goals  Clinical Goals: lower ammonia, lower lactic acid  Patient Goals: MICHI  Family Goals: comfort, improve mental status    Problem: Skin Integrity  Goal: Skin integrity is maintained or improved  Outcome: Not Progressing      Problem: Knowledge Deficit - Standard  Goal: Patient and family/care givers will demonstrate understanding of plan of care, disease process/condition, diagnostic tests and medications  Outcome: Progressing     Problem: Respiratory  Goal: Patient will achieve/maintain optimum respiratory ventilation and gas exchange  Outcome: Progressing     Problem: Fall Risk  Goal: Patient will remain free from falls  Outcome: Progressing

## 2021-08-05 NOTE — WOUND TEAM
"Received consult 8/5/21 need wound care assessment and nursing care instructions for abdominal necrotic sloughing wounds.\" Pt was seen by wound team on 8/3/21 and orders placed at that time. Pt is currently on wound team follow up for Thursday 8/5/211. Wound consult cancelled has already been evaluated and is being followed by wound team. Orders for dressing care are already in place.   "

## 2021-08-05 NOTE — PROGRESS NOTES
Bedside report received. Assessment completed.  Pt is A&O x 0-1, very impulsive. Pt on room air.   Medicating for pain PRN per MAR Pain MICHI.  No signs of nausea, - numbness, - tingling.  Necrotic skin and blistering on pannus and bilateral inner thighs. Mepitel one scattered on wound, pt keeps removing dressings.   LDA CDI   Last BM 8/4/21, +flatus, +void.  Diabetic diet with 2000 ml fluid restriction. Tolerates well.   Pt up with two assist to beside commode.  Family at bedside.  Call light and belongings within reach. All needs met at this time. Fall Precautions and hourly rounding in place.

## 2021-08-05 NOTE — PROGRESS NOTES
Was paged to bedside by nurse for altered mental status.  This is a 41-year-old female with history of hepatitis C and alcoholic cirrhosis, noncompliance, hepatorenal syndrome with poor outpatient follow-up transferred to [facility] for higher level of care given pannus cellulitis.  Outside facility CT abdomen pelvis negative for necrotizing fasciitis started on Zosyn.  Earlier prior provider had ordered lactate which resulted 5.6 and was transferred to telemetry.     I examined patient bedside is currently agitated, responsive to sternal rub, moving all extremities spontaneously.  Ordered 3 L of IV fluids.  CT abdomen pelvis was ordered stat however is unable to be done given patient's severe agitation and unable to lay still.  Sedation was attempted with Haldol 5 mg, multiple doses of Ativan with no success.  Patient was restarted on Zyvox and Zosyn.  Repeat labs showing improvement in lactic acid to 3.2.  Ammonia level sent returned elevated 82 rifaximin and lactulose ordered.  Curb sided ICU regarding sedation for CT abdomen pelvis and was recommended to obtain abdominal sonogram.  Patient is already on IV antibiotics and her WBC count is improving.    Patient has poor prognosis given her meld score of 27 and Madrey of 80.6. I have started her on prednisone 40 mg daily.        Sepsis (HCC)  This is Sepsis Present on admission  SIRS criteria identified on my evaluation include: Leukocytosis, with WBC greater than 12,000  Source is pannus  Sepsis protocol initiated  Fluid resuscitation ordered per protocol  IV antibiotics as appropriate for source of sepsis  While organ dysfunction may be noted elsewhere in this problem list or in the chart, degree of organ dysfunction does not meet CMS criteria for severe sepsis    S/P IVF 3 Liters, monitor for fluid overload   Abx Zosyn/Zyvox   Trend Lactate       Abdominal panniculitis/cellulitis w/ concern for necrosis  CT AP at another facility colitis,  panniculitis with no gas/abscess  Surgery f/u appreciated -- no indication for surgery at this time  ID is consulted and on zosyn and Zyvox continue with IV abx  Abdominal sonogram       Class 3 severe obesity due to excess calories with serious comorbidity and body mass index (BMI) of 40.0 to 44.9 in adult (HCC)  Diet and lifestyle modifications    Type 2 diabetes mellitus with neurologic complication, without long-term current use of insulin (HCC)  No ISS as pt has been hypoglycemic    Alcoholic cirrhosis of liver without ascites (HCC)  Decompensated secondary to alcohol   MELD 27 upon admission       Hypothyroidism  TSH 7.2, elevated, but improved from TSH in April, free T4 in normal range  Synthroid      Portal vein thrombosis  Suspect chronic PV throbosis -- in which case there is no clear benefit for anticoagulation -- case was previously noted by hematology (Dr. Montenegro)  Will avoid chronic anticoagulation    Acute encephalopathy  Likely hepatic encephalopathy Ammonia [id number]  Continue with lactulose and Rifaximin    Colitis  As noted in panniculitis    Class 3 severe obesity due to excess calories with serious comorbidity and body mass index (BMI) of 40.0 to 44.9 in adult (HCC)  BMI 40.74    Alcoholic cirrhosis of liver without ascites (HCC)  MELD 27  Maddrey 80 ; prednisone 40 mg daily   Palliative consult     Hypoglycemia  Hypoglycemia protocol    Anasarca  Secondary to ESLD  Supportive care    Failure to thrive in adult  PT/OT, PO diet as able to tolerated  Palliative care consult    Severe protein-calorie malnutrition (HCC)  Ensure    Anxiety  PRN atarax    Hyperbilirubinemia  Secondary to ESLD      Please note that this dictation was created using voice recognition software. I have made every reasonable attempt to correct obvious errors, but I expect that there are errors of grammar and possibly context that I did not discover before finalizing the note.      Discussed patient condition and risk of  morbidity and/or mortality with Intensivist, RN, RT and Pharmacy.    The patient remains critically ill.  Critical care time = 35 minutes in directly providing and coordinating critical care and extensive data review.  No time overlap and excludes procedures.

## 2021-08-05 NOTE — WOUND TEAM
[facility]  Inpatient Services  Wound and Skin Care Evaluation    Admission Date: 8/1/2021     Last order of IP CONSULT TO WOUND CARE was found on 8/1/2021 from Hospital Encounter on 8/1/2021     HPI, PMH, SH: Reviewed    No past surgical history on file.  Social History     Tobacco Use   • Smoking status: Former Smoker   • Smokeless tobacco: Former User   Substance Use Topics   • Alcohol use: Not Currently     Alcohol/week: 8.4 oz     Types: 14 Cans of beer per week     Comment: last drink on 5/15     Chief Complaint   Patient presents with   • Wound Check     Pt reports redness in lower abd started ~2 weeks ago, now presenting with blackened open wounds across lower abd to b/l groin.      Diagnosis: Panniculitis [M79.3]    Unit where seen by Wound Team: [id number]/01     WOUND CONSULT/FOLLOW UP RELATED TO:  Left pannus/bilateral thighs/chest      WOUND HISTORY:  Unknown history.  Patient states it started about a week or 2 ago.    Per Dr. Lion 08/02: Cellulitis present for 2 weeks -treated with oral antibiotics  CT shows panniculitis extension both left and right flanks  8/2 cont abx, no need for surgery at the moment    WOUND ASSESSMENT/LDA     Wound 08/01/21 Soft Tissue Necrosis Abdomen;Pannus;Thigh Lower Left eschar  (Active)   Wound Image   08/05/21 1130   Site Assessment Black;Eschar 08/05/21 1130   Periwound Assessment Edema;Fragile 08/05/21 1130   Margins Undefined edges 08/05/21 1130   Closure Secondary intention 08/05/21 1130   Drainage Amount None 08/05/21 1130   Drainage Description Serosanguineous 08/04/21 1930   Treatments Cleansed;Site care 08/05/21 1130   Wound Cleansing Approved Wound Cleanser 08/05/21 1130   Periwound Protectant Viscopaste 08/05/21 1130   Dressing Cleansing/Solutions Not Applicable 08/05/21 1130   Dressing Options Viscopaste 08/05/21 1130   Dressing Changed New 08/05/21 1130   Dressing Status Clean;Dry;Intact 08/05/21 1130   Dressing Change/Treatment Frequency Every  Shift, and As Needed 08/05/21 1130   NEXT Dressing Change/Treatment Date 08/05/21 08/05/21 1130   NEXT Weekly Photo (Inpatient Only) 08/12/21 08/05/21 1130   Non-staged Wound Description Full thickness 08/05/21 1130   Wound Length (cm) 10 cm 08/05/21 1130   Wound Width (cm) 40 cm 08/05/21 1130   Wound Surface Area (cm^2) 400 cm^2 08/05/21 1130   Shape irregular 08/05/21 1130   Wound Odor None 08/05/21 1130   Exposed Structures MICHI 08/05/21 1130   WOUND NURSE ONLY - Time Spent with Patient (mins) 60 08/05/21 1130       Wound 08/01/21 Soft Tissue Necrosis Thigh Inner;Upper Left (Active)   Wound Image    08/05/21 1130   Site Assessment Black;Eschar 08/05/21 1130   Periwound Assessment Maceration;Blistered;Painful 08/05/21 1130   Margins Undefined edges 08/05/21 1130   Closure Secondary intention 08/05/21 1130   Drainage Amount None 08/05/21 1130   Drainage Description Serosanguineous 08/04/21 1930   Treatments Cleansed;Site care 08/05/21 1130   Wound Cleansing Approved Wound Cleanser 08/05/21 1130   Periwound Protectant Viscopaste 08/05/21 1130   Dressing Cleansing/Solutions Not Applicable 08/05/21 1130   Dressing Options Viscopaste 08/05/21 1130   Dressing Changed New 08/05/21 1130   Dressing Status Clean;Dry;Intact 08/05/21 1130   Dressing Change/Treatment Frequency Every Shift, and As Needed 08/05/21 1130   NEXT Dressing Change/Treatment Date 08/05/21 08/05/21 1130   NEXT Weekly Photo (Inpatient Only) 08/09/21 08/02/21 1754   Non-staged Wound Description Full thickness 08/05/21 1130   Shape irregular 08/05/21 1130   Wound Odor None 08/05/21 1130   WOUND NURSE ONLY - Time Spent with Patient (mins) 60 08/05/21 1130          Vascular:    LLOYD:   No results found.    Lab Values:    Lab Results   Component Value Date/Time    WBC 17.2 (H) 08/05/2021 04:17 AM    RBC 2.29 (L) 08/05/2021 04:17 AM    HEMOGLOBIN 7.7 (L) 08/05/2021 04:17 AM    HEMATOCRIT 22.5 (L) 08/05/2021 04:17 AM    HBA1C 4.0 08/01/2021 10:38 AM      Culture  Results show:  No results found for this or any previous visit (from the past 720 hour(s)).    Pain Level/Medicated:  Pt agitated. Medicated with Haldol.     INTERVENTIONS BY WOUND TEAM:  Chart and images reviewed. Discussed with bedside RN. All areas of concern (based on picture review, LDA review and discussion with bedside RN) have been thoroughly assessed. Documentation of areas based on significant findings. This RN in to assess patient. Performed standard wound care which includes appropriate positioning, dressing removal and non-selective debridement. Pictures and measurements obtained weekly if/when required.    Preparation for Dressing removal: NA  Cleansed with:  NA   Sharp debridement: NA  Nursing to apply if able and patient allows   [place] wound: barrier paste   Primary Dressing: Xeroform guaze   Secondary (Outer) Dressing: ABD and roll gauze     Interdisciplinary consultation: Patient, Bedside RN (Kelsey), wound RN (Helen), Dr. Lopez (infectious disease)      EVALUATION / RATIONALE FOR TREATMENT:  Most Recent Date:    8/5/21: Patient with hard, shell like eschar that spans the entirety of her pannus. Medial thighs with blisters and wounds that have eschar as well. Viscopatch zinc impregnated gauze applied to provide a non-stick wound contact layer and to soften eschar. Patient will likely need surgical debridement. Per bedside RN, repeat scans of her abdomen to be performed when patient can receive sedation as she is too agitated for imaging at this time.     BMS also placed as patient with extremely watery stool.     08/02/21: patient refusing care and medications.  Confused and needs re-orienting.  Not allowing wounds cleansed or dressing at this time.  Will not lay down for full assessment.  Dressings to be applied if able per nursing.  Wound team to follow up.       Goals: Steady decrease in wound area and depth weekly.    WOUND TEAM PLAN OF CARE ([X] for frequency of wound follow up,):    Nursing to follow orders written for wound care. Contact wound team if area fails to progress, deteriorates or with any questions/concerns  Dressing changes by wound team:                   Follow up 3 times weekly:                NPWT change 3 times weekly:     Follow up 1-2 times weekly:    X  Follow up Bi-Monthly:                   Follow up as needed:     Other (explain):     NURSING PLAN OF CARE ORDERS (X):  Dressing changes: See Dressing Care orders: X  Skin care: See Skin Care orders:   RN Prevention Protocol: O  Rectal tube care: See Rectal Tube Care orders:   Other orders:    RSKIN:   CURRENTLY IN PLACE (X), APPLIED THIS VISIT (A), ORDERED (O):   Q shift Yousif:  X  Q shift pressure point assessments:  X    Surface/Positioning   Pressure redistribution mattress          X  Low Airloss          Bariatric foam      Bariatric LUCITA     Waffle cushion        Waffle Overlay          Reposition q 2 hours    Ensure patient turning   TAPs Turning system     Z Bill Pillow     Offloading/Redistribution   Sacral Mepilex (Silicone dressing)     Heel Mepilex (Silicone dressing)         Heel float boots (Prevalon boot)             Float Heels off Bed with Pillows           Respiratory   Silicone O2 tubing         Gray Foam Ear protectors     Cannula fixation Device (Tender )          High flow offloading Clip    Elastic head band offloading device      Anchorfast                                                         Trach with Optifoam split foam             Containment/Moisture Prevention     Rectal tube or BMS  A  Purwick/Condom Cath        Charlton Catheter    Barrier wipes      A     Barrier paste     A  Antifungal tx      Interdry        Mobilization       Up to chair        Ambulate      PT/OT      Nutrition       Dietician        Diabetes Education      PO   X  TF     TPN     NPO   # days     Other        Anticipated discharge plans: TBA will need ongoing wound care post DC  LTACH:        SNF/Rehab:                   Home Health Care:           Outpatient Wound Center:            Self/Family Care:        Other:

## 2021-08-05 NOTE — PROGRESS NOTES
"[facility] Medicine Daily Progress Note    Date of Service  8/5/2021    Chief Complaint  Hien Alfaro is a 41 y.o. female admitted 8/1/2021 with abdominal discomfort.    Hospital Course  Per admitting provider:  \"Hien Alfaro is a 41 y.o. female who presented 8/1/2021 with Wound Check (Pt reports redness in lower abd started ~2 weeks ago, now presenting with blackened open wounds across lower abd to b/l groin. )  She is morbidly obese. She has a history of alcoholic cirrhosis, portal vein thrombosis NOT on anticoagulation (seen and decided by Dr. Montenegro, [facility]) and was hospitalized in June for decompensated alcoholic cirrhosis with hyperammonemia, bilirubinemia and hepatorenal syndrome with poor outpatient follow-up. She was placed on antibiotics for her pannus infections. Despite antibiotics, she noticed black open wounds along her abdomen to bilateral groin and also a lump/mass that started 2 weeks now RLQ region.\"     CT AP at another facility noted colitis and panniculitis, no gas or abscess noted. Transferred to [facility] ER for higher level of care, admitted to medicine service, surgery was consulted, no surgery indicated, cont abx.     ID is consulted. On zosyn and zyvox. Check MRSA swab pending. Blood culture NTD.    Palliative consulted.     Hepatic encephalopathy, ammonia elevated. On lactulose po and rifaximin. Ammonia slightly trending down    Interval Problem Update  8/3: Patient is noted to be confused, agitated intermittently.  at the bedside.  is feeding her with lactulose. Ammonia this morning 101.     8/4: patient is still very confused, agitated intermittently.  at the bedside, reports patient's mentation slightly improving. On lactulose and rifaximin and ammonia level trending down slightly.   Noted tachycardia. On hold diuretics. Check lactic acid.     8/5 patient remains agitated and is screaming in pain.  I have added IV Toradol for pain control.  Ordered a stat CT abdomen " and lower extremity to rule out abscess.  No obvious abscess seen on imaging.  Continue IV antibiotics.  Continue lactulose via Cortrak.    I have personally seen and examined the patient at bedside. I discussed the plan of care with patient, family and bedside RN.    Consultants/Specialty  Surgery  Wound care  Palliative care    Code Status  Full Code    Disposition  Patient is not medically cleared.   Anticipate discharge to to skilled nursing facility.  I have placed the appropriate orders for post-discharge needs.    Review of Systems  Review of Systems   Unable to perform ROS: Mental acuity        Physical Exam  Temp:  [36.5 °C (97.7 °F)-37.2 °C (99 °F)] 36.5 °C (97.7 °F)  Pulse:  [113-125] 113  Resp:  [20-24] 20  BP: (102-124)/(56-80) 113/73  SpO2:  [92 %-99 %] 96 %    Physical Exam  Vitals and nursing note reviewed.   Constitutional:       Appearance: She is obese. She is ill-appearing.   HENT:      Head: Normocephalic and atraumatic.      Nose: Nose normal.   Eyes:      General: Scleral icterus present.      Extraocular Movements: Extraocular movements intact.   Cardiovascular:      Rate and Rhythm: Regular rhythm. Tachycardia present.      Pulses: Normal pulses.      Heart sounds: No friction rub.   Pulmonary:      Effort: Pulmonary effort is normal.      Breath sounds: No wheezing.      Comments: Decreased bibasilar breath sounds  Abdominal:      General: There is distension.      Palpations: Abdomen is soft.      Tenderness: There is abdominal tenderness. There is no guarding or rebound.   Musculoskeletal:         General: No tenderness. Normal range of motion.      Cervical back: Neck supple. No tenderness.      Right lower leg: Edema present.      Left lower leg: Edema present.   Skin:     General: Skin is dry.      Capillary Refill: Capillary refill takes less than 2 seconds.      Coloration: Skin is jaundiced.   Neurological:      Mental Status: She is alert.      Comments: Confused and agitated  intermittently  Minimally verbal     Psychiatric:      Comments: Unable to evaluate         Fluids    Intake/Output Summary (Last 24 hours) at 8/5/2021 1554  Last data filed at 8/5/2021 1307  Gross per 24 hour   Intake 30 ml   Output --   Net 30 ml       Laboratory  Recent Labs     08/03/21  0642 08/04/21  0433 08/05/21  0417   WBC 14.0* 18.2* 17.2*   RBC 2.53* 2.51* 2.29*   HEMOGLOBIN 8.4* 8.5* 7.7*   HEMATOCRIT 24.2* 24.4* 22.5*   MCV 95.7 97.2 98.3*   MCH 33.2* 33.9* 33.6*   MCHC 34.7 34.8 34.2   RDW 58.1* 60.8* 63.8*   PLATELETCT 164 165 139*   MPV 9.7 9.7 10.1     Recent Labs     08/03/21  0642 08/04/21  0433 08/05/21  0417   SODIUM 136 137 141   POTASSIUM 3.7 3.3* 3.9   CHLORIDE 106 105 112   CO2 19* 17* 18*   GLUCOSE 132* 144* 142*   BUN 8 9 8   CREATININE 0.80 0.97 0.74   CALCIUM 7.7* 8.3* 7.9*     Recent Labs     08/04/21  0433   INR 2.78*               Imaging  CT-EXTREMITY, LOWER WITH LEFT   Final Result      1.  No evidence of osteomyelitis.   2.  Soft tissue edema in the pannus and left thigh with skin thickening which can be seen in the setting of cellulitis.   3.  No abscess is identified.   4.  No soft tissue air is noted.   5.  Free fluid is seen in the pelvis.      CT-ABDOMEN-PELVIS WITH   Final Result      1.  Again there are morphologic changes of the liver consistent with cirrhosis with associated splenomegaly.   2.  Portal vein and mesenteric vessels are grossly patent.   3.  There is a small amount of ascites with mesenteric edema and subcutaneous edema.   4.  There is no soft tissue abscess or intra-abdominal or pelvic abscess.   5.  There is colonic wall edema most likely related to the liver disease and hypoproteinemia.      US-ABDOMEN LTD (SOFT TISSUE)   Final Result      1.  Small volume of ascites in the right upper quadrant adjacent to the liver.      2.  Cirrhotic appearance of liver.      3.  No large volume of ascites.      DX-ABDOMEN FOR TUBE PLACEMENT   Final Result      Waqas  feeding tube tip projects in the region of the duodenal bulb.      CT-HEAD W/O   Final Result      No evidence of acute intracranial process.      OUTSIDE IMAGES-CT ABDOMEN /PELVIS   Final Result      OUTSIDE IMAGES-DX CHEST   Final Result      OUTSIDE IMAGES-CT ABDOMEN /PELVIS   Final Result           Assessment/Plan  * Abdominal panniculitis/cellulitis w/ concern for necrosis- (present on admission)  Assessment & Plan  CT AP at another facility colitis, panniculitis with no gas/abscess  Surgery f/u appreciated -- no indication for surgery at this time  ID is consulted and on zosyn and zyvox, MRSA screen pending.  Low threshold for CT imaging if septic shock/deteriorates  Follow blood culture  Wound care      Hyperbilirubinemia  Assessment & Plan  Sec to ESLD  Total bilirubin 4. Her total bilirubin was 11 in June    Anxiety  Assessment & Plan  PRN atarax    Acute encephalopathy  Assessment & Plan  Likely hepatic encephalopathy  Ordered CT head, unremarkable  Ammonia elevated, trending down   On lactulose and Rifaximin    End stage liver disease (HCC)  Assessment & Plan  MELD score: 28 on admission  Secondary to ETOH  Hepatitis panel neg  Poor prognosis    Severe protein-calorie malnutrition (HCC)  Assessment & Plan  Ensure    Failure to thrive in adult  Assessment & Plan  PT/OT, PO diet as able to tolerated  Palliative care consult    Anasarca  Assessment & Plan  Secondary to ESLD  Supportive care    Hypoglycemia  Assessment & Plan  Hypoglycemia protocol    Colitis  Assessment & Plan  As noted in panniculitis    Portal vein thrombosis- (present on admission)  Assessment & Plan  Suspect chronic PV throbosis -- in which case there is no clear benefit for anticoagulation -- case was previously noted by hematology (Dr. Montenegro)    Will avoid chronic anticoagulation    Class 3 severe obesity due to excess calories with serious comorbidity and body mass index (BMI) of 40.0 to 44.9 in adult (HCC)- (present on  admission)  Assessment & Plan  Diet and lifestyle modifications    Sepsis (HCC)  Assessment & Plan  This is Sepsis Present on admission  SIRS criteria identified on my evaluation include: Leukocytosis, with WBC greater than 12,000  Source is pannus  Sepsis protocol initiated  Fluid resuscitation ordered per protocol  IV antibiotics as appropriate for source of sepsis  While organ dysfunction may be noted elsewhere in this problem list or in the chart, degree of organ dysfunction does not meet CMS criteria for severe sepsis    On IVF  Abx  Follow up lactic acid and blood culture          Hypothyroidism- (present on admission)  Assessment & Plan  TSH 7.2, elevated, but improved from TSH in April, free T4 in normal range  Synthroid    Alcoholic cirrhosis of liver without ascites (HCC)- (present on admission)  Assessment & Plan  Decompensated.   reports patient has not been drinking since May  MELD score 28 on admission, 27-32% 90 day mortality rate  She has upcoming appointment with GI as outpatient      Type 2 diabetes mellitus with neurologic complication, without long-term current use of insulin (HCC)- (present on admission)  Assessment & Plan  No ISS as pt has been hypoglycemic       VTE prophylaxis: SCDs/TEDs given elevated INR    I have performed a physical exam and reviewed and updated ROS and Plan today (8/5/2021). In review of yesterday's note (8/4/2021), there are no changes except as documented above.

## 2021-08-05 NOTE — CARE PLAN
The patient is Unstable - High likelihood or risk of patient condition declining or worsening    Shift Goals  Clinical Goals: lower ammonia, lower lactic acid  Patient Goals: MICHI  Family Goals: comfort, improve mental status    Problem: Pain - Standard  Goal: Alleviation of pain or a reduction in pain to the patient’s comfort goal  Outcome: Progressing     Problem: Knowledge Deficit - Standard  Goal: Patient and family/care givers will demonstrate understanding of plan of care, disease process/condition, diagnostic tests and medications  Outcome: Progressing     Problem: Fluid Volume  Goal: Fluid volume balance will be maintained  Outcome: Progressing     Problem: Respiratory  Goal: Patient will achieve/maintain optimum respiratory ventilation and gas exchange  Outcome: Progressing

## 2021-08-05 NOTE — PROGRESS NOTES
Cortrak Placement    Tube Team verified patient name and medical record number prior to tube placement.  Cortrak tube (43 inches, 10 Burmese) placed at 70 cm in right nare.  Per Cortrak picture, tube appears to be in the stomach.  Nursing Instructions: Awaiting KUB to confirm placement before use for medications or feeding. Once placement confirmed, flush tube with 30 ml of water, and then remove and save stylet, in patient medication drawer.

## 2021-08-05 NOTE — PROGRESS NOTES
Assumed care of patient at bedside report from NOC RN. Updated spouse on POC at bedside. Patient currently A & O x 0 continues to be very restless and combative; on room air; bedbound; without signs of acute pain. Wrist restraints in place. Call light within reach. Whiteboard updated. Fall precautions in place. Bed locked and in lowest position. All questions answered. No other needs indicated at this time.

## 2021-08-05 NOTE — CONSULTS
Reason for PC Consult: Advance Care Planning    Consulted by:   Dr. Mejia    Assessment:  General:   41 year old female admitted for panniculitis on 8/1/21. Pt has a history of liver cirrhosis, EtOH abuse (last drink 3/21), Hepatitis C, obesity (BMI 40.74), portal vein thrombosis, and esophageal veracies. Pt presented to [facility] with confusion and worsening wounds, pt was transferred to [facility] ED for further management.     Prior PC Consult:   None on File    Social:   Pt lives at home with spouse and two minor children. Prior to recent 2 weeks, pt was independent with all ADLs. In the recent last two weeks pt was needing a walker to ambulate and eventually wheelchair bound.     Dyspnea: No, 96% on RA  Last BM: 08/05/21    Pain: Yes, Pt moaning intermittenly  Depression: Unable to determine    Dementia: Unable to Determine       Spiritual:  Is Roman [employer] or spirituality important for coping with this illness? Yes, Placed order for  to visit  Has a  or spiritual provider visit been requested? Yes    Palliative Performance Scale: 20%    Advance Directive: None on File   DPOA: None on File, PATRICIA Reardon  POLST: None on File    To locate the AD/POLST, please hover the cursor over the patient's code status to find all linked ACP documents.    Code Status: Full     Outcome:  PC RN visited pt and spouse Brant at bedside. Introduced self and role of PC, pt is lethargic, does not engage in discussion, she moans intermittently. Discussed Brant's understanding of clinical picture, educated Brant on cirrhosis, MELD score, hepatorenal syndrome and encephalopathy.    Long discussion about quality vs quantity of life, benefits vs burdens of treatment and concern for long term survival. Discussed Brant's experience of pt worsening mentally and physically. He verbalized he remains hopeful and supportive of pt. He explained how he is going to support pt at home and ensure she takes her medications.  Apparently pt did not like taking lactulose due to the frequent loose stools so she stopped taking it at home. That is when Brant noticed pt's inability to walk around by herself, she needed a walker and then eventually a wheelchair. Pt was falling a lot and her mentation worsened with confusion. Brant had gone out of town for work and when he returned he noticed pt needing medical attention and then brought the pt to the hospital.     Discussed concerns for long term recovery if pt does not clinically improve with treatment. If pt were to improve then pt would need to follow up with GI doctor to discuss long term planning for liver management. Brant verbalized understanding, he states he will do what he needs to for the pt. Brant reports he has been keeping their two minor children updates on pt's situation. Offered child life to assist with resources and support in discussion with their children.    Provided business card, encouraged Brant to call with any questions or concerns.    Active listening, education, validation, normalization, therapeutic touch, and emotional support provided throughout encounter.    Updated:   PC Tem    Plan:   Continue to discuss GOC as clinical picture unfolds.     Recommendations: I do not recommend an ethics or hospice consult at this time because GOC discussion is ongoing.    *Recommendation does not provide clinical appropriateness for hospice nor does it provide that the patient would or would not qualify for hospice services.*     Thank you for allowing Palliative Care to participate in this patient's care. Please feel free to call [phone] with any questions or concerns.

## 2021-08-05 NOTE — DIETARY
"Nutrition Support Assessment   Day 4 of admit.  Hien Alfaro is a 41 y.o. female with admitting DX of abdominal panniculitis/cellulitis w/ concern for necrosis.     Current problem list:  1. Acute encephalopathy  2. Alcoholic cirrhosis of liver without ascites  3. Anasarca  4. Colitis  5. ESLD decompensated  6. Failure to thrive in adult  7. Hyperbilirubinemia  8. Hypoglycemia  9. Hypothyroidism  10. Portal vein thrombosis  11. Sepsis  12. Severe protein-calorie malnutrition  13. Type 2 DM     Assessment:  Estimated Nutritional Needs: based on:   Height: 160 cm (5' 3\")  Weight: 104 kg (230 lb) - via stand up scale   Body mass index is 40.74 kg/m²., BMI classification: Obesity class III (may not be accurate representation of wt considering 2+ BLE edema)    Calculation/Equation: [place] x 1.0 - 1.1  Total Calories / day: 1690 - 1860  (Calories / k.2 - 17.8 / 32.3 - 35.5 kcal / kg IBW)  1144 - 1456 kcals (11-14 kcals / kg)  Total Grams Protein / day: 104.5 - 115 (Grams Protein / k - 1.1 / 2.0 - 2.2 g/kg IBW 52.4 kg)      Evaluation:   1. Pt presented to [facility] for wounds that developed over course of 2 weeks PTA.  2. Soft tissue necrosis on abdomen, pannus, lower left thigh, inner upper left thigh.  3. Labs: Glu 142, Alb 1.7, ammonia 82 (vs 101 on ) POC glu : 142  4. Meds: cyanocobalamin, folvite, lasix 40 mg QD, dilaudid, cultrelle, lactulose 20 gm/30 mL, synthroid, ativan, abx, MVI, prednisone, aldactone 100 mg, cholecalciferol  5. 2+ pitting BLE edema  6. Last BM: : medium loose  7. Indication for nutrition support: pt agitated and in restraints; continues to be encephalopathic  8. Per diagnostic imaging, cortrak tips projects over duodenal bulb.  9. Unable to visualize pt at this time as care team with pt.  10. Calorically-dense and protein-dense tube feed most appropriate formula to meet pt's needs at this time.     Malnutrition Risk: Unable to assess due to limited information. Pt at " risk with poor PO during stay.     Recommendations/Plan:  1. Recommend initiation of Impact Peptide 1.5, advance per protocol to goal rate 45 ml/hr, providing 1620 kcals, 101.5 grams protein, 832 mL free water,  (151 g CHO, 0 g fiber, 1264 Na).   2. Fluids per MD.  3. Monitor weight.  4. Follow up for nutrition-focused physical exam to evaluate for malnutrition.  5. Monitor for tube feed tolerance.  6. Due to MD dx of severe protein-calorie malnutrition and PO intake 0-50% of meals doc since admit, monitor for refeeding: Order BMP w/ Mg and Phos x 7 days. Replete K, Phos and Mg prn. Supplement 100 mg Thiamine x 7 days to reduce risk of refeeding. Texted MD re: thiamine order.     RD following.

## 2021-08-05 NOTE — WOUND TEAM
Wound team note:   Pt seen for placement of BMS. MD order verified. Pt assessed, noted to be incontinent of loose brown stool. Sacrum/buttocks pink and intact. Sphincter tone determined to be adequate. BMS placed without difficulty, 40 mL of tap water placed in retention balloon, irrigated with 60 mL warm tap water. Confirmed irrigant/stool output in tube. Nursing to irrigate q shift with 60 mL-120 mL warm tap water or until patent.

## 2021-08-05 NOTE — PROGRESS NOTES
"ADULT  INFECTIOUS DISEASES  CONSULT  FOLLOW UP NOTE     Reason for Consultation: abdominal panniculitis /cellulitis with concern for necrotizing     Interim History: Patient seen pre-rounds at bedside during wound change, still in significant distress santiago. Medial thighs, u/s abdomen result noted ascites/cirrhosis, wbc 17.2, CT A/P/extremities pending; linezolid switched to IV    Allergies/Intolerances:  No Known Allergies    Most Recent Vital Signs:  /73   Pulse (!) 113   Temp 36.5 °C (97.7 °F) (Temporal)   Resp 20   Ht 1.6 m (5' 3\")   Wt 104 kg (230 lb)   SpO2 96%   BMI 40.74 kg/m²   Temp  Min: 36.4 °C (97.5 °F)  Max: 37.3 °C (99.1 °F)    Physical Exam:  General: still in distress, guarded to move   HEENT: sclera anicteric, MMM, no oral lesions  Neck: no LAD  Chest: CTAB with no r/r/w, normal work of breathing  Cardiac: RRR, normal S1 S2, no m/r/g   Abdomen: +bs, soft, NTND  Extremities: no edema, WWP  Skin: Skin lower abdomen and medial LE superficially eroded, right upper thigh vesicular formation at wound side, wound of lower left side of abdomen now scabbing over appears to be dead tissue , no discharge, painful to touch, erythematous   Neuro: alert    Pertinent Lab Results:  Recent Labs     08/03/21  0642 08/04/21  0433   WBC 14.0* 18.2*      Recent Labs     08/03/21  0642 08/04/21  0433 08/05/21  0417   HEMOGLOBIN 8.4* 8.5* 7.7*   HEMATOCRIT 24.2* 24.4* 22.5*   MCV 95.7 97.2 98.3*   MCH 33.2* 33.9* 33.6*   PLATELETCT 164 165 139*         Recent Labs     08/02/21  0457 08/03/21  0642 08/04/21  0433   SODIUM 133* 136 137   POTASSIUM 3.8 3.7 3.3*   CHLORIDE 103 106 105   CO2 20 19* 17*   CREATININE 0.86 0.80 0.97        Invalid input(s): ALT, ALKPHOS, BILITOT, TOTALBILIRUB, BILIRUBINTOT, BILIRUBINDIR, BILIRUBININD, ALKALINEPHOS     Microbiology:  No results found for: BLOODCULTU, BLDCULT, BCHOLD     Studies:      IMPRESSSION/PLAN:   #abdominal and LE medial thigh " cellulitis/panniculitis  #leukocytosis  #encephalopathy  Hien Alfaro is a 41 y.o.female with PMH of alcoholic cirrhosis of liver with a recent hx of abdominal wall cellulitis and colitis , was given 2 weeks abx (doxy, rifampin, bactrim) and at [facility] facility where she came as a transfer was treated with c3, clinda, and meropenem. CT on admission concerning for colitis, leukocytosis, lactic acidosis. Was admitted for concerns of cellulitis vs necrotizing fasciitis. Surgery assessed, advised abdominal panniculitis/cellulitis with necrosis treatment with abx and no surgical intervention. Blood cultures 8/1 negative thus far. WBC 14,000, patient consistently tachycardic. Blood cultures drawn 8/1 NGTD. Was placed on IV zosyn, vanc, and clinda (the latter two discontinued). Medial thighs, u/s abdomen result noted ascites/cirrhosis     Linezolid was added to regime to cover for staph/strep/antixtoxin prophylaxis      Plan:  Abx: Zosyn, Linezolid (now IV due to intolerance PO)  MRSA nares pending, follow up  CTM labs  Maximize tx for encephalopathy  Follow up CT A/P/extremities pending        Dr. Lopez and Dr. Lopez collaborated together with plan.  Thank for you allowing us to take part in your patient's care, please call should you have any questions or would like to discuss this patient.      Aparna Garces M.D.

## 2021-08-05 NOTE — THERAPY
Missed Therapy     Patient Name: Hien Alfaro  Age:  41 y.o., Sex:  female  Medical Record #: [id number]  Today's Date: 8/5/2021 08/05/21 1335   Interdisciplinary Plan of Care Collaboration   Collaboration Comments PT EVAL attempted, pt not appropriate at this time. Will re-attempt again tomorrow.

## 2021-08-05 NOTE — PROGRESS NOTES
Patient was noted to be agitated and persistent tachycardia. Lasix and spirolactone were on hold this morning and LR 100cc/hr was started. She is on zosyn and zyvox. ID/surgery following.   Ammonia level trending slightly down.   Patient started to have diarrhea today.  Check lactic acid stat, returned 5.6.  , Bp 104/80    Plans  1. Start LR bolus   2. Trend Lactic acid  3. Abd/pelvic CT with contrast stat  4. Cont abx Zosyn and Zyvox  5. Transfer patient to tele with tele monitoring and closely clinical monitoring  6. I did discuss the case with ICU Dr. Gonda, who will come to evaluate the patient     This patient is critically ill with a life threatening illness as noted above requiring my direct presence, involvement and maximal preparedness. I have personally spend 35 minutes providing critical care to this patient. Time spend on critical care excludes time spend on procedures.

## 2021-08-05 NOTE — CARE PLAN
Problem: Nutritional:  Goal: Nutrition support tolerated and meeting greater than 85% of estimated needs  Outcome: Progressing  See RD note.

## 2021-08-05 NOTE — DISCHARGE PLANNING
Anticipated Discharge Disposition: TBD, likely home    Action: per IDT rounds pt pending medical clearance, case management needs unknown at this time, however, pt has [facility]HS and lives in [place], so, if pt needs outpatient services it may be difficult to obtain.    Barriers to Discharge: medical clearance    Plan: f/u with medical team and pt to discuss dc needs and barriers.

## 2021-08-05 NOTE — PALLIATIVE CARE
Palliative Care   Attempted to visit pt at bedside, pt sleeping and difficult to arouse. Spouse sleeping as well at bedside. Will return when spouse is awake to discuss GOC.         Thank you for allowing Palliative Care to participate in this patient's care. Please feel free to call [phone] with any questions or concerns.

## 2021-08-06 PROBLEM — K72.90 LIVER FAILURE (HCC): Status: ACTIVE | Noted: 2021-08-06

## 2021-08-06 PROBLEM — D68.9 COAGULOPATHY (HCC): Status: ACTIVE | Noted: 2021-08-06

## 2021-08-06 LAB
ABO + RH BLD: NORMAL
ABO GROUP BLD: NORMAL
ALBUMIN SERPL BCP-MCNC: 1.7 G/DL (ref 3.2–4.9)
ALBUMIN/GLOB SERPL: 0.4 G/DL
ALP SERPL-CCNC: 140 U/L (ref 30–99)
ALT SERPL-CCNC: 29 U/L (ref 2–50)
AMMONIA PLAS-SCNC: 77 UMOL/L (ref 11–45)
ANION GAP SERPL CALC-SCNC: 7 MMOL/L (ref 7–16)
APTT PPP: 61.3 SEC (ref 24.7–36)
AST SERPL-CCNC: 50 U/L (ref 12–45)
BACTERIA BLD CULT: NORMAL
BACTERIA BLD CULT: NORMAL
BARCODED ABORH UBTYP: 5100
BARCODED ABORH UBTYP: 5100
BARCODED PRD CODE UBPRD: NORMAL
BARCODED PRD CODE UBPRD: NORMAL
BARCODED UNIT NUM UBUNT: NORMAL
BARCODED UNIT NUM UBUNT: NORMAL
BASOPHILS # BLD AUTO: 0.1 % (ref 0–1.8)
BASOPHILS # BLD: 0.01 K/UL (ref 0–0.12)
BILIRUB SERPL-MCNC: 4.2 MG/DL (ref 0.1–1.5)
BLD GP AB SCN SERPL QL: NORMAL
BUN SERPL-MCNC: 11 MG/DL (ref 8–22)
CALCIUM SERPL-MCNC: 8 MG/DL (ref 8.5–10.5)
CHLORIDE SERPL-SCNC: 113 MMOL/L (ref 96–112)
CO2 SERPL-SCNC: 20 MMOL/L (ref 20–33)
COMPONENT F 8504F: NORMAL
COMPONENT R 8504R: NORMAL
CREAT SERPL-MCNC: 0.82 MG/DL (ref 0.5–1.4)
CRP SERPL HS-MCNC: 15.95 MG/DL (ref 0–0.75)
EOSINOPHIL # BLD AUTO: 0.03 K/UL (ref 0–0.51)
EOSINOPHIL NFR BLD: 0.2 % (ref 0–6.9)
ERYTHROCYTE [DISTWIDTH] IN BLOOD BY AUTOMATED COUNT: 64.3 FL (ref 35.9–50)
FOLATE SERPL-MCNC: 21.2 NG/ML
GLOBULIN SER CALC-MCNC: 4.6 G/DL (ref 1.9–3.5)
GLUCOSE SERPL-MCNC: 209 MG/DL (ref 65–99)
HCT VFR BLD AUTO: 20.3 % (ref 37–47)
HEMOCCULT STL QL: NEGATIVE
HGB BLD-MCNC: 6.9 G/DL (ref 12–16)
HGB BLD-MCNC: 8.2 G/DL (ref 12–16)
IMM GRANULOCYTES # BLD AUTO: 0.14 K/UL (ref 0–0.11)
IMM GRANULOCYTES NFR BLD AUTO: 1 % (ref 0–0.9)
INR PPP: 2.85 (ref 0.87–1.13)
LACTATE BLD-SCNC: 2.2 MMOL/L (ref 0.5–2)
LYMPHOCYTES # BLD AUTO: 1.23 K/UL (ref 1–4.8)
LYMPHOCYTES NFR BLD: 8.7 % (ref 22–41)
MAGNESIUM SERPL-MCNC: 1.7 MG/DL (ref 1.5–2.5)
MCH RBC QN AUTO: 33.5 PG (ref 27–33)
MCHC RBC AUTO-ENTMCNC: 34 G/DL (ref 33.6–35)
MCV RBC AUTO: 98.5 FL (ref 81.4–97.8)
MONOCYTES # BLD AUTO: 1.23 K/UL (ref 0–0.85)
MONOCYTES NFR BLD AUTO: 8.7 % (ref 0–13.4)
NEUTROPHILS # BLD AUTO: 11.57 K/UL (ref 2–7.15)
NEUTROPHILS NFR BLD: 81.3 % (ref 44–72)
NRBC # BLD AUTO: 0.03 K/UL
NRBC BLD-RTO: 0.2 /100 WBC
PHOSPHATE SERPL-MCNC: 2.1 MG/DL (ref 2.5–4.5)
PLATELET # BLD AUTO: 111 K/UL (ref 164–446)
PMV BLD AUTO: 10.1 FL (ref 9–12.9)
POTASSIUM SERPL-SCNC: 3.9 MMOL/L (ref 3.6–5.5)
PREALB SERPL-MCNC: <3 MG/DL (ref 18–38)
PRODUCT TYPE UPROD: NORMAL
PRODUCT TYPE UPROD: NORMAL
PROT SERPL-MCNC: 6.3 G/DL (ref 6–8.2)
PROTHROMBIN TIME: 29 SEC (ref 12–14.6)
RBC # BLD AUTO: 2.06 M/UL (ref 4.2–5.4)
RH BLD: NORMAL
SIGNIFICANT IND 70042: NORMAL
SIGNIFICANT IND 70042: NORMAL
SITE SITE: NORMAL
SITE SITE: NORMAL
SODIUM SERPL-SCNC: 140 MMOL/L (ref 135–145)
SOURCE SOURCE: NORMAL
SOURCE SOURCE: NORMAL
UNIT STATUS USTAT: NORMAL
UNIT STATUS USTAT: NORMAL
VIT B12 SERPL-MCNC: >4000 PG/ML (ref 211–911)
WBC # BLD AUTO: 14.2 K/UL (ref 4.8–10.8)

## 2021-08-06 PROCEDURE — 82746 ASSAY OF FOLIC ACID SERUM: CPT

## 2021-08-06 PROCEDURE — 85610 PROTHROMBIN TIME: CPT

## 2021-08-06 PROCEDURE — 700105 HCHG RX REV CODE 258: Performed by: INTERNAL MEDICINE

## 2021-08-06 PROCEDURE — 85025 COMPLETE CBC W/AUTO DIFF WBC: CPT

## 2021-08-06 PROCEDURE — 700111 HCHG RX REV CODE 636 W/ 250 OVERRIDE (IP): Performed by: INTERNAL MEDICINE

## 2021-08-06 PROCEDURE — 99233 SBSQ HOSP IP/OBS HIGH 50: CPT | Performed by: INTERNAL MEDICINE

## 2021-08-06 PROCEDURE — 86901 BLOOD TYPING SEROLOGIC RH(D): CPT

## 2021-08-06 PROCEDURE — A9270 NON-COVERED ITEM OR SERVICE: HCPCS | Performed by: STUDENT IN AN ORGANIZED HEALTH CARE EDUCATION/TRAINING PROGRAM

## 2021-08-06 PROCEDURE — 86850 RBC ANTIBODY SCREEN: CPT

## 2021-08-06 PROCEDURE — 83605 ASSAY OF LACTIC ACID: CPT

## 2021-08-06 PROCEDURE — P9017 PLASMA 1 DONOR FRZ W/IN 8 HR: HCPCS

## 2021-08-06 PROCEDURE — 700102 HCHG RX REV CODE 250 W/ 637 OVERRIDE(OP): Performed by: INTERNAL MEDICINE

## 2021-08-06 PROCEDURE — 700111 HCHG RX REV CODE 636 W/ 250 OVERRIDE (IP): Performed by: STUDENT IN AN ORGANIZED HEALTH CARE EDUCATION/TRAINING PROGRAM

## 2021-08-06 PROCEDURE — A9270 NON-COVERED ITEM OR SERVICE: HCPCS | Performed by: INTERNAL MEDICINE

## 2021-08-06 PROCEDURE — 770020 HCHG ROOM/CARE - TELE (206)

## 2021-08-06 PROCEDURE — 82607 VITAMIN B-12: CPT

## 2021-08-06 PROCEDURE — 85730 THROMBOPLASTIN TIME PARTIAL: CPT

## 2021-08-06 PROCEDURE — P9016 RBC LEUKOCYTES REDUCED: HCPCS

## 2021-08-06 PROCEDURE — 86900 BLOOD TYPING SEROLOGIC ABO: CPT

## 2021-08-06 PROCEDURE — 82140 ASSAY OF AMMONIA: CPT

## 2021-08-06 PROCEDURE — 36415 COLL VENOUS BLD VENIPUNCTURE: CPT

## 2021-08-06 PROCEDURE — 302307 BAG FECAL MANAGEMENT TEMPORARY COLLECTION WITH FILTER - SEAL SIGNAL FMS: Performed by: INTERNAL MEDICINE

## 2021-08-06 PROCEDURE — 36430 TRANSFUSION BLD/BLD COMPNT: CPT

## 2021-08-06 PROCEDURE — 99233 SBSQ HOSP IP/OBS HIGH 50: CPT | Mod: GC | Performed by: INTERNAL MEDICINE

## 2021-08-06 PROCEDURE — 84134 ASSAY OF PREALBUMIN: CPT

## 2021-08-06 PROCEDURE — 82272 OCCULT BLD FECES 1-3 TESTS: CPT

## 2021-08-06 PROCEDURE — 84100 ASSAY OF PHOSPHORUS: CPT

## 2021-08-06 PROCEDURE — 85018 HEMOGLOBIN: CPT

## 2021-08-06 PROCEDURE — 30233N1 TRANSFUSION OF NONAUTOLOGOUS RED BLOOD CELLS INTO PERIPHERAL VEIN, PERCUTANEOUS APPROACH: ICD-10-PCS | Performed by: STUDENT IN AN ORGANIZED HEALTH CARE EDUCATION/TRAINING PROGRAM

## 2021-08-06 PROCEDURE — 83735 ASSAY OF MAGNESIUM: CPT

## 2021-08-06 PROCEDURE — 80053 COMPREHEN METABOLIC PANEL: CPT

## 2021-08-06 PROCEDURE — 86923 COMPATIBILITY TEST ELECTRIC: CPT

## 2021-08-06 PROCEDURE — 86140 C-REACTIVE PROTEIN: CPT

## 2021-08-06 PROCEDURE — 700102 HCHG RX REV CODE 250 W/ 637 OVERRIDE(OP): Performed by: STUDENT IN AN ORGANIZED HEALTH CARE EDUCATION/TRAINING PROGRAM

## 2021-08-06 RX ORDER — GAUZE BANDAGE 2" X 2"
100 BANDAGE TOPICAL DAILY
Status: DISCONTINUED | OUTPATIENT
Start: 2021-08-06 | End: 2021-08-07

## 2021-08-06 RX ORDER — PHYTONADIONE 5 MG/1
5 TABLET ORAL DAILY
Status: DISCONTINUED | OUTPATIENT
Start: 2021-08-06 | End: 2021-08-06

## 2021-08-06 RX ADMIN — HYDROMORPHONE HYDROCHLORIDE 0.25 MG: 1 INJECTION, SOLUTION INTRAMUSCULAR; INTRAVENOUS; SUBCUTANEOUS at 23:31

## 2021-08-06 RX ADMIN — HYDROMORPHONE HYDROCHLORIDE 0.25 MG: 1 INJECTION, SOLUTION INTRAMUSCULAR; INTRAVENOUS; SUBCUTANEOUS at 18:07

## 2021-08-06 RX ADMIN — HALOPERIDOL LACTATE 5 MG: 5 INJECTION, SOLUTION INTRAMUSCULAR at 04:07

## 2021-08-06 RX ADMIN — THERA TABS 1 TABLET: TAB at 06:10

## 2021-08-06 RX ADMIN — HYDROMORPHONE HYDROCHLORIDE 0.25 MG: 1 INJECTION, SOLUTION INTRAMUSCULAR; INTRAVENOUS; SUBCUTANEOUS at 11:50

## 2021-08-06 RX ADMIN — DIBASIC SODIUM PHOSPHATE, MONOBASIC POTASSIUM PHOSPHATE AND MONOBASIC SODIUM PHOSPHATE 250 MG: 852; 155; 130 TABLET ORAL at 23:09

## 2021-08-06 RX ADMIN — LACTULOSE 45 ML: 20 SOLUTION ORAL at 06:08

## 2021-08-06 RX ADMIN — PREDNISONE 40 MG: 20 TABLET ORAL at 06:09

## 2021-08-06 RX ADMIN — LINEZOLID 600 MG: 600 INJECTION, SOLUTION INTRAVENOUS at 08:23

## 2021-08-06 RX ADMIN — LACTULOSE 45 ML: 20 SOLUTION ORAL at 10:47

## 2021-08-06 RX ADMIN — PHYTONADIONE 5 MG: 5 TABLET ORAL at 10:46

## 2021-08-06 RX ADMIN — Medication 100 MG: at 10:45

## 2021-08-06 RX ADMIN — DIBASIC SODIUM PHOSPHATE, MONOBASIC POTASSIUM PHOSPHATE AND MONOBASIC SODIUM PHOSPHATE 250 MG: 852; 155; 130 TABLET ORAL at 10:44

## 2021-08-06 RX ADMIN — LINEZOLID 600 MG: 600 INJECTION, SOLUTION INTRAVENOUS at 18:01

## 2021-08-06 RX ADMIN — RIFAXIMIN 550 MG: 550 TABLET ORAL at 17:05

## 2021-08-06 RX ADMIN — Medication 1000 UNITS: at 06:09

## 2021-08-06 RX ADMIN — Medication 1 CAPSULE: at 08:29

## 2021-08-06 RX ADMIN — MIDODRINE HYDROCHLORIDE 10 MG: 5 TABLET ORAL at 07:16

## 2021-08-06 RX ADMIN — HYDROMORPHONE HYDROCHLORIDE 0.25 MG: 1 INJECTION, SOLUTION INTRAMUSCULAR; INTRAVENOUS; SUBCUTANEOUS at 08:38

## 2021-08-06 RX ADMIN — PIPERACILLIN AND TAZOBACTAM 4.5 G: 4; .5 INJECTION, POWDER, LYOPHILIZED, FOR SOLUTION INTRAVENOUS; PARENTERAL at 13:22

## 2021-08-06 RX ADMIN — FOLIC ACID 1 MG: 1 TABLET ORAL at 06:09

## 2021-08-06 RX ADMIN — LEVOTHYROXINE SODIUM 50 MCG: 0.05 TABLET ORAL at 06:09

## 2021-08-06 RX ADMIN — PIPERACILLIN AND TAZOBACTAM 4.5 G: 4; .5 INJECTION, POWDER, LYOPHILIZED, FOR SOLUTION INTRAVENOUS; PARENTERAL at 22:32

## 2021-08-06 RX ADMIN — PHYTONADIONE 10 MG: 10 INJECTION, EMULSION INTRAMUSCULAR; INTRAVENOUS; SUBCUTANEOUS at 17:04

## 2021-08-06 RX ADMIN — DIBASIC SODIUM PHOSPHATE, MONOBASIC POTASSIUM PHOSPHATE AND MONOBASIC SODIUM PHOSPHATE 250 MG: 852; 155; 130 TABLET ORAL at 17:06

## 2021-08-06 RX ADMIN — PIPERACILLIN AND TAZOBACTAM 4.5 G: 4; .5 INJECTION, POWDER, LYOPHILIZED, FOR SOLUTION INTRAVENOUS; PARENTERAL at 04:07

## 2021-08-06 RX ADMIN — MIDODRINE HYDROCHLORIDE 10 MG: 5 TABLET ORAL at 10:44

## 2021-08-06 RX ADMIN — CYANOCOBALAMIN TAB 500 MCG 2500 MCG: 500 TAB at 06:10

## 2021-08-06 RX ADMIN — MIDODRINE HYDROCHLORIDE 10 MG: 5 TABLET ORAL at 17:06

## 2021-08-06 RX ADMIN — LACTULOSE 45 ML: 20 SOLUTION ORAL at 17:05

## 2021-08-06 RX ADMIN — RIFAXIMIN 550 MG: 550 TABLET ORAL at 06:14

## 2021-08-06 ASSESSMENT — FIBROSIS 4 INDEX: FIB4 SCORE: 3.26

## 2021-08-06 NOTE — THERAPY
Missed Therapy     Patient Name: Hien Alfaro  Age:  41 y.o., Sex:  female  Medical Record #: [id number]  Today's Date: 8/6/2021 08/06/21 1115   Interdisciplinary Plan of Care Collaboration   Collaboration Comments Pt not medically stable today as well as not cognitively able to participate. Continues to be hypotensive s/p blood transfusion. Will complete PT eval order at this time. Please re-order once patient becomes medically stable and is cognitively able to participate with out of bed activities.

## 2021-08-06 NOTE — PROGRESS NOTES
Monitor Summary  Rhythm: ST  Rate: 102-123  Ectopy: PAC  0.14 / 0.06 / 0.39    Monitor strip reviewed

## 2021-08-06 NOTE — PROGRESS NOTES
Assumed care of patient at bedside report from NOC RN. Updated on POC. Patient currently A & O x 0 and continues to be restless; on room air; bedbound; with signs of acute pain. Patient current running RBC for HBG of 6.9. Most recent pressure of 89/54. Midodrine administered and MD Lopez notified. New orders received. Patient continues to be in bilateral wrist restraints.Call light within reach. Whiteboard updated. Fall precautions in place. Bed locked and in lowest position. All questions answered. No other needs indicated at this time.

## 2021-08-06 NOTE — CARE PLAN
The patient is Watcher - Medium risk of patient condition declining or worsening    Shift Goals  Clinical Goals: lower ammonia, lower lactic acid  Patient Goals: MICHI  Family Goals: comfort, improve mental status    Progress made toward(s) clinical / shift goals:    Problem: Pain - Standard  Goal: Alleviation of pain or a reduction in pain to the patient’s comfort goal  Outcome: Progressing     Problem: Knowledge Deficit - Standard  Goal: Patient and family/care givers will demonstrate understanding of plan of care, disease process/condition, diagnostic tests and medications  Outcome: Progressing     Problem: Hemodynamics  Goal: Patient's hemodynamics, fluid balance and neurologic status will be stable or improve  Outcome: Progressing     Problem: Fluid Volume  Goal: Fluid volume balance will be maintained  Outcome: Progressing     Problem: Urinary - Renal Perfusion  Goal: Ability to achieve and maintain adequate renal perfusion and functioning will improve  Outcome: Progressing     Problem: Respiratory  Goal: Patient will achieve/maintain optimum respiratory ventilation and gas exchange  Outcome: Progressing     Problem: Fall Risk  Goal: Patient will remain free from falls  Outcome: Progressing     Problem: Skin Integrity  Goal: Skin integrity is maintained or improved  Outcome: Progressing

## 2021-08-06 NOTE — PROGRESS NOTES
"[facility] Medicine Daily Progress Note    Date of Service  8/6/2021    Chief Complaint  Hien Alfaro is a 41 y.o. female admitted 8/1/2021 with abdominal discomfort.    Hospital Course  Per admitting provider:  \"Hien Alfaro is a 41 y.o. female who presented 8/1/2021 with Wound Check (Pt reports redness in lower abd started ~2 weeks ago, now presenting with blackened open wounds across lower abd to b/l groin. )  She is morbidly obese. She has a history of alcoholic cirrhosis, portal vein thrombosis NOT on anticoagulation (seen and decided by Dr. Montenegro, [facility]) and was hospitalized in June for decompensated alcoholic cirrhosis with hyperammonemia, bilirubinemia and hepatorenal syndrome with poor outpatient follow-up. She was placed on antibiotics for her pannus infections. Despite antibiotics, she noticed black open wounds along her abdomen to bilateral groin and also a lump/mass that started 2 weeks now RLQ region.\"     CT AP at another facility noted colitis and panniculitis, no gas or abscess noted. Transferred to [facility] ER for higher level of care, admitted to medicine service, surgery was consulted, no surgery indicated, cont abx.     ID is consulted. On zosyn and zyvox. Check MRSA swab pending. Blood culture NTD.    Palliative consulted.     Hepatic encephalopathy, ammonia elevated. On lactulose po and rifaximin. Ammonia slightly trending down    Interval Problem Update  8/3: Patient is noted to be confused, agitated intermittently.  at the bedside.  is feeding her with lactulose. Ammonia this morning 101.     8/4: patient is still very confused, agitated intermittently.  at the bedside, reports patient's mentation slightly improving. On lactulose and rifaximin and ammonia level trending down slightly.   Noted tachycardia. On hold diuretics. Check lactic acid.     8/5 patient remains agitated and is screaming in pain.  I have added IV Toradol for pain control.  Ordered a stat CT abdomen " and lower extremity to rule out abscess.  No obvious abscess seen on imaging.  Continue IV antibiotics.  Continue lactulose via Cortrak.    8/6 Hb dropped to 6.9. Transfuse 1 unit of PRBC. Her INR is elevated at 2.85. I have ordered a dose of FFP and IV vitamin K 10 mg x 3. She has end stage liver disease and very poor prognosis. I have discontinued her prednisone due to active sepsis and infection. I have discussed her case with her  and explained her poor prognosis. He understands however he would like to keep her CODE STATUS as full code    I have personally seen and examined the patient at bedside. I discussed the plan of care with patient, family and bedside RN.    Consultants/Specialty  Surgery  Wound care  Palliative care    Code Status  Full Code    Disposition  Patient is not medically cleared.   Anticipate discharge to to skilled nursing facility.  I have placed the appropriate orders for post-discharge needs.    Review of Systems  Review of Systems   Unable to perform ROS: Mental acuity        Physical Exam  Temp:  [35.7 °C (96.3 °F)-36.9 °C (98.5 °F)] 36.7 °C (98.1 °F)  Pulse:  [] 90  Resp:  [19-22] 22  BP: ()/(50-73) 93/59  SpO2:  [95 %-97 %] 95 %    Physical Exam  Vitals and nursing note reviewed.   Constitutional:       Appearance: She is obese. She is ill-appearing.   HENT:      Head: Normocephalic and atraumatic.      Nose: Nose normal.      Mouth/Throat:      Comments: Dried blood noted around lips, left nare and oral cavity    Eyes:      General: Scleral icterus present.      Extraocular Movements: Extraocular movements intact.   Cardiovascular:      Rate and Rhythm: Regular rhythm. Tachycardia present.      Pulses: Normal pulses.      Heart sounds: No friction rub.   Pulmonary:      Effort: Pulmonary effort is normal.      Breath sounds: No wheezing.      Comments: Decreased bibasilar breath sounds  Abdominal:      General: There is distension.      Palpations: Abdomen is soft.       Tenderness: There is abdominal tenderness. There is no guarding or rebound.   Musculoskeletal:         General: No tenderness. Normal range of motion.      Cervical back: Neck supple. No tenderness.      Right lower leg: Edema present.      Left lower leg: Edema present.   Skin:     General: Skin is dry.      Capillary Refill: Capillary refill takes less than 2 seconds.      Coloration: Skin is jaundiced.      Comments: Necrotic wounds on left lower abdomen and left thigh   Neurological:      Mental Status: She is alert. She is disoriented.      Comments: Confused and somnolent       Psychiatric:      Comments: Unable to evaluate         Fluids    Intake/Output Summary (Last 24 hours) at 8/6/2021 0850  Last data filed at 8/5/2021 2200  Gross per 24 hour   Intake 150 ml   Output --   Net 150 ml       Laboratory  Recent Labs     08/04/21 0433 08/05/21 0417 08/06/21  0247   WBC 18.2* 17.2* 14.2*   RBC 2.51* 2.29* 2.06*   HEMOGLOBIN 8.5* 7.7* 6.9*   HEMATOCRIT 24.4* 22.5* 20.3*   MCV 97.2 98.3* 98.5*   MCH 33.9* 33.6* 33.5*   MCHC 34.8 34.2 34.0   RDW 60.8* 63.8* 64.3*   PLATELETCT 165 139* 111*   MPV 9.7 10.1 10.1     Recent Labs     08/04/21 0433 08/05/21 0417 08/06/21  0247   SODIUM 137 141 140   POTASSIUM 3.3* 3.9 3.9   CHLORIDE 105 112 113*   CO2 17* 18* 20   GLUCOSE 144* 142* 209*   BUN 9 8 11   CREATININE 0.97 0.74 0.82   CALCIUM 8.3* 7.9* 8.0*     Recent Labs     08/04/21 0433   INR 2.78*               Imaging  CT-EXTREMITY, LOWER WITH LEFT   Final Result      1.  No evidence of osteomyelitis.   2.  Soft tissue edema in the pannus and left thigh with skin thickening which can be seen in the setting of cellulitis.   3.  No abscess is identified.   4.  No soft tissue air is noted.   5.  Free fluid is seen in the pelvis.      CT-ABDOMEN-PELVIS WITH   Final Result      1.  Again there are morphologic changes of the liver consistent with cirrhosis with associated splenomegaly.   2.  Portal vein and  mesenteric vessels are grossly patent.   3.  There is a small amount of ascites with mesenteric edema and subcutaneous edema.   4.  There is no soft tissue abscess or intra-abdominal or pelvic abscess.   5.  There is colonic wall edema most likely related to the liver disease and hypoproteinemia.      US-ABDOMEN LTD (SOFT TISSUE)   Final Result      1.  Small volume of ascites in the right upper quadrant adjacent to the liver.      2.  Cirrhotic appearance of liver.      3.  No large volume of ascites.      DX-ABDOMEN FOR TUBE PLACEMENT   Final Result      Cortrak feeding tube tip projects in the region of the duodenal bulb.      CT-HEAD W/O   Final Result      No evidence of acute intracranial process.      OUTSIDE IMAGES-CT ABDOMEN /PELVIS   Final Result      OUTSIDE IMAGES-DX CHEST   Final Result      OUTSIDE IMAGES-CT ABDOMEN /PELVIS   Final Result           Assessment/Plan  * Abdominal panniculitis/cellulitis w/ concern for necrosis- (present on admission)  Assessment & Plan  CT AP at another facility colitis, panniculitis with no gas/abscess  Surgery f/u appreciated -- no indication for surgery at this time  ID is consulted and on zosyn and zyvox, MRSA screen pending.  Low threshold for CT imaging if septic shock/deteriorates  Follow blood culture  Wound care      Hyperbilirubinemia  Assessment & Plan  Sec to ESLD  Total bilirubin 4. Her total bilirubin was 11 in June    Anxiety  Assessment & Plan  PRN atarax    Acute encephalopathy  Assessment & Plan  Likely hepatic encephalopathy  Ordered CT head, unremarkable  Ammonia elevated, trending down   On lactulose and Rifaximin    End stage liver disease (HCC)  Assessment & Plan  MELD score: 28 on admission  Secondary to ETOH  Hepatitis panel neg  Poor prognosis    Severe protein-calorie malnutrition (HCC)  Assessment & Plan  Ensure    Failure to thrive in adult  Assessment & Plan  PT/OT, PO diet as able to tolerated  Palliative care consult    Anasarca  Assessment  & Plan  Secondary to ESLD  Supportive care    Hypoglycemia  Assessment & Plan  Hypoglycemia protocol    Colitis  Assessment & Plan  As noted in panniculitis    Portal vein thrombosis- (present on admission)  Assessment & Plan  Suspect chronic PV throbosis -- in which case there is no clear benefit for anticoagulation -- case was previously noted by hematology (Dr. Montenegro)    Will avoid chronic anticoagulation    Class 3 severe obesity due to excess calories with serious comorbidity and body mass index (BMI) of 40.0 to 44.9 in adult (HCC)- (present on admission)  Assessment & Plan  Diet and lifestyle modifications    Sepsis ([facility])  Assessment & Plan  This is Sepsis Present on admission  SIRS criteria identified on my evaluation include: Leukocytosis, with WBC greater than 12,000  Source is pannus  Sepsis protocol initiated  Fluid resuscitation ordered per protocol  IV antibiotics as appropriate for source of sepsis  While organ dysfunction may be noted elsewhere in this problem list or in the chart, degree of organ dysfunction does not meet CMS criteria for severe sepsis    On IVF  Abx  Follow up lactic acid and blood culture          Hypothyroidism- (present on admission)  Assessment & Plan  TSH 7.2, elevated, but improved from TSH in April, free T4 in normal range  Synthroid    Alcoholic cirrhosis of liver without ascites (HCC)- (present on admission)  Assessment & Plan  Decompensated.   reports patient has not been drinking since May  MELD score 28 on admission, 27-32% 90 day mortality rate  She has upcoming appointment with GI as outpatient      Type 2 diabetes mellitus with neurologic complication, without long-term current use of insulin ([facility])- (present on admission)  Assessment & Plan  No ISS as pt has been hypoglycemic       VTE prophylaxis: SCDs/TEDs given elevated INR    I have performed a physical exam and reviewed and updated ROS and Plan today (8/6/2021). In review of yesterday's note (8/5/2021),  there are no changes except as documented above.

## 2021-08-06 NOTE — PROGRESS NOTES
"ADULT  INFECTIOUS DISEASES  CONSULT  FOLLOW UP NOTE     Reason for Consultation: abdominal panniculitis /cellulitis with concern for necrotizing     Interim History: Patient still in distress, less tachycardic today and becoming hypotensive into 80s/90s (lower than her range throughout her course thus far), wbc downtrended, CT results consistent with cellulitis (RLE), ascites and edema (A/P )    CT-EXTREMITY, LOWER WITH LEFT   Final Result      1.  No evidence of osteomyelitis.   2.  Soft tissue edema in the pannus and left thigh with skin thickening which can be seen in the setting of cellulitis.   3.  No abscess is identified.   4.  No soft tissue air is noted.   5.  Free fluid is seen in the pelvis.      CT-ABDOMEN-PELVIS WITH   Final Result      1.  Again there are morphologic changes of the liver consistent with cirrhosis with associated splenomegaly.   2.  Portal vein and mesenteric vessels are grossly patent.   3.  There is a small amount of ascites with mesenteric edema and subcutaneous edema.   4.  There is no soft tissue abscess or intra-abdominal or pelvic abscess.   5.  There is colonic wall edema most likely related to the liver disease and hypoproteinemia.      US-ABDOMEN LTD (SOFT TISSUE)   Final Result      1.  Small volume of ascites in the right upper quadrant adjacent to the liver.      2.  Cirrhotic appearance of liver.      3.  No large volume of ascites.      DX-ABDOMEN FOR TUBE PLACEMENT   Final Result      Cortrak feeding tube tip projects in the region of the duodenal bulb.      CT-HEAD W/O   Final Result      No evidence of acute intracranial process.      OUTSIDE IMAGES-CT ABDOMEN /PELVIS   Final Result      OUTSIDE IMAGES-DX CHEST   Final Result      OUTSIDE IMAGES-CT ABDOMEN /PELVIS   Final Result          Allergies/Intolerances:  No Known Allergies    Most Recent Vital Signs:  BP (!) 93/59   Pulse 90   Temp 36.7 °C (98.1 °F)   Resp (!) 22   Ht 1.6 m (5' 3\")   Wt 104 kg (230 lb)   " SpO2 95%   BMI 40.74 kg/m²   Temp  Min: 36.4 °C (97.5 °F)  Max: 37.3 °C (99.1 °F)    Physical Exam:  General: still in distress, guarded to move   HEENT: sclera anicteric, MMM, no oral lesions  Neck: no LAD  Chest: CTAB with no r/r/w, normal work of breathing  Cardiac: RRR, normal S1 S2, no m/r/g   Abdomen: +bs, soft, NTND  Extremities: no edema, WWP  Skin: Skin lower abdomen and medial LE superficially eroded, right upper thigh vesicular formation at wound side, wound of lower left side of abdomen now scabbing over appears to be dead tissue , no discharge, painful to touch, erythematous   Neuro: alert    Pertinent Lab Results:  Recent Labs     08/03/21  0642 08/04/21  0433   WBC 14.0* 18.2*      Recent Labs     08/04/21  0433 08/05/21  0417 08/06/21  0247   HEMOGLOBIN 8.5* 7.7* 6.9*   HEMATOCRIT 24.4* 22.5* 20.3*   MCV 97.2 98.3* 98.5*   MCH 33.9* 33.6* 33.5*   PLATELETCT 165 139* 111*         Recent Labs     08/02/21  0457 08/03/21  0642 08/04/21  0433   SODIUM 133* 136 137   POTASSIUM 3.8 3.7 3.3*   CHLORIDE 103 106 105   CO2 20 19* 17*   CREATININE 0.86 0.80 0.97        Invalid input(s): ALT, ALKPHOS, BILITOT, TOTALBILIRUB, BILIRUBINTOT, BILIRUBINDIR, BILIRUBININD, ALKALINEPHOS     Microbiology:  No results found for: BLOODCULTU, BLDCULT, BCHOLD     Studies:      IMPRESSSION/PLAN:   #abdominal and LE medial thigh cellulitis/panniculitis  #leukocytosis  #encephalopathy  Hien Alfaro is a 41 y.o.female with PMH of alcoholic cirrhosis of liver with a recent hx of abdominal wall cellulitis and colitis , was given 2 weeks abx (doxy, rifampin, bactrim) and at [facility] where she came as a transfer was treated with c3, clinda, and meropenem. CT on admission concerning for colitis, leukocytosis, lactic acidosis. Was admitted for concerns of cellulitis vs necrotizing fasciitis. Surgery assessed, advised abdominal panniculitis/cellulitis with necrosis treatment with abx and no surgical intervention. Blood  cultures 8/1 negative thus far. WBC 14,000, patient consistently tachycardic. Blood cultures drawn 8/1 NG. Was placed on IV zosyn, vanc, and clinda initially, but the latter two were discontinued.  U/s abdomen result noted ascites/cirrhosis. Updated CT results consistent with cellulitis (RLE), ascites and edema (A/P )       Linezolid was added to regime to cover for staph/strep/antixtoxin prophylaxis      Plan:  Abx: Continue Zosyn, Linezolid (now IV due to NPO)  CTM labs  Maximize tx for encephalopathy  If goes into septic shock, switch Zosyn to Meropenem        Updated Dr. Lopez with plan.  Thank for you allowing us to take part in your patient's care, please call should you have any questions or would like to discuss this patient.      Aparna Garces M.D.   [facility]

## 2021-08-06 NOTE — PROGRESS NOTES
Bedside report received from day RN, pt care assumed. Pt is A&O x0, patient is moaning and grimacing, plan to medicate per MAR. ST on the monitor.  is at bedside and updated on POC, questions answered. Bed in lowest, locked position, treaded socks on, call light and belongings within reach.

## 2021-08-06 NOTE — CARE PLAN
The patient is Unstable - High likelihood or risk of patient condition declining or worsening    Shift Goals  Clinical Goals: lower ammonia, lower lactic acid  Patient Goals: MICHI  Family Goals: comfort, improve mental status      Problem: Pain - Standard  Goal: Alleviation of pain or a reduction in pain to the patient’s comfort goal  Outcome: Progressing     Problem: Knowledge Deficit - Standard  Goal: Patient and family/care givers will demonstrate understanding of plan of care, disease process/condition, diagnostic tests and medications  Outcome: Progressing     Problem: Respiratory  Goal: Patient will achieve/maintain optimum respiratory ventilation and gas exchange  Outcome: Progressing     Problem: Fall Risk  Goal: Patient will remain free from falls  Outcome: Progressing     Problem: Skin Integrity  Goal: Skin integrity is maintained or improved  Outcome: Progressing

## 2021-08-07 LAB
ALBUMIN SERPL BCP-MCNC: 1.8 G/DL (ref 3.2–4.9)
ALBUMIN/GLOB SERPL: 0.4 G/DL
ALP SERPL-CCNC: 154 U/L (ref 30–99)
ALT SERPL-CCNC: 27 U/L (ref 2–50)
ANION GAP SERPL CALC-SCNC: 9 MMOL/L (ref 7–16)
ANISOCYTOSIS BLD QL SMEAR: ABNORMAL
APPEARANCE UR: CLEAR
AST SERPL-CCNC: 48 U/L (ref 12–45)
BACTERIA #/AREA URNS HPF: NEGATIVE /HPF
BASOPHILS # BLD AUTO: 0 % (ref 0–1.8)
BASOPHILS # BLD AUTO: 0.1 % (ref 0–1.8)
BASOPHILS # BLD: 0 K/UL (ref 0–0.12)
BASOPHILS # BLD: 0.02 K/UL (ref 0–0.12)
BILIRUB SERPL-MCNC: 4.3 MG/DL (ref 0.1–1.5)
BILIRUB UR QL STRIP.AUTO: ABNORMAL
BUN SERPL-MCNC: 24 MG/DL (ref 8–22)
BURR CELLS BLD QL SMEAR: NORMAL
CALCIUM SERPL-MCNC: 8.3 MG/DL (ref 8.5–10.5)
CHLORIDE SERPL-SCNC: 116 MMOL/L (ref 96–112)
CO2 SERPL-SCNC: 18 MMOL/L (ref 20–33)
COLOR UR: ABNORMAL
CREAT SERPL-MCNC: 0.82 MG/DL (ref 0.5–1.4)
EOSINOPHIL # BLD AUTO: 0.23 K/UL (ref 0–0.51)
EOSINOPHIL # BLD AUTO: 0.29 K/UL (ref 0–0.51)
EOSINOPHIL NFR BLD: 1.7 % (ref 0–6.9)
EOSINOPHIL NFR BLD: 1.9 % (ref 0–6.9)
EPI CELLS #/AREA URNS HPF: NEGATIVE /HPF
ERYTHROCYTE [DISTWIDTH] IN BLOOD BY AUTOMATED COUNT: 62.8 FL (ref 35.9–50)
ERYTHROCYTE [DISTWIDTH] IN BLOOD BY AUTOMATED COUNT: 67.2 FL (ref 35.9–50)
GLOBULIN SER CALC-MCNC: 4.8 G/DL (ref 1.9–3.5)
GLUCOSE SERPL-MCNC: 266 MG/DL (ref 65–99)
GLUCOSE UR STRIP.AUTO-MCNC: NEGATIVE MG/DL
HCT VFR BLD AUTO: 24.7 % (ref 37–47)
HCT VFR BLD AUTO: 25.5 % (ref 37–47)
HGB BLD-MCNC: 8.2 G/DL (ref 12–16)
HGB BLD-MCNC: 8.9 G/DL (ref 12–16)
HYALINE CASTS #/AREA URNS LPF: ABNORMAL /LPF
IMM GRANULOCYTES # BLD AUTO: 0.18 K/UL (ref 0–0.11)
IMM GRANULOCYTES NFR BLD AUTO: 1.2 % (ref 0–0.9)
INR PPP: 2.11 (ref 0.87–1.13)
KETONES UR STRIP.AUTO-MCNC: ABNORMAL MG/DL
LEUKOCYTE ESTERASE UR QL STRIP.AUTO: NEGATIVE
LYMPHOCYTES # BLD AUTO: 0.82 K/UL (ref 1–4.8)
LYMPHOCYTES # BLD AUTO: 2.23 K/UL (ref 1–4.8)
LYMPHOCYTES NFR BLD: 14.7 % (ref 22–41)
LYMPHOCYTES NFR BLD: 6.1 % (ref 22–41)
MACROCYTES BLD QL SMEAR: ABNORMAL
MAGNESIUM SERPL-MCNC: 1.7 MG/DL (ref 1.5–2.5)
MANUAL DIFF BLD: NORMAL
MCH RBC QN AUTO: 32.7 PG (ref 27–33)
MCH RBC QN AUTO: 33.3 PG (ref 27–33)
MCHC RBC AUTO-ENTMCNC: 33.2 G/DL (ref 33.6–35)
MCHC RBC AUTO-ENTMCNC: 34.9 G/DL (ref 33.6–35)
MCV RBC AUTO: 95.5 FL (ref 81.4–97.8)
MCV RBC AUTO: 98.4 FL (ref 81.4–97.8)
MICRO URNS: ABNORMAL
MONOCYTES # BLD AUTO: 0.23 K/UL (ref 0–0.85)
MONOCYTES # BLD AUTO: 1.15 K/UL (ref 0–0.85)
MONOCYTES NFR BLD AUTO: 1.7 % (ref 0–13.4)
MONOCYTES NFR BLD AUTO: 7.6 % (ref 0–13.4)
MORPHOLOGY BLD-IMP: NORMAL
MYELOCYTES NFR BLD MANUAL: 0.9 %
NEUTROPHILS # BLD AUTO: 11.25 K/UL (ref 2–7.15)
NEUTROPHILS # BLD AUTO: 12.01 K/UL (ref 2–7.15)
NEUTROPHILS NFR BLD: 74.5 % (ref 44–72)
NEUTROPHILS NFR BLD: 89.6 % (ref 44–72)
NITRITE UR QL STRIP.AUTO: NEGATIVE
NRBC # BLD AUTO: 0 K/UL
NRBC # BLD AUTO: 0 K/UL
NRBC BLD-RTO: 0 /100 WBC
NRBC BLD-RTO: 0 /100 WBC
OVALOCYTES BLD QL SMEAR: NORMAL
PH UR STRIP.AUTO: 6 [PH] (ref 5–8)
PHOSPHATE SERPL-MCNC: 2.3 MG/DL (ref 2.5–4.5)
PLATELET # BLD AUTO: 90 K/UL (ref 164–446)
PLATELET # BLD AUTO: 91 K/UL (ref 164–446)
PLATELET BLD QL SMEAR: NORMAL
PMV BLD AUTO: 10.3 FL (ref 9–12.9)
PMV BLD AUTO: 11 FL (ref 9–12.9)
POIKILOCYTOSIS BLD QL SMEAR: NORMAL
POTASSIUM SERPL-SCNC: 4.3 MMOL/L (ref 3.6–5.5)
PROT SERPL-MCNC: 6.6 G/DL (ref 6–8.2)
PROT UR QL STRIP: 30 MG/DL
PROTHROMBIN TIME: 23.1 SEC (ref 12–14.6)
RBC # BLD AUTO: 2.51 M/UL (ref 4.2–5.4)
RBC # BLD AUTO: 2.67 M/UL (ref 4.2–5.4)
RBC # URNS HPF: ABNORMAL /HPF
RBC BLD AUTO: PRESENT
RBC UR QL AUTO: NEGATIVE
SODIUM SERPL-SCNC: 143 MMOL/L (ref 135–145)
SP GR UR STRIP.AUTO: 1.04
TARGETS BLD QL SMEAR: NORMAL
UROBILINOGEN UR STRIP.AUTO-MCNC: 1 MG/DL
WBC # BLD AUTO: 13.4 K/UL (ref 4.8–10.8)
WBC # BLD AUTO: 15.1 K/UL (ref 4.8–10.8)
WBC #/AREA URNS HPF: ABNORMAL /HPF

## 2021-08-07 PROCEDURE — P9047 ALBUMIN (HUMAN), 25%, 50ML: HCPCS | Mod: JG | Performed by: INTERNAL MEDICINE

## 2021-08-07 PROCEDURE — 700111 HCHG RX REV CODE 636 W/ 250 OVERRIDE (IP): Mod: JG | Performed by: INTERNAL MEDICINE

## 2021-08-07 PROCEDURE — 700111 HCHG RX REV CODE 636 W/ 250 OVERRIDE (IP): Performed by: INTERNAL MEDICINE

## 2021-08-07 PROCEDURE — 81001 URINALYSIS AUTO W/SCOPE: CPT

## 2021-08-07 PROCEDURE — 83735 ASSAY OF MAGNESIUM: CPT

## 2021-08-07 PROCEDURE — 85025 COMPLETE CBC W/AUTO DIFF WBC: CPT

## 2021-08-07 PROCEDURE — 770020 HCHG ROOM/CARE - TELE (206)

## 2021-08-07 PROCEDURE — 700105 HCHG RX REV CODE 258: Performed by: INTERNAL MEDICINE

## 2021-08-07 PROCEDURE — 85027 COMPLETE CBC AUTOMATED: CPT

## 2021-08-07 PROCEDURE — 302146: Performed by: INTERNAL MEDICINE

## 2021-08-07 PROCEDURE — 99233 SBSQ HOSP IP/OBS HIGH 50: CPT | Performed by: INTERNAL MEDICINE

## 2021-08-07 PROCEDURE — A9270 NON-COVERED ITEM OR SERVICE: HCPCS | Performed by: STUDENT IN AN ORGANIZED HEALTH CARE EDUCATION/TRAINING PROGRAM

## 2021-08-07 PROCEDURE — 36415 COLL VENOUS BLD VENIPUNCTURE: CPT

## 2021-08-07 PROCEDURE — 80053 COMPREHEN METABOLIC PANEL: CPT

## 2021-08-07 PROCEDURE — 700111 HCHG RX REV CODE 636 W/ 250 OVERRIDE (IP): Performed by: STUDENT IN AN ORGANIZED HEALTH CARE EDUCATION/TRAINING PROGRAM

## 2021-08-07 PROCEDURE — 85007 BL SMEAR W/DIFF WBC COUNT: CPT

## 2021-08-07 PROCEDURE — 700102 HCHG RX REV CODE 250 W/ 637 OVERRIDE(OP): Performed by: INTERNAL MEDICINE

## 2021-08-07 PROCEDURE — A9270 NON-COVERED ITEM OR SERVICE: HCPCS | Performed by: INTERNAL MEDICINE

## 2021-08-07 PROCEDURE — 85610 PROTHROMBIN TIME: CPT

## 2021-08-07 PROCEDURE — 05HA33Z INSERTION OF INFUSION DEVICE INTO LEFT BRACHIAL VEIN, PERCUTANEOUS APPROACH: ICD-10-PCS | Performed by: INTERNAL MEDICINE

## 2021-08-07 PROCEDURE — 84100 ASSAY OF PHOSPHORUS: CPT

## 2021-08-07 PROCEDURE — 700102 HCHG RX REV CODE 250 W/ 637 OVERRIDE(OP): Performed by: STUDENT IN AN ORGANIZED HEALTH CARE EDUCATION/TRAINING PROGRAM

## 2021-08-07 RX ORDER — ALBUMIN (HUMAN) 12.5 G/50ML
25 SOLUTION INTRAVENOUS ONCE
Status: COMPLETED | OUTPATIENT
Start: 2021-08-07 | End: 2021-08-07

## 2021-08-07 RX ORDER — HYDROMORPHONE HYDROCHLORIDE 1 MG/ML
0.25 INJECTION, SOLUTION INTRAMUSCULAR; INTRAVENOUS; SUBCUTANEOUS EVERY 8 HOURS PRN
Status: DISCONTINUED | OUTPATIENT
Start: 2021-08-07 | End: 2021-08-15 | Stop reason: HOSPADM

## 2021-08-07 RX ORDER — GAUZE BANDAGE 2" X 2"
100 BANDAGE TOPICAL DAILY
Status: DISCONTINUED | OUTPATIENT
Start: 2021-08-08 | End: 2021-08-11

## 2021-08-07 RX ADMIN — HYDROMORPHONE HYDROCHLORIDE 0.25 MG: 1 INJECTION, SOLUTION INTRAMUSCULAR; INTRAVENOUS; SUBCUTANEOUS at 23:44

## 2021-08-07 RX ADMIN — LACTULOSE 45 ML: 20 SOLUTION ORAL at 12:22

## 2021-08-07 RX ADMIN — LEVOTHYROXINE SODIUM 50 MCG: 0.05 TABLET ORAL at 05:46

## 2021-08-07 RX ADMIN — MIDODRINE HYDROCHLORIDE 10 MG: 5 TABLET ORAL at 12:23

## 2021-08-07 RX ADMIN — THERA TABS 1 TABLET: TAB at 05:46

## 2021-08-07 RX ADMIN — FOLIC ACID 1 MG: 1 TABLET ORAL at 05:45

## 2021-08-07 RX ADMIN — Medication 1000 UNITS: at 05:45

## 2021-08-07 RX ADMIN — PIPERACILLIN AND TAZOBACTAM 4.5 G: 4; .5 INJECTION, POWDER, LYOPHILIZED, FOR SOLUTION INTRAVENOUS; PARENTERAL at 21:00

## 2021-08-07 RX ADMIN — LORAZEPAM 0.5 MG: 2 INJECTION INTRAMUSCULAR; INTRAVENOUS at 08:22

## 2021-08-07 RX ADMIN — HYDROMORPHONE HYDROCHLORIDE 0.25 MG: 1 INJECTION, SOLUTION INTRAMUSCULAR; INTRAVENOUS; SUBCUTANEOUS at 15:44

## 2021-08-07 RX ADMIN — PHYTONADIONE 10 MG: 10 INJECTION, EMULSION INTRAMUSCULAR; INTRAVENOUS; SUBCUTANEOUS at 05:44

## 2021-08-07 RX ADMIN — PIPERACILLIN AND TAZOBACTAM 4.5 G: 4; .5 INJECTION, POWDER, LYOPHILIZED, FOR SOLUTION INTRAVENOUS; PARENTERAL at 12:23

## 2021-08-07 RX ADMIN — HYDROMORPHONE HYDROCHLORIDE 0.25 MG: 1 INJECTION, SOLUTION INTRAMUSCULAR; INTRAVENOUS; SUBCUTANEOUS at 08:23

## 2021-08-07 RX ADMIN — DIBASIC SODIUM PHOSPHATE, MONOBASIC POTASSIUM PHOSPHATE AND MONOBASIC SODIUM PHOSPHATE 250 MG: 852; 155; 130 TABLET ORAL at 23:10

## 2021-08-07 RX ADMIN — DIBASIC SODIUM PHOSPHATE, MONOBASIC POTASSIUM PHOSPHATE AND MONOBASIC SODIUM PHOSPHATE 250 MG: 852; 155; 130 TABLET ORAL at 16:36

## 2021-08-07 RX ADMIN — DIBASIC SODIUM PHOSPHATE, MONOBASIC POTASSIUM PHOSPHATE AND MONOBASIC SODIUM PHOSPHATE 250 MG: 852; 155; 130 TABLET ORAL at 14:49

## 2021-08-07 RX ADMIN — LACTULOSE 45 ML: 20 SOLUTION ORAL at 05:46

## 2021-08-07 RX ADMIN — HYDROMORPHONE HYDROCHLORIDE 0.25 MG: 1 INJECTION, SOLUTION INTRAMUSCULAR; INTRAVENOUS; SUBCUTANEOUS at 05:44

## 2021-08-07 RX ADMIN — LACTULOSE 45 ML: 20 SOLUTION ORAL at 17:30

## 2021-08-07 RX ADMIN — ALBUMIN (HUMAN) 25 G: 5 SOLUTION INTRAVENOUS at 16:35

## 2021-08-07 RX ADMIN — DIBASIC SODIUM PHOSPHATE, MONOBASIC POTASSIUM PHOSPHATE AND MONOBASIC SODIUM PHOSPHATE 250 MG: 852; 155; 130 TABLET ORAL at 17:12

## 2021-08-07 RX ADMIN — RIFAXIMIN 550 MG: 550 TABLET ORAL at 17:29

## 2021-08-07 RX ADMIN — RIFAXIMIN 550 MG: 550 TABLET ORAL at 05:46

## 2021-08-07 RX ADMIN — Medication 100 MG: at 05:45

## 2021-08-07 RX ADMIN — Medication 1 CAPSULE: at 08:29

## 2021-08-07 RX ADMIN — PIPERACILLIN AND TAZOBACTAM 4.5 G: 4; .5 INJECTION, POWDER, LYOPHILIZED, FOR SOLUTION INTRAVENOUS; PARENTERAL at 05:44

## 2021-08-07 RX ADMIN — LINEZOLID 600 MG: 600 INJECTION, SOLUTION INTRAVENOUS at 09:54

## 2021-08-07 RX ADMIN — CYANOCOBALAMIN TAB 500 MCG 2500 MCG: 500 TAB at 05:45

## 2021-08-07 RX ADMIN — HALOPERIDOL LACTATE 5 MG: 5 INJECTION, SOLUTION INTRAMUSCULAR at 22:49

## 2021-08-07 RX ADMIN — SODIUM CHLORIDE, POTASSIUM CHLORIDE, SODIUM LACTATE AND CALCIUM CHLORIDE 500 ML: 600; 310; 30; 20 INJECTION, SOLUTION INTRAVENOUS at 14:49

## 2021-08-07 RX ADMIN — MIDODRINE HYDROCHLORIDE 10 MG: 5 TABLET ORAL at 08:29

## 2021-08-07 RX ADMIN — MIDODRINE HYDROCHLORIDE 10 MG: 5 TABLET ORAL at 17:13

## 2021-08-07 RX ADMIN — LINEZOLID 600 MG: 600 INJECTION, SOLUTION INTRAVENOUS at 17:13

## 2021-08-07 ASSESSMENT — PAIN DESCRIPTION - PAIN TYPE
TYPE: ACUTE PAIN

## 2021-08-07 NOTE — CARE PLAN
The patient is Unstable - High likelihood or risk of patient condition declining or worsening    Shift Goals  Clinical Goals: Follow labs and treat accordingly  Patient Goals: MICHI  Family Goals: Get better    Progress made toward(s) clinical / shift goals:  N/A    Patient is not progressing towards the following goals:      Problem: Pain - Standard  Goal: Alleviation of pain or a reduction in pain to the patient’s comfort goal  Outcome: Not Progressing     Problem: Knowledge Deficit - Standard  Goal: Patient and family/care givers will demonstrate understanding of plan of care, disease process/condition, diagnostic tests and medications  Outcome: Not Progressing     Problem: Hemodynamics  Goal: Patient's hemodynamics, fluid balance and neurologic status will be stable or improve  Outcome: Not Progressing

## 2021-08-07 NOTE — PROGRESS NOTES
Bedside report received from day RN, pt care assumed,  Pt is not alert and oriented, resting calmly at this time, ST on the monitor.  at bedside updated on POC, questions answered. Bed in lowest, locked position, treaded socks on, call light and belongings within reach. Fall precautions in place.

## 2021-08-07 NOTE — CARE PLAN
The patient is Watcher - Medium risk of patient condition declining or worsening    Shift Goals  Clinical Goals: lower ammonia, lower lactic acid  Patient Goals: MICHI  Family Goals: comfort, improve mental status    Progress made toward(s) clinical / shift goals:    Problem: Pain - Standard  Goal: Alleviation of pain or a reduction in pain to the patient’s comfort goal  Outcome: Progressing     Problem: Knowledge Deficit - Standard  Goal: Patient and family/care givers will demonstrate understanding of plan of care, disease process/condition, diagnostic tests and medications  Outcome: Progressing     Problem: Hemodynamics  Goal: Patient's hemodynamics, fluid balance and neurologic status will be stable or improve  Outcome: Progressing     Problem: Fluid Volume  Goal: Fluid volume balance will be maintained  Outcome: Progressing     Problem: Urinary - Renal Perfusion  Goal: Ability to achieve and maintain adequate renal perfusion and functioning will improve  Outcome: Progressing       Patient is not progressing towards the following goals:

## 2021-08-07 NOTE — WOUND TEAM
Wound team re-consulted for bilateral thighs. Wound team already following patient thus consult completed. Bedside RN updated. Dressing orders in place.

## 2021-08-07 NOTE — PROGRESS NOTES
Infectious Disease Progress Note    Author: Ann Gomes M.D. Date & Time of service: 2021  9:19 AM    Chief Complaint:  Follow-up for abdominal panniculitis/cellulitis    Interval History:   afebrile, WBC 13.4 patient is obtunded and only opens eyes to voice.   at bedside with questions      Labs Reviewed, Medications Reviewed and Wound Reviewed.    Review of Systems:  Review of Systems   Unable to perform ROS: Other   Patient encephalopathic    Hemodynamics:  Temp (24hrs), Av.1 °C (97 °F), Min:35.7 °C (96.2 °F), Max:36.8 °C (98.2 °F)  Temperature: 36.2 °C (97.1 °F)  Pulse  Av.6  Min: 76  Max: 134   Blood Pressure: 105/85       Physical Exam:  Physical Exam  Vitals and nursing note reviewed.   Constitutional:       Appearance: She is obese. She is ill-appearing.   HENT:      Nose:      Comments: Core track  Eyes:      General: Scleral icterus present.      Pupils: Pupils are equal, round, and reactive to light.   Cardiovascular:      Rate and Rhythm: Normal rate.   Pulmonary:      Effort: Pulmonary effort is normal. No respiratory distress.      Breath sounds: No wheezing.   Abdominal:      Palpations: Abdomen is soft.      Comments: Right upper quadrant skin lesion improving    Abdominal lesions improving   Skin:     General: Skin is warm.      Coloration: Skin is jaundiced.   Neurological:      Comments: Obtunded   Psychiatric:      Comments: Unable to assess         Meds:    Current Facility-Administered Medications:   •  thiamine  •  vitamin k (MEPHYTON) ivpb  •  Pharmacy  •  hydrOXYzine HCl  •  levothyroxine  •  lactobacillus rhamnosus  •  cyanocobalamin  •  folic acid  •  multivitamin  •  vitamin D  •  Notify **AND** glucose **AND** dextrose 50%  •  lactulose  •  riFAXIMin  •  lidocaine **OR** lidocaine  •  linezolid (ZYVOX) IV  •  midodrine  •  haloperidol lactate  •  lactulose  •  LORazepam  •  [Held by provider] furosemide  •  LR  •  [Held by provider] spironolactone  •   Notify provider if pain remains uncontrolled **AND** Use the Numeric Rating Scale (NRS), Monroy-Baker Faces (WBF), or FLACC on regular floors and Critical-Care Pain Observation Tool (CPOT) on ICUs/Trauma to assess pain **AND** Pulse Ox **AND** Pharmacy Consult Request **AND** If patient difficult to arouse and/or has respiratory depression (respiratory rate of 10 or less), stop any opiates that are currently infusing and call a Rapid Response.  •  [DISCONTINUED] oxyCODONE immediate-release **OR** [DISCONTINUED] oxyCODONE immediate-release **OR** HYDROmorphone  •  LR  •  [COMPLETED] piperacillin-tazobactam **AND** piperacillin-tazobactam    Labs:  Recent Labs     08/05/21 0417 08/05/21 0417 08/06/21 0247 08/06/21  1046 08/07/21  0304   WBC 17.2*  --  14.2*  --  13.4*   RBC 2.29*  --  2.06*  --  2.51*   HEMOGLOBIN 7.7*   < > 6.9* 8.2* 8.2*   HEMATOCRIT 22.5*  --  20.3*  --  24.7*   MCV 98.3*  --  98.5*  --  98.4*   MCH 33.6*  --  33.5*  --  32.7   RDW 63.8*  --  64.3*  --  67.2*   PLATELETCT 139*  --  111*  --  90*   MPV 10.1  --  10.1  --  10.3   NEUTSPOLYS 76.10*  --  81.30*  --  89.60*   LYMPHOCYTES 12.70*  --  8.70*  --  6.10*   MONOCYTES 9.40  --  8.70  --  1.70   EOSINOPHILS 0.70  --  0.20  --  1.70   BASOPHILS 0.30  --  0.10  --  0.00   RBCMORPHOLO  --   --   --   --  Present    < > = values in this interval not displayed.     Recent Labs     08/05/21 0417 08/06/21 0247 08/07/21  0304   SODIUM 141 140 143   POTASSIUM 3.9 3.9 4.3   CHLORIDE 112 113* 116*   CO2 18* 20 18*   GLUCOSE 142* 209* 266*   BUN 8 11 24*     Recent Labs     08/05/21  0417 08/06/21  0247 08/07/21  0304   ALBUMIN 1.7* 1.7* 1.8*   TBILIRUBIN 5.1* 4.2* 4.3*   ALKPHOSPHAT 154* 140* 154*   TOTPROTEIN 6.9 6.3 6.6   ALTSGPT 33 29 27   ASTSGOT 86* 50* 48*   CREATININE 0.74 0.82 0.82       Imaging:  CT-ABDOMEN-PELVIS WITH    Result Date: 8/5/2021 8/5/2021 1:07 PM HISTORY/REASON FOR EXAM:  Alcoholic cirrhosis. History of portal vein thrombosis.  Black open wounds in the lower abdomen and groin region. Possible sepsis. TECHNIQUE/EXAM DESCRIPTION:   CT scan of the abdomen and pelvis with contrast. Contrast-enhanced helical scanning was obtained from the diaphragmatic domes through the pubic symphysis following the bolus administration of nonionic contrast without complication. 100 mL of Omnipaque 350 nonionic contrast was administered without complication. Low dose optimization technique was utilized for this CT exam including automated exposure control and adjustment of the mA and/or kV according to patient size. COMPARISON: CT 8/1/2021 and 4/27/2021 FINDINGS: Exam limited by overall body habitus. Lower Chest: Lungs demonstrate dependent atelectasis. Liver: Liver again demonstrates heterogeneous enhancement with hypertrophy of the lateral segment left lobe and slight nodularity suggesting underlying cirrhosis. No definite lesion is seen.. Spleen: Spleen measures 14.8 cm in diameter. Pancreas: Unremarkable. Gallbladder: The gallbladder has been resected. Biliary: Biliary tree is unchanged. Adrenal glands: Normal. Kidneys: Kidneys are unchanged with a hypodense right superior pole lesion again seen too small characterize. There is no hydronephrosis. Lymph nodes: No adenopathy. Vasculature: There is no gross evidence of portal vein thrombosis on this exam. Bowel: There is an enteric tube in the 1st portion the duodenum. Stomach is decompressed. There is limited evaluation of the bowel due to patient body habitus and no contrast. There appears to be diffuse bowel wall thickening and edema involving the majority of the colon more prominent on the right. There is no small bowel obstruction. Peritoneum: There is a small amount of ascites predominantly perihepatic and perisplenic in location with a small amount of the pelvis. There is diffuse body wall edema. There is mesenteric edema. There is no free air. Pelvis: Uterus is unremarkable. Bladder is unremarkable.  There appears to be a rectal tube in place. There is no soft tissue or subcutaneous abscess. Musculoskeletal: There is multilevel degenerative change in the lumbar spine. Mild anterior wedging at the T10 level is stable compared with 8/1/2021 but is new since 4/27/2021.     1.  Again there are morphologic changes of the liver consistent with cirrhosis with associated splenomegaly. 2.  Portal vein and mesenteric vessels are grossly patent. 3.  There is a small amount of ascites with mesenteric edema and subcutaneous edema. 4.  There is no soft tissue abscess or intra-abdominal or pelvic abscess. 5.  There is colonic wall edema most likely related to the liver disease and hypoproteinemia.    CT-EXTREMITY, LOWER WITH LEFT    Result Date: 8/5/2021 8/5/2021 1:08 PM HISTORY/REASON FOR EXAM:  Soft tissue infection suspected, thigh, xray done; concern for nec fascitis of thigh. TECHNIQUE/EXAM DESCRIPTION AND NUMBER OF VIEWS:  CT scan of the LEFT lower extremity with contrast, with reconstructions. Thin helical 3 mm sections were obtained from the distal femur through the proximal tibia/fibula. Sagittal and coronal multiplanar reconstructions were generated from the axial images. A total of 100 mL of Omnipaque 350 nonionic contrast was administered  IV without complication. Up to date radiation dose reduction adjustments have been utilized to meet ALARA standards for radiation dose reduction. COMPARISON: None. FINDINGS: Femoral head is seated within the acetabulum. No fracture or dislocation is seen. Visualized pubic rami on the left are intact. There is no diastases of the symphysis pubis. No osseous destruction is seen to suggest osteomyelitis. There is soft tissue edema in the pannus with skin thickening. There is soft tissue edema and skin thickening in the thigh. Skin thickening is most prominent overlying the hip. No abscess is identified. There is no soft tissue air. There are multiple small left inguinal lymph nodes  There is a rectal catheter. There is a small amount of free fluid in the lower abdomen and pelvis.     1.  No evidence of osteomyelitis. 2.  Soft tissue edema in the pannus and left thigh with skin thickening which can be seen in the setting of cellulitis. 3.  No abscess is identified. 4.  No soft tissue air is noted. 5.  Free fluid is seen in the pelvis.    CT-HEAD W/O    Result Date: 8/3/2021  8/3/2021 10:31 PM HISTORY/REASON FOR EXAM:  Delirium. TECHNIQUE/EXAM DESCRIPTION AND NUMBER OF VIEWS: CT of the head without contrast. Up to date radiation dose reduction adjustments have been utilized to meet ALARA standards for radiation dose reduction. COMPARISON:  Outside CT 6/11/2021 FINDINGS: There is no evidence of mass or mass effect. The ventricles and CSF spaces are normal. There is no periventricular chronic small vessel ischemic change present. There is no intracranial hemorrhage seen. The calvarium is intact. There is no scalp injury.     No evidence of acute intracranial process.    US-ABDOMEN LTD (SOFT TISSUE)    Result Date: 8/5/2021 8/5/2021 6:05 AM HISTORY/REASON FOR EXAM:  Distended Abdomen TECHNIQUE/EXAM DESCRIPTION: Limited abdominal ultrasound. COMPARISON: None available. FINDINGS: There is a small volume of ascites surrounding the liver. The liver has a nodular texture with coarse echogenicity consistent with cirrhotic change. No large volume ascites is present.     1.  Small volume of ascites in the right upper quadrant adjacent to the liver. 2.  Cirrhotic appearance of liver. 3.  No large volume of ascites.    DX-ABDOMEN FOR TUBE PLACEMENT    Result Date: 8/5/2021 8/5/2021 1:51 AM HISTORY/REASON FOR EXAM:  Cortrak evaluation. TECHNIQUE/EXAM DESCRIPTION AND NUMBER OF VIEWS:  1 view(s) of the abdomen. COMPARISON:  None. FINDINGS: Cortrak feeding tube tip projects in the region of the duodenal bulb. The thoracic course appears appropriate. The bowel gas pattern is within normal limits.     Cortrak  "feeding tube tip projects in the region of the duodenal bulb.      Micro:  Results     Procedure Component Value Units Date/Time    BLOOD CULTURE [[id number]] Collected: 08/01/21 1213    Order Status: Completed Specimen: Blood from Peripheral Updated: 08/06/21 1500     Significant Indicator NEG     Source BLD     Site PERIPHERAL     Culture Result No growth after 5 days of incubation.    Narrative:      Per Hospital Policy: Only change Specimen Src: to \"Line\" if  specified by physician order.  Right AC    BLOOD CULTURE [[id number]] Collected: 08/01/21 1113    Order Status: Completed Specimen: Blood from Peripheral Updated: 08/06/21 1300     Significant Indicator NEG     Source BLD     Site PERIPHERAL     Culture Result No growth after 5 days of incubation.    Narrative:      Per Hospital Policy: Only change Specimen Src: to \"Line\" if  specified by physician order.  No site indicated    MRSA By PCR (Amp) [[id number]]     Order Status: No result Specimen: Respirate from Nares     C Diff by PCR rflx Toxin [[id number]]     Order Status: Canceled Specimen: Stool     Urinalysis [[id number]]     Order Status: Canceled Specimen: Urine     URINALYSIS [[id number]] Collected: 08/01/21 0000    Order Status: Canceled Specimen: Urine     URINE CULTURE(NEW) [[id number]] Collected: 08/01/21 0000    Order Status: Canceled Specimen: Urine     Blood Culture [[id number]] Collected: 08/01/21 0000    Order Status: Canceled Specimen: Other from Peripheral     Blood Culture [[id number]] Collected: 08/01/21 0000    Order Status: Canceled Specimen: Other from Peripheral     Culture Wound W/ Gram Stain [[id number]] Collected: 08/01/21 0000    Order Status: Canceled Specimen: Other from Abdominal           Assessment:  41 y.o. morbidly obese female with PMH of alcoholic cirrhosis of liver with a recent hx of abdominal wall cellulitis and colitis , was given 2 weeks abx (doxy, rifampin, bactrim) and at [facility] where she came as a transfer " was treated with ceftriaxone, clinda, and meropenem. CT on admission concerning for colitis, leukocytosis, lactic acidosis. Was admitted for concerns of cellulitis vs necrotizing fasciitis. Surgery assessed, advised abdominal panniculitis/cellulitis with necrosis treatment with abx and no surgical intervention. Blood cultures 8/1 negative thus far. WBC 14,000, patient consistently tachycardic. Blood cultures drawn 8/1 NG. Was placed on IV zosyn, vanc, and clinda initially, but the latter two were discontinued.  U/s abdomen result noted ascites/cirrhosis. Updated CT results consistent with cellulitis (RLE), ascites and edema.    Pertinent diagnoses:  Abdominal panniculitis/cellulitis, improving  Thigh cellulitis  Leukocytosis, improving  Encephalopathy, persistent  Alcoholic cirrhosis    Plan:  -Continue IV Zosyn and linezolid  -Plan a 10-day course of antibiotics total from 8/1.  Stop date 8/11/2021  -Continue wound care    Discussed with internal medicine/Dr. Lopez and  at bedside.  ID signing off.  Please reconsult if needed

## 2021-08-07 NOTE — PROGRESS NOTES
"[facility] Medicine Daily Progress Note    Date of Service  8/7/2021    Chief Complaint  Hien Alfaro is a 41 y.o. female admitted 8/1/2021 with abdominal discomfort.    Hospital Course  Per admitting provider:  \"Hien Alfaro is a 41 y.o. female who presented 8/1/2021 with Wound Check (Pt reports redness in lower abd started ~2 weeks ago, now presenting with blackened open wounds across lower abd to b/l groin. )  She is morbidly obese. She has a history of alcoholic cirrhosis, portal vein thrombosis NOT on anticoagulation (seen and decided by Dr. Montenegro, [facility]) and was hospitalized in June for decompensated alcoholic cirrhosis with hyperammonemia, bilirubinemia and hepatorenal syndrome with poor outpatient follow-up. She was placed on antibiotics for her pannus infections. Despite antibiotics, she noticed black open wounds along her abdomen to bilateral groin and also a lump/mass that started 2 weeks now RLQ region.\"     CT AP at another facility noted colitis and panniculitis, no gas or abscess noted. Transferred to [facility] ER for higher level of care, admitted to medicine service, surgery was consulted, no surgery indicated, cont abx.     ID is consulted. On zosyn and zyvox. Check MRSA swab pending. Blood culture NTD.    Palliative consulted.     Hepatic encephalopathy, ammonia elevated. On lactulose po and rifaximin. Ammonia slightly trending down    Interval Problem Update    8/5 patient remains agitated and is screaming in pain.  I have added IV Toradol for pain control.  Ordered a stat CT abdomen and lower extremity to rule out abscess.  No obvious abscess seen on imaging.  Continue IV antibiotics.  Continue lactulose via Cortrak.    8/6 Hb dropped to 6.9. Transfuse 1 unit of PRBC. Her INR is elevated at 2.85. I have ordered a dose of FFP and IV vitamin K 10 mg x 3. She has end stage liver disease and very poor prognosis. I have discontinued her prednisone due to active sepsis and infection. I have discussed " her case with her  and explained her poor prognosis. He understands however he would like to keep her CODE STATUS as full code    8/7 patient remains confused and somnolent. Discontinue Ativan and decreased IV Dilaudid frequency. No further bleeding noted today. Hemoglobin stable at 8.2    I have personally seen and examined the patient at bedside. I discussed the plan of care with family and bedside RN.    Consultants/Specialty  Surgery  Wound care  Palliative care    Code Status  Full Code    Disposition  Patient is not medically cleared.   Anticipate discharge to to skilled nursing facility.  I have placed the appropriate orders for post-discharge needs.    Review of Systems  Review of Systems   Unable to perform ROS: Mental acuity        Physical Exam  Temp:  [35.7 °C (96.2 °F)-36.3 °C (97.4 °F)] 36.1 °C (96.9 °F)  Pulse:  [] 108  Resp:  [16-22] 22  BP: ()/(48-85) 113/66  SpO2:  [94 %-98 %] 98 %    Physical Exam  Vitals and nursing note reviewed.   Constitutional:       Appearance: She is obese. She is ill-appearing.   HENT:      Head: Normocephalic and atraumatic.      Nose: Nose normal.      Mouth/Throat:      Comments: Dried blood noted around lips, left nare and oral cavity    Eyes:      General: Scleral icterus present.      Extraocular Movements: Extraocular movements intact.   Cardiovascular:      Rate and Rhythm: Regular rhythm. Tachycardia present.      Pulses: Normal pulses.      Heart sounds: No friction rub.   Pulmonary:      Effort: Pulmonary effort is normal.      Breath sounds: No wheezing.      Comments: Decreased bibasilar breath sounds  Abdominal:      General: There is distension.      Palpations: Abdomen is soft.      Tenderness: There is abdominal tenderness. There is no guarding or rebound.   Musculoskeletal:         General: No tenderness. Normal range of motion.      Cervical back: Neck supple. No tenderness.      Right lower leg: Edema present.      Left lower leg:  Edema present.   Skin:     General: Skin is dry.      Capillary Refill: Capillary refill takes less than 2 seconds.      Coloration: Skin is jaundiced.      Comments: Necrotic wounds on left lower abdomen and left thigh   Neurological:      Mental Status: She is alert. She is disoriented.      Comments: Confused and somnolent       Psychiatric:      Comments: Unable to evaluate         Fluids    Intake/Output Summary (Last 24 hours) at 8/7/2021 1406  Last data filed at 8/7/2021 1200  Gross per 24 hour   Intake 478 ml   Output 550 ml   Net -72 ml       Laboratory  Recent Labs     08/05/21 0417 08/05/21 0417 08/06/21  0247 08/06/21  1046 08/07/21  0304   WBC 17.2*  --  14.2*  --  13.4*   RBC 2.29*  --  2.06*  --  2.51*   HEMOGLOBIN 7.7*   < > 6.9* 8.2* 8.2*   HEMATOCRIT 22.5*  --  20.3*  --  24.7*   MCV 98.3*  --  98.5*  --  98.4*   MCH 33.6*  --  33.5*  --  32.7   MCHC 34.2  --  34.0  --  33.2*   RDW 63.8*  --  64.3*  --  67.2*   PLATELETCT 139*  --  111*  --  90*   MPV 10.1  --  10.1  --  10.3    < > = values in this interval not displayed.     Recent Labs     08/05/21 0417 08/06/21  0247 08/07/21  0304   SODIUM 141 140 143   POTASSIUM 3.9 3.9 4.3   CHLORIDE 112 113* 116*   CO2 18* 20 18*   GLUCOSE 142* 209* 266*   BUN 8 11 24*   CREATININE 0.74 0.82 0.82   CALCIUM 7.9* 8.0* 8.3*     Recent Labs     08/06/21  0432   APTT 61.3*   INR 2.85*               Imaging  CT-EXTREMITY, LOWER WITH LEFT   Final Result      1.  No evidence of osteomyelitis.   2.  Soft tissue edema in the pannus and left thigh with skin thickening which can be seen in the setting of cellulitis.   3.  No abscess is identified.   4.  No soft tissue air is noted.   5.  Free fluid is seen in the pelvis.      CT-ABDOMEN-PELVIS WITH   Final Result      1.  Again there are morphologic changes of the liver consistent with cirrhosis with associated splenomegaly.   2.  Portal vein and mesenteric vessels are grossly patent.   3.  There is a small amount  of ascites with mesenteric edema and subcutaneous edema.   4.  There is no soft tissue abscess or intra-abdominal or pelvic abscess.   5.  There is colonic wall edema most likely related to the liver disease and hypoproteinemia.      US-ABDOMEN LTD (SOFT TISSUE)   Final Result      1.  Small volume of ascites in the right upper quadrant adjacent to the liver.      2.  Cirrhotic appearance of liver.      3.  No large volume of ascites.      DX-ABDOMEN FOR TUBE PLACEMENT   Final Result      Cortrak feeding tube tip projects in the region of the duodenal bulb.      CT-HEAD W/O   Final Result      No evidence of acute intracranial process.      OUTSIDE IMAGES-CT ABDOMEN /PELVIS   Final Result      OUTSIDE IMAGES-DX CHEST   Final Result      OUTSIDE IMAGES-CT ABDOMEN /PELVIS   Final Result           Assessment/Plan  * Abdominal panniculitis/cellulitis w/ concern for necrosis- (present on admission)  Assessment & Plan  CT AP at another facility colitis, panniculitis with no gas/abscess  Surgery f/u appreciated -- no indication for surgery at this time  ID is consulted and on zosyn and zyvox, MRSA screen pending.  Low threshold for CT imaging if septic shock/deteriorates  Follow blood culture  Wound care      Coagulopathy (HCC)  Assessment & Plan  INR 2.85  I have ordered FFP and IV vitamin K    Liver failure (HCC)  Assessment & Plan  Secondary to alcoholic cirrhosis   With coagulopathy, bleeding, hepatic encephalopathy, ascites    Hyperbilirubinemia  Assessment & Plan  Sec to ESLD  Total bilirubin 4. Her total bilirubin was 11 in June    Anxiety  Assessment & Plan  PRN atarax    Acute encephalopathy  Assessment & Plan  Likely hepatic encephalopathy  Ordered CT head, unremarkable  Ammonia elevated, trending down   On lactulose and Rifaximin    End stage liver disease (HCC)  Assessment & Plan  MELD score: 28 on admission  Secondary to ETOH  Hepatitis panel neg  Poor prognosis    Severe protein-calorie malnutrition  ([facility])  Assessment & Plan  Ensure    Failure to thrive in adult  Assessment & Plan  PT/OT, PO diet as able to tolerated  Palliative care consult    Anasarca  Assessment & Plan  Secondary to ESLD  Supportive care    Hypoglycemia  Assessment & Plan  Hypoglycemia protocol    Colitis  Assessment & Plan  As noted in panniculitis    Portal vein thrombosis- (present on admission)  Assessment & Plan  Suspect chronic PV throbosis -- in which case there is no clear benefit for anticoagulation -- case was previously noted by hematology (Dr. Montenegro)    Will avoid chronic anticoagulation due to ongoing bleeding    Class 3 severe obesity due to excess calories with serious comorbidity and body mass index (BMI) of 40.0 to 44.9 in adult ([facility])- (present on admission)  Assessment & Plan  Diet and lifestyle modifications    Sepsis ([facility])  Assessment & Plan  This is Sepsis Present on admission  SIRS criteria identified on my evaluation include: Leukocytosis, with WBC greater than 12,000  Source is pannus  Sepsis protocol initiated  Fluid resuscitation ordered per protocol  IV antibiotics as appropriate for source of sepsis  While organ dysfunction may be noted elsewhere in this problem list or in the chart, degree of organ dysfunction does not meet CMS criteria for severe sepsis    On IVF  Abx  Follow up lactic acid and blood culture          Hypothyroidism- (present on admission)  Assessment & Plan  TSH 7.2, elevated, but improved from TSH in April, free T4 in normal range  Synthroid    Alcoholic cirrhosis of liver without ascites (HCC)- (present on admission)  Assessment & Plan  Decompensated.   reports patient has not been drinking since May  MELD score 28 on admission, 27-32% 90 day mortality rate  She has upcoming appointment with GI as outpatient      Type 2 diabetes mellitus with neurologic complication, without long-term current use of insulin (HCC)- (present on admission)  Assessment & Plan  No ISS as pt has been  hypoglycemic       VTE prophylaxis: SCDs/TEDs given elevated INR    I have performed a physical exam and reviewed and updated ROS and Plan today (8/7/2021). In review of yesterday's note (8/6/2021), there are no changes except as documented above.

## 2021-08-07 NOTE — PROGRESS NOTES
4 Eyes Skin Assessment Completed by ROSALIND Cole and ROSALIND Calderon.    Head Jaundice  Ears Jaundice  Nose Jaundice  Mouth Bleeding, dry blood   Neck WDL  Breast/Chest Bruising, Discoloration, Jaundice and Edema  Shoulder Blades WDL  Spine WDL  (R) Arm/Elbow/Hand Redness, Blanching and Bruising  (L) Arm/Elbow/Hand Redness, Blanching and Bruising  Abdomen Redness, Blanching, Scab and Bruising, known wound   Groin Redness, Blanching and Excoriation  Scrotum/Coccyx/Buttocks Redness, Blanching, Excoriation and Discoloration, BMS system in place  (R) Leg Bruising, Jaundice and Edema  (L) Leg Bruising, Jaundice and Edema  (R) Heel/Foot/Toe Blanching, Boggy and Edema  (L) Heel/Foot/Toe Blanching, Boggy and Edema          Devices In Places Blood Pressure Cuff and Pulse Ox, rectal tube, cortrak      Interventions In Place Heel Float Boots, Pillows, Q2 Turns, Barrier Cream, Dri-Bill Pads and Pressure Redistribution Mattress    Possible Skin Injury Yes, wound following    Pictures Uploaded Into Epic Yes  Wound Consult Placed Yes, wound following   RN Wound Prevention Protocol Ordered Yes

## 2021-08-08 ENCOUNTER — APPOINTMENT (OUTPATIENT)
Dept: RADIOLOGY | Facility: MEDICAL CENTER | Age: 41
DRG: 871 | End: 2021-08-08
Attending: INTERNAL MEDICINE
Payer: COMMERCIAL

## 2021-08-08 LAB
ALBUMIN SERPL BCP-MCNC: 2.2 G/DL (ref 3.2–4.9)
ALBUMIN/GLOB SERPL: 0.5 G/DL
ALP SERPL-CCNC: 169 U/L (ref 30–99)
ALT SERPL-CCNC: 34 U/L (ref 2–50)
AMMONIA PLAS-SCNC: 104 UMOL/L (ref 11–45)
ANION GAP SERPL CALC-SCNC: 10 MMOL/L (ref 7–16)
AST SERPL-CCNC: 57 U/L (ref 12–45)
BASOPHILS # BLD AUTO: 0.1 % (ref 0–1.8)
BASOPHILS # BLD: 0.02 K/UL (ref 0–0.12)
BILIRUB SERPL-MCNC: 5.1 MG/DL (ref 0.1–1.5)
BUN SERPL-MCNC: 28 MG/DL (ref 8–22)
CALCIUM SERPL-MCNC: 8.9 MG/DL (ref 8.5–10.5)
CHLORIDE SERPL-SCNC: 114 MMOL/L (ref 96–112)
CO2 SERPL-SCNC: 19 MMOL/L (ref 20–33)
CREAT SERPL-MCNC: 0.73 MG/DL (ref 0.5–1.4)
EOSINOPHIL # BLD AUTO: 0.33 K/UL (ref 0–0.51)
EOSINOPHIL NFR BLD: 2.5 % (ref 0–6.9)
ERYTHROCYTE [DISTWIDTH] IN BLOOD BY AUTOMATED COUNT: 72 FL (ref 35.9–50)
GLOBULIN SER CALC-MCNC: 4.8 G/DL (ref 1.9–3.5)
GLUCOSE BLD-MCNC: 337 MG/DL (ref 65–99)
GLUCOSE SERPL-MCNC: 372 MG/DL (ref 65–99)
HCT VFR BLD AUTO: 25.2 % (ref 37–47)
HGB BLD-MCNC: 8.2 G/DL (ref 12–16)
IMM GRANULOCYTES # BLD AUTO: 0.14 K/UL (ref 0–0.11)
IMM GRANULOCYTES NFR BLD AUTO: 1 % (ref 0–0.9)
LYMPHOCYTES # BLD AUTO: 1.8 K/UL (ref 1–4.8)
LYMPHOCYTES NFR BLD: 13.4 % (ref 22–41)
MAGNESIUM SERPL-MCNC: 1.7 MG/DL (ref 1.5–2.5)
MCH RBC QN AUTO: 33.6 PG (ref 27–33)
MCHC RBC AUTO-ENTMCNC: 32.5 G/DL (ref 33.6–35)
MCV RBC AUTO: 103.3 FL (ref 81.4–97.8)
MONOCYTES # BLD AUTO: 1.05 K/UL (ref 0–0.85)
MONOCYTES NFR BLD AUTO: 7.8 % (ref 0–13.4)
NEUTROPHILS # BLD AUTO: 10.07 K/UL (ref 2–7.15)
NEUTROPHILS NFR BLD: 75.2 % (ref 44–72)
NRBC # BLD AUTO: 0.02 K/UL
NRBC BLD-RTO: 0.1 /100 WBC
PHOSPHATE SERPL-MCNC: 1.8 MG/DL (ref 2.5–4.5)
PLATELET # BLD AUTO: 105 K/UL (ref 164–446)
PMV BLD AUTO: 11.1 FL (ref 9–12.9)
POTASSIUM SERPL-SCNC: 3.8 MMOL/L (ref 3.6–5.5)
PROT SERPL-MCNC: 7 G/DL (ref 6–8.2)
RBC # BLD AUTO: 2.44 M/UL (ref 4.2–5.4)
SODIUM SERPL-SCNC: 143 MMOL/L (ref 135–145)
WBC # BLD AUTO: 13.4 K/UL (ref 4.8–10.8)

## 2021-08-08 PROCEDURE — 700111 HCHG RX REV CODE 636 W/ 250 OVERRIDE (IP): Performed by: INTERNAL MEDICINE

## 2021-08-08 PROCEDURE — 82140 ASSAY OF AMMONIA: CPT

## 2021-08-08 PROCEDURE — 770020 HCHG ROOM/CARE - TELE (206)

## 2021-08-08 PROCEDURE — 700105 HCHG RX REV CODE 258: Performed by: INTERNAL MEDICINE

## 2021-08-08 PROCEDURE — C1751 CATH, INF, PER/CENT/MIDLINE: HCPCS

## 2021-08-08 PROCEDURE — 700102 HCHG RX REV CODE 250 W/ 637 OVERRIDE(OP): Performed by: STUDENT IN AN ORGANIZED HEALTH CARE EDUCATION/TRAINING PROGRAM

## 2021-08-08 PROCEDURE — 80053 COMPREHEN METABOLIC PANEL: CPT

## 2021-08-08 PROCEDURE — 700102 HCHG RX REV CODE 250 W/ 637 OVERRIDE(OP): Performed by: INTERNAL MEDICINE

## 2021-08-08 PROCEDURE — 99233 SBSQ HOSP IP/OBS HIGH 50: CPT | Performed by: INTERNAL MEDICINE

## 2021-08-08 PROCEDURE — 85025 COMPLETE CBC W/AUTO DIFF WBC: CPT

## 2021-08-08 PROCEDURE — A9270 NON-COVERED ITEM OR SERVICE: HCPCS | Performed by: INTERNAL MEDICINE

## 2021-08-08 PROCEDURE — 700101 HCHG RX REV CODE 250: Performed by: INTERNAL MEDICINE

## 2021-08-08 PROCEDURE — 36415 COLL VENOUS BLD VENIPUNCTURE: CPT

## 2021-08-08 PROCEDURE — 83735 ASSAY OF MAGNESIUM: CPT

## 2021-08-08 PROCEDURE — 84100 ASSAY OF PHOSPHORUS: CPT

## 2021-08-08 PROCEDURE — A9270 NON-COVERED ITEM OR SERVICE: HCPCS | Performed by: STUDENT IN AN ORGANIZED HEALTH CARE EDUCATION/TRAINING PROGRAM

## 2021-08-08 PROCEDURE — 82962 GLUCOSE BLOOD TEST: CPT | Mod: 91

## 2021-08-08 PROCEDURE — 700111 HCHG RX REV CODE 636 W/ 250 OVERRIDE (IP): Performed by: STUDENT IN AN ORGANIZED HEALTH CARE EDUCATION/TRAINING PROGRAM

## 2021-08-08 RX ORDER — INSULIN LISPRO 100 [IU]/ML
2-9 INJECTION, SOLUTION INTRAVENOUS; SUBCUTANEOUS EVERY 6 HOURS
Status: DISCONTINUED | OUTPATIENT
Start: 2021-08-08 | End: 2021-08-08

## 2021-08-08 RX ORDER — DEXTROSE MONOHYDRATE 25 G/50ML
50 INJECTION, SOLUTION INTRAVENOUS
Status: DISCONTINUED | OUTPATIENT
Start: 2021-08-08 | End: 2021-08-08

## 2021-08-08 RX ORDER — MAGNESIUM SULFATE HEPTAHYDRATE 40 MG/ML
2 INJECTION, SOLUTION INTRAVENOUS ONCE
Status: COMPLETED | OUTPATIENT
Start: 2021-08-08 | End: 2021-08-08

## 2021-08-08 RX ORDER — LACTULOSE 20 G/30ML
45 SOLUTION ORAL 4 TIMES DAILY
Status: DISCONTINUED | OUTPATIENT
Start: 2021-08-08 | End: 2021-08-11

## 2021-08-08 RX ORDER — INSULIN LISPRO 100 [IU]/ML
2-9 INJECTION, SOLUTION INTRAVENOUS; SUBCUTANEOUS
Status: DISCONTINUED | OUTPATIENT
Start: 2021-08-08 | End: 2021-08-08

## 2021-08-08 RX ORDER — DEXTROSE MONOHYDRATE 25 G/50ML
50 INJECTION, SOLUTION INTRAVENOUS
Status: DISCONTINUED | OUTPATIENT
Start: 2021-08-08 | End: 2021-08-09

## 2021-08-08 RX ADMIN — LACTULOSE 45 ML: 20 SOLUTION ORAL at 11:57

## 2021-08-08 RX ADMIN — THERA TABS 1 TABLET: TAB at 05:06

## 2021-08-08 RX ADMIN — PIPERACILLIN AND TAZOBACTAM 4.5 G: 4; .5 INJECTION, POWDER, LYOPHILIZED, FOR SOLUTION INTRAVENOUS; PARENTERAL at 11:59

## 2021-08-08 RX ADMIN — CYANOCOBALAMIN TAB 500 MCG 2500 MCG: 500 TAB at 05:05

## 2021-08-08 RX ADMIN — INSULIN HUMAN 4 UNITS: 100 INJECTION, SOLUTION PARENTERAL at 12:00

## 2021-08-08 RX ADMIN — RIFAXIMIN 550 MG: 550 TABLET ORAL at 05:05

## 2021-08-08 RX ADMIN — LACTULOSE 45 ML: 20 SOLUTION ORAL at 18:16

## 2021-08-08 RX ADMIN — HALOPERIDOL LACTATE 5 MG: 5 INJECTION, SOLUTION INTRAMUSCULAR at 02:42

## 2021-08-08 RX ADMIN — LINEZOLID 600 MG: 600 INJECTION, SOLUTION INTRAVENOUS at 05:04

## 2021-08-08 RX ADMIN — HYDROXYZINE HYDROCHLORIDE 50 MG: 25 TABLET, FILM COATED ORAL at 20:10

## 2021-08-08 RX ADMIN — INSULIN HUMAN 6 UNITS: 100 INJECTION, SOLUTION PARENTERAL at 18:23

## 2021-08-08 RX ADMIN — LEVOTHYROXINE SODIUM 50 MCG: 0.05 TABLET ORAL at 05:05

## 2021-08-08 RX ADMIN — HALOPERIDOL LACTATE 5 MG: 5 INJECTION, SOLUTION INTRAMUSCULAR at 09:25

## 2021-08-08 RX ADMIN — PHYTONADIONE 10 MG: 10 INJECTION, EMULSION INTRAMUSCULAR; INTRAVENOUS; SUBCUTANEOUS at 05:04

## 2021-08-08 RX ADMIN — LACTULOSE 45 ML: 20 SOLUTION ORAL at 05:07

## 2021-08-08 RX ADMIN — LACTULOSE 45 ML: 20 SOLUTION ORAL at 20:10

## 2021-08-08 RX ADMIN — PIPERACILLIN AND TAZOBACTAM 4.5 G: 4; .5 INJECTION, POWDER, LYOPHILIZED, FOR SOLUTION INTRAVENOUS; PARENTERAL at 20:11

## 2021-08-08 RX ADMIN — FOLIC ACID 1 MG: 1 TABLET ORAL at 05:06

## 2021-08-08 RX ADMIN — Medication 100 MG: at 06:00

## 2021-08-08 RX ADMIN — MAGNESIUM SULFATE 2 G: 2 INJECTION INTRAVENOUS at 18:16

## 2021-08-08 RX ADMIN — MIDODRINE HYDROCHLORIDE 10 MG: 5 TABLET ORAL at 11:59

## 2021-08-08 RX ADMIN — Medication 1000 UNITS: at 05:05

## 2021-08-08 RX ADMIN — INSULIN GLARGINE 10 UNITS: 100 INJECTION, SOLUTION SUBCUTANEOUS at 18:22

## 2021-08-08 RX ADMIN — HYDROXYZINE HYDROCHLORIDE 50 MG: 25 TABLET, FILM COATED ORAL at 00:56

## 2021-08-08 RX ADMIN — DIBASIC SODIUM PHOSPHATE, MONOBASIC POTASSIUM PHOSPHATE AND MONOBASIC SODIUM PHOSPHATE 250 MG: 852; 155; 130 TABLET ORAL at 05:05

## 2021-08-08 RX ADMIN — RIFAXIMIN 550 MG: 550 TABLET ORAL at 18:16

## 2021-08-08 RX ADMIN — POTASSIUM PHOSPHATE, MONOBASIC AND POTASSIUM PHOSPHATE, DIBASIC 30 MMOL: 224; 236 INJECTION, SOLUTION, CONCENTRATE INTRAVENOUS at 13:53

## 2021-08-08 RX ADMIN — LINEZOLID 600 MG: 600 INJECTION, SOLUTION INTRAVENOUS at 18:15

## 2021-08-08 RX ADMIN — MIDODRINE HYDROCHLORIDE 10 MG: 5 TABLET ORAL at 07:30

## 2021-08-08 RX ADMIN — MIDODRINE HYDROCHLORIDE 10 MG: 5 TABLET ORAL at 18:16

## 2021-08-08 RX ADMIN — PIPERACILLIN AND TAZOBACTAM 4.5 G: 4; .5 INJECTION, POWDER, LYOPHILIZED, FOR SOLUTION INTRAVENOUS; PARENTERAL at 05:04

## 2021-08-08 RX ADMIN — HALOPERIDOL LACTATE 5 MG: 5 INJECTION, SOLUTION INTRAMUSCULAR at 22:49

## 2021-08-08 RX ADMIN — Medication 1 CAPSULE: at 07:30

## 2021-08-08 ASSESSMENT — PATIENT HEALTH QUESTIONNAIRE - PHQ9
SUM OF ALL RESPONSES TO PHQ9 QUESTIONS 1 AND 2: 0
2. FEELING DOWN, DEPRESSED, IRRITABLE, OR HOPELESS: NOT AT ALL
1. LITTLE INTEREST OR PLEASURE IN DOING THINGS: NOT AT ALL
SUM OF ALL RESPONSES TO PHQ QUESTIONS 1-9: 0
4. FEELING TIRED OR HAVING LITTLE ENERGY: NOT AT ALL
7. TROUBLE CONCENTRATING ON THINGS, SUCH AS READING THE NEWSPAPER OR WATCHING TELEVISION: NOT AT ALL
8. MOVING OR SPEAKING SO SLOWLY THAT OTHER PEOPLE COULD HAVE NOTICED. OR THE OPPOSITE, BEING SO FIGETY OR RESTLESS THAT YOU HAVE BEEN MOVING AROUND A LOT MORE THAN USUAL: NOT AT ALL
3. TROUBLE FALLING OR STAYING ASLEEP OR SLEEPING TOO MUCH: NOT AT ALL
6. FEELING BAD ABOUT YOURSELF - OR THAT YOU ARE A FAILURE OR HAVE LET YOURSELF OR YOUR FAMILY DOWN: NOT AL ALL
5. POOR APPETITE OR OVEREATING: NOT AT ALL
9. THOUGHTS THAT YOU WOULD BE BETTER OFF DEAD, OR OF HURTING YOURSELF: NOT AT ALL

## 2021-08-08 ASSESSMENT — PAIN DESCRIPTION - PAIN TYPE
TYPE: ACUTE PAIN
TYPE: ACUTE PAIN

## 2021-08-08 ASSESSMENT — FIBROSIS 4 INDEX: FIB4 SCORE: 3.63

## 2021-08-08 NOTE — CARE PLAN
Problem: Nutritional:  Goal: Nutrition support tolerated and meeting greater than 85% of estimated needs  Outcome: Met   TF @ goal: Impact Peptide 1.5 @ 45 mL/hr.

## 2021-08-08 NOTE — PROGRESS NOTES
"[facility] Medicine Daily Progress Note    Date of Service  8/8/2021    Chief Complaint  Hien Alfaro is a 41 y.o. female admitted 8/1/2021 with abdominal discomfort.    Hospital Course  Per admitting provider:  \"Hien Alfaro is a 41 y.o. female who presented 8/1/2021 with Wound Check (Pt reports redness in lower abd started ~2 weeks ago, now presenting with blackened open wounds across lower abd to b/l groin. )  She is morbidly obese. She has a history of alcoholic cirrhosis, portal vein thrombosis NOT on anticoagulation (seen and decided by Dr. Montenegro, [facility]) and was hospitalized in June for decompensated alcoholic cirrhosis with hyperammonemia, bilirubinemia and hepatorenal syndrome with poor outpatient follow-up. She was placed on antibiotics for her pannus infections. Despite antibiotics, she noticed black open wounds along her abdomen to bilateral groin and also a lump/mass that started 2 weeks now RLQ region.\"     CT AP at another facility noted colitis and panniculitis, no gas or abscess noted. Transferred to [facility] ER for higher level of care, admitted to medicine service, surgery was consulted, no surgery indicated, cont abx.     ID is consulted. On zosyn and zyvox. Check MRSA swab pending. Blood culture NTD.    Palliative consulted.     Hepatic encephalopathy, ammonia elevated. On lactulose po and rifaximin. Ammonia slightly trending down    Interval Problem Update    8/6 Hb dropped to 6.9. Transfuse 1 unit of PRBC. Her INR is elevated at 2.85. I have ordered a dose of FFP and IV vitamin K 10 mg x 3. She has end stage liver disease and very poor prognosis. I have discontinued her prednisone due to active sepsis and infection. I have discussed her case with her  and explained her poor prognosis. He understands however he would like to keep her CODE STATUS as full code    8/7 patient remains confused and somnolent. Discontinue Ativan and decreased IV Dilaudid frequency. No further bleeding noted " today. Hemoglobin stable at 8.2    8/8 patient's glucose was found to be elevated at 372.  Recently her HbA1c was 4.0 on 8/1/2021.  I have started the patient on long-acting insulin with ISS.  Her phosphorus was found to be low at 1.8 I have added IV K-Phos replacement.  Patient remains somnolent and lethargic, increased lactulose to 4 times daily.    I have personally seen and examined the patient at bedside. I discussed the plan of care with family and bedside RN.    Consultants/Specialty  Surgery  Wound care  Palliative care    Code Status  Full Code    Disposition  Patient is not medically cleared.   Anticipate discharge to to skilled nursing facility.  I have placed the appropriate orders for post-discharge needs.    Review of Systems  Review of Systems   Unable to perform ROS: Mental acuity        Physical Exam  Temp:  [36.2 °C (97.2 °F)-36.7 °C (98 °F)] 36.7 °C (98 °F)  Pulse:  [111-123] 115  Resp:  [20-22] 20  BP: (117-132)/() 132/79  SpO2:  [94 %-98 %] 94 %    Physical Exam  Vitals and nursing note reviewed.   Constitutional:       Appearance: She is obese. She is ill-appearing.   HENT:      Head: Normocephalic and atraumatic.      Nose: Nose normal.      Mouth/Throat:      Comments: Dried blood noted around lips, left nare and oral cavity    Eyes:      General: Scleral icterus present.      Extraocular Movements: Extraocular movements intact.   Cardiovascular:      Rate and Rhythm: Regular rhythm. Tachycardia present.      Pulses: Normal pulses.      Heart sounds: No friction rub.   Pulmonary:      Effort: Pulmonary effort is normal.      Breath sounds: No wheezing.      Comments: Decreased bibasilar breath sounds  Abdominal:      General: There is distension.      Palpations: Abdomen is soft.      Tenderness: There is abdominal tenderness. There is no guarding or rebound.   Musculoskeletal:         General: No tenderness. Normal range of motion.      Cervical back: Neck supple. No tenderness.       Right lower leg: Edema present.      Left lower leg: Edema present.   Skin:     General: Skin is dry.      Capillary Refill: Capillary refill takes less than 2 seconds.      Coloration: Skin is jaundiced.      Comments: Necrotic wounds on left lower abdomen and left thigh   Neurological:      Mental Status: She is alert. She is disoriented.      Comments: Confused and somnolent       Psychiatric:      Comments: Unable to evaluate         Fluids    Intake/Output Summary (Last 24 hours) at 8/8/2021 1423  Last data filed at 8/8/2021 0600  Gross per 24 hour   Intake 300 ml   Output 750 ml   Net -450 ml       Laboratory  Recent Labs     08/07/21  0304 08/07/21  1458 08/08/21  0316   WBC 13.4* 15.1* 13.4*   RBC 2.51* 2.67* 2.44*   HEMOGLOBIN 8.2* 8.9* 8.2*   HEMATOCRIT 24.7* 25.5* 25.2*   MCV 98.4* 95.5 103.3*   MCH 32.7 33.3* 33.6*   MCHC 33.2* 34.9 32.5*   RDW 67.2* 62.8* 72.0*   PLATELETCT 90* 91* 105*   MPV 10.3 11.0 11.1     Recent Labs     08/06/21  0247 08/07/21  0304 08/08/21  0316   SODIUM 140 143 143   POTASSIUM 3.9 4.3 3.8   CHLORIDE 113* 116* 114*   CO2 20 18* 19*   GLUCOSE 209* 266* 372*   BUN 11 24* 28*   CREATININE 0.82 0.82 0.73   CALCIUM 8.0* 8.3* 8.9     Recent Labs     08/06/21  0432 08/07/21  1458   APTT 61.3*  --    INR 2.85* 2.11*               Imaging  IR-MIDLINE CATHETER INSERTION WO GUIDANCE > AGE 3   Final Result                  Ultrasound-guided midline placement performed by qualified nursing staff    as above.          CT-EXTREMITY, LOWER WITH LEFT   Final Result      1.  No evidence of osteomyelitis.   2.  Soft tissue edema in the pannus and left thigh with skin thickening which can be seen in the setting of cellulitis.   3.  No abscess is identified.   4.  No soft tissue air is noted.   5.  Free fluid is seen in the pelvis.      CT-ABDOMEN-PELVIS WITH   Final Result      1.  Again there are morphologic changes of the liver consistent with cirrhosis with associated splenomegaly.   2.  Portal  vein and mesenteric vessels are grossly patent.   3.  There is a small amount of ascites with mesenteric edema and subcutaneous edema.   4.  There is no soft tissue abscess or intra-abdominal or pelvic abscess.   5.  There is colonic wall edema most likely related to the liver disease and hypoproteinemia.      US-ABDOMEN LTD (SOFT TISSUE)   Final Result      1.  Small volume of ascites in the right upper quadrant adjacent to the liver.      2.  Cirrhotic appearance of liver.      3.  No large volume of ascites.      DX-ABDOMEN FOR TUBE PLACEMENT   Final Result      Cortrak feeding tube tip projects in the region of the duodenal bulb.      CT-HEAD W/O   Final Result      No evidence of acute intracranial process.      OUTSIDE IMAGES-CT ABDOMEN /PELVIS   Final Result      OUTSIDE IMAGES-DX CHEST   Final Result      OUTSIDE IMAGES-CT ABDOMEN /PELVIS   Final Result           Assessment/Plan  * Abdominal panniculitis/cellulitis w/ concern for necrosis- (present on admission)  Assessment & Plan  CT AP at another facility colitis, panniculitis with no gas/abscess  Surgery f/u appreciated -- no indication for surgery at this time  ID is consulted. Pt is on zosyn and zyvox, Stop date 8/11/2021  Low threshold for CT imaging if septic shock/deteriorates  Follow blood culture  Wound care      Coagulopathy (HCC)  Assessment & Plan  INR 2.85  I have ordered FFP and IV vitamin K    Liver failure (HCC)  Assessment & Plan  Secondary to alcoholic cirrhosis   With coagulopathy, bleeding, hepatic encephalopathy, ascites    Hyperbilirubinemia  Assessment & Plan  Sec to ESLD  Total bilirubin 4. Her total bilirubin was 11 in June    Anxiety  Assessment & Plan  PRN atarax    Acute encephalopathy  Assessment & Plan  Likely hepatic encephalopathy  Ordered CT head, unremarkable  Ammonia elevated, trending down   On lactulose and Rifaximin    End stage liver disease (HCC)  Assessment & Plan  MELD score: 28 on admission  Secondary to  ETOH  Hepatitis panel neg  Poor prognosis    Severe protein-calorie malnutrition (HCC)  Assessment & Plan  Ensure    Failure to thrive in adult  Assessment & Plan  PT/OT, PO diet as able to tolerated  Palliative care consult    Anasarca  Assessment & Plan  Secondary to ESLD  Supportive care    Hypoglycemia  Assessment & Plan  Hypoglycemia protocol    Colitis  Assessment & Plan  As noted in panniculitis    Portal vein thrombosis- (present on admission)  Assessment & Plan  Suspect chronic PV throbosis -- in which case there is no clear benefit for anticoagulation -- case was previously noted by hematology (Dr. Montenegro)    Will avoid chronic anticoagulation due to ongoing bleeding    Class 3 severe obesity due to excess calories with serious comorbidity and body mass index (BMI) of 40.0 to 44.9 in adult (HCC)- (present on admission)  Assessment & Plan  Diet and lifestyle modifications    Sepsis (HCC)  Assessment & Plan  This is Sepsis Present on admission  SIRS criteria identified on my evaluation include: Leukocytosis, with WBC greater than 12,000  Source is pannus  Sepsis protocol initiated  Fluid resuscitation ordered per protocol  IV antibiotics as appropriate for source of sepsis  While organ dysfunction may be noted elsewhere in this problem list or in the chart, degree of organ dysfunction does not meet CMS criteria for severe sepsis    On IVF  Abx  Follow up lactic acid and blood culture          Hypothyroidism- (present on admission)  Assessment & Plan  TSH 7.2, elevated, but improved from TSH in April, free T4 in normal range  Synthroid    Alcoholic cirrhosis of liver without ascites (HCC)- (present on admission)  Assessment & Plan  Decompensated.   reports patient has not been drinking since May  MELD score 28 on admission, 27-32% 90 day mortality rate  She has upcoming appointment with GI as outpatient      Type 2 diabetes mellitus with neurologic complication, without long-term current use of  insulin (HCC)- (present on admission)  Assessment & Plan  Start on insulin sliding scale with serial Accu-Checks and long acting insulin  Hypoglycemic protocol in place           VTE prophylaxis: SCDs/TEDs given elevated INR    I have performed a physical exam and reviewed and updated ROS and Plan today (8/8/2021). In review of yesterday's note (8/7/2021), there are no changes except as documented above.

## 2021-08-08 NOTE — DIETARY
"Nutrition Services: Brief update:  Day 7 of admit.  iHen Alfaro is a 41 y.o. female with admitting DX of abdominal pain, panniculitis.      Tube feeding initiated . Current TF via gastric cortrak: Impact Peptide 1.5 @ goal rate of 45 ml/hr, providing 1620 kcals, 102 grams protein (2.0 grams/kg of IBW), 151 grams of CHO, and 832 mL of free water per day.    Calculation/Equation: MSJ x 1.0 = 1686 kcals/day  Total Calories / day: 1686 - 1872 (Calories / k - 18)  Total Grams Protein / day: 104 - 125 (Grams Protein / kg ABW: 1.0 - 1.2)     Evaluation:   1. Consult received for \"Please switch tube feeding to diabetic feeding as appropriate\".  2. RD reviewed current tube feeding formula.  3. Pt with end stage liver disease and multiple areas of skin breakdown (followed by wound team).   4. Current tube feeding formula is CHO controlled (36% CHO). Diabetisource AC (37% CHO) not indicated at this time as goal rate needed to meet pt's estimated nutrition needs would only provide 7 fewer grams of CHO per day, and would not meet pt's estimated protein needs.  5. Current feeding remains appropriate. High protein, L-arginine containing formula to aid in wound healing.     Recommendations/Plan:  1. Continue TF formula and rate.  2. Fluids per MD.    RD will continue to follow.                  "

## 2021-08-08 NOTE — CARE PLAN
The patient is Unstable - High likelihood or risk of patient condition declining or worsening    Shift Goals  Clinical Goals: Follow labs and treat accordingly  Patient Goals: MICHI  Family Goals: Get better    Progress made toward(s) clinical / shift goals:    Problem: Pain - Standard  Goal: Alleviation of pain or a reduction in pain to the patient’s comfort goal  Outcome: Not Progressing     Problem: Skin Integrity  Goal: Skin integrity is maintained or improved  Outcome: Not Progressing     Problem: Respiratory  Goal: Patient will achieve/maintain optimum respiratory ventilation and gas exchange  Outcome: Progressing     Problem: Fall Risk  Goal: Patient will remain free from falls  Outcome: Progressing       Patient is not progressing towards the following goals:      Problem: Pain - Standard  Goal: Alleviation of pain or a reduction in pain to the patient’s comfort goal  Outcome: Not Progressing     Problem: Skin Integrity  Goal: Skin integrity is maintained or improved  Outcome: Not Progressing

## 2021-08-08 NOTE — PROGRESS NOTES
Assumed care of pt. Bedside report received from Bang BOYER. Pt was updated on plan of care. Call light, phone and personal belongings in reach. Bed alarm on and working properly, bed in lowest position, and locked.

## 2021-08-08 NOTE — PROGRESS NOTES
2 RN skin check done with Shanelle RN, and Harpreet RN      Head Jaundice  Ears Jaundice  Nose Jaundice  Mouth Bleeding, dry blood   Neck WDL  Breast/Chest Bruising, Discoloration, Jaundice and Edema  Shoulder Blades WDL  Spine WDL  (R) Arm/Elbow/Hand Redness, Blanching and Bruising  (L) Arm/Elbow/Hand Redness, Blanching and Bruising  Abdomen Redness, Blanching, Scab and Bruising, known wound, excoriation, necrotic tissue   Groin Redness, Blanching and Excoriation, hutson in place   Scrotum/Coccyx/Buttocks Redness, Blanching, Excoriation and Discoloration, BMS system in place  (R) Leg Bruising, Jaundice and Edema  (L) Leg Bruising, Jaundice and Edema  (R) Heel/Foot/Toe Blanching, Boggy and Edema  (L) Heel/Foot/Toe Blanching, Boggy and Edema              Devices In Places Blood Pressure Cuff and Pulse Ox, rectal tube, cortrak, hutson         Interventions In Place Heel Float Boots, Pillows, Q2 Turns, Barrier Cream, Dri-Bill Pads and low air loss mattress      Possible Skin Injury Yes, wound following     Pictures Uploaded Into Epic Yes  Wound Consult Placed Yes, wound following   RN Wound Prevention Protocol Ordered Yes

## 2021-08-08 NOTE — CARE PLAN
Problem: Pain - Standard  Goal: Alleviation of pain or a reduction in pain to the patient’s comfort goal  Outcome: Progressing     Problem: Knowledge Deficit - Standard  Goal: Patient and family/care givers will demonstrate understanding of plan of care, disease process/condition, diagnostic tests and medications  Outcome: Progressing     Problem: Hemodynamics  Goal: Patient's hemodynamics, fluid balance and neurologic status will be stable or improve  Outcome: Progressing     Problem: Fluid Volume  Goal: Fluid volume balance will be maintained  Outcome: Progressing     Problem: Urinary - Renal Perfusion  Goal: Ability to achieve and maintain adequate renal perfusion and functioning will improve  Outcome: Progressing     Problem: Respiratory  Goal: Patient will achieve/maintain optimum respiratory ventilation and gas exchange  Outcome: Progressing     Problem: Mechanical Ventilation  Goal: Safe management of artificial airway and ventilation  Outcome: Progressing  Goal: Successful weaning off mechanical ventilator, spontaneously maintains adequate gas exchange  Outcome: Progressing  Goal: Patient will be able to express needs and understand communication  Outcome: Progressing     Problem: Physical Regulation  Goal: Diagnostic test results will improve  Outcome: Progressing  Goal: Signs and symptoms of infection will decrease  Outcome: Progressing     Problem: Fall Risk  Goal: Patient will remain free from falls  Outcome: Progressing     Problem: Skin Integrity  Goal: Skin integrity is maintained or improved  Outcome: Progressing

## 2021-08-08 NOTE — PROCEDURES
Vascular Access Team    Date of Insertion: August 8, 2021  Arm Circumference: 38 cm  Internal length: 17 cm  External Length: 0 cm  Vein Occupancy %: 22%  Reason for Midline: Access/antibiotics  Labs: WBC 13.4, , BUN 28, Cr 0.73, GFR >60, INR 2.11    Orders confirmed, vessel patency confirmed with ultrasound. Risks and benefits of procedure explained to patient and family member and education regarding line associated bloodstream infections provided. Questions answered.     Power Midline placed in LUE per licensed provider order with ultrasound guidance. 4 Fr, single lumen Power Midline placed in basilic vein after 1 attempt(s). 2 mL of 1% lidocaine injected intradermally, 21 gauge microintroducer needle and modified Seldinger technique used. 17 cm catheter inserted with good blood return. Secured at 0 cm marker. Each lumen flushed without resistance with 10 mL 0.9% normal saline. Midline secured with Biopatch and Tegaderm.     Midline placement is confirmed by nurse using ultrasound and ability to flush and draw blood. Midline is appropriate for use at this time.  Patient tolerated procedure well, without complications.  No X-ray is needed for placement confirmation.  Patient condition relayed to unit RN or ordering physician via this post procedure note in the EMR.     Ultrasound images uploaded to PACS and viewable in the EMR - yes  Ultrasound imaged printed and placed in paper chart - no     BARD Power Midline ref # [id number], Lot # [id number], Expiration Date August 31, 2022

## 2021-08-09 LAB
ALBUMIN SERPL BCP-MCNC: 2 G/DL (ref 3.2–4.9)
ALBUMIN/GLOB SERPL: 0.4 G/DL
ALP SERPL-CCNC: 139 U/L (ref 30–99)
ALT SERPL-CCNC: 32 U/L (ref 2–50)
AMMONIA PLAS-SCNC: 85 UMOL/L (ref 11–45)
ANION GAP SERPL CALC-SCNC: 10 MMOL/L (ref 7–16)
AST SERPL-CCNC: 49 U/L (ref 12–45)
BASOPHILS # BLD AUTO: 0.2 % (ref 0–1.8)
BASOPHILS # BLD: 0.03 K/UL (ref 0–0.12)
BILIRUB SERPL-MCNC: 4.8 MG/DL (ref 0.1–1.5)
BUN SERPL-MCNC: 23 MG/DL (ref 8–22)
CALCIUM SERPL-MCNC: 8.9 MG/DL (ref 8.5–10.5)
CHLORIDE SERPL-SCNC: 119 MMOL/L (ref 96–112)
CO2 SERPL-SCNC: 18 MMOL/L (ref 20–33)
CREAT SERPL-MCNC: 0.53 MG/DL (ref 0.5–1.4)
CRP SERPL HS-MCNC: 8.53 MG/DL (ref 0–0.75)
EOSINOPHIL # BLD AUTO: 0.27 K/UL (ref 0–0.51)
EOSINOPHIL NFR BLD: 1.9 % (ref 0–6.9)
ERYTHROCYTE [DISTWIDTH] IN BLOOD BY AUTOMATED COUNT: 62.1 FL (ref 35.9–50)
GLOBULIN SER CALC-MCNC: 4.6 G/DL (ref 1.9–3.5)
GLUCOSE BLD-MCNC: 319 MG/DL (ref 65–99)
GLUCOSE BLD-MCNC: 329 MG/DL (ref 65–99)
GLUCOSE BLD-MCNC: 340 MG/DL (ref 65–99)
GLUCOSE BLD-MCNC: 351 MG/DL (ref 65–99)
GLUCOSE BLD-MCNC: 378 MG/DL (ref 65–99)
GLUCOSE SERPL-MCNC: 405 MG/DL (ref 65–99)
HCT VFR BLD AUTO: 21.9 % (ref 37–47)
HGB BLD-MCNC: 7.4 G/DL (ref 12–16)
IMM GRANULOCYTES # BLD AUTO: 0.1 K/UL (ref 0–0.11)
IMM GRANULOCYTES NFR BLD AUTO: 0.7 % (ref 0–0.9)
INR PPP: 2.28 (ref 0.87–1.13)
LYMPHOCYTES # BLD AUTO: 2.08 K/UL (ref 1–4.8)
LYMPHOCYTES NFR BLD: 15 % (ref 22–41)
MAGNESIUM SERPL-MCNC: 2 MG/DL (ref 1.5–2.5)
MCH RBC QN AUTO: 33 PG (ref 27–33)
MCHC RBC AUTO-ENTMCNC: 33.8 G/DL (ref 33.6–35)
MCV RBC AUTO: 97.8 FL (ref 81.4–97.8)
MONOCYTES # BLD AUTO: 0.84 K/UL (ref 0–0.85)
MONOCYTES NFR BLD AUTO: 6.1 % (ref 0–13.4)
NEUTROPHILS # BLD AUTO: 10.55 K/UL (ref 2–7.15)
NEUTROPHILS NFR BLD: 76.1 % (ref 44–72)
NRBC # BLD AUTO: 0.04 K/UL
NRBC BLD-RTO: 0.3 /100 WBC
PHOSPHATE SERPL-MCNC: 2.1 MG/DL (ref 2.5–4.5)
PLATELET # BLD AUTO: 90 K/UL (ref 164–446)
PMV BLD AUTO: 11.7 FL (ref 9–12.9)
POTASSIUM SERPL-SCNC: 3.6 MMOL/L (ref 3.6–5.5)
PREALB SERPL-MCNC: 4.4 MG/DL (ref 18–38)
PROT SERPL-MCNC: 6.6 G/DL (ref 6–8.2)
PROTHROMBIN TIME: 24.5 SEC (ref 12–14.6)
RBC # BLD AUTO: 2.24 M/UL (ref 4.2–5.4)
SODIUM SERPL-SCNC: 147 MMOL/L (ref 135–145)
WBC # BLD AUTO: 13.9 K/UL (ref 4.8–10.8)

## 2021-08-09 PROCEDURE — 700111 HCHG RX REV CODE 636 W/ 250 OVERRIDE (IP): Performed by: INTERNAL MEDICINE

## 2021-08-09 PROCEDURE — 700105 HCHG RX REV CODE 258: Performed by: INTERNAL MEDICINE

## 2021-08-09 PROCEDURE — 700102 HCHG RX REV CODE 250 W/ 637 OVERRIDE(OP): Performed by: INTERNAL MEDICINE

## 2021-08-09 PROCEDURE — 700101 HCHG RX REV CODE 250: Performed by: INTERNAL MEDICINE

## 2021-08-09 PROCEDURE — 86140 C-REACTIVE PROTEIN: CPT

## 2021-08-09 PROCEDURE — 80053 COMPREHEN METABOLIC PANEL: CPT

## 2021-08-09 PROCEDURE — 83735 ASSAY OF MAGNESIUM: CPT

## 2021-08-09 PROCEDURE — 82962 GLUCOSE BLOOD TEST: CPT

## 2021-08-09 PROCEDURE — 84134 ASSAY OF PREALBUMIN: CPT

## 2021-08-09 PROCEDURE — 84100 ASSAY OF PHOSPHORUS: CPT

## 2021-08-09 PROCEDURE — 85025 COMPLETE CBC W/AUTO DIFF WBC: CPT

## 2021-08-09 PROCEDURE — 770001 HCHG ROOM/CARE - MED/SURG/GYN PRIV*

## 2021-08-09 PROCEDURE — 99233 SBSQ HOSP IP/OBS HIGH 50: CPT | Performed by: INTERNAL MEDICINE

## 2021-08-09 PROCEDURE — 85610 PROTHROMBIN TIME: CPT

## 2021-08-09 PROCEDURE — 82140 ASSAY OF AMMONIA: CPT

## 2021-08-09 PROCEDURE — 97535 SELF CARE MNGMENT TRAINING: CPT | Mod: CO

## 2021-08-09 PROCEDURE — 36415 COLL VENOUS BLD VENIPUNCTURE: CPT

## 2021-08-09 PROCEDURE — A9270 NON-COVERED ITEM OR SERVICE: HCPCS | Performed by: INTERNAL MEDICINE

## 2021-08-09 PROCEDURE — 700102 HCHG RX REV CODE 250 W/ 637 OVERRIDE(OP): Performed by: STUDENT IN AN ORGANIZED HEALTH CARE EDUCATION/TRAINING PROGRAM

## 2021-08-09 PROCEDURE — A9270 NON-COVERED ITEM OR SERVICE: HCPCS | Performed by: STUDENT IN AN ORGANIZED HEALTH CARE EDUCATION/TRAINING PROGRAM

## 2021-08-09 RX ORDER — INSULIN LISPRO 100 [IU]/ML
2-9 INJECTION, SOLUTION INTRAVENOUS; SUBCUTANEOUS
Status: DISCONTINUED | OUTPATIENT
Start: 2021-08-09 | End: 2021-08-10

## 2021-08-09 RX ORDER — MAGNESIUM SULFATE HEPTAHYDRATE 40 MG/ML
2 INJECTION, SOLUTION INTRAVENOUS ONCE
Status: COMPLETED | OUTPATIENT
Start: 2021-08-09 | End: 2021-08-09

## 2021-08-09 RX ORDER — INSULIN LISPRO 100 [IU]/ML
0.2 INJECTION, SOLUTION INTRAVENOUS; SUBCUTANEOUS
Status: DISCONTINUED | OUTPATIENT
Start: 2021-08-09 | End: 2021-08-10

## 2021-08-09 RX ORDER — HALOPERIDOL 5 MG/ML
2-5 INJECTION INTRAMUSCULAR EVERY 8 HOURS PRN
Status: DISCONTINUED | OUTPATIENT
Start: 2021-08-09 | End: 2021-08-15 | Stop reason: HOSPADM

## 2021-08-09 RX ORDER — DEXTROSE MONOHYDRATE 25 G/50ML
50 INJECTION, SOLUTION INTRAVENOUS
Status: DISCONTINUED | OUTPATIENT
Start: 2021-08-09 | End: 2021-08-10

## 2021-08-09 RX ADMIN — LINEZOLID 600 MG: 600 INJECTION, SOLUTION INTRAVENOUS at 18:47

## 2021-08-09 RX ADMIN — PIPERACILLIN AND TAZOBACTAM 4.5 G: 4; .5 INJECTION, POWDER, LYOPHILIZED, FOR SOLUTION INTRAVENOUS; PARENTERAL at 04:52

## 2021-08-09 RX ADMIN — MIDODRINE HYDROCHLORIDE 10 MG: 5 TABLET ORAL at 18:48

## 2021-08-09 RX ADMIN — MIDODRINE HYDROCHLORIDE 10 MG: 5 TABLET ORAL at 08:06

## 2021-08-09 RX ADMIN — HYDROMORPHONE HYDROCHLORIDE 0.25 MG: 1 INJECTION, SOLUTION INTRAMUSCULAR; INTRAVENOUS; SUBCUTANEOUS at 06:06

## 2021-08-09 RX ADMIN — INSULIN LISPRO 8 UNITS: 100 INJECTION, SOLUTION INTRAVENOUS; SUBCUTANEOUS at 12:52

## 2021-08-09 RX ADMIN — CYANOCOBALAMIN TAB 500 MCG 2500 MCG: 500 TAB at 04:52

## 2021-08-09 RX ADMIN — INSULIN HUMAN 8 UNITS: 100 INJECTION, SOLUTION PARENTERAL at 00:13

## 2021-08-09 RX ADMIN — FOLIC ACID 1 MG: 1 TABLET ORAL at 04:52

## 2021-08-09 RX ADMIN — Medication 1000 UNITS: at 04:51

## 2021-08-09 RX ADMIN — LINEZOLID 600 MG: 600 INJECTION, SOLUTION INTRAVENOUS at 04:51

## 2021-08-09 RX ADMIN — INSULIN LISPRO 6 UNITS: 100 INJECTION, SOLUTION INTRAVENOUS; SUBCUTANEOUS at 20:55

## 2021-08-09 RX ADMIN — POTASSIUM PHOSPHATE, MONOBASIC AND POTASSIUM PHOSPHATE, DIBASIC 30 MMOL: 224; 236 INJECTION, SOLUTION, CONCENTRATE INTRAVENOUS at 14:10

## 2021-08-09 RX ADMIN — INSULIN HUMAN 8 UNITS: 100 INJECTION, SOLUTION PARENTERAL at 06:14

## 2021-08-09 RX ADMIN — LEVOTHYROXINE SODIUM 50 MCG: 0.05 TABLET ORAL at 04:52

## 2021-08-09 RX ADMIN — LACTULOSE 45 ML: 20 SOLUTION ORAL at 08:06

## 2021-08-09 RX ADMIN — INSULIN LISPRO 8 UNITS: 100 INJECTION, SOLUTION INTRAVENOUS; SUBCUTANEOUS at 18:53

## 2021-08-09 RX ADMIN — RIFAXIMIN 550 MG: 550 TABLET ORAL at 18:47

## 2021-08-09 RX ADMIN — HYDROMORPHONE HYDROCHLORIDE 0.25 MG: 1 INJECTION, SOLUTION INTRAMUSCULAR; INTRAVENOUS; SUBCUTANEOUS at 00:05

## 2021-08-09 RX ADMIN — RIFAXIMIN 550 MG: 550 TABLET ORAL at 04:52

## 2021-08-09 RX ADMIN — INSULIN LISPRO 6 UNITS: 100 INJECTION, SOLUTION INTRAVENOUS; SUBCUTANEOUS at 18:55

## 2021-08-09 RX ADMIN — LACTULOSE 45 ML: 20 SOLUTION ORAL at 20:41

## 2021-08-09 RX ADMIN — THERA TABS 1 TABLET: TAB at 04:52

## 2021-08-09 RX ADMIN — Medication 100 MG: at 04:51

## 2021-08-09 RX ADMIN — LACTULOSE 45 ML: 20 SOLUTION ORAL at 12:51

## 2021-08-09 RX ADMIN — PIPERACILLIN AND TAZOBACTAM 4.5 G: 4; .5 INJECTION, POWDER, LYOPHILIZED, FOR SOLUTION INTRAVENOUS; PARENTERAL at 20:40

## 2021-08-09 RX ADMIN — INSULIN GLARGINE 10 UNITS: 100 INJECTION, SOLUTION SUBCUTANEOUS at 09:18

## 2021-08-09 RX ADMIN — PIPERACILLIN AND TAZOBACTAM 4.5 G: 4; .5 INJECTION, POWDER, LYOPHILIZED, FOR SOLUTION INTRAVENOUS; PARENTERAL at 12:52

## 2021-08-09 RX ADMIN — INSULIN LISPRO 6 UNITS: 100 INJECTION, SOLUTION INTRAVENOUS; SUBCUTANEOUS at 12:54

## 2021-08-09 RX ADMIN — MIDODRINE HYDROCHLORIDE 10 MG: 5 TABLET ORAL at 12:51

## 2021-08-09 RX ADMIN — MAGNESIUM SULFATE 2 G: 2 INJECTION INTRAVENOUS at 10:25

## 2021-08-09 RX ADMIN — Medication 1 CAPSULE: at 08:06

## 2021-08-09 ASSESSMENT — COGNITIVE AND FUNCTIONAL STATUS - GENERAL
HELP NEEDED FOR BATHING: A LOT
SUGGESTED CMS G CODE MODIFIER DAILY ACTIVITY: CL
TOILETING: A LOT
PERSONAL GROOMING: A LOT
DRESSING REGULAR UPPER BODY CLOTHING: A LOT
DAILY ACTIVITIY SCORE: 10
DRESSING REGULAR LOWER BODY CLOTHING: TOTAL
EATING MEALS: TOTAL

## 2021-08-09 ASSESSMENT — PAIN DESCRIPTION - PAIN TYPE: TYPE: ACUTE PAIN

## 2021-08-09 ASSESSMENT — PAIN SCALES - WONG BAKER
WONGBAKER_NUMERICALRESPONSE: HURTS JUST A LITTLE BIT
WONGBAKER_NUMERICALRESPONSE: DOESN'T HURT AT ALL
WONGBAKER_NUMERICALRESPONSE: DOESN'T HURT AT ALL

## 2021-08-09 NOTE — PROGRESS NOTES
Patient was given 8 units of Humulin R @ 0013 for blood sugar of 351. Her blood sugar is now 364 when I rechecked it. Patient is not symptomatic. Notified MD Morgan.  said to give 10 units of lantus. Waiting on pharmacy to send up the  insulin.

## 2021-08-09 NOTE — THERAPY
Occupational Therapy  Daily Treatment     Patient Name: Hien Alfaro  Age:  41 y.o., Sex:  female  Medical Record #: [id number]  Today's Date: 8/9/2021       Precautions: Fall Risk, Swallow Precautions ( See Comments), Nasogastric Tube  Comments: BMS    Assessment    Pt seen for OT tx. Continues to be limited by decreased activity tolerance, balance deficits, inattention and poor safety awareness impacting ability to complete ADLs and ADL transfers independently. Max A supine > sit, required min A for seated balance. Pt appropriately following commands during session. Required assistance for thoroughness for brushing hair. Limited ROM of UEs d/t swelling and pain. Will continue to benefit from OT services while in house.     Plan    Continue current treatment plan.    DC Equipment Recommendations: Unable to determine at this time  Discharge Recommendations: Recommend post-acute placement for additional occupational therapy services prior to discharge home       08/09/21 1053   Cognition    Cognition / Consciousness X   Safety Awareness Impaired;Impulsive   New Learning Impaired   Strength Upper Body   Upper Body Strength  X   Gross Strength Generalized Weakness, Equal Bilaterally.    Balance   Sitting Balance (Static) Poor +   Sitting Balance (Dynamic) Poor +   Weight Shift Sitting Fair   Bed Mobility    Supine to Sit Maximal Assist   Sit to Supine Maximal Assist   Scooting Moderate Assist   Activities of Daily Living   Grooming Moderate Assist;Seated   Upper Body Dressing Moderate Assist   Lower Body Dressing Maximal Assist   Toileting Maximal Assist   Functional Mobility   Comments did not assess    Short Term Goals   Short Term Goal # 1 pt will complete grooming seated w/set up    Goal Outcome # 1 Progressing as expected   Short Term Goal # 2 pt will complete txf to BSC w/min A    Goal Outcome # 2 Goal not met   Anticipated Discharge Equipment and Recommendations   DC Equipment Recommendations Unable to determine  at this time   Discharge Recommendations Recommend post-acute placement for additional occupational therapy services prior to discharge home

## 2021-08-09 NOTE — PROGRESS NOTES
Report received from ROSALIND Yang. Dx, medications, O2 needs, and poc reviewed. Safety precautions in place and pt has no sign of distress or acute needs at this time. Care fully assumed. Will continue to monitor.

## 2021-08-09 NOTE — PROGRESS NOTES
"[facility] Medicine Daily Progress Note    Date of Service  8/9/2021    Chief Complaint  Hien Alfaro is a 41 y.o. female admitted 8/1/2021 with abdominal discomfort.    Hospital Course  Per admitting provider:  \"Hien Alfaro is a 41 y.o. female who presented 8/1/2021 with Wound Check (Pt reports redness in lower abd started ~2 weeks ago, now presenting with blackened open wounds across lower abd to b/l groin. )  She is morbidly obese. She has a history of alcoholic cirrhosis, portal vein thrombosis NOT on anticoagulation (seen and decided by Dr. Montenegro, [facility]) and was hospitalized in June for decompensated alcoholic cirrhosis with hyperammonemia, bilirubinemia and hepatorenal syndrome with poor outpatient follow-up. She was placed on antibiotics for her pannus infections. Despite antibiotics, she noticed black open wounds along her abdomen to bilateral groin and also a lump/mass that started 2 weeks now RLQ region.\"     CT AP at another facility noted colitis and panniculitis, no gas or abscess noted. Transferred to [facility] for higher level of care, admitted to medicine service, surgery was consulted, no surgery indicated, cont abx.     ID is consulted. On zosyn and zyvox. Check MRSA swab pending. Blood culture NTD.    Palliative consulted.     Hepatic encephalopathy, ammonia elevated. On lactulose po and rifaximin. Ammonia slightly trending down    Interval Problem Update    8/6 Hb dropped to 6.9. Transfuse 1 unit of PRBC. Her INR is elevated at 2.85. I have ordered a dose of FFP and IV vitamin K 10 mg x 3. She has end stage liver disease and very poor prognosis. I have discontinued her prednisone due to active sepsis and infection. I have discussed her case with her  and explained her poor prognosis. He understands however he would like to keep her CODE STATUS as full code    8/7 patient remains confused and somnolent. Discontinue Ativan and decreased IV Dilaudid frequency. No further bleeding noted " today. Hemoglobin stable at 8.2    8/8 patient's glucose was found to be elevated at 372.  Recently her HbA1c was 4.0 on 8/1/2021.  I have started the patient on long-acting insulin with ISS.  Her phosphorus was found to be low at 1.8 I have added IV K-Phos replacement.  Patient remains somnolent and lethargic, increased lactulose to 4 times daily.    8/9 Glucose remains elevated. Hold tube feeds. Increase lantus to 22. Mag, k and phos low, will replace via IV. Na elevated, increase free water flushes to 200 Q4 hours. Patient is more awake today. SLP consult for swallow eval    I have personally seen and examined the patient at bedside. I discussed the plan of care with family and bedside RN.    Consultants/Specialty  Surgery Dr Shin  Wound care  Palliative care    Code Status  Full Code    Disposition  Patient is not medically cleared.   Anticipate discharge to to skilled nursing facility.  I have placed the appropriate orders for post-discharge needs.    Review of Systems  Review of Systems   Unable to perform ROS: Mental acuity        Physical Exam  Temp:  [36.7 °C (98 °F)-36.9 °C (98.5 °F)] 36.9 °C (98.5 °F)  Pulse:  [102-117] 102  Resp:  [16-20] 16  BP: (109-132)/(66-83) 112/66  SpO2:  [94 %] 94 %    Physical Exam  Vitals and nursing note reviewed.   Constitutional:       Appearance: She is obese. She is ill-appearing.   HENT:      Head: Normocephalic and atraumatic.      Nose: Nose normal.      Mouth/Throat:      Comments: Dried blood noted around lips, left nare and oral cavity    Eyes:      General: Scleral icterus present.      Extraocular Movements: Extraocular movements intact.   Cardiovascular:      Rate and Rhythm: Regular rhythm. Tachycardia present.      Pulses: Normal pulses.      Heart sounds: No friction rub.   Pulmonary:      Effort: Pulmonary effort is normal.      Breath sounds: No wheezing.      Comments: Decreased bibasilar breath sounds  Abdominal:      General: There is distension.       Palpations: Abdomen is soft.      Tenderness: There is abdominal tenderness. There is no guarding or rebound.   Musculoskeletal:         General: No tenderness. Normal range of motion.      Cervical back: Neck supple. No tenderness.      Right lower leg: Edema present.      Left lower leg: Edema present.   Skin:     General: Skin is dry.      Capillary Refill: Capillary refill takes less than 2 seconds.      Coloration: Skin is jaundiced.      Comments: Necrotic wounds on left lower abdomen and left thigh   Neurological:      Mental Status: She is alert. She is disoriented.      Comments: Confused and somnolent       Psychiatric:      Comments: Unable to evaluate         Fluids    Intake/Output Summary (Last 24 hours) at 8/9/2021 0820  Last data filed at 8/9/2021 0507  Gross per 24 hour   Intake 800 ml   Output 1200 ml   Net -400 ml       Laboratory  Recent Labs     08/07/21  1458 08/08/21  0316 08/09/21  0327   WBC 15.1* 13.4* 13.9*   RBC 2.67* 2.44* 2.24*   HEMOGLOBIN 8.9* 8.2* 7.4*   HEMATOCRIT 25.5* 25.2* 21.9*   MCV 95.5 103.3* 97.8   MCH 33.3* 33.6* 33.0   MCHC 34.9 32.5* 33.8   RDW 62.8* 72.0* 62.1*   PLATELETCT 91* 105* 90*   MPV 11.0 11.1 11.7     Recent Labs     08/07/21  0304 08/08/21  0316 08/09/21  0327   SODIUM 143 143 147*   POTASSIUM 4.3 3.8 3.6   CHLORIDE 116* 114* 119*   CO2 18* 19* 18*   GLUCOSE 266* 372* 405*   BUN 24* 28* 23*   CREATININE 0.82 0.73 0.53   CALCIUM 8.3* 8.9 8.9     Recent Labs     08/07/21  1458 08/09/21  0327   INR 2.11* 2.28*               Imaging  IR-MIDLINE CATHETER INSERTION WO GUIDANCE > AGE 3   Final Result                  Ultrasound-guided midline placement performed by qualified nursing staff    as above.          CT-EXTREMITY, LOWER WITH LEFT   Final Result      1.  No evidence of osteomyelitis.   2.  Soft tissue edema in the pannus and left thigh with skin thickening which can be seen in the setting of cellulitis.   3.  No abscess is identified.   4.  No soft tissue  air is noted.   5.  Free fluid is seen in the pelvis.      CT-ABDOMEN-PELVIS WITH   Final Result      1.  Again there are morphologic changes of the liver consistent with cirrhosis with associated splenomegaly.   2.  Portal vein and mesenteric vessels are grossly patent.   3.  There is a small amount of ascites with mesenteric edema and subcutaneous edema.   4.  There is no soft tissue abscess or intra-abdominal or pelvic abscess.   5.  There is colonic wall edema most likely related to the liver disease and hypoproteinemia.      US-ABDOMEN LTD (SOFT TISSUE)   Final Result      1.  Small volume of ascites in the right upper quadrant adjacent to the liver.      2.  Cirrhotic appearance of liver.      3.  No large volume of ascites.      DX-ABDOMEN FOR TUBE PLACEMENT   Final Result      Cortrak feeding tube tip projects in the region of the duodenal bulb.      CT-HEAD W/O   Final Result      No evidence of acute intracranial process.      OUTSIDE IMAGES-CT ABDOMEN /PELVIS   Final Result      OUTSIDE IMAGES-DX CHEST   Final Result      OUTSIDE IMAGES-CT ABDOMEN /PELVIS   Final Result           Assessment/Plan  * Abdominal panniculitis/cellulitis w/ concern for necrosis- (present on admission)  Assessment & Plan  CT AP at another facility colitis, panniculitis with no gas/abscess  Surgery f/u appreciated -- no indication for surgery at this time  ID is consulted. Pt is on zosyn and zyvox, Stop date 8/11/2021  Low threshold for CT imaging if septic shock/deteriorates  Follow blood culture  Wound care      Coagulopathy (HCC)  Assessment & Plan  INR 2.85  I have ordered FFP and IV vitamin K    Liver failure (HCC)  Assessment & Plan  Secondary to alcoholic cirrhosis   With coagulopathy, bleeding, hepatic encephalopathy, ascites    Hyperbilirubinemia  Assessment & Plan  Sec to ESLD  Total bilirubin 4. Her total bilirubin was 11 in June    Anxiety  Assessment & Plan  PRN atarax    Acute encephalopathy  Assessment &  Plan  Likely hepatic encephalopathy  Ordered CT head, unremarkable  Ammonia elevated, trending down   On lactulose and Rifaximin    End stage liver disease (HCC)  Assessment & Plan  MELD score: 28 on admission  Secondary to ETOH  Hepatitis panel neg  Poor prognosis    Severe protein-calorie malnutrition (HCC)  Assessment & Plan  Ensure    Failure to thrive in adult  Assessment & Plan  PT/OT, PO diet as able to tolerated  Palliative care consult    Anasarca  Assessment & Plan  Secondary to ESLD  Supportive care    Hypoglycemia  Assessment & Plan  Hypoglycemia protocol    Colitis  Assessment & Plan  As noted in panniculitis    Portal vein thrombosis- (present on admission)  Assessment & Plan  Suspect chronic PV throbosis -- in which case there is no clear benefit for anticoagulation -- case was previously noted by hematology (Dr. Montenegro)    Will avoid chronic anticoagulation due to ongoing bleeding    Class 3 severe obesity due to excess calories with serious comorbidity and body mass index (BMI) of 40.0 to 44.9 in adult (HCC)- (present on admission)  Assessment & Plan  Diet and lifestyle modifications    Sepsis (HCC)  Assessment & Plan  This is Sepsis Present on admission  SIRS criteria identified on my evaluation include: Leukocytosis, with WBC greater than 12,000  Source is pannus  Sepsis protocol initiated  Fluid resuscitation ordered per protocol  IV antibiotics as appropriate for source of sepsis  While organ dysfunction may be noted elsewhere in this problem list or in the chart, degree of organ dysfunction does not meet CMS criteria for severe sepsis    On IVF  Abx  Follow up lactic acid and blood culture          Hypothyroidism- (present on admission)  Assessment & Plan  TSH 7.2, elevated, but improved from TSH in April, free T4 in normal range  Synthroid    Alcoholic cirrhosis of liver without ascites (HCC)- (present on admission)  Assessment & Plan  Decompensated.   reports patient has not been  drinking since May  MELD score 28 on admission, 27-32% 90 day mortality rate  She has upcoming appointment with GI as outpatient      Type 2 diabetes mellitus with neurologic complication, without long-term current use of insulin (HCC)- (present on admission)  Assessment & Plan  Uncontrolled  Start on insulin sliding scale with serial Accu-Checks, nutritional and long acting insulin  Hypoglycemic protocol in place           VTE prophylaxis: SCDs/TEDs given elevated INR    I have performed a physical exam and reviewed and updated ROS and Plan today (8/9/2021). In review of yesterday's note (8/8/2021), there are no changes except as documented above.

## 2021-08-09 NOTE — CARE PLAN
The patient is Watcher - Medium risk of patient condition declining or worsening    Shift Goals  Clinical Goals: Follow labs and treat accordingly  Patient Goals: MICHI  Family Goals: Get better    Progress made toward(s) clinical / shift goals:  Patients WBC has decreased. BUN increased. Patient getting antibiotics. Not getting anymore oral pain medication because of cirrhosis.     Patient is not progressing towards the following goals:N/A      Problem: Skin Integrity  Goal: Skin integrity is maintained or improved  Outcome: Not Progressing  Note: Patient has open skin on buttocks. Added LDA with pictures. Covered in Visco paste.          Problem: Respiratory  Goal: Patient will achieve/maintain optimum respiratory ventilation and gas exchange  Outcome: Progressing  Flowsheets  Taken 8/9/2021 0054 by Trey Munoz R.N.  Deep Breathe and Cough: Performs Correctly  Taken 8/8/2021 2317 by Nallely James, C.N.A.  O2 Delivery Device: None - Room Air  Note: Patient has been sating in the high 90's on RA.

## 2021-08-09 NOTE — PROGRESS NOTES
2 RN skin check done with Trey RN, and Vineet RN        Head Jaundice  Ears Jaundice  Nose Jaundice  Mouth Bleeding, dry blood   Neck WDL  Breast/Chest Bruising, Discoloration, Jaundice and Edema  Shoulder Blades WDL  Spine WDL  (R) Arm/Elbow/Hand Redness, Blanching and Bruising  (L) Arm/Elbow/Hand Redness, Blanching and Bruising  Abdomen Redness, Blanching, Scab and Bruising, known wound, excoriation, necrotic tissue   Groin Redness, Blanching and Excoriation, hutson in place   Scrotum/Coccyx/Buttocks Redness, Blanching, Excoriation and Discoloration, Bleeding, sore BMS system in place  (R) Leg Bruising, Jaundice and Edema  (L) Leg Bruising, Jaundice and Edema  (R) Heel/Foot/Toe Blanching, Boggy and Edema  (L) Heel/Foot/Toe Blanching, Boggy and Edema              Devices In Places Blood Pressure Cuff and Pulse Ox, rectal tube, cortrak, hutson         Interventions In Place Heel Float Boots, Pillows, Q2 Turns, Barrier Cream, Dri-Bill Pads and low air loss mattress      Possible Skin Injury Yes, wound following     Pictures Uploaded Into Epic Yes  Wound Consult Placed Yes, wound following   RN Wound Prevention Protocol Ordered Yes

## 2021-08-09 NOTE — PROGRESS NOTES
Assumed care of patient @1915, beside report received from Anupam RN, Pt A&OX3 and 5/10 pain. Gave PRN atarax for anxiety because the 0.25mg of dilaudid doesn't help per patient. Bed locked an lowered with call light in reach and questions answered in present time.

## 2021-08-10 LAB
ALBUMIN SERPL BCP-MCNC: 2.1 G/DL (ref 3.2–4.9)
ALBUMIN/GLOB SERPL: 0.5 G/DL
ALP SERPL-CCNC: 115 U/L (ref 30–99)
ALT SERPL-CCNC: 28 U/L (ref 2–50)
ANION GAP SERPL CALC-SCNC: 11 MMOL/L (ref 7–16)
AST SERPL-CCNC: 39 U/L (ref 12–45)
BASOPHILS # BLD AUTO: 0.2 % (ref 0–1.8)
BASOPHILS # BLD: 0.03 K/UL (ref 0–0.12)
BILIRUB SERPL-MCNC: 4.7 MG/DL (ref 0.1–1.5)
BUN SERPL-MCNC: 19 MG/DL (ref 8–22)
CALCIUM SERPL-MCNC: 8.6 MG/DL (ref 8.5–10.5)
CHLORIDE SERPL-SCNC: 123 MMOL/L (ref 96–112)
CO2 SERPL-SCNC: 18 MMOL/L (ref 20–33)
CREAT SERPL-MCNC: 0.54 MG/DL (ref 0.5–1.4)
EOSINOPHIL # BLD AUTO: 0.26 K/UL (ref 0–0.51)
EOSINOPHIL NFR BLD: 1.7 % (ref 0–6.9)
ERYTHROCYTE [DISTWIDTH] IN BLOOD BY AUTOMATED COUNT: 67 FL (ref 35.9–50)
GLOBULIN SER CALC-MCNC: 4.5 G/DL (ref 1.9–3.5)
GLUCOSE BLD-MCNC: 231 MG/DL (ref 65–99)
GLUCOSE BLD-MCNC: 232 MG/DL (ref 65–99)
GLUCOSE BLD-MCNC: 250 MG/DL (ref 65–99)
GLUCOSE BLD-MCNC: 253 MG/DL (ref 65–99)
GLUCOSE SERPL-MCNC: 300 MG/DL (ref 65–99)
HCT VFR BLD AUTO: 22.6 % (ref 37–47)
HGB BLD-MCNC: 7.5 G/DL (ref 12–16)
IMM GRANULOCYTES # BLD AUTO: 0.15 K/UL (ref 0–0.11)
IMM GRANULOCYTES NFR BLD AUTO: 1 % (ref 0–0.9)
LYMPHOCYTES # BLD AUTO: 2.11 K/UL (ref 1–4.8)
LYMPHOCYTES NFR BLD: 14.2 % (ref 22–41)
MAGNESIUM SERPL-MCNC: 2.3 MG/DL (ref 1.5–2.5)
MCH RBC QN AUTO: 33.6 PG (ref 27–33)
MCHC RBC AUTO-ENTMCNC: 33.2 G/DL (ref 33.6–35)
MCV RBC AUTO: 101.3 FL (ref 81.4–97.8)
MONOCYTES # BLD AUTO: 1 K/UL (ref 0–0.85)
MONOCYTES NFR BLD AUTO: 6.7 % (ref 0–13.4)
NEUTROPHILS # BLD AUTO: 11.31 K/UL (ref 2–7.15)
NEUTROPHILS NFR BLD: 76.2 % (ref 44–72)
NRBC # BLD AUTO: 0.08 K/UL
NRBC BLD-RTO: 0.5 /100 WBC
PHOSPHATE SERPL-MCNC: 2.7 MG/DL (ref 2.5–4.5)
PLATELET # BLD AUTO: 91 K/UL (ref 164–446)
PMV BLD AUTO: 11.5 FL (ref 9–12.9)
POTASSIUM SERPL-SCNC: 3.4 MMOL/L (ref 3.6–5.5)
PROT SERPL-MCNC: 6.6 G/DL (ref 6–8.2)
RBC # BLD AUTO: 2.23 M/UL (ref 4.2–5.4)
SODIUM SERPL-SCNC: 152 MMOL/L (ref 135–145)
WBC # BLD AUTO: 14.9 K/UL (ref 4.8–10.8)

## 2021-08-10 PROCEDURE — 84100 ASSAY OF PHOSPHORUS: CPT

## 2021-08-10 PROCEDURE — 36415 COLL VENOUS BLD VENIPUNCTURE: CPT

## 2021-08-10 PROCEDURE — 700111 HCHG RX REV CODE 636 W/ 250 OVERRIDE (IP): Performed by: INTERNAL MEDICINE

## 2021-08-10 PROCEDURE — 700102 HCHG RX REV CODE 250 W/ 637 OVERRIDE(OP): Performed by: INTERNAL MEDICINE

## 2021-08-10 PROCEDURE — 80053 COMPREHEN METABOLIC PANEL: CPT

## 2021-08-10 PROCEDURE — A9270 NON-COVERED ITEM OR SERVICE: HCPCS | Performed by: INTERNAL MEDICINE

## 2021-08-10 PROCEDURE — A9270 NON-COVERED ITEM OR SERVICE: HCPCS | Performed by: STUDENT IN AN ORGANIZED HEALTH CARE EDUCATION/TRAINING PROGRAM

## 2021-08-10 PROCEDURE — 700105 HCHG RX REV CODE 258: Performed by: INTERNAL MEDICINE

## 2021-08-10 PROCEDURE — 97163 PT EVAL HIGH COMPLEX 45 MIN: CPT

## 2021-08-10 PROCEDURE — 770001 HCHG ROOM/CARE - MED/SURG/GYN PRIV*

## 2021-08-10 PROCEDURE — 92610 EVALUATE SWALLOWING FUNCTION: CPT

## 2021-08-10 PROCEDURE — 99232 SBSQ HOSP IP/OBS MODERATE 35: CPT | Performed by: INTERNAL MEDICINE

## 2021-08-10 PROCEDURE — 85025 COMPLETE CBC W/AUTO DIFF WBC: CPT

## 2021-08-10 PROCEDURE — 82962 GLUCOSE BLOOD TEST: CPT

## 2021-08-10 PROCEDURE — 700102 HCHG RX REV CODE 250 W/ 637 OVERRIDE(OP): Performed by: STUDENT IN AN ORGANIZED HEALTH CARE EDUCATION/TRAINING PROGRAM

## 2021-08-10 PROCEDURE — 83735 ASSAY OF MAGNESIUM: CPT

## 2021-08-10 RX ORDER — POTASSIUM CHLORIDE 20 MEQ/1
TABLET, EXTENDED RELEASE ORAL
Status: ACTIVE
Start: 2021-08-10 | End: 2021-08-11

## 2021-08-10 RX ORDER — INSULIN LISPRO 100 [IU]/ML
2-9 INJECTION, SOLUTION INTRAVENOUS; SUBCUTANEOUS
Status: DISCONTINUED | OUTPATIENT
Start: 2021-08-10 | End: 2021-08-11

## 2021-08-10 RX ORDER — POTASSIUM CHLORIDE 20 MEQ/1
40 TABLET, EXTENDED RELEASE ORAL ONCE
Status: COMPLETED | OUTPATIENT
Start: 2021-08-10 | End: 2021-08-10

## 2021-08-10 RX ORDER — INSULIN LISPRO 100 [IU]/ML
0.2 INJECTION, SOLUTION INTRAVENOUS; SUBCUTANEOUS
Status: DISCONTINUED | OUTPATIENT
Start: 2021-08-10 | End: 2021-08-11

## 2021-08-10 RX ORDER — DEXTROSE MONOHYDRATE 25 G/50ML
50 INJECTION, SOLUTION INTRAVENOUS
Status: DISCONTINUED | OUTPATIENT
Start: 2021-08-10 | End: 2021-08-11

## 2021-08-10 RX ORDER — LINEZOLID 600 MG/1
600 TABLET, FILM COATED ORAL EVERY 12 HOURS
Status: COMPLETED | OUTPATIENT
Start: 2021-08-10 | End: 2021-08-11

## 2021-08-10 RX ORDER — ONDANSETRON 2 MG/ML
4 INJECTION INTRAMUSCULAR; INTRAVENOUS EVERY 4 HOURS PRN
Status: DISCONTINUED | OUTPATIENT
Start: 2021-08-10 | End: 2021-08-15 | Stop reason: HOSPADM

## 2021-08-10 RX ADMIN — PIPERACILLIN AND TAZOBACTAM 4.5 G: 4; .5 INJECTION, POWDER, LYOPHILIZED, FOR SOLUTION INTRAVENOUS; PARENTERAL at 22:30

## 2021-08-10 RX ADMIN — CYANOCOBALAMIN TAB 500 MCG 2500 MCG: 500 TAB at 06:10

## 2021-08-10 RX ADMIN — Medication 100 MG: at 06:11

## 2021-08-10 RX ADMIN — Medication 1 CAPSULE: at 08:32

## 2021-08-10 RX ADMIN — POTASSIUM CHLORIDE 40 MEQ: 1500 TABLET, EXTENDED RELEASE ORAL at 13:12

## 2021-08-10 RX ADMIN — INSULIN LISPRO 3 UNITS: 100 INJECTION, SOLUTION INTRAVENOUS; SUBCUTANEOUS at 12:58

## 2021-08-10 RX ADMIN — LINEZOLID 600 MG: 600 INJECTION, SOLUTION INTRAVENOUS at 06:10

## 2021-08-10 RX ADMIN — LINEZOLID 600 MG: 600 TABLET, FILM COATED ORAL at 18:42

## 2021-08-10 RX ADMIN — LEVOTHYROXINE SODIUM 50 MCG: 0.05 TABLET ORAL at 06:11

## 2021-08-10 RX ADMIN — INSULIN LISPRO 3 UNITS: 100 INJECTION, SOLUTION INTRAVENOUS; SUBCUTANEOUS at 22:35

## 2021-08-10 RX ADMIN — MIDODRINE HYDROCHLORIDE 10 MG: 5 TABLET ORAL at 08:32

## 2021-08-10 RX ADMIN — RIFAXIMIN 550 MG: 550 TABLET ORAL at 18:42

## 2021-08-10 RX ADMIN — ONDANSETRON 4 MG: 2 INJECTION INTRAMUSCULAR; INTRAVENOUS at 15:55

## 2021-08-10 RX ADMIN — PIPERACILLIN AND TAZOBACTAM 4.5 G: 4; .5 INJECTION, POWDER, LYOPHILIZED, FOR SOLUTION INTRAVENOUS; PARENTERAL at 13:12

## 2021-08-10 RX ADMIN — LACTULOSE 45 ML: 20 SOLUTION ORAL at 13:11

## 2021-08-10 RX ADMIN — HYDROMORPHONE HYDROCHLORIDE 0.25 MG: 1 INJECTION, SOLUTION INTRAMUSCULAR; INTRAVENOUS; SUBCUTANEOUS at 00:37

## 2021-08-10 RX ADMIN — HYDROMORPHONE HYDROCHLORIDE 0.25 MG: 1 INJECTION, SOLUTION INTRAMUSCULAR; INTRAVENOUS; SUBCUTANEOUS at 06:15

## 2021-08-10 RX ADMIN — INSULIN LISPRO 5 UNITS: 100 INJECTION, SOLUTION INTRAVENOUS; SUBCUTANEOUS at 08:29

## 2021-08-10 RX ADMIN — RIFAXIMIN 550 MG: 550 TABLET ORAL at 06:10

## 2021-08-10 RX ADMIN — LACTULOSE 45 ML: 20 SOLUTION ORAL at 22:28

## 2021-08-10 RX ADMIN — Medication 1000 UNITS: at 06:10

## 2021-08-10 RX ADMIN — FOLIC ACID 1 MG: 1 TABLET ORAL at 06:10

## 2021-08-10 RX ADMIN — INSULIN LISPRO 3 UNITS: 100 INJECTION, SOLUTION INTRAVENOUS; SUBCUTANEOUS at 18:40

## 2021-08-10 RX ADMIN — PIPERACILLIN AND TAZOBACTAM 4.5 G: 4; .5 INJECTION, POWDER, LYOPHILIZED, FOR SOLUTION INTRAVENOUS; PARENTERAL at 06:15

## 2021-08-10 RX ADMIN — INSULIN LISPRO 8 UNITS: 100 INJECTION, SOLUTION INTRAVENOUS; SUBCUTANEOUS at 12:58

## 2021-08-10 RX ADMIN — INSULIN LISPRO 8 UNITS: 100 INJECTION, SOLUTION INTRAVENOUS; SUBCUTANEOUS at 18:40

## 2021-08-10 RX ADMIN — MIDODRINE HYDROCHLORIDE 10 MG: 5 TABLET ORAL at 13:12

## 2021-08-10 RX ADMIN — LACTULOSE 45 ML: 20 SOLUTION ORAL at 08:32

## 2021-08-10 RX ADMIN — THERA TABS 1 TABLET: TAB at 06:10

## 2021-08-10 RX ADMIN — INSULIN LISPRO 8 UNITS: 100 INJECTION, SOLUTION INTRAVENOUS; SUBCUTANEOUS at 08:25

## 2021-08-10 ASSESSMENT — ENCOUNTER SYMPTOMS
DEPRESSION: 0
SENSORY CHANGE: 0
HEMOPTYSIS: 0
PHOTOPHOBIA: 0
WHEEZING: 0
SPEECH CHANGE: 0
BACK PAIN: 0
BLURRED VISION: 0
SHORTNESS OF BREATH: 0
VOMITING: 1
COUGH: 0
WEAKNESS: 0
FEVER: 0
SORE THROAT: 0
BRUISES/BLEEDS EASILY: 0
ORTHOPNEA: 0
NAUSEA: 1
MYALGIAS: 0
ABDOMINAL PAIN: 0
PALPITATIONS: 0
CLAUDICATION: 0
FLANK PAIN: 0
HEARTBURN: 0
HEADACHES: 0
CHILLS: 0
SPUTUM PRODUCTION: 0
BLOOD IN STOOL: 0
DIZZINESS: 0
DOUBLE VISION: 0
EYE DISCHARGE: 0
DIARRHEA: 1

## 2021-08-10 ASSESSMENT — COGNITIVE AND FUNCTIONAL STATUS - GENERAL
CLIMB 3 TO 5 STEPS WITH RAILING: TOTAL
MOVING TO AND FROM BED TO CHAIR: UNABLE
TURNING FROM BACK TO SIDE WHILE IN FLAT BAD: A LOT
WALKING IN HOSPITAL ROOM: TOTAL
SUGGESTED CMS G CODE MODIFIER MOBILITY: CM
STANDING UP FROM CHAIR USING ARMS: TOTAL
MOBILITY SCORE: 7
MOVING FROM LYING ON BACK TO SITTING ON SIDE OF FLAT BED: UNABLE

## 2021-08-10 ASSESSMENT — GAIT ASSESSMENTS: GAIT LEVEL OF ASSIST: UNABLE TO PARTICIPATE

## 2021-08-10 ASSESSMENT — PAIN SCALES - WONG BAKER: WONGBAKER_NUMERICALRESPONSE: DOESN'T HURT AT ALL

## 2021-08-10 NOTE — PROGRESS NOTES
"Hospital Medicine Daily Progress Note    Date of Service  8/10/2021    Chief Complaint  Hien Alfaro is a 41 y.o. female admitted 8/1/2021 with abdominal wall cellulitis    Hospital Course    Per admitting provider:  \"Hien Alfaro is a 41 y.o. female who presented 8/1/2021 with Wound Check (Pt reports redness in lower abd started ~2 weeks ago, now presenting with blackened open wounds across lower abd to b/l groin. )  She is morbidly obese. She has a history of alcoholic cirrhosis, portal vein thrombosis NOT on anticoagulation (seen and decided by Dr. Montenegro, [facility]) and was hospitalized in June for decompensated alcoholic cirrhosis with hyperammonemia, bilirubinemia and hepatorenal syndrome with poor outpatient follow-up. She was placed on antibiotics for her pannus infections. Despite antibiotics, she noticed black open wounds along her abdomen to bilateral groin and also a lump/mass that started 2 weeks now RLQ region.\"      CT AP at another facility noted colitis and panniculitis, no gas or abscess noted. Transferred to [facility] ER for higher level of care, admitted to medicine service, surgery was consulted, no surgery indicated, cont abx.     Interval Problem Update    8/10/2021: The patient was admitted for further evaluation and management of abdominal wall cellulitis.  She was evaluated by ID with current recommendations to complete antibiotic therapy for 10 days.  Stop date is 8/11/2021.  On physical examination patient without any worsening erythema or tenderness to the affected areas.  She was evaluated by surgery who states that there is no surgical intervention needed at this time.  Patient is more awake this morning and is alert and oriented to time place and person.  We are pending her ability to tolerate oral intake prior to discontinuing NGT.  Pending reevaluation by PT/OT to determine disposition needs.  Patient did have some nausea and vomiting during afternoon meal however she denies any chest " pains, palpitations, shortness of breath.  No other overnight events reported.      I have personally seen and examined the patient at bedside. I discussed the plan of care with patient.    Consultants/Specialty  infectious disease   General surgery    Code Status  Full Code    Disposition  Patient is not medically cleared.   Anticipate discharge to to home with close outpatient follow-up.  I have placed the appropriate orders for post-discharge needs.    Review of Systems  Review of Systems   Constitutional: Negative for chills, fever and malaise/fatigue.   HENT: Negative for congestion, hearing loss, sore throat and tinnitus.    Eyes: Negative for blurred vision, double vision, photophobia and discharge.   Respiratory: Negative for cough, hemoptysis, sputum production, shortness of breath and wheezing.    Cardiovascular: Negative for chest pain, palpitations, orthopnea, claudication and leg swelling.   Gastrointestinal: Positive for diarrhea, nausea and vomiting. Negative for abdominal pain, blood in stool, heartburn and melena.   Genitourinary: Negative for dysuria, flank pain, hematuria and urgency.   Musculoskeletal: Negative for back pain, joint pain and myalgias.   Skin: Negative for itching and rash.   Neurological: Negative for dizziness, sensory change, speech change, weakness and headaches.   Endo/Heme/Allergies: Does not bruise/bleed easily.   Psychiatric/Behavioral: Negative for depression and suicidal ideas.        Physical Exam  Temp:  [36.4 °C (97.6 °F)-37.1 °C (98.8 °F)] 37.1 °C (98.8 °F)  Pulse:  [] 100  Resp:  [18-19] 18  BP: (121-132)/(68-84) 121/77  SpO2:  [94 %-100 %] 100 %    Physical Exam  Vitals and nursing note reviewed.   Constitutional:       General: She is not in acute distress.     Appearance: Normal appearance. She is obese.   HENT:      Head: Normocephalic and atraumatic.      Mouth/Throat:      Mouth: Mucous membranes are moist.   Eyes:      Extraocular Movements: Extraocular  movements intact.      Conjunctiva/sclera: Conjunctivae normal.      Pupils: Pupils are equal, round, and reactive to light.   Cardiovascular:      Rate and Rhythm: Normal rate and regular rhythm.      Heart sounds: No murmur heard.   No friction rub.   Pulmonary:      Effort: Pulmonary effort is normal. No respiratory distress.      Breath sounds: Normal breath sounds. No wheezing.   Abdominal:      General: Abdomen is flat. Bowel sounds are normal.      Palpations: Abdomen is soft.      Tenderness: There is no abdominal tenderness. There is no guarding.   Musculoskeletal:         General: No swelling or tenderness. Normal range of motion.      Cervical back: Normal range of motion and neck supple.   Skin:     General: Skin is warm and dry.      Findings: No rash.      Comments: Abdominal wall with several areas of induration however no associated erythema or purulent drainage. Areas of black scar tissue.    Neurological:      General: No focal deficit present.      Mental Status: She is alert and oriented to person, place, and time.      Cranial Nerves: No cranial nerve deficit.      Motor: No weakness.   Psychiatric:         Mood and Affect: Mood normal.         Behavior: Behavior normal.         Fluids    Intake/Output Summary (Last 24 hours) at 8/10/2021 1543  Last data filed at 8/10/2021 0401  Gross per 24 hour   Intake 630 ml   Output 1200 ml   Net -570 ml       Laboratory  Recent Labs     08/08/21  0316 08/09/21  0327 08/10/21  0045   WBC 13.4* 13.9* 14.9*   RBC 2.44* 2.24* 2.23*   HEMOGLOBIN 8.2* 7.4* 7.5*   HEMATOCRIT 25.2* 21.9* 22.6*   .3* 97.8 101.3*   MCH 33.6* 33.0 33.6*   MCHC 32.5* 33.8 33.2*   RDW 72.0* 62.1* 67.0*   PLATELETCT 105* 90* 91*   MPV 11.1 11.7 11.5     Recent Labs     08/08/21 0316 08/09/21  0327 08/10/21  0045   SODIUM 143 147* 152*   POTASSIUM 3.8 3.6 3.4*   CHLORIDE 114* 119* 123*   CO2 19* 18* 18*   GLUCOSE 372* 405* 300*   BUN 28* 23* 19   CREATININE 0.73 0.53 0.54    CALCIUM 8.9 8.9 8.6     Recent Labs     08/09/21  0327   INR 2.28*               Imaging  IR-MIDLINE CATHETER INSERTION WO GUIDANCE > AGE 3   Final Result                  Ultrasound-guided midline placement performed by qualified nursing staff    as above.          CT-EXTREMITY, LOWER WITH LEFT   Final Result      1.  No evidence of osteomyelitis.   2.  Soft tissue edema in the pannus and left thigh with skin thickening which can be seen in the setting of cellulitis.   3.  No abscess is identified.   4.  No soft tissue air is noted.   5.  Free fluid is seen in the pelvis.      CT-ABDOMEN-PELVIS WITH   Final Result      1.  Again there are morphologic changes of the liver consistent with cirrhosis with associated splenomegaly.   2.  Portal vein and mesenteric vessels are grossly patent.   3.  There is a small amount of ascites with mesenteric edema and subcutaneous edema.   4.  There is no soft tissue abscess or intra-abdominal or pelvic abscess.   5.  There is colonic wall edema most likely related to the liver disease and hypoproteinemia.      US-ABDOMEN LTD (SOFT TISSUE)   Final Result      1.  Small volume of ascites in the right upper quadrant adjacent to the liver.      2.  Cirrhotic appearance of liver.      3.  No large volume of ascites.      DX-ABDOMEN FOR TUBE PLACEMENT   Final Result      Cortrak feeding tube tip projects in the region of the duodenal bulb.      CT-HEAD W/O   Final Result      No evidence of acute intracranial process.      OUTSIDE IMAGES-CT ABDOMEN /PELVIS   Final Result      OUTSIDE IMAGES-DX CHEST   Final Result      OUTSIDE IMAGES-CT ABDOMEN /PELVIS   Final Result           Assessment/Plan  * Abdominal panniculitis/cellulitis w/ concern for necrosis- (present on admission)  Assessment & Plan  CT AP at another facility colitis, panniculitis with no gas/abscess  Surgery f/u appreciated -- no indication for surgery at this time  ID is consulted. Pt is on zosyn and zyvox, Stop date  8/11/2021  Low threshold for CT imaging if septic shock/deteriorates  Follow blood culture  Wound care      Coagulopathy (HCC)  Assessment & Plan  INR 2.85  I have ordered FFP and IV vitamin K    Liver failure (HCC)  Assessment & Plan  Secondary to alcoholic cirrhosis   With coagulopathy, bleeding, hepatic encephalopathy, ascites    Hyperbilirubinemia  Assessment & Plan  Sec to ESLD  Total bilirubin 4. Her total bilirubin was 11 in June    Anxiety  Assessment & Plan  PRN atarax    Acute encephalopathy  Assessment & Plan  Likely hepatic encephalopathy  Ordered CT head, unremarkable  Ammonia elevated, trending down   On lactulose and Rifaximin    End stage liver disease (HCC)  Assessment & Plan  MELD score: 28 on admission  Secondary to ETOH  Hepatitis panel neg  Poor prognosis    Severe protein-calorie malnutrition (HCC)  Assessment & Plan  Ensure    Failure to thrive in adult  Assessment & Plan  PT/OT, PO diet as able to tolerated  Palliative care consult    Anasarca  Assessment & Plan  Secondary to ESLD  Supportive care    Hypoglycemia  Assessment & Plan  Hypoglycemia protocol    Colitis  Assessment & Plan  As noted in panniculitis    Portal vein thrombosis- (present on admission)  Assessment & Plan  Suspect chronic PV throbosis -- in which case there is no clear benefit for anticoagulation -- case was previously noted by hematology (Dr. Montenegro)    Will avoid chronic anticoagulation due to ongoing bleeding    Class 3 severe obesity due to excess calories with serious comorbidity and body mass index (BMI) of 40.0 to 44.9 in adult (HCC)- (present on admission)  Assessment & Plan  Diet and lifestyle modifications    Sepsis (HCC)  Assessment & Plan  This is Sepsis Present on admission  SIRS criteria identified on my evaluation include: Leukocytosis, with WBC greater than 12,000  Source is pannus  Sepsis protocol initiated  Fluid resuscitation ordered per protocol  IV antibiotics as appropriate for source of  sepsis  While organ dysfunction may be noted elsewhere in this problem list or in the chart, degree of organ dysfunction does not meet CMS criteria for severe sepsis    On IVF  Abx  Follow up lactic acid and blood culture          Hypothyroidism- (present on admission)  Assessment & Plan  TSH 7.2, elevated, but improved from TSH in April, free T4 in normal range  Synthroid    Alcoholic cirrhosis of liver without ascites (HCC)- (present on admission)  Assessment & Plan  Decompensated.   reports patient has not been drinking since May  MELD score 28 on admission, 27-32% 90 day mortality rate  She has upcoming appointment with GI as outpatient      Type 2 diabetes mellitus with neurologic complication, without long-term current use of insulin (HCC)- (present on admission)  Assessment & Plan  Uncontrolled  Start on insulin sliding scale with serial Accu-Checks, nutritional and long acting insulin  Hypoglycemic protocol in place             VTE prophylaxis: SCDs/TEDs    I have performed a physical exam and reviewed and updated ROS and Plan today (8/10/2021). In review of yesterday's note (8/9/2021), there are no changes except as documented above.

## 2021-08-10 NOTE — THERAPY
"Speech Language Pathology   Initial Assessment     Patient Name: Hien Alfaro  AGE:  41 y.o., SEX:  female  Medical Record #: [id number]  Today's Date: 8/10/2021     Precautions  Precautions: Fall Risk, Swallow Precautions ( See Comments)  Comments:     Assessment    Patient is 42 y/o F admitted 8/1/21 with blackened open wounds across lower abd to b/l groin, colitis, panniculitis, hepatic encephalopathy. PMHx includes alcoholic cirrhosis, portal vein thrombosis, morbid obesity. CTH 8/3: \"No evidence of acute intracranial process.\" No CXR on file.     Clinical swallow evaluation was completed at bedside. Pt was A&Ox3, cooperative. Pt denies a hx of dysphagia. Oral mech exam was notable for reduced lingual ROM and strength bilaterally. Pt was presented with ice chips, thins via tsp/cup, liquidized, pureed, soft/bite sized, and regular solids. Pt needed assistance w/ self-feeding due to BUE weakness. Clinical signs of oropharyngeal dysphagia include throat clearing following serial sips of thins; otherwise swallow function was intact and safe for a regular diet with single cup sips of thins. Recommend initiating a diet of regular solids and thin liquids with tray set up A and monitoring during meals; pt may need A w/ self-feeding due to BUE weakness. Pills whole one at a time w/ thins. Please wait to remove Cortrak until pt tolerates 1-2 full meals without s/sx of aspiration. SLP will f/u to ensure diet tolerance.        Plan    Recommend Speech Therapy 2 times per week until therapy goals are met for the following treatments:  Dysphagia Training and Patient / Family / Caregiver Education.    Discharge Recommendations: Anticipate that the patient will have no further speech therapy needs after discharge from the hospital       Objective       08/10/21 0845   Prior Level Of Function   Communication Within Functional Limits   Swallow Within Functional Limits   Dentition Intact   Dentures None   Oral Motor Eval    Is " Patient Able to Complete Oral Motor Eval Yes but Impaired   Labial Function   Labial Structure At Rest Within Functional Limits   Labial Vowel Production / I /, / U / Within Functional Limits   Labial Sequence / I /, / U / Within Functional Limits   Frown, Pucker Within Functional Limits   Lingual Function   Lingual Structure At Rest Within Functional Limits   Lingual Protrude Minimal   Lingual Retract Minimal   Elevate In Mouth Minimal   Elevate Outside Mouth Minimal   Lateralization Minimal Right;Minimal Left  (reduced ROM/strength)   Lick Lips (Circular)   (pt reports she is unable to do this)   / Pa / 5X's Within Functional Limits   / Ta / 5X's Within Functional Limits   / Ka / 5X's Within Functional Limits   / [place] / 5X's Within Functional Limits   Jaw   Jaw Structure At Rest Within Functional Limits   Bite ([place]) Within Functional Limits   Chew ([place]) Within Functional Limits   Velar Function   Velar Structure At Rest Within Functional Limits   Laryngeal Function   Voice Quality Within Functional Limits   Volutional Cough Within Functional Limits   Excursion Upon Swallow   (unable to palpate d/t body habitus)   Oral Food Presentation   Ice Chips Within Functional Limits   Single Swallow Thin (0) Within Functional Limits   Serial Swallow Thin (0) Minimal  (throat clear x1 following serial sips)   Liquidised (3) Within Functional Limits   Pureed (4) Within Functional Limits   Soft & Bite-Sized (6) - (Dysphagia III) Within Functional Limits   Regular (7) Within Functional Limits   Self Feeding Needs Assistance   Dysphagia Strategies / Recommendations   Compensatory Strategies Monitor During Meals;Assistance Needed for Meal Tray Set-up;Head of Bed 90 Degrees During Eating / Drinking;Single Sips / Bites   Diet / Liquid Recommendation Regular (7);Thin (0)   Medication Administration  Whole with Liquid Wash   Therapy Interventions Dysphagia Therapy By Speech Language Pathologist   Patient / Family Goals  "  Patient / Family Goal #1 \"Ice chips.\"   Short Term Goals   Short Term Goal # 1 Patient will consume meals of regular solids and thin liquids with no s/sx of aspiration given monitoring during meals.         "

## 2021-08-10 NOTE — DIETARY
NUTRITION SERVICES: UPDATE  RD visualized TF off this morning when visiting pt's room. RN reports pt's diet was advanced to regular per SLP this morning. RN agreeable to hold TF for 24-48 hrs to stimulate appetite and document PO intake in ADL flow sheet for RD to assess if pt's PO intake is adequate. No PO intake per chart review documented at this time.    Potassium is 3.4 mmol/L. MD texted to suggest replacing.    RD following.

## 2021-08-10 NOTE — PROGRESS NOTES
Assumed care of patient @1915, beside report received from [place] RN, Pt A&OX3 disoriented to time and is in 4/10 pain in her inner thighs. Repositioned patient to relieve the pain. Bed locked an lowered with call light in reach and questions answered in present time.

## 2021-08-10 NOTE — THERAPY
Physical Therapy   Initial Evaluation     Patient Name: Hien Alfaro  Age:  41 y.o., Sex:  female  Medical Record #: [id number]  Today's Date: 8/10/2021     Precautions: Fall Risk, Swallow Precautions ( See Comments)    Assessment  Patient is 41 y.o. female with abdominal panniculitis/ cellulitis, colitis, portal vein thrombosis, liver cirrhosis, and sepsis. Pt was found supine in bed and was receptive to PT. 2 therapy staff members were used for safety. Pt was brought EOB using total assist. Once EOB pt was able to help support herself but required posterior support from therapist and had onset of dizziness. Strength testing was performed and it was determined that standing was not safe based on pt's tolerance of the session and weakness. Pt's rectal tube then began leaking so she was mobilized with total assist back to supine. Pt was cleaned and nursing was notified of the leak. This pt requires extensive re-strengthening of the LE's and increase of activity tolerance. Recommend continuing acute PT until pt is medically cleared to move to post-acute care.        Plan    Recommend Physical Therapy 3 times per week until therapy goals are met for the following treatments:  Bed Mobility, Gait Training, Neuro Re-Education / Balance, Stair Training, Therapeutic Activities and Therapeutic Exercises    DC Equipment Recommendations: Unable to determine at this time  Discharge Recommendations: Recommend post-acute placement for additional physical therapy services prior to discharge home      Objective       08/10/21 1156   Pain 0 - 10 Group   Therapist Pain Assessment Prior to Activity;0;During Activity;6;Nurse Notified  (edema leg pain and abdominal wound pain )   Prior Living Situation   Prior Services None   Housing / Facility 1 Story Apartment / Condo   Steps Into Home 2   Steps In Home 0   Equipment Owned None   Lives with - Patient's Self Care Capacity Child Less than 18 Years of Age;Spouse   Prior Level of Functional  Mobility   Bed Mobility Independent   Transfer Status Independent   Ambulation Independent   Distance Ambulation (Feet) 150   Assistive Devices Used None   Stairs Independent   History of Falls   History of Falls No   Cognition    Cognition / Consciousness WDL   Level of Consciousness Alert   Passive ROM Lower Body   Passive ROM Lower Body WDL   Active ROM Lower Body    Active ROM Lower Body  WDL   Strength Lower Body   Lower Body Strength  X   Gross Strength Generalized Weakness, Equal Bilaterally   Comments quad ext 4/5, trace in hips   Sensation Lower Body   Lower Extremity Sensation   X   Rt Lower Extremity Light Touch Absent   Comments L4-S1   Lower Body Muscle Tone   Lower Body Muscle Tone  WDL   Coordination Lower Body    Coordination Lower Body  WDL   Balance Assessment   Sitting Balance (Static) Poor +   Sitting Balance (Dynamic) Poor -   Weight Shift Sitting Absent   Gait Analysis   Gait Level Of Assist Unable to Participate   Bed Mobility    Supine to Sit Total Assist   Sit to Supine Total Assist   Scooting Total Assist   Rolling Unable to Participate   Functional Mobility   Sit to Stand Unable to Participate   Bed, Chair, Wheelchair Transfer Unable to Participate   Activity Tolerance   Sitting Edge of Bed 5   Short Term Goals    Short Term Goal # 1 Pt will be able to perform bed mobility with min A within 6 visits to increase functional ability.   Short Term Goal # 2 Pt will be able to perform functional transfers with mod A within 6 visits to increase functional ability.    Short Term Goal # 3 Pt will be able to ambulate 10ft with a FWW and max A within 6 visits to increase mobility.    Short Term Goal # 4 Pt will be able to go up 2 steps with max A and using B handrails within 6 visits in order to dacilitate return to home.

## 2021-08-10 NOTE — DISCHARGE PLANNING
Anticipated Discharge Disposition: TBD    Action: Patient discussed in IDT rounds. Patient seems to be improving. Patient have PT/OT re-eval. May not need placement. Patient has [employer] as payor source which may be a barrier if post acute services are needed.    Barriers to Discharge: TBD    Plan: LSW to follow for dc recommendations.    Care Transition Team Assessment  In case of emergency contact legal NOK spouse @ [phone].     Patient [facility]. Patient has [employer] as payor. Chart review indicates that patient may also have medi-tulio coverage. LSW sent email to [employer] requesting medi-[employer] coverage be added to facesheet. Patient has good support from spouse. Patient has a history of alcohol use and reportedly stopped drinking 6 months ago.     Information Source  Orientation Level: Unable to assess  Information Given By: Other (Comments)  Informant's Name: chart review  Who is responsible for making decisions for patient? : Patient    Readmission Evaluation  Is this a readmission?: No    Elopement Risk  Legal Hold: No  Ambulatory or Self Mobile in Wheelchair: No-Not an Elopement Risk  Disoriented: No  Psychiatric Symptoms: None  History of Wandering: No  Elopement this Admit: No  Vocalizing Wanting to Leave: No  Displays Behaviors, Body Language Wanting to Leave: No-Not at Risk for Elopement  Elopement Risk: Not at Risk for Elopement    Interdisciplinary Discharge Planning  Lives with - Patient's Self Care Capacity: Spouse, Child Less than 18 Years of Age  Patient or legal guardian wants to designate a caregiver: No  Housing / Facility: [address]  Prior Services: None  Durable Medical Equipment: Not Applicable    Discharge Preparedness  What is your plan after discharge?: Uncertain - pending medical team collaboration  What are your discharge supports?: Spouse  Prior Functional Level: Ambulatory, Independent with Activities of Daily Living, Independent with Medication Management  Difficulity with  ADLs: None  Difficulity with IADLs: None    Functional Assesment  Prior Functional Level: Ambulatory, Independent with Activities of Daily Living, Independent with Medication Management    Finances  Financial Barriers to Discharge: No  Prescription Coverage: Yes    Vision / Hearing Impairment  Vision Impairment : No  Right Eye Vision: Impaired, Wears Glasses  Left Eye Vision: Impaired, Wears Glasses  Hearing Impairment : No         Advance Directive  Advance Directive?: None    Domestic Abuse  Have you ever been the victim of abuse or violence?: No  Is this happening now?: No  Has the violence increased in frequency and severity?: No  Are you afraid to go home today?: No  Did you have pets at the time of Abuse?: No  Do you know Where to get Help?: No  Physical Abuse or Sexual Abuse: No  Verbal Abuse or Emotional Abuse: No  Possible Abuse/Neglect Reported to:: Not Applicable    Psychological Assessment  History of Substance Abuse: Alcohol  Date Last Used - Alcohol: 2/1/21  Substance Abuse Comments: last used 6 months ago per chart review  History of Psychiatric Problems: No  Non-compliant with Treatment: No  Newly Diagnosed Illness: No    Discharge Risks or Barriers  Discharge risks or barriers?: No    Anticipated Discharge Information  Discharge Disposition: Still a Patient (30)

## 2021-08-10 NOTE — CARE PLAN
The patient is Watcher - Medium risk of patient condition declining or worsening    Shift Goals  Clinical Goals: Follow labs and treat accordingly  Patient Goals: MICHI  Family Goals: Get better    Progress made toward(s) clinical / shift goals:  Patient treatment alines with lab values. Patient has remained free from falls.     Patient is not progressing towards the following goals:      Problem: Pain - Standard  Goal: Alleviation of pain or a reduction in pain to the patient’s comfort goal  Outcome: Progressing  Flowsheets  Taken 8/9/2021 2333  Monroy-Rebolledo Scale: 2   OB Pain Level: 1-Coping  OB Pain Intervention: Rest  Taken 8/9/2021 1900  Non Verbal Scale: Calm  Pain Rating Scale (NPRS): 4  Note: Patients pain has been managed with rest and repositioning.      Problem: Knowledge Deficit - Standard  Goal: Patient and family/care givers will demonstrate understanding of plan of care, disease process/condition, diagnostic tests and medications  Outcome: Progressing  Note: Patient up to date on POC. Patient now medical and has been accepted to go to med/surg unit in next 24-48 hours.

## 2021-08-11 LAB
ANION GAP SERPL CALC-SCNC: 12 MMOL/L (ref 7–16)
BUN SERPL-MCNC: 16 MG/DL (ref 8–22)
CALCIUM SERPL-MCNC: 8.5 MG/DL (ref 8.5–10.5)
CHLORIDE SERPL-SCNC: 113 MMOL/L (ref 96–112)
CO2 SERPL-SCNC: 16 MMOL/L (ref 20–33)
CREAT SERPL-MCNC: 0.49 MG/DL (ref 0.5–1.4)
ERYTHROCYTE [DISTWIDTH] IN BLOOD BY AUTOMATED COUNT: 68.4 FL (ref 35.9–50)
GLUCOSE BLD-MCNC: 186 MG/DL (ref 65–99)
GLUCOSE BLD-MCNC: 207 MG/DL (ref 65–99)
GLUCOSE BLD-MCNC: 221 MG/DL (ref 65–99)
GLUCOSE BLD-MCNC: 222 MG/DL (ref 65–99)
GLUCOSE SERPL-MCNC: 254 MG/DL (ref 65–99)
HCT VFR BLD AUTO: 22.3 % (ref 37–47)
HGB BLD-MCNC: 7.3 G/DL (ref 12–16)
MAGNESIUM SERPL-MCNC: 2.3 MG/DL (ref 1.5–2.5)
MCH RBC QN AUTO: 33.8 PG (ref 27–33)
MCHC RBC AUTO-ENTMCNC: 32.7 G/DL (ref 33.6–35)
MCV RBC AUTO: 103.2 FL (ref 81.4–97.8)
PHOSPHATE SERPL-MCNC: 2.4 MG/DL (ref 2.5–4.5)
PLATELET # BLD AUTO: 92 K/UL (ref 164–446)
PMV BLD AUTO: 11.7 FL (ref 9–12.9)
POTASSIUM SERPL-SCNC: 3.8 MMOL/L (ref 3.6–5.5)
RBC # BLD AUTO: 2.16 M/UL (ref 4.2–5.4)
SODIUM SERPL-SCNC: 141 MMOL/L (ref 135–145)
WBC # BLD AUTO: 15.3 K/UL (ref 4.8–10.8)

## 2021-08-11 PROCEDURE — 85027 COMPLETE CBC AUTOMATED: CPT

## 2021-08-11 PROCEDURE — 80048 BASIC METABOLIC PNL TOTAL CA: CPT

## 2021-08-11 PROCEDURE — A9270 NON-COVERED ITEM OR SERVICE: HCPCS | Performed by: INTERNAL MEDICINE

## 2021-08-11 PROCEDURE — 770001 HCHG ROOM/CARE - MED/SURG/GYN PRIV*

## 2021-08-11 PROCEDURE — A9270 NON-COVERED ITEM OR SERVICE: HCPCS | Performed by: STUDENT IN AN ORGANIZED HEALTH CARE EDUCATION/TRAINING PROGRAM

## 2021-08-11 PROCEDURE — 700102 HCHG RX REV CODE 250 W/ 637 OVERRIDE(OP): Performed by: STUDENT IN AN ORGANIZED HEALTH CARE EDUCATION/TRAINING PROGRAM

## 2021-08-11 PROCEDURE — 97535 SELF CARE MNGMENT TRAINING: CPT | Mod: CO

## 2021-08-11 PROCEDURE — 84100 ASSAY OF PHOSPHORUS: CPT

## 2021-08-11 PROCEDURE — 700111 HCHG RX REV CODE 636 W/ 250 OVERRIDE (IP): Performed by: INTERNAL MEDICINE

## 2021-08-11 PROCEDURE — 82962 GLUCOSE BLOOD TEST: CPT | Mod: 91

## 2021-08-11 PROCEDURE — 36415 COLL VENOUS BLD VENIPUNCTURE: CPT

## 2021-08-11 PROCEDURE — 83735 ASSAY OF MAGNESIUM: CPT

## 2021-08-11 PROCEDURE — 99232 SBSQ HOSP IP/OBS MODERATE 35: CPT | Performed by: INTERNAL MEDICINE

## 2021-08-11 PROCEDURE — 700102 HCHG RX REV CODE 250 W/ 637 OVERRIDE(OP): Performed by: INTERNAL MEDICINE

## 2021-08-11 PROCEDURE — 700105 HCHG RX REV CODE 258: Performed by: INTERNAL MEDICINE

## 2021-08-11 RX ORDER — INSULIN LISPRO 100 [IU]/ML
2-9 INJECTION, SOLUTION INTRAVENOUS; SUBCUTANEOUS
Status: DISCONTINUED | OUTPATIENT
Start: 2021-08-11 | End: 2021-08-15 | Stop reason: HOSPADM

## 2021-08-11 RX ORDER — LACTOBACILLUS RHAMNOSUS GG 10B CELL
1 CAPSULE ORAL
Status: DISCONTINUED | OUTPATIENT
Start: 2021-08-12 | End: 2021-08-15 | Stop reason: HOSPADM

## 2021-08-11 RX ORDER — DEXTROSE MONOHYDRATE 25 G/50ML
50 INJECTION, SOLUTION INTRAVENOUS
Status: DISCONTINUED | OUTPATIENT
Start: 2021-08-11 | End: 2021-08-15 | Stop reason: HOSPADM

## 2021-08-11 RX ORDER — LACTULOSE 20 G/30ML
45 SOLUTION ORAL 4 TIMES DAILY
Status: DISCONTINUED | OUTPATIENT
Start: 2021-08-11 | End: 2021-08-15

## 2021-08-11 RX ORDER — INSULIN LISPRO 100 [IU]/ML
0.2 INJECTION, SOLUTION INTRAVENOUS; SUBCUTANEOUS
Status: DISCONTINUED | OUTPATIENT
Start: 2021-08-11 | End: 2021-08-15 | Stop reason: HOSPADM

## 2021-08-11 RX ORDER — VITAMIN B COMPLEX
1000 TABLET ORAL DAILY
Status: DISCONTINUED | OUTPATIENT
Start: 2021-08-12 | End: 2021-08-15 | Stop reason: HOSPADM

## 2021-08-11 RX ORDER — HYDROXYZINE 50 MG/1
50 TABLET, FILM COATED ORAL 3 TIMES DAILY PRN
Status: DISCONTINUED | OUTPATIENT
Start: 2021-08-11 | End: 2021-08-15 | Stop reason: HOSPADM

## 2021-08-11 RX ORDER — GAUZE BANDAGE 2" X 2"
100 BANDAGE TOPICAL DAILY
Status: COMPLETED | OUTPATIENT
Start: 2021-08-12 | End: 2021-08-12

## 2021-08-11 RX ORDER — LEVOTHYROXINE SODIUM 0.05 MG/1
50 TABLET ORAL
Status: DISCONTINUED | OUTPATIENT
Start: 2021-08-12 | End: 2021-08-15 | Stop reason: HOSPADM

## 2021-08-11 RX ORDER — FOLIC ACID 1 MG/1
1 TABLET ORAL DAILY
Status: DISCONTINUED | OUTPATIENT
Start: 2021-08-12 | End: 2021-08-15 | Stop reason: HOSPADM

## 2021-08-11 RX ORDER — CHOLECALCIFEROL (VITAMIN D3) 125 MCG
2500 CAPSULE ORAL DAILY
Status: DISCONTINUED | OUTPATIENT
Start: 2021-08-12 | End: 2021-08-15 | Stop reason: HOSPADM

## 2021-08-11 RX ADMIN — FUROSEMIDE 40 MG: 40 TABLET ORAL at 04:59

## 2021-08-11 RX ADMIN — INSULIN LISPRO 8 UNITS: 100 INJECTION, SOLUTION INTRAVENOUS; SUBCUTANEOUS at 17:29

## 2021-08-11 RX ADMIN — HYDROMORPHONE HYDROCHLORIDE 0.25 MG: 1 INJECTION, SOLUTION INTRAMUSCULAR; INTRAVENOUS; SUBCUTANEOUS at 01:18

## 2021-08-11 RX ADMIN — DIBASIC SODIUM PHOSPHATE, MONOBASIC POTASSIUM PHOSPHATE AND MONOBASIC SODIUM PHOSPHATE 250 MG: 852; 155; 130 TABLET ORAL at 08:44

## 2021-08-11 RX ADMIN — INSULIN LISPRO 3 UNITS: 100 INJECTION, SOLUTION INTRAVENOUS; SUBCUTANEOUS at 12:40

## 2021-08-11 RX ADMIN — HYDROMORPHONE HYDROCHLORIDE 0.25 MG: 1 INJECTION, SOLUTION INTRAMUSCULAR; INTRAVENOUS; SUBCUTANEOUS at 22:50

## 2021-08-11 RX ADMIN — PIPERACILLIN AND TAZOBACTAM 4.5 G: 4; .5 INJECTION, POWDER, LYOPHILIZED, FOR SOLUTION INTRAVENOUS; PARENTERAL at 12:45

## 2021-08-11 RX ADMIN — INSULIN LISPRO 8 UNITS: 100 INJECTION, SOLUTION INTRAVENOUS; SUBCUTANEOUS at 12:42

## 2021-08-11 RX ADMIN — FOLIC ACID 1 MG: 1 TABLET ORAL at 04:59

## 2021-08-11 RX ADMIN — LACTULOSE 45 ML: 20 SOLUTION ORAL at 12:45

## 2021-08-11 RX ADMIN — THERA TABS 1 TABLET: TAB at 04:58

## 2021-08-11 RX ADMIN — Medication 1000 UNITS: at 04:59

## 2021-08-11 RX ADMIN — Medication 100 MG: at 04:59

## 2021-08-11 RX ADMIN — LINEZOLID 600 MG: 600 TABLET, FILM COATED ORAL at 04:59

## 2021-08-11 RX ADMIN — DIBASIC SODIUM PHOSPHATE, MONOBASIC POTASSIUM PHOSPHATE AND MONOBASIC SODIUM PHOSPHATE 250 MG: 852; 155; 130 TABLET ORAL at 12:48

## 2021-08-11 RX ADMIN — LEVOTHYROXINE SODIUM 50 MCG: 0.05 TABLET ORAL at 04:59

## 2021-08-11 RX ADMIN — DIBASIC SODIUM PHOSPHATE, MONOBASIC POTASSIUM PHOSPHATE AND MONOBASIC SODIUM PHOSPHATE 250 MG: 852; 155; 130 TABLET ORAL at 17:23

## 2021-08-11 RX ADMIN — Medication 1 CAPSULE: at 08:46

## 2021-08-11 RX ADMIN — PIPERACILLIN AND TAZOBACTAM 4.5 G: 4; .5 INJECTION, POWDER, LYOPHILIZED, FOR SOLUTION INTRAVENOUS; PARENTERAL at 04:51

## 2021-08-11 RX ADMIN — RIFAXIMIN 550 MG: 550 TABLET ORAL at 04:58

## 2021-08-11 RX ADMIN — RIFAXIMIN 550 MG: 550 TABLET ORAL at 17:23

## 2021-08-11 RX ADMIN — INSULIN LISPRO 3 UNITS: 100 INJECTION, SOLUTION INTRAVENOUS; SUBCUTANEOUS at 17:29

## 2021-08-11 RX ADMIN — LACTULOSE 45 ML: 20 SOLUTION ORAL at 08:45

## 2021-08-11 RX ADMIN — INSULIN LISPRO 2 UNITS: 100 INJECTION, SOLUTION INTRAVENOUS; SUBCUTANEOUS at 20:32

## 2021-08-11 RX ADMIN — PIPERACILLIN AND TAZOBACTAM 4.5 G: 4; .5 INJECTION, POWDER, LYOPHILIZED, FOR SOLUTION INTRAVENOUS; PARENTERAL at 20:23

## 2021-08-11 RX ADMIN — CYANOCOBALAMIN TAB 500 MCG 2500 MCG: 500 TAB at 04:59

## 2021-08-11 ASSESSMENT — ENCOUNTER SYMPTOMS
CHILLS: 0
DOUBLE VISION: 0
FLANK PAIN: 0
COUGH: 0
EYE DISCHARGE: 0
MYALGIAS: 0
BLOOD IN STOOL: 0
DEPRESSION: 0
DIZZINESS: 0
WEAKNESS: 0
HEADACHES: 0
VOMITING: 0
BLURRED VISION: 0
BRUISES/BLEEDS EASILY: 0
BACK PAIN: 0
NAUSEA: 0
SORE THROAT: 0
HEMOPTYSIS: 0
PALPITATIONS: 0
FEVER: 0

## 2021-08-11 ASSESSMENT — COGNITIVE AND FUNCTIONAL STATUS - GENERAL
DRESSING REGULAR UPPER BODY CLOTHING: A LOT
PERSONAL GROOMING: A LOT
DRESSING REGULAR LOWER BODY CLOTHING: TOTAL
SUGGESTED CMS G CODE MODIFIER DAILY ACTIVITY: CL
TOILETING: A LOT
EATING MEALS: A LITTLE
DAILY ACTIVITIY SCORE: 12
HELP NEEDED FOR BATHING: A LOT

## 2021-08-11 ASSESSMENT — PAIN DESCRIPTION - PAIN TYPE
TYPE: ACUTE PAIN
TYPE: ACUTE PAIN

## 2021-08-11 NOTE — PROGRESS NOTES
Pt's spouse requested bedside commode for pt, advised that needed to get assistance for this task advised not comfortable with this task. Upon returning to assist was advised by  CNA pt placed on commode and now on edge of bed. Charge RN aware.

## 2021-08-11 NOTE — WOUND TEAM
[facility]  Inpatient Services  Wound and Skin Care Evaluation    Admission Date: 8/1/2021     Last order of IP CONSULT TO WOUND CARE was found on 8/1/2021 from Hospital Encounter on 8/1/2021     HPI, PMH, SH: Reviewed    No past surgical history on file.  Social History     Tobacco Use   • Smoking status: Former Smoker   • Smokeless tobacco: Former User   Substance Use Topics   • Alcohol use: Not Currently     Alcohol/week: 8.4 oz     Types: 14 Cans of beer per week     Comment: last drink on 5/15     Chief Complaint   Patient presents with   • Wound Check     Pt reports redness in lower abd started ~2 weeks ago, now presenting with blackened open wounds across lower abd to b/l groin.      Diagnosis: Panniculitis [M79.3]    Unit where seen by Wound Team: [id number]    WOUND CONSULT/FOLLOW UP RELATED TO:  Pannus, thighs    WOUND HISTORY:  Unknown history.  Patient states it started about a week or 2 ago.    Per Dr. Lion 08/02: Cellulitis present for 2 weeks -treated with oral antibiotics  CT shows panniculitis extension both left and right flanks  8/2 cont abx, no need for surgery at the moment    WOUND ASSESSMENT/LDA       Wound 08/01/21 Soft Tissue Necrosis Abdomen;Pannus;Thigh Lower Left eschar  (Active)   Wound Image     08/11/21 1634   Site Assessment Brown;Black;Yellow    Periwound Assessment Fragile    Margins Attached edges    Closure None    Drainage Amount Moderate    Drainage Description Yellow    Treatments Site care    Wound Cleansing Approved Wound Cleanser    Periwound Protectant Viscopaste    Dressing Cleansing/Solutions Not Applicable    Dressing Options Viscopaste    Dressing Changed Changed    Dressing Status Intact    Dressing Change/Treatment Frequency Daily, and As Needed    NEXT Dressing Change/Treatment Date 08/12/21    NEXT Weekly Photo (Inpatient Only) 08/18/21    Non-staged Wound Description Full thickness    Wound Length (cm) 10 cm    Wound Width (cm) 40 cm    Wound Surface  Area (cm^2) 400 cm^2    Shape irregular    Wound Odor None    Exposed Structures MICHI         Vascular:    LLOYD:   No results found.    Lab Values:    Lab Results   Component Value Date/Time    WBC 15.3 (H) 08/11/2021 02:43 AM    RBC 2.16 (L) 08/11/2021 02:43 AM    HEMOGLOBIN 7.3 (L) 08/11/2021 02:43 AM    HEMATOCRIT 22.3 (L) 08/11/2021 02:43 AM    CREACTPROT 8.53 (H) 08/09/2021 12:27 PM    HBA1C 4.0 08/01/2021 10:38 AM      Culture Results show:  No results found for this or any previous visit (from the past 720 hour(s)).    Pain Level/Medicated:  Minimal discomfort with care     INTERVENTIONS BY WOUND TEAM:  Chart and images reviewed. Discussed with bedside RN. All areas of concern (based on picture review, LDA review and discussion with bedside RN) have been thoroughly assessed. Documentation of areas based on significant findings. This RN in to assess patient. Performed standard wound care which includes appropriate positioning, dressing removal and non-selective debridement. Pictures and measurements obtained weekly if/when required.    Pannus  Preparation for Dressing removal: NA  Cleansed with:  Cleanser and most washcloth  Sharp debridement: NA  [place] wound: NA  Primary Dressing: viscopate  Secondary (Outer) Dressing: interdry    Nursing to apply interdry to inner thighs after soiled pad is changed    Interdisciplinary consultation: Patient, Bedside RN     EVALUATION / RATIONALE FOR TREATMENT:  Most Recent Date:    8/11 Eschar appears to be lifting on mid pannus. Per MD note, surgical intervention not indicated. Continue with viscopaste to site to be change DAILY by primary nurse or PRN if drainage/dislodgement. Interdry applied to continue wicking moisture. Care orders updated. Wound team to continue following    8/5/21: Patient with hard, shell like eschar that spans the entirety of her pannus. Medial thighs with blisters and wounds that have eschar as well. Viscopatch zinc impregnated gauze applied to  provide a non-stick wound contact layer and to soften eschar. Patient will likely need surgical debridement. Per bedside RN, repeat scans of her abdomen to be performed when patient can receive sedation as she is too agitated for imaging at this time.     BMS also placed as patient with extremely watery stool.     08/02/21: patient refusing care and medications.  Confused and needs re-orienting.  Not allowing wounds cleansed or dressing at this time.  Will not lay down for full assessment.  Dressings to be applied if able per nursing.  Wound team to follow up.       Goals: Steady decrease in wound area and depth weekly.    WOUND TEAM PLAN OF CARE ([X] for frequency of wound follow up,):   Nursing to follow orders written for wound care. Contact wound team if area fails to progress, deteriorates or with any questions/concerns  Dressing changes by wound team:                   Follow up 3 times weekly:                NPWT change 3 times weekly:     Follow up 1-2 times weekly:    X  Follow up Bi-Monthly:                   Follow up as needed:     Other (explain):     NURSING PLAN OF CARE ORDERS (X):  Dressing changes: See Dressing Care orders: X  Skin care: See Skin Care orders:   RN Prevention Protocol: O  Rectal tube care: See Rectal Tube Care orders:   Other orders:      Anticipated discharge plans: TBA will need ongoing wound care post DC  LTACH:        SNF/Rehab:                  Home Health Care:           Outpatient Wound Center:            Self/Family Care:        Other:

## 2021-08-11 NOTE — PROGRESS NOTES
Report received from day RN and patient care assumed. Patient is alert and oriented x 3, on RA. Patient is sitting up in bed, eating dinner, and in good spirits joking with staff.  Safety measures are in place.

## 2021-08-11 NOTE — THERAPY
Occupational Therapy  Daily Treatment     Patient Name: Hien Alfaro  Age:  41 y.o., Sex:  female  Medical Record #: [id number]  Today's Date: 8/11/2021       Precautions: Fall Risk, Swallow Precautions ( See Comments)      Assessment    Pt seen for OT tx. Continues to be limited by decreased activity tolerance, balance deficits, inattention and poor safety awareness impacting ability to complete ADLs and ADL transfers independently. Required total A for pericare. Max A supine > sit, able to maintain sitting balance w/o physical assist from therapist. Pt appeared in better spirits laughing and joking w/ therapist. C/o pain from wounds during session. Will continue to benefit from OT services while in house.     Plan    Continue current treatment plan.    DC Equipment Recommendations: Unable to determine at this time  Discharge Recommendations: Recommend post-acute placement for additional occupational therapy services prior to discharge home       08/11/21 1225   Cognition    Cognition / Consciousness X   Safety Awareness Impaired;Impulsive   New Learning Impaired   Balance   Sitting Balance (Static) Fair -   Sitting Balance (Dynamic) Poor +   Weight Shift Sitting Poor   Bed Mobility    Supine to Sit Maximal Assist   Sit to Supine Maximal Assist   Scooting Maximal Assist   Rolling Maximal Assist to Rt.;Maximum Assist to Lt.   Activities of Daily Living   Grooming Moderate Assist;Seated   Upper Body Dressing Moderate Assist   Lower Body Dressing Maximal Assist   Toileting Maximal Assist   Functional Mobility   Sit to Stand Unable to Participate   Short Term Goals   Short Term Goal # 1 pt will complete grooming seated w/set up    Goal Outcome # 1 Progressing as expected   Short Term Goal # 2 pt will complete txf to BSC w/min A    Goal Outcome # 2 Goal not met   Anticipated Discharge Equipment and Recommendations   DC Equipment Recommendations Unable to determine at this time   Discharge Recommendations Recommend  post-acute placement for additional occupational therapy services prior to discharge home

## 2021-08-11 NOTE — CARE PLAN
The patient is Stable - Low risk of patient condition declining or worsening    Shift Goals  Clinical Goals: monitor bg, and monitor rectal tube  Patient Goals: pain control  Family Goals: get better and get home    Progress made toward(s) clinical / shift goals:    Problem: Pain - Standard  Goal: Alleviation of pain or a reduction in pain to the patient’s comfort goal  Outcome: Progressing     Problem: Knowledge Deficit - Standard  Goal: Patient and family/care givers will demonstrate understanding of plan of care, disease process/condition, diagnostic tests and medications  Outcome: Progressing     Problem: Hemodynamics  Goal: Patient's hemodynamics, fluid balance and neurologic status will be stable or improve  Outcome: Progressing     Problem: Fluid Volume  Goal: Fluid volume balance will be maintained  Outcome: Progressing     Problem: Urinary - Renal Perfusion  Goal: Ability to achieve and maintain adequate renal perfusion and functioning will improve  Outcome: Progressing     Problem: Respiratory  Goal: Patient will achieve/maintain optimum respiratory ventilation and gas exchange  Outcome: Progressing     Problem: Fall Risk  Goal: Patient will remain free from falls  Outcome: Progressing     Problem: Skin Integrity  Goal: Skin integrity is maintained or improved  Outcome: Progressing       Patient is not progressing towards the following goals:

## 2021-08-12 ENCOUNTER — HOSPITAL ENCOUNTER (INPATIENT)
Facility: REHABILITATION | Age: 41
End: 2021-08-12
Attending: PHYSICAL MEDICINE & REHABILITATION | Admitting: PHYSICAL MEDICINE & REHABILITATION
Payer: OTHER GOVERNMENT

## 2021-08-12 LAB
ANION GAP SERPL CALC-SCNC: 11 MMOL/L (ref 7–16)
BUN SERPL-MCNC: 15 MG/DL (ref 8–22)
CALCIUM SERPL-MCNC: 8.7 MG/DL (ref 8.5–10.5)
CHLORIDE SERPL-SCNC: 115 MMOL/L (ref 96–112)
CO2 SERPL-SCNC: 17 MMOL/L (ref 20–33)
CREAT SERPL-MCNC: 0.55 MG/DL (ref 0.5–1.4)
ERYTHROCYTE [DISTWIDTH] IN BLOOD BY AUTOMATED COUNT: 67.8 FL (ref 35.9–50)
GLUCOSE BLD-MCNC: 137 MG/DL (ref 65–99)
GLUCOSE BLD-MCNC: 141 MG/DL (ref 65–99)
GLUCOSE BLD-MCNC: 147 MG/DL (ref 65–99)
GLUCOSE BLD-MCNC: 175 MG/DL (ref 65–99)
GLUCOSE BLD-MCNC: 226 MG/DL (ref 65–99)
GLUCOSE SERPL-MCNC: 175 MG/DL (ref 65–99)
HCT VFR BLD AUTO: 23.2 % (ref 37–47)
HGB BLD-MCNC: 7.6 G/DL (ref 12–16)
MAGNESIUM SERPL-MCNC: 2.1 MG/DL (ref 1.5–2.5)
MCH RBC QN AUTO: 34.1 PG (ref 27–33)
MCHC RBC AUTO-ENTMCNC: 32.8 G/DL (ref 33.6–35)
MCV RBC AUTO: 104 FL (ref 81.4–97.8)
PHOSPHATE SERPL-MCNC: 2.4 MG/DL (ref 2.5–4.5)
PLATELET # BLD AUTO: 81 K/UL (ref 164–446)
PMV BLD AUTO: 11.5 FL (ref 9–12.9)
POTASSIUM SERPL-SCNC: 3.7 MMOL/L (ref 3.6–5.5)
RBC # BLD AUTO: 2.23 M/UL (ref 4.2–5.4)
SODIUM SERPL-SCNC: 143 MMOL/L (ref 135–145)
WBC # BLD AUTO: 14.2 K/UL (ref 4.8–10.8)

## 2021-08-12 PROCEDURE — 700102 HCHG RX REV CODE 250 W/ 637 OVERRIDE(OP): Performed by: STUDENT IN AN ORGANIZED HEALTH CARE EDUCATION/TRAINING PROGRAM

## 2021-08-12 PROCEDURE — 36415 COLL VENOUS BLD VENIPUNCTURE: CPT

## 2021-08-12 PROCEDURE — 97530 THERAPEUTIC ACTIVITIES: CPT

## 2021-08-12 PROCEDURE — 700111 HCHG RX REV CODE 636 W/ 250 OVERRIDE (IP): Performed by: INTERNAL MEDICINE

## 2021-08-12 PROCEDURE — 84100 ASSAY OF PHOSPHORUS: CPT

## 2021-08-12 PROCEDURE — A9270 NON-COVERED ITEM OR SERVICE: HCPCS | Performed by: INTERNAL MEDICINE

## 2021-08-12 PROCEDURE — 82962 GLUCOSE BLOOD TEST: CPT | Mod: 91

## 2021-08-12 PROCEDURE — 80048 BASIC METABOLIC PNL TOTAL CA: CPT

## 2021-08-12 PROCEDURE — 700102 HCHG RX REV CODE 250 W/ 637 OVERRIDE(OP): Performed by: INTERNAL MEDICINE

## 2021-08-12 PROCEDURE — A9270 NON-COVERED ITEM OR SERVICE: HCPCS | Performed by: STUDENT IN AN ORGANIZED HEALTH CARE EDUCATION/TRAINING PROGRAM

## 2021-08-12 PROCEDURE — 770001 HCHG ROOM/CARE - MED/SURG/GYN PRIV*

## 2021-08-12 PROCEDURE — 700105 HCHG RX REV CODE 258: Performed by: INTERNAL MEDICINE

## 2021-08-12 PROCEDURE — 99232 SBSQ HOSP IP/OBS MODERATE 35: CPT | Performed by: INTERNAL MEDICINE

## 2021-08-12 PROCEDURE — 83735 ASSAY OF MAGNESIUM: CPT

## 2021-08-12 PROCEDURE — 85027 COMPLETE CBC AUTOMATED: CPT

## 2021-08-12 PROCEDURE — 92526 ORAL FUNCTION THERAPY: CPT

## 2021-08-12 RX ORDER — SPIRONOLACTONE 25 MG/1
25 TABLET ORAL
Status: DISCONTINUED | OUTPATIENT
Start: 2021-08-12 | End: 2021-08-15 | Stop reason: HOSPADM

## 2021-08-12 RX ADMIN — FUROSEMIDE 40 MG: 40 TABLET ORAL at 05:24

## 2021-08-12 RX ADMIN — CYANOCOBALAMIN TAB 500 MCG 2500 MCG: 500 TAB at 05:25

## 2021-08-12 RX ADMIN — RIFAXIMIN 550 MG: 550 TABLET ORAL at 05:24

## 2021-08-12 RX ADMIN — Medication 100 MG: at 05:25

## 2021-08-12 RX ADMIN — FOLIC ACID 1 MG: 1 TABLET ORAL at 05:24

## 2021-08-12 RX ADMIN — DIBASIC SODIUM PHOSPHATE, MONOBASIC POTASSIUM PHOSPHATE AND MONOBASIC SODIUM PHOSPHATE 250 MG: 852; 155; 130 TABLET ORAL at 13:38

## 2021-08-12 RX ADMIN — RIFAXIMIN 550 MG: 550 TABLET ORAL at 17:00

## 2021-08-12 RX ADMIN — DIBASIC SODIUM PHOSPHATE, MONOBASIC POTASSIUM PHOSPHATE AND MONOBASIC SODIUM PHOSPHATE 250 MG: 852; 155; 130 TABLET ORAL at 23:17

## 2021-08-12 RX ADMIN — PIPERACILLIN AND TAZOBACTAM 4.5 G: 4; .5 INJECTION, POWDER, LYOPHILIZED, FOR SOLUTION INTRAVENOUS; PARENTERAL at 05:25

## 2021-08-12 RX ADMIN — DIBASIC SODIUM PHOSPHATE, MONOBASIC POTASSIUM PHOSPHATE AND MONOBASIC SODIUM PHOSPHATE 250 MG: 852; 155; 130 TABLET ORAL at 17:00

## 2021-08-12 RX ADMIN — Medication 1000 UNITS: at 05:24

## 2021-08-12 RX ADMIN — LEVOTHYROXINE SODIUM 50 MCG: 0.05 TABLET ORAL at 05:24

## 2021-08-12 RX ADMIN — INSULIN LISPRO 2 UNITS: 100 INJECTION, SOLUTION INTRAVENOUS; SUBCUTANEOUS at 17:12

## 2021-08-12 RX ADMIN — DIBASIC SODIUM PHOSPHATE, MONOBASIC POTASSIUM PHOSPHATE AND MONOBASIC SODIUM PHOSPHATE 250 MG: 852; 155; 130 TABLET ORAL at 05:24

## 2021-08-12 RX ADMIN — LACTULOSE 45 ML: 20 SOLUTION ORAL at 13:37

## 2021-08-12 RX ADMIN — Medication 1 CAPSULE: at 08:50

## 2021-08-12 RX ADMIN — INSULIN LISPRO 8 UNITS: 100 INJECTION, SOLUTION INTRAVENOUS; SUBCUTANEOUS at 17:12

## 2021-08-12 RX ADMIN — SPIRONOLACTONE 25 MG: 25 TABLET ORAL at 17:00

## 2021-08-12 RX ADMIN — LACTULOSE 45 ML: 20 SOLUTION ORAL at 16:59

## 2021-08-12 RX ADMIN — THERA TABS 1 TABLET: TAB at 05:25

## 2021-08-12 RX ADMIN — DIBASIC SODIUM PHOSPHATE, MONOBASIC POTASSIUM PHOSPHATE AND MONOBASIC SODIUM PHOSPHATE 250 MG: 852; 155; 130 TABLET ORAL at 00:10

## 2021-08-12 ASSESSMENT — ENCOUNTER SYMPTOMS
DEPRESSION: 0
CLAUDICATION: 0
DOUBLE VISION: 0
NECK PAIN: 0
BLURRED VISION: 0
EYE PAIN: 0
MEMORY LOSS: 0
COUGH: 0
MYALGIAS: 0
STRIDOR: 0
DIZZINESS: 0
BRUISES/BLEEDS EASILY: 0
HEMOPTYSIS: 0
BACK PAIN: 0
SPUTUM PRODUCTION: 0
NAUSEA: 0
INSOMNIA: 0
FEVER: 0
CHILLS: 0
HEADACHES: 0
VOMITING: 0
SORE THROAT: 0

## 2021-08-12 ASSESSMENT — COGNITIVE AND FUNCTIONAL STATUS - GENERAL
CLIMB 3 TO 5 STEPS WITH RAILING: TOTAL
MOVING FROM LYING ON BACK TO SITTING ON SIDE OF FLAT BED: UNABLE
STANDING UP FROM CHAIR USING ARMS: A LOT
TURNING FROM BACK TO SIDE WHILE IN FLAT BAD: A LOT
SUGGESTED CMS G CODE MODIFIER MOBILITY: CM
MOVING TO AND FROM BED TO CHAIR: UNABLE
WALKING IN HOSPITAL ROOM: A LOT
MOBILITY SCORE: 9

## 2021-08-12 ASSESSMENT — PAIN DESCRIPTION - PAIN TYPE
TYPE: ACUTE PAIN

## 2021-08-12 ASSESSMENT — GAIT ASSESSMENTS
GAIT LEVEL OF ASSIST: MAXIMAL ASSIST
ASSISTIVE DEVICE: FRONT WHEEL WALKER
DISTANCE (FEET): 10

## 2021-08-12 ASSESSMENT — FIBROSIS 4 INDEX: FIB4 SCORE: 3.55

## 2021-08-12 NOTE — PROGRESS NOTES
Received report from ROSALIND Fermin. Reviewed POC, no questions at this time. Call light is within reach, bed is in lowest/locked position.

## 2021-08-12 NOTE — PROGRESS NOTES
Assumed care at 0700, bedside report received from ROSALIND Palomino. Pt is Medical not on the telemetry monitor. Patient is AO x 4 and is in bed. Initial assessment completed and orders reviewed. POC discussed with patient. Call light within reach and hourly rounding in place. No further questions at this time. Fall precautions in place.

## 2021-08-12 NOTE — DISCHARGE PLANNING
Anticipated Discharge Disposition: SNF with [facility]     Action: Chart review indicates patient not a candidate for rehab.   LSW spoke to patient at bedside about dc plan recommendations for SNF. LSW explained that barriers are payor source. LSW informed patient that LSW could request [facility] if patient agreeable. Patient consents to blanket SNF in [facility] to see if [facility] is possible. Patient motivated to get back home. Home address is [address] [place] [address] in [facility].     Patient reports that she submitted [facility] application a few months ago and provided eligibility response form to LSW. LSW emailed PFA sheet. PFA unable to locate [facility]. PFA will screen patient for [facility].    LSW escalated patient's case to HCM supervisor Esperanza.     Barriers to Discharge: [facility] payor source, [facility] needed, accepting SNF    Plan: LSW to f/u with PFA and SNF

## 2021-08-12 NOTE — DIETARY
Nutrition Services: Update   Day 11 of admit. Hien Alfaro is a 41 y.o. female with admitting DX of panniculitis.    Pt is currently on regular diet and Boost Glucose Control TID. PO <25% for 3 meals per flow sheet on 8/11. Previous RD visited room and  reported pt ate <25% of dinner on 8/10. This writer went to visit pt today. Breakfast tray visualized untouched at bedside. Pt remains confused, asking me multiple times what my name is and talking about grocery shopping with her family.     This RD spoke with MD and RN regarding initiating nocturnal tube feeding to meet ~50% of estimated nutrition needs per RD note in 8/5. MD agreeable.     Phosphorus 2.4 today and being replaced via MAR. Additionally, insulin noted on MAR.    Wt 8/8: 116 kg (255 lb 11.7 oz) via bed scale - 12 kg (26 lb) weight increase over 11 days. +1.6 L per I/O. Bilateral pitting 3+ bilateral lower extremity edema noted in assessments. Weight increase is likely fluid-related.     Malnutrition Risk: No criteria met at this time. Pt at-risk due to poor PO intake.     Recommendations/Plan:  1. Initiate nocturnal tube feeding. Replete Fiber @ 60 ml/hr for 12 hours to provide 720 kcal, 46 grams of protein, and 598 ml of free water.   2. Monitor blood sugar.  3. Encourage intake of meals/supplements.  4. Document intake of all meals/supplements as % taken in ADL's to provide interdisciplinary communication across all shifts.   5. Monitor weight.  6. Nutrition rep will continue to see patient for ongoing meal and snack preferences.    RD following.

## 2021-08-12 NOTE — DISCHARGE PLANNING
Received Choice form at 1517  Agency/Facility Name: [facility] ([facility])  Referral sent per Choice form @ 0189    LSW informed

## 2021-08-12 NOTE — DISCHARGE PLANNING
Received Choice form at 1515  Agency/Facility Name: [facility] ([facility])  Referral sent per Choice form @ 1515    LSW informed

## 2021-08-12 NOTE — PROGRESS NOTES
"Hospital Medicine Daily Progress Note    Date of Service  8/12/2021    Chief Complaint  Hien Alfaro is a 41 y.o. female admitted 8/1/2021 with abdominal wall cellulitis    Hospital Course    Per admitting provider:  \"Hien Alfaro is a 41 y.o. female who presented 8/1/2021 with Wound Check (Pt reports redness in lower abd started ~2 weeks ago, now presenting with blackened open wounds across lower abd to b/l groin. )  She is morbidly obese. She has a history of alcoholic cirrhosis, portal vein thrombosis NOT on anticoagulation (seen and decided by Dr. Montenegro, [facility]) and was hospitalized in June for decompensated alcoholic cirrhosis with hyperammonemia, bilirubinemia and hepatorenal syndrome with poor outpatient follow-up. She was placed on antibiotics for her pannus infections. Despite antibiotics, she noticed black open wounds along her abdomen to bilateral groin and also a lump/mass that started 2 weeks now RLQ region.\"      CT AP at another facility noted colitis and panniculitis, no gas or abscess noted. Transferred to [facility] ER for higher level of care, admitted to medicine service, surgery was consulted, no surgery indicated, cont abx.     Interval Problem Update    8/10/2021: The patient was admitted for further evaluation and management of abdominal wall cellulitis.  She was evaluated by ID with current recommendations to complete antibiotic therapy for 10 days.  Stop date is 8/11/2021.  On physical examination patient without any worsening erythema or tenderness to the affected areas.  She was evaluated by surgery who states that there is no surgical intervention needed at this time.  Patient is more awake this morning and is alert and oriented to time place and person.  We are pending her ability to tolerate oral intake prior to discontinuing NGT.  Pending reevaluation by PT/OT to determine disposition needs.  Patient did have some nausea and vomiting during afternoon meal however she denies any chest " pains, palpitations, shortness of breath.  No other overnight events reported.    8/11/2021: The patient was reevaluated by PT/OT with recommendations for SNF, pending placement. She is still unable to fully tolerate meals. Speech with recommendations to remove cortrak after she is able to tolerate 1-2 meals. Patient without nausea or vomiting but has decreased appetite. Her mentation continues to improve. She is alert to time place and person. States that she is ambulating better. No chest pain or palpitations. No other overnight events noted.    8/12/2021: The patient was seen and evaluated at bedside and remains pleasant. She is alert to time place and person. Patient's mentation has significantly improved. Patient still with significant debility requiring full assist during ambulation. We are pending placement to SNF for further physical therapy. Her appetite is improving steadily however nutrition with current recommendations to continue supplementation of her feeds with nocturnal tube feeding. Patient without bowel or bladder disturbances. No other overnight events reported.      I have personally seen and examined the patient at bedside. I discussed the plan of care with patient.    Consultants/Specialty  infectious disease   General surgery    Code Status  Full Code    Disposition  Patient is not medically cleared.   Anticipate discharge to to home with close outpatient follow-up.  I have placed the appropriate orders for post-discharge needs.    Review of Systems  Review of Systems   Constitutional: Negative for chills, fever and malaise/fatigue.   HENT: Negative for hearing loss, sore throat and tinnitus.    Eyes: Negative for blurred vision, double vision and pain.   Respiratory: Negative for cough, hemoptysis, sputum production and stridor.    Cardiovascular: Negative for claudication and leg swelling.   Gastrointestinal: Negative for nausea and vomiting.   Genitourinary: Negative for dysuria and  urgency.   Musculoskeletal: Negative for back pain, myalgias and neck pain.   Skin: Negative for itching and rash.   Neurological: Negative for dizziness and headaches.   Endo/Heme/Allergies: Does not bruise/bleed easily.   Psychiatric/Behavioral: Negative for depression, memory loss and suicidal ideas. The patient does not have insomnia.         Physical Exam  Temp:  [36 °C (96.8 °F)-37 °C (98.6 °F)] 36 °C (96.8 °F)  Pulse:  [102-110] 104  Resp:  [18] 18  BP: (106-121)/(66-78) 121/78  SpO2:  [95 %-100 %] 95 %    Physical Exam  Vitals and nursing note reviewed.   Constitutional:       General: She is not in acute distress.     Appearance: Normal appearance. She is obese.   HENT:      Head: Normocephalic and atraumatic.      Mouth/Throat:      Mouth: Mucous membranes are moist.   Eyes:      Extraocular Movements: Extraocular movements intact.      Conjunctiva/sclera: Conjunctivae normal.      Pupils: Pupils are equal, round, and reactive to light.   Cardiovascular:      Rate and Rhythm: Normal rate and regular rhythm.      Pulses: Normal pulses.      Heart sounds: Normal heart sounds. No murmur heard.   No friction rub.   Pulmonary:      Effort: Pulmonary effort is normal.      Breath sounds: Normal breath sounds.   Abdominal:      General: Abdomen is flat. Bowel sounds are normal.      Palpations: Abdomen is soft.   Musculoskeletal:         General: No swelling or tenderness. Normal range of motion.      Cervical back: Normal range of motion and neck supple.      Right lower leg: Edema present.      Left lower leg: Edema present.   Skin:     General: Skin is warm and dry.      Comments: Abdominal wall with several areas of induration however no associated erythema or purulent drainage. Areas of black scar tissue.    Neurological:      General: No focal deficit present.      Mental Status: She is alert and oriented to person, place, and time. Mental status is at baseline.      Cranial Nerves: No cranial nerve  deficit.   Psychiatric:         Mood and Affect: Mood normal.         Behavior: Behavior normal.         Fluids    Intake/Output Summary (Last 24 hours) at 8/12/2021 1415  Last data filed at 8/12/2021 0821  Gross per 24 hour   Intake 100 ml   Output 750 ml   Net -650 ml       Laboratory  Recent Labs     08/10/21  0045 08/11/21  0243 08/12/21  0025   WBC 14.9* 15.3* 14.2*   RBC 2.23* 2.16* 2.23*   HEMOGLOBIN 7.5* 7.3* 7.6*   HEMATOCRIT 22.6* 22.3* 23.2*   .3* 103.2* 104.0*   MCH 33.6* 33.8* 34.1*   MCHC 33.2* 32.7* 32.8*   RDW 67.0* 68.4* 67.8*   PLATELETCT 91* 92* 81*   MPV 11.5 11.7 11.5     Recent Labs     08/10/21  0045 08/11/21  0243 08/12/21  0025   SODIUM 152* 141 143   POTASSIUM 3.4* 3.8 3.7   CHLORIDE 123* 113* 115*   CO2 18* 16* 17*   GLUCOSE 300* 254* 175*   BUN 19 16 15   CREATININE 0.54 0.49* 0.55   CALCIUM 8.6 8.5 8.7                   Imaging  IR-MIDLINE CATHETER INSERTION WO GUIDANCE > AGE 3   Final Result                  Ultrasound-guided midline placement performed by qualified nursing staff    as above.          CT-EXTREMITY, LOWER WITH LEFT   Final Result      1.  No evidence of osteomyelitis.   2.  Soft tissue edema in the pannus and left thigh with skin thickening which can be seen in the setting of cellulitis.   3.  No abscess is identified.   4.  No soft tissue air is noted.   5.  Free fluid is seen in the pelvis.      CT-ABDOMEN-PELVIS WITH   Final Result      1.  Again there are morphologic changes of the liver consistent with cirrhosis with associated splenomegaly.   2.  Portal vein and mesenteric vessels are grossly patent.   3.  There is a small amount of ascites with mesenteric edema and subcutaneous edema.   4.  There is no soft tissue abscess or intra-abdominal or pelvic abscess.   5.  There is colonic wall edema most likely related to the liver disease and hypoproteinemia.      US-ABDOMEN LTD (SOFT TISSUE)   Final Result      1.  Small volume of ascites in the right upper  quadrant adjacent to the liver.      2.  Cirrhotic appearance of liver.      3.  No large volume of ascites.      DX-ABDOMEN FOR TUBE PLACEMENT   Final Result      Cortrak feeding tube tip projects in the region of the duodenal bulb.      CT-HEAD W/O   Final Result      No evidence of acute intracranial process.      OUTSIDE IMAGES-CT ABDOMEN /PELVIS   Final Result      OUTSIDE IMAGES-DX CHEST   Final Result      OUTSIDE IMAGES-CT ABDOMEN /PELVIS   Final Result           Assessment/Plan  * Abdominal panniculitis/cellulitis w/ concern for necrosis- (present on admission)  Assessment & Plan  CT AP at another facility colitis, panniculitis with no gas/abscess  Surgery f/u appreciated -- no indication for surgery at this time  ID is consulted. Pt is on zosyn and zyvox, Stop date 8/11/2021  Low threshold for CT imaging if septic shock/deteriorates  Follow blood culture  Wound care      Coagulopathy (HCC)  Assessment & Plan  INR 2.85  I have ordered FFP and IV vitamin K    Liver failure (HCC)  Assessment & Plan  Secondary to alcoholic cirrhosis   With coagulopathy, bleeding, hepatic encephalopathy, ascites    Hyperbilirubinemia  Assessment & Plan  Sec to ESLD  Total bilirubin 4. Her total bilirubin was 11 in June    Anxiety  Assessment & Plan  PRN atarax    Acute encephalopathy  Assessment & Plan  Likely hepatic encephalopathy  Ordered CT head, unremarkable  Ammonia elevated, trending down   On lactulose and Rifaximin    End stage liver disease (HCC)  Assessment & Plan  MELD score: 28 on admission  Secondary to ETOH  Hepatitis panel neg  Poor prognosis    Severe protein-calorie malnutrition (HCC)  Assessment & Plan  Ensure    Failure to thrive in adult  Assessment & Plan  PT/OT, PO diet as able to tolerated  Palliative care consult    Anasarca  Assessment & Plan  Secondary to ESLD  Supportive care    Hypoglycemia  Assessment & Plan  Hypoglycemia protocol    Colitis  Assessment & Plan  As noted in panniculitis    Portal  vein thrombosis- (present on admission)  Assessment & Plan  Suspect chronic PV throbosis -- in which case there is no clear benefit for anticoagulation -- case was previously noted by hematology (Dr. Montenegro)    Will avoid chronic anticoagulation due to ongoing bleeding    Class 3 severe obesity due to excess calories with serious comorbidity and body mass index (BMI) of 40.0 to 44.9 in adult (HCC)- (present on admission)  Assessment & Plan  Diet and lifestyle modifications    Sepsis ([facility])  Assessment & Plan  This is Sepsis Present on admission  SIRS criteria identified on my evaluation include: Leukocytosis, with WBC greater than 12,000  Source is pannus  Sepsis protocol initiated  Fluid resuscitation ordered per protocol  IV antibiotics as appropriate for source of sepsis  While organ dysfunction may be noted elsewhere in this problem list or in the chart, degree of organ dysfunction does not meet CMS criteria for severe sepsis    On IVF  Abx  Follow up lactic acid and blood culture          Hypothyroidism- (present on admission)  Assessment & Plan  TSH 7.2, elevated, but improved from TSH in April, free T4 in normal range  Synthroid    Alcoholic cirrhosis of liver without ascites (HCC)- (present on admission)  Assessment & Plan  Decompensated.   reports patient has not been drinking since May  MELD score 28 on admission, 27-32% 90 day mortality rate  She has upcoming appointment with GI as outpatient      Type 2 diabetes mellitus with neurologic complication, without long-term current use of insulin ([facility])- (present on admission)  Assessment & Plan  Uncontrolled  Start on insulin sliding scale with serial Accu-Checks, nutritional and long acting insulin  Hypoglycemic protocol in place           VTE prophylaxis: SCDs/TEDs    I have performed a physical exam and reviewed and updated ROS and Plan today (8/12/2021). In review of yesterday's note (8/11/2021), there are no changes except as documented  above.

## 2021-08-12 NOTE — CONSULTS
Physical Medicine and Rehabilitation Consultation           Chart Review Only     Date of initial consultation: 8/12/2021  Requesting provider: Shiva Pedersen MD   Consulting provider: Giuliana Waller D.O.  Reason for consultation: assess for acute inpatient rehab appropriateness  LOS: 11 Day(s)    Chief complaint: abdominal wound/panniculitis     HPI: The patient is a 41 y.o.  female with a past medical history of obesity, history of alcoholic cirrhosis, portal vein thrombosis not on anticoagulation and recent hospitalization in June for hyperammonemia, bilirubinemia and hepatorenal syndrome secondary to patient's decompensated alcoholic cirrhosis;  who presented on 8/1/2021 10:05 AM as a transfer from an outside hospital with complaints of redness in the lower abdominal area that started approximately 2 weeks ago.  Patient presented for a wound check, reporting that her the redness in her abdominal area has progressed and to a blackened area and open wounds across her lower abdominal/bilateral groin.  Patient was discharged from her hospitalization in June on antibiotics, however despite the antibiotic she noticed that she had a black open wounds along her abdomen and bilateral groin, patient presented without complaints of fevers or chills.  CT abdomen obtained at the outside hospital showed flatus as well as panniculitis.  Patient was transferred to [facility] and general surgery (Dr. Shin) was consulted.  Patient was started on IV Zosyn, vancomycin and clindamycin and no recommendations for acute surgical intervention.  Infectious diseases was consulted (Dr. Lopez) and patient was started on linezolid in addition to continuing Zosyn.  Patient's hospital course has been complicated by confusion secondary to patient's encephalopathy thought to be secondary to patient's cirrhosis.  CT head was obtained on 8/30 which showed no acute process.  Her Ativan and IV Dilaudid have been stopped due to patient's  confusion and somnolence.  On  patient had a hemoglobin of 6.9 and she was transfused 1 unit packed red blood cells her INR is elevated likely suspect suspected secondary to her cirrhosis.  During patient's hospitalization she has continued to have a leukocytosis 13.9 -> 14.9 -> 15.3 -> 14.2 as of .  Additionally patient has had uncontrolled diabetes, her blood sugars in the last 24 hours are ranging from 141-226.      Functionally patient has been able to participate with therapy however she is currently functioning at a max assist level for bed mobility and ADLs.  Patient is currently unable to participate in any functional transfer from sit to stand.      Social Hx:  Patient lives with her  in a one-story apartment with 2 steps to enter  2 BOBBY  At prior level of function independent with mobility and ADLs    Tobacco: Former smoker  Alcohol: History of significant alcohol use, documentation reveals that she quit drinking in May  Drugs: Denies    THERAPY:  Restrictions: Fall risk  PT: Functional mobility   8/10 PT note: Total assist for bed mobility, unable to participate in sit to stand    OT: ADLs   OT note: Max assist for bed mobility, max assist for all ADLs    SLP:   8/10 SLP note: Diet is regular with thins, assist with feeding due to bilateral upper extremity weakness.    IMAGIN/5 CT left lower extremities:  IMPRESSION:     1.  No evidence of osteomyelitis.  2.  Soft tissue edema in the pannus and left thigh with skin thickening which can be seen in the setting of cellulitis.  3.  No abscess is identified.  4.  No soft tissue air is noted.  5.  Free fluid is seen in the pelvis.     CT abdomen pelvis  IMPRESSION:     1.  Again there are morphologic changes of the liver consistent with cirrhosis with associated splenomegaly.  2.  Portal vein and mesenteric vessels are grossly patent.  3.  There is a small amount of ascites with mesenteric edema and subcutaneous edema.  4.  There is no  soft tissue abscess or intra-abdominal or pelvic abscess.  5.  There is colonic wall edema most likely related to the liver disease and hypoproteinemia.    8/3 CT head  IMPRESSION:     No evidence of acute intracranial process.  PROCEDURES:  None    PMH:  Past Medical History:   Diagnosis Date   • DM (diabetes mellitus) (HCC)        PSH:  No past surgical history on file.    FHX:  No family history on file.    Medications:  Current Facility-Administered Medications   Medication Dose   • [START ON 8/13/2021] multivitamin (THERAGRAN) tablet 1 Tablet  1 Tablet   • insulin glargine (Semglee) injection  30 Units    And   • insulin lispro (AdmeLOG) injection  0.2 Units/kg/day    And   • insulin lispro (AdmeLOG) injection  2-9 Units    And   • dextrose 50% (D50W) injection 50 mL  50 mL   • phosphorus (K-Phos-Neutral) per tablet 250 mg  250 mg   • cyanocobalamin (VITAMIN B-12) tablet 2,500 mcg  2,500 mcg   • folic acid (FOLVITE) tablet 1 mg  1 mg   • hydrOXYzine HCl (ATARAX) tablet 50 mg  50 mg   • lactobacillus rhamnosus (CULTURELLE) capsule 1 Capsule  1 Capsule   • lactulose 20 GM/30ML solution 45 mL  45 mL   • levothyroxine (SYNTHROID) tablet 50 mcg  50 mcg   • riFAXIMin (XIFAXAN) tablet 550 mg  550 mg   • vitamin D (cholecalciferol) tablet 1,000 Units  1,000 Units   • ondansetron (ZOFRAN) syringe/vial injection 4 mg  4 mg   • haloperidol lactate (HALDOL) injection 2-5 mg  2-5 mg   • HYDROmorphone (Dilaudid) injection 0.25 mg  0.25 mg   • lidocaine (XYLOCAINE) 2 % injection 20 mL  20 mL    Or   • lidocaine (XYLOCAINE) 4 % topical solution     • lactulose 10 GM/15ML solution 300 mL  300 mL   • furosemide (LASIX) tablet 40 mg  40 mg   • lactated ringers infusion (BOLUS): BMI less than or equal to 30  30 mL/kg   • Pharmacy Consult Request ...Pain Management Review 1 Each  1 Each   • piperacillin-tazobactam (ZOSYN) 4.5 g in  mL IVPB  4.5 g       Allergies:  No Known Allergies      Physical Exam:  Vitals: /78    "Pulse (!) 104   Temp 36 °C (96.8 °F) (Temporal)   Resp 18   Ht 1.6 m (5' 2.99\")   Wt 116 kg (255 lb 11.7 oz)   SpO2 95%     Labs: Reviewed and significant for   Recent Labs     08/10/21  0045 08/11/21 0243 08/12/21  0025   RBC 2.23* 2.16* 2.23*   HEMOGLOBIN 7.5* 7.3* 7.6*   HEMATOCRIT 22.6* 22.3* 23.2*   PLATELETCT 91* 92* 81*     Recent Labs     08/10/21  0045 08/11/21  0243 08/12/21  0025   SODIUM 152* 141 143   POTASSIUM 3.4* 3.8 3.7   CHLORIDE 123* 113* 115*   CO2 18* 16* 17*   GLUCOSE 300* 254* 175*   BUN 19 16 15   CREATININE 0.54 0.49* 0.55   CALCIUM 8.6 8.5 8.7     Recent Results (from the past 24 hour(s))   POCT glucose device results    Collection Time: 08/11/21 12:33 PM   Result Value Ref Range    Glucose - Accu-Ck 222 (H) 65 - 99 mg/dL   POCT glucose device results    Collection Time: 08/11/21  5:01 PM   Result Value Ref Range    Glucose - Accu-Ck 207 (H) 65 - 99 mg/dL   POCT glucose device results    Collection Time: 08/11/21  5:25 PM   Result Value Ref Range    Glucose - Accu-Ck 226 (H) 65 - 99 mg/dL   POCT glucose device results    Collection Time: 08/11/21  8:22 PM   Result Value Ref Range    Glucose - Accu-Ck 186 (H) 65 - 99 mg/dL   MAGNESIUM    Collection Time: 08/12/21 12:17 AM   Result Value Ref Range    Magnesium 2.1 1.5 - 2.5 mg/dL   PHOSPHORUS    Collection Time: 08/12/21 12:17 AM   Result Value Ref Range    Phosphorus 2.4 (L) 2.5 - 4.5 mg/dL   CBC WITHOUT DIFFERENTIAL    Collection Time: 08/12/21 12:25 AM   Result Value Ref Range    WBC 14.2 (H) 4.8 - 10.8 K/uL    RBC 2.23 (L) 4.20 - 5.40 M/uL    Hemoglobin 7.6 (L) 12.0 - 16.0 g/dL    Hematocrit 23.2 (L) 37.0 - 47.0 %    .0 (H) 81.4 - 97.8 fL    MCH 34.1 (H) 27.0 - 33.0 pg    MCHC 32.8 (L) 33.6 - 35.0 g/dL    RDW 67.8 (H) 35.9 - 50.0 fL    Platelet Count 81 (L) 164 - 446 K/uL    MPV 11.5 9.0 - 12.9 fL   Basic Metabolic Panel    Collection Time: 08/12/21 12:25 AM   Result Value Ref Range    Sodium 143 135 - 145 mmol/L    Potassium " 3.7 3.6 - 5.5 mmol/L    Chloride 115 (H) 96 - 112 mmol/L    Co2 17 (L) 20 - 33 mmol/L    Glucose 175 (H) 65 - 99 mg/dL    Bun 15 8 - 22 mg/dL    Creatinine 0.55 0.50 - 1.40 mg/dL    Calcium 8.7 8.5 - 10.5 mg/dL    Anion Gap 11.0 7.0 - 16.0   ESTIMATED GFR    Collection Time: 08/12/21 12:25 AM   Result Value Ref Range    GFR If African American >60 >60 mL/min/1.73 m 2    GFR If Non African American >60 >60 mL/min/1.73 m 2   POCT glucose device results    Collection Time: 08/12/21  8:49 AM   Result Value Ref Range    Glucose - Accu-Ck 141 (H) 65 - 99 mg/dL         ASSESSMENT:  Patient is a 41 y.o. female admitted with altered mental status and panniculitis      Rehabilitation: Impaired ADLs and mobility  Patient is a poor candidate for inpatient rehab based on poor endurance for for PT, OT, and speech therapy.   Barriers to transfer include: Insurance authorization, TCCs to verify disposition, medical clearance and bed availability     Additional Recommendations:  Debility secondary to panniculitis  -Patient with significant impaired strength and endurance with prolonged panniculitis  -Infectious diseases and surgery following, patient completed course of Zosyn on 8/12  -Panniculitis infection exacerbated by obesity and poorly controlled blood sugars  -Continue with PT/OT for strength and endurance, will need prolonged rehabilitation plan with discharged to SNF and then to IRF  -Given patient has an infectious source and significant weakness would have a low threshold for evaluating for signs/symptoms of Guillain-Barré    Encephalopathy  -Patient with history of altered mental status secondary to hyperammonemia secondary to patient's liver failure  -Updated CT head on 8/3 without any acute process  -Encephalopathy is improved however would benefit from serial CMP's and ammonia level to evaluate liver function and anticipate any changes in ammonia prior to altered mental status    Anemia  -Hemoglobin at 7.6 as of  8/12  -Significant anemia etiology related to patient's end-stage liver disease  -Significant fatigue and impaired tolerance for therapies may be secondary to patient's chronic anemia    Disposition  -Patient with significant comorbidities that prevent patient from being able to tolerate IRF stay with 3 hours of therapy 5 days a week  -In order to make functional gains patient will need improved tolerance which will ultimately depend on patient's improved blood glucose control, improved anemia, status of her end-stage liver disease and no worsening of her panniculitis.  -At this point in time patient's postacute rehab course will be most successful with a transition program from SNF to IRF      Medical Complexity:  Panniculitis  Encephalopathy  Uncontrolled diabetes type 2 with hyperglycemia  Anemia  Liver failure        DVT PPX: SCDs      Thank you for allowing us to participate in the care of this patient.     I spent 31 mins non face-to-face today reviewing H&P, progress notes images and labs. Visit start time 12:35, visit stop time 1:07.      Giuliana Waller D.O.   Physical Medicine and Rehabilitation     Please note that this dictation was created using voice recognition software. I have made every reasonable attempt to correct obvious errors, but there may be errors of grammar and possibly content that I did not discover before finalizing the note.

## 2021-08-12 NOTE — THERAPY
Physical Therapy   Daily Treatment     Patient Name: Hien Alfaro  Age:  41 y.o., Sex:  female  Medical Record #: [id number]  Today's Date: 8/12/2021     Precautions: Fall Risk, Swallow Precautions ( See Comments)    Assessment    Pt was receptive to therapy. Second therapy staff member was used for safety. Pt able to slightly move her LE's and pull with UE's to help with bed mobility but still requires physical assist. Pt was mobilized to EOB and tolerated it well. Pt then was mobilized to standing with a FWW. Pt was able to stand approx 1 min before lowering self back to EOB. A second standing attempt was made which failed due to pt's LOB. After a rest a third attempt was made and PT was able to stabilize pt in standing and hold her there until she found her balance. Pt then was able to make small steps approx 5 feet from bed, turn around, and ambulate back to EOB. Pt fatigued after this and again required extensive physical assist to get back to supine. Pt appears to be progressing in strength and activity tolerance, expect progress to continue with current POC.       Plan    Continue current treatment plan.    DC Equipment Recommendations: Unable to determine at this time  Discharge Recommendations: Recommend post-acute placement for additional physical therapy services prior to discharge home       Objective       08/12/21 1430   Pain 0 - 10 Group   Therapist Pain Assessment During Activity;4;Nurse Notified  (Leg pain and pain from sores in gluteal area )   Cognition    Cognition / Consciousness WDL   Level of Consciousness Alert   Comments Pt fully aware and pleasant    Balance   Sitting Balance (Static) Fair   Sitting Balance (Dynamic) Fair -   Standing Balance (Static) Poor +   Standing Balance (Dynamic) Poor +   Weight Shift Sitting Poor   Weight Shift Standing Poor   Skilled Intervention Verbal Cuing;Facilitation;Postural Facilitation;Sequencing   Gait Analysis   Gait Level Of Assist Maximal Assist    Assistive Device Front Wheel Walker   Distance (Feet) 10   # of Times Distance was Traveled 1   Skilled Intervention Facilitation;Verbal Cuing;Sequencing;Postural Facilitation   Bed Mobility    Supine to Sit Maximal Assist   Sit to Supine Maximal Assist   Scooting Maximal Assist   Skilled Intervention Verbal Cuing;Postural Facilitation;Facilitation   Functional Mobility   Sit to Stand Maximal Assist   Bed, Chair, Wheelchair Transfer Maximal Assist   Transfer Method Stand Step   Skilled Intervention Verbal Cuing;Sequencing;Postural Facilitation;Facilitation   Activity Tolerance   Sitting Edge of Bed 5   Standing 2   Short Term Goals    Short Term Goal # 1 Pt will be able to perform bed mobility with min A within 6 visits to increase functional ability.   Goal Outcome # 1 goal not met   Short Term Goal # 2 Pt will be able to perform functional transfers with mod A within 6 visits to increase functional ability.    Goal Outcome # 2 Goal not met   Short Term Goal # 3 Pt will be able to ambulate 10ft with a FWW and max A within 6 visits to increase mobility.    Goal Outcome # 3 Goal met, new goal added   Short Term Goal # 3 B Pt will be able to ambulate 25ft with a FWW under supervision within 6 visits in order to increase mobility.

## 2021-08-12 NOTE — PROGRESS NOTES
2 RN skin check done with Cat RN, and Trey RN        Head Jaundice  Ears Jaundice  Nose Jaundice, cortrak in left nare.   Mouth dry, dry blood   Neck WDL  Breast/Chest Bruising, Discoloration, Jaundice and Edema  Shoulder Blades WDL  Spine WDL  (R) Arm/Elbow/Hand Redness, Blanching, dry and Bruising  (L) Arm/Elbow/Hand Redness, Blanching, dry and Bruising  Abdomen Unable to view as dressing was changed today (8/11). Viscopaste in place.   Groin Redness, Blanching and Excoriation, hutson in place   Scrotum/Coccyx/Buttocks Redness, Blanching, Excoriation and Discoloration, Bleeding, left butt cheek tear present.   (R) Leg Bruising, Jaundice and Edema  (L) Leg Bruising, Jaundice and Edema  (R) Heel/Foot/Toe Blanching, Boggy and Edema  (L) Heel/Foot/Toe Blanching, Boggy and Edema              Devices In Places Pulse Ox, cortrak, hutson         Interventions In Place Heel Float Boots, Pillows, Q2 Turns, Barrier Cream, Dri-Bill Pads and low air loss mattress      Possible Skin Injury Yes, wound following     Pictures Uploaded Into Epic Yes  Wound Consult Placed Yes, wound following   RN Wound Prevention Protocol Ordered Yes

## 2021-08-12 NOTE — CARE PLAN
Problem: Knowledge Deficit - Standard  Goal: Patient and family/care givers will demonstrate understanding of plan of care, disease process/condition, diagnostic tests and medications  Outcome: Progressing     Problem: Skin Integrity  Goal: Skin integrity is maintained or improved  Outcome: Progressing     The patient is Stable - Low risk of patient condition declining or worsening    Shift Goals  Clinical Goals: monitor BMs  Patient Goals: pain control/comfort  Family Goals: get better and get home    Progress made toward(s) clinical / shift goals:  Pt educated regarding plan of care and medications. All questions answered.   Pt's skin will continue to not deteriorate due to helping with turns, and adjusting pressure points.     Patient is not progressing towards the following goals:

## 2021-08-12 NOTE — CONSULTS
Diabetes education: pt has a hx of diabetes. Pt was admitted with blood sugars of 69 and Hg a1c of 4.0% ( not sure how accurate as Hg was 8.7). Pt is currently on Semglee 30 units pm with Admelog 8 units tid meals and Admelog sliding scale coverage ac and hs. Blood sugars are 222 ( 3 + 8 units) and 226 ( 3 + 8 units).  Met with pt and family this afternoon. Pt states she was on insulin for a short while ( vials and syringes) but it was stopped as her blood sugars were better. Pt stated she did give her insulin to her abdomen ( discussed need and importance of site rotation) but it has been awhile since insulin.  Discussed what diabetes was, what effects blood sugars, goals for blood sugars, and hyperglycemia.  CDE had pt give her lunchtime insulin to her thigh. Pt was reluctant to do this and needed direction ( as had never done before).  Pt was slightly confused. Pt was refusing lactulose as it was causing loose stools. RN and CDE discussed importance of this medication. Pt agreed to take. CDE discussed coming back tomorrow to continue education.  Plan: CDE to return tomorrow to continue education.

## 2021-08-12 NOTE — PROGRESS NOTES
Advised by Charge Rn, that pt and family requested another RN for remainder of shift and advised that med pass would be completed by charge.

## 2021-08-12 NOTE — CARE PLAN
Problem: Pain - Standard  Goal: Alleviation of pain or a reduction in pain to the patient’s comfort goal  8/12/2021 1204 by Alvaro Sanchez R.N.  Outcome: Progressing  8/12/2021 1203 by Alvaro Sanchez R.N.  Outcome: Progressing     Problem: Knowledge Deficit - Standard  Goal: Patient and family/care givers will demonstrate understanding of plan of care, disease process/condition, diagnostic tests and medications  8/12/2021 1204 by Alvaro Sanchez R.N.  Outcome: Progressing  8/12/2021 1203 by Alvaro Sanchez R.N.  Outcome: Progressing     Problem: Hemodynamics  Goal: Patient's hemodynamics, fluid balance and neurologic status will be stable or improve  8/12/2021 1204 by Alvaro Sanchez R.N.  Outcome: Progressing  8/12/2021 1203 by Alvaro Sanchez R.N.  Outcome: Progressing     Problem: Fluid Volume  Goal: Fluid volume balance will be maintained  8/12/2021 1204 by Alvaro Sanchez R.N.  Outcome: Progressing  8/12/2021 1203 by Alvaro Sanchez R.N.  Outcome: Progressing     Problem: Urinary - Renal Perfusion  Goal: Ability to achieve and maintain adequate renal perfusion and functioning will improve  8/12/2021 1204 by Alvaro Sanchez R.N.  Outcome: Progressing  8/12/2021 1203 by Alvaro Sanchez R.N.  Outcome: Progressing     Problem: Respiratory  Goal: Patient will achieve/maintain optimum respiratory ventilation and gas exchange  8/12/2021 1204 by Alvaro Sanchez R.N.  Outcome: Progressing  8/12/2021 1203 by Alvaro Sanchez R.N.  Outcome: Progressing     Problem: Mechanical Ventilation  Goal: Safe management of artificial airway and ventilation  8/12/2021 1204 by Alvaro Sanchez R.N.  Outcome: Progressing  8/12/2021 1203 by Alvaro Sanchez R.N.  Outcome: Progressing  Goal: Successful weaning off mechanical ventilator, spontaneously maintains adequate gas exchange  8/12/2021 1204 by Alvaro Sanchez R.N.  Outcome: Progressing  8/12/2021 1203 by Alvaro Sanchez R.N.  Outcome: Progressing  Goal: Patient will be able to express needs and  understand communication  8/12/2021 1204 by Alvaro Sanchez R.N.  Outcome: Progressing  8/12/2021 1203 by Alvaro Sanchez R.N.  Outcome: Progressing     Problem: Physical Regulation  Goal: Diagnostic test results will improve  8/12/2021 1204 by Alvaro Sanchez R.N.  Outcome: Progressing  8/12/2021 1203 by Alvaro Sanchez R.N.  Outcome: Progressing  Goal: Signs and symptoms of infection will decrease  8/12/2021 1204 by Alvaro Sanchez R.N.  Outcome: Progressing  8/12/2021 1203 by Alvaro Sanchez R.N.  Outcome: Progressing     Problem: Fall Risk  Goal: Patient will remain free from falls  8/12/2021 1204 by Alvaro Sanchez R.N.  Outcome: Progressing  8/12/2021 1203 by Alvaro Sanchez R.N.  Outcome: Progressing     Problem: Skin Integrity  Goal: Skin integrity is maintained or improved  8/12/2021 1204 by Alvaro Sanchez R.N.  Outcome: Progressing  8/12/2021 1203 by Alvaro Sanchez R.N.  Outcome: Progressing

## 2021-08-12 NOTE — THERAPY
"Speech Language Pathology  Daily Treatment     Patient Name: Hien Alfaro  Age:  41 y.o., Sex:  female  Medical Record #: [id number]  Today's Date: 8/12/2021     Precautions  Precautions: (P) Fall Risk, Swallow Precautions ( See Comments)  Comments:     Assessment    Pt was seen for dysphagia tx during meal of regular solids and thin liquids as well as pill whole w/ thins w/ RN present. Per RD and RN notes, pt has had poor PO intake. Pt reports diminished appetite since withdrawing from alcohol. She does report having a cut in her mouth that is painful at times while chewing, though denies any difficulty swallowing. Pt demo'd adequate mastication of regular solids, timely swallow with thin liquids and no s/sx of aspiration w/ PO intake. Pt agreed she would rather self select foods she knows she can chew that won't irritate the cut in her mouth vs. having diet texture modified by SLP. Swallow function does not appear to be impacting amount of PO intake/nutritional status at this time. Recommend continuing regular diet/thin liquids. Patient is not being actively followed for therapy services at this time, however may be seen if requested by attending provider for 1 more visit within 30 days to address any discharge needs or if the patient has a change in status.        Plan    regular diet/thin liquids.    Discharge secondary to goals met.    Discharge Recommendations: (P) Anticipate that the patient will have no further speech therapy needs after discharge from the hospital       Objective       08/12/21 1347   Dysphagia    Positioning / Behavior Modification Modulate Rate or Bite Size;Self Monitoring;Other (see Comments)  (upright positioning)   Other Treatments meal tx RG7/TN0   Diet / Liquid Recommendation Regular (7);Thin (0)   Recommended Route of Medication Administration   Medication Administration  Whole with Liquid Wash   Patient / Family Goals   Patient / Family Goal #1 \"Ice chips.\"   Short Term Goals   Short " Term Goal # 1 Patient will consume meals of regular solids and thin liquids with no s/sx of aspiration given monitoring during meals.   Goal Outcome # 1 Goal met

## 2021-08-12 NOTE — DISCHARGE PLANNING
[facility] Transitional Care Coordination    Referral from: Dr. Pedersen    Insurance Provider on Facesheet: [employer] potentially [employer]   Potential Rehab Diagnosis: Debility   Chart review indicates patient has on going medical management and therapy  therapy needs to possibly meet inpatient rehab facility criteria with the goal of returning to community.    D/C support: Spouse      Physiatry consultation  per protocol.     Last Covid test:  None       [employer]/ [employer] are both barriers to IRF level of care with [facility] Physiatry to consult to contribute plan of care. Anticipate post acute services closer to home in [facility].      Thank you for the referral.

## 2021-08-12 NOTE — PROGRESS NOTES
"Attempting to enter room with CNA to clean up pt,  cut RN off and closed door and stated he \"needed a minute\" door closed and conversation had with CNA. Pt's spouse \"hostile\" toward RN several times during shift. Charge RN made aware.   "

## 2021-08-12 NOTE — PROGRESS NOTES
"Hospital Medicine Daily Progress Note    Date of Service  8/11/2021    Chief Complaint  Hien Alfaro is a 41 y.o. female admitted 8/1/2021 with abdominal wall cellulitis    Hospital Course    Per admitting provider:  \"Hien Alfaro is a 41 y.o. female who presented 8/1/2021 with Wound Check (Pt reports redness in lower abd started ~2 weeks ago, now presenting with blackened open wounds across lower abd to b/l groin. )  She is morbidly obese. She has a history of alcoholic cirrhosis, portal vein thrombosis NOT on anticoagulation (seen and decided by Dr. Montenegro, [facility]) and was hospitalized in June for decompensated alcoholic cirrhosis with hyperammonemia, bilirubinemia and hepatorenal syndrome with poor outpatient follow-up. She was placed on antibiotics for her pannus infections. Despite antibiotics, she noticed black open wounds along her abdomen to bilateral groin and also a lump/mass that started 2 weeks now RLQ region.\"      CT AP at another facility noted colitis and panniculitis, no gas or abscess noted. Transferred to [facility] ER for higher level of care, admitted to medicine service, surgery was consulted, no surgery indicated, cont abx.     Interval Problem Update    8/10/2021: The patient was admitted for further evaluation and management of abdominal wall cellulitis.  She was evaluated by ID with current recommendations to complete antibiotic therapy for 10 days.  Stop date is 8/11/2021.  On physical examination patient without any worsening erythema or tenderness to the affected areas.  She was evaluated by surgery who states that there is no surgical intervention needed at this time.  Patient is more awake this morning and is alert and oriented to time place and person.  We are pending her ability to tolerate oral intake prior to discontinuing NGT.  Pending reevaluation by PT/OT to determine disposition needs.  Patient did have some nausea and vomiting during afternoon meal however she denies any chest " pains, palpitations, shortness of breath.  No other overnight events reported.    8/11/2021: The patient was reevaluated by PT/OT with recommendations for SNF, pending placement. She is still unable to fully tolerate meals. Speech with recommendations to remove cortrak after she is able to tolerate 1-2 meals. Patient without nausea or vomiting but has decreased appetite. Her mentation continues to improve. She is alert to time place and person. States that she is ambulating better. No chest pain or palpitations. No other overnight events noted.      I have personally seen and examined the patient at bedside. I discussed the plan of care with patient.    Consultants/Specialty  infectious disease   General surgery    Code Status  Full Code    Disposition  Patient is not medically cleared.   Anticipate discharge to to home with close outpatient follow-up.  I have placed the appropriate orders for post-discharge needs.    Review of Systems  Review of Systems   Constitutional: Negative for chills, fever and malaise/fatigue.   HENT: Negative for congestion, hearing loss, sore throat and tinnitus.    Eyes: Negative for blurred vision, double vision and discharge.   Respiratory: Negative for cough and hemoptysis.    Cardiovascular: Negative for chest pain and palpitations.   Gastrointestinal: Negative for blood in stool, melena, nausea and vomiting.   Genitourinary: Negative for dysuria, flank pain, hematuria and urgency.   Musculoskeletal: Negative for back pain and myalgias.   Skin: Negative for itching and rash.   Neurological: Negative for dizziness, weakness and headaches.   Endo/Heme/Allergies: Does not bruise/bleed easily.   Psychiatric/Behavioral: Negative for depression and suicidal ideas.        Physical Exam  Temp:  [36 °C (96.8 °F)-36.7 °C (98 °F)] 36 °C (96.8 °F)  Pulse:  [] 106  Resp:  [16-18] 17  BP: (108-115)/(64-72) 108/68  SpO2:  [92 %-98 %] 98 %    Physical Exam  Vitals and nursing note reviewed.    Constitutional:       General: She is not in acute distress.     Appearance: Normal appearance. She is obese.   HENT:      Head: Normocephalic and atraumatic.      Mouth/Throat:      Mouth: Mucous membranes are moist.   Eyes:      Extraocular Movements: Extraocular movements intact.      Conjunctiva/sclera: Conjunctivae normal.      Pupils: Pupils are equal, round, and reactive to light.   Cardiovascular:      Rate and Rhythm: Normal rate and regular rhythm.      Heart sounds: No murmur heard.   No friction rub.   Pulmonary:      Effort: Pulmonary effort is normal.      Breath sounds: Normal breath sounds.   Abdominal:      General: Abdomen is flat. Bowel sounds are normal.      Palpations: Abdomen is soft.      Tenderness: There is no abdominal tenderness. There is no guarding.   Musculoskeletal:         General: No swelling or tenderness. Normal range of motion.      Cervical back: Normal range of motion and neck supple.   Skin:     General: Skin is warm and dry.      Findings: No rash.      Comments: Abdominal wall with several areas of induration however no associated erythema or purulent drainage. Areas of black scar tissue.    Neurological:      General: No focal deficit present.      Mental Status: She is alert and oriented to person, place, and time. Mental status is at baseline.      Cranial Nerves: No cranial nerve deficit.   Psychiatric:         Mood and Affect: Mood normal.         Behavior: Behavior normal.         Fluids    Intake/Output Summary (Last 24 hours) at 8/11/2021 1731  Last data filed at 8/11/2021 0856  Gross per 24 hour   Intake 420 ml   Output 400 ml   Net 20 ml       Laboratory  Recent Labs     08/09/21  0327 08/10/21  0045 08/11/21  0243   WBC 13.9* 14.9* 15.3*   RBC 2.24* 2.23* 2.16*   HEMOGLOBIN 7.4* 7.5* 7.3*   HEMATOCRIT 21.9* 22.6* 22.3*   MCV 97.8 101.3* 103.2*   MCH 33.0 33.6* 33.8*   MCHC 33.8 33.2* 32.7*   RDW 62.1* 67.0* 68.4*   PLATELETCT 90* 91* 92*   MPV 11.7 11.5 11.7      Recent Labs     08/09/21  0327 08/10/21  0045 08/11/21  0243   SODIUM 147* 152* 141   POTASSIUM 3.6 3.4* 3.8   CHLORIDE 119* 123* 113*   CO2 18* 18* 16*   GLUCOSE 405* 300* 254*   BUN 23* 19 16   CREATININE 0.53 0.54 0.49*   CALCIUM 8.9 8.6 8.5     Recent Labs     08/09/21  0327   INR 2.28*               Imaging  IR-MIDLINE CATHETER INSERTION WO GUIDANCE > AGE 3   Final Result                  Ultrasound-guided midline placement performed by qualified nursing staff    as above.          CT-EXTREMITY, LOWER WITH LEFT   Final Result      1.  No evidence of osteomyelitis.   2.  Soft tissue edema in the pannus and left thigh with skin thickening which can be seen in the setting of cellulitis.   3.  No abscess is identified.   4.  No soft tissue air is noted.   5.  Free fluid is seen in the pelvis.      CT-ABDOMEN-PELVIS WITH   Final Result      1.  Again there are morphologic changes of the liver consistent with cirrhosis with associated splenomegaly.   2.  Portal vein and mesenteric vessels are grossly patent.   3.  There is a small amount of ascites with mesenteric edema and subcutaneous edema.   4.  There is no soft tissue abscess or intra-abdominal or pelvic abscess.   5.  There is colonic wall edema most likely related to the liver disease and hypoproteinemia.      US-ABDOMEN LTD (SOFT TISSUE)   Final Result      1.  Small volume of ascites in the right upper quadrant adjacent to the liver.      2.  Cirrhotic appearance of liver.      3.  No large volume of ascites.      DX-ABDOMEN FOR TUBE PLACEMENT   Final Result      Cortrak feeding tube tip projects in the region of the duodenal bulb.      CT-HEAD W/O   Final Result      No evidence of acute intracranial process.      OUTSIDE IMAGES-CT ABDOMEN /PELVIS   Final Result      OUTSIDE IMAGES-DX CHEST   Final Result      OUTSIDE IMAGES-CT ABDOMEN /PELVIS   Final Result           Assessment/Plan  * Abdominal panniculitis/cellulitis w/ concern for necrosis- (present  on admission)  Assessment & Plan  CT AP at another facility colitis, panniculitis with no gas/abscess  Surgery f/u appreciated -- no indication for surgery at this time  ID is consulted. Pt is on zosyn and zyvox, Stop date 8/11/2021  Low threshold for CT imaging if septic shock/deteriorates  Follow blood culture  Wound care      Coagulopathy (HCC)  Assessment & Plan  INR 2.85  I have ordered FFP and IV vitamin K    Liver failure (HCC)  Assessment & Plan  Secondary to alcoholic cirrhosis   With coagulopathy, bleeding, hepatic encephalopathy, ascites    Hyperbilirubinemia  Assessment & Plan  Sec to ESLD  Total bilirubin 4. Her total bilirubin was 11 in June    Anxiety  Assessment & Plan  PRN atarax    Acute encephalopathy  Assessment & Plan  Likely hepatic encephalopathy  Ordered CT head, unremarkable  Ammonia elevated, trending down   On lactulose and Rifaximin    End stage liver disease (HCC)  Assessment & Plan  MELD score: 28 on admission  Secondary to ETOH  Hepatitis panel neg  Poor prognosis    Severe protein-calorie malnutrition (HCC)  Assessment & Plan  Ensure    Failure to thrive in adult  Assessment & Plan  PT/OT, PO diet as able to tolerated  Palliative care consult    Anasarca  Assessment & Plan  Secondary to ESLD  Supportive care    Hypoglycemia  Assessment & Plan  Hypoglycemia protocol    Colitis  Assessment & Plan  As noted in panniculitis    Portal vein thrombosis- (present on admission)  Assessment & Plan  Suspect chronic PV throbosis -- in which case there is no clear benefit for anticoagulation -- case was previously noted by hematology (Dr. Montenegro)    Will avoid chronic anticoagulation due to ongoing bleeding    Class 3 severe obesity due to excess calories with serious comorbidity and body mass index (BMI) of 40.0 to 44.9 in adult (HCC)- (present on admission)  Assessment & Plan  Diet and lifestyle modifications    Sepsis (HCC)  Assessment & Plan  This is Sepsis Present on admission  SIRS criteria  identified on my evaluation include: Leukocytosis, with WBC greater than 12,000  Source is pannus  Sepsis protocol initiated  Fluid resuscitation ordered per protocol  IV antibiotics as appropriate for source of sepsis  While organ dysfunction may be noted elsewhere in this problem list or in the chart, degree of organ dysfunction does not meet CMS criteria for severe sepsis    On IVF  Abx  Follow up lactic acid and blood culture          Hypothyroidism- (present on admission)  Assessment & Plan  TSH 7.2, elevated, but improved from TSH in April, free T4 in normal range  Synthroid    Alcoholic cirrhosis of liver without ascites (HCC)- (present on admission)  Assessment & Plan  Decompensated.   reports patient has not been drinking since May  MELD score 28 on admission, 27-32% 90 day mortality rate  She has upcoming appointment with GI as outpatient      Type 2 diabetes mellitus with neurologic complication, without long-term current use of insulin (HCC)- (present on admission)  Assessment & Plan  Uncontrolled  Start on insulin sliding scale with serial Accu-Checks, nutritional and long acting insulin  Hypoglycemic protocol in place           VTE prophylaxis: SCDs/TEDs    I have performed a physical exam and reviewed and updated ROS and Plan today (8/11/2021). In review of yesterday's note (8/10/2021), there are no changes except as documented above.

## 2021-08-13 ENCOUNTER — APPOINTMENT (OUTPATIENT)
Dept: RADIOLOGY | Facility: MEDICAL CENTER | Age: 41
DRG: 871 | End: 2021-08-13
Attending: INTERNAL MEDICINE
Payer: COMMERCIAL

## 2021-08-13 LAB
ABO GROUP BLD: NORMAL
ANION GAP SERPL CALC-SCNC: 12 MMOL/L (ref 7–16)
BARCODED ABORH UBTYP: 5100
BARCODED PRD CODE UBPRD: NORMAL
BARCODED UNIT NUM UBUNT: NORMAL
BLD GP AB SCN SERPL QL: NORMAL
BUN SERPL-MCNC: 13 MG/DL (ref 8–22)
CALCIUM SERPL-MCNC: 8.5 MG/DL (ref 8.5–10.5)
CHLORIDE SERPL-SCNC: 111 MMOL/L (ref 96–112)
CO2 SERPL-SCNC: 17 MMOL/L (ref 20–33)
COMPONENT R 8504R: NORMAL
CREAT SERPL-MCNC: 0.35 MG/DL (ref 0.5–1.4)
ERYTHROCYTE [DISTWIDTH] IN BLOOD BY AUTOMATED COUNT: 62.6 FL (ref 35.9–50)
GLUCOSE BLD-MCNC: 118 MG/DL (ref 65–99)
GLUCOSE BLD-MCNC: 126 MG/DL (ref 65–99)
GLUCOSE BLD-MCNC: 131 MG/DL (ref 65–99)
GLUCOSE BLD-MCNC: 132 MG/DL (ref 65–99)
GLUCOSE SERPL-MCNC: 145 MG/DL (ref 65–99)
HCT VFR BLD AUTO: 21 % (ref 37–47)
HGB BLD-MCNC: 6.9 G/DL (ref 12–16)
MAGNESIUM SERPL-MCNC: 2.1 MG/DL (ref 1.5–2.5)
MCH RBC QN AUTO: 33.3 PG (ref 27–33)
MCHC RBC AUTO-ENTMCNC: 32.9 G/DL (ref 33.6–35)
MCV RBC AUTO: 101.4 FL (ref 81.4–97.8)
PHOSPHATE SERPL-MCNC: 3.1 MG/DL (ref 2.5–4.5)
PLATELET # BLD AUTO: 90 K/UL (ref 164–446)
PMV BLD AUTO: 11.6 FL (ref 9–12.9)
POTASSIUM SERPL-SCNC: 3.8 MMOL/L (ref 3.6–5.5)
PRODUCT TYPE UPROD: NORMAL
RBC # BLD AUTO: 2.07 M/UL (ref 4.2–5.4)
RH BLD: NORMAL
SODIUM SERPL-SCNC: 140 MMOL/L (ref 135–145)
UNIT STATUS USTAT: NORMAL
WBC # BLD AUTO: 16.9 K/UL (ref 4.8–10.8)

## 2021-08-13 PROCEDURE — 82962 GLUCOSE BLOOD TEST: CPT | Mod: 91

## 2021-08-13 PROCEDURE — 74018 RADEX ABDOMEN 1 VIEW: CPT

## 2021-08-13 PROCEDURE — 80048 BASIC METABOLIC PNL TOTAL CA: CPT

## 2021-08-13 PROCEDURE — 700111 HCHG RX REV CODE 636 W/ 250 OVERRIDE (IP): Performed by: INTERNAL MEDICINE

## 2021-08-13 PROCEDURE — 36430 TRANSFUSION BLD/BLD COMPNT: CPT

## 2021-08-13 PROCEDURE — 84100 ASSAY OF PHOSPHORUS: CPT

## 2021-08-13 PROCEDURE — A9270 NON-COVERED ITEM OR SERVICE: HCPCS | Performed by: STUDENT IN AN ORGANIZED HEALTH CARE EDUCATION/TRAINING PROGRAM

## 2021-08-13 PROCEDURE — 86923 COMPATIBILITY TEST ELECTRIC: CPT

## 2021-08-13 PROCEDURE — A9270 NON-COVERED ITEM OR SERVICE: HCPCS | Performed by: INTERNAL MEDICINE

## 2021-08-13 PROCEDURE — 770006 HCHG ROOM/CARE - MED/SURG/GYN SEMI*

## 2021-08-13 PROCEDURE — 86900 BLOOD TYPING SEROLOGIC ABO: CPT

## 2021-08-13 PROCEDURE — 85027 COMPLETE CBC AUTOMATED: CPT

## 2021-08-13 PROCEDURE — 99232 SBSQ HOSP IP/OBS MODERATE 35: CPT | Performed by: INTERNAL MEDICINE

## 2021-08-13 PROCEDURE — 86901 BLOOD TYPING SEROLOGIC RH(D): CPT

## 2021-08-13 PROCEDURE — P9016 RBC LEUKOCYTES REDUCED: HCPCS

## 2021-08-13 PROCEDURE — 83735 ASSAY OF MAGNESIUM: CPT

## 2021-08-13 PROCEDURE — 700102 HCHG RX REV CODE 250 W/ 637 OVERRIDE(OP): Performed by: INTERNAL MEDICINE

## 2021-08-13 PROCEDURE — 86850 RBC ANTIBODY SCREEN: CPT

## 2021-08-13 PROCEDURE — 700102 HCHG RX REV CODE 250 W/ 637 OVERRIDE(OP): Performed by: STUDENT IN AN ORGANIZED HEALTH CARE EDUCATION/TRAINING PROGRAM

## 2021-08-13 RX ADMIN — Medication 1 CAPSULE: at 09:30

## 2021-08-13 RX ADMIN — Medication 1000 UNITS: at 06:30

## 2021-08-13 RX ADMIN — CYANOCOBALAMIN TAB 500 MCG 2500 MCG: 500 TAB at 06:29

## 2021-08-13 RX ADMIN — THERA TABS 1 TABLET: TAB at 06:30

## 2021-08-13 RX ADMIN — DIBASIC SODIUM PHOSPHATE, MONOBASIC POTASSIUM PHOSPHATE AND MONOBASIC SODIUM PHOSPHATE 250 MG: 852; 155; 130 TABLET ORAL at 23:56

## 2021-08-13 RX ADMIN — LEVOTHYROXINE SODIUM 50 MCG: 0.05 TABLET ORAL at 06:30

## 2021-08-13 RX ADMIN — FOLIC ACID 1 MG: 1 TABLET ORAL at 06:30

## 2021-08-13 RX ADMIN — DIBASIC SODIUM PHOSPHATE, MONOBASIC POTASSIUM PHOSPHATE AND MONOBASIC SODIUM PHOSPHATE 250 MG: 852; 155; 130 TABLET ORAL at 12:48

## 2021-08-13 RX ADMIN — DIBASIC SODIUM PHOSPHATE, MONOBASIC POTASSIUM PHOSPHATE AND MONOBASIC SODIUM PHOSPHATE 250 MG: 852; 155; 130 TABLET ORAL at 06:29

## 2021-08-13 RX ADMIN — ONDANSETRON 4 MG: 2 INJECTION INTRAMUSCULAR; INTRAVENOUS at 15:02

## 2021-08-13 RX ADMIN — RIFAXIMIN 550 MG: 550 TABLET ORAL at 06:29

## 2021-08-13 RX ADMIN — FUROSEMIDE 40 MG: 40 TABLET ORAL at 06:30

## 2021-08-13 RX ADMIN — RIFAXIMIN 550 MG: 550 TABLET ORAL at 17:02

## 2021-08-13 RX ADMIN — SPIRONOLACTONE 25 MG: 25 TABLET ORAL at 06:30

## 2021-08-13 RX ADMIN — DIBASIC SODIUM PHOSPHATE, MONOBASIC POTASSIUM PHOSPHATE AND MONOBASIC SODIUM PHOSPHATE 250 MG: 852; 155; 130 TABLET ORAL at 17:02

## 2021-08-13 RX ADMIN — HYDROMORPHONE HYDROCHLORIDE 0.25 MG: 1 INJECTION, SOLUTION INTRAMUSCULAR; INTRAVENOUS; SUBCUTANEOUS at 20:28

## 2021-08-13 RX ADMIN — ONDANSETRON 4 MG: 2 INJECTION INTRAMUSCULAR; INTRAVENOUS at 06:30

## 2021-08-13 ASSESSMENT — PAIN DESCRIPTION - PAIN TYPE
TYPE: ACUTE PAIN

## 2021-08-13 ASSESSMENT — ENCOUNTER SYMPTOMS
DOUBLE VISION: 0
DEPRESSION: 0
FEVER: 0
MEMORY LOSS: 0
CONSTIPATION: 0
DIZZINESS: 0
EYE PAIN: 0
CLAUDICATION: 0
ABDOMINAL PAIN: 0
INSOMNIA: 0
BLURRED VISION: 0
NECK PAIN: 0
STRIDOR: 0
HEMOPTYSIS: 0
NAUSEA: 1
HEADACHES: 0
MYALGIAS: 0
DIARRHEA: 0
BRUISES/BLEEDS EASILY: 0
COUGH: 0
BLOOD IN STOOL: 0
CHILLS: 0
VOMITING: 1

## 2021-08-13 NOTE — DISCHARGE PLANNING
Anticipated Discharge Disposition: SNF    Action: ROSALIND FRANK spoke with Leydi ROTH. Per Leydi the patient's MediCal has been confirmed as active. The patient will need to go to SNF in [place]. The patient lives in [place], [place].     RN CM discussed choice at bedside with patient and . They are not interested in going to [facility] SNF. SNF choice received for: [facility]. Choice form faxed to [facility].     Barriers to Discharge: medical clearance, SNF acceptance    Plan: Follow up with medical team.

## 2021-08-13 NOTE — DISCHARGE PLANNING
Received Choice form at 7929  Agency/Facility Name: [facility]  Referral sent per Choice form @ 0546

## 2021-08-13 NOTE — CARE PLAN
The patient is Watcher - Medium risk of patient condition declining or worsening    Shift Goals  Clinical Goals: monitor bg, and monitor rectal tube  Patient Goals: pain control/comfort  Family Goals: get better and get home    Progress made toward(s) clinical / shift goals:    Problem: Pain - Standard  Goal: Alleviation of pain or a reduction in pain to the patient’s comfort goal  Outcome: Progressing  Note: Pt declines need for pain intervention     Problem: Knowledge Deficit - Standard  Goal: Patient and family/care givers will demonstrate understanding of plan of care, disease process/condition, diagnostic tests and medications  Outcome: Progressing  Note: White board updated with POC and care team information during bedside report.      Problem: Physical Regulation  Goal: Signs and symptoms of infection will decrease  Outcome: Progressing  Note: Increase in pt mobility/strength  Decrease in pt pain level

## 2021-08-13 NOTE — PROGRESS NOTES
Pt transferred to floor. Report received from ROSALIND Gross. 2 RN skin check completed with ROSALIND Jimenez. Pt reports some leg pain, but declines interventions at this time. Safety precautions in place. All pt needs met at this time.

## 2021-08-13 NOTE — CARE PLAN
The patient is Stable - Low risk of patient condition declining or worsening    Shift Goals  Clinical Goals: Pt's pain will continue to be managed to a comfortable level  Patient Goals: pain control/comfort  Family Goals: get better and get home    Progress made toward(s) clinical / shift goals:  Pt has PRN medication for pain if needed, educated to let staff know if her pain becomes more than what is comfortable.     Patient is not progressing towards the following goals:

## 2021-08-13 NOTE — PROGRESS NOTES
Diabetes education: Attempted to meet with pt this evening, but she was sleeping. Spoke with SO and handout given. CDE to follow up on Monday if pt remains in hospital.     1 person assist

## 2021-08-13 NOTE — PROGRESS NOTES
Pt transferred to S6, All belongings, chart, medications and family with pt at time of transfer. Report given to ROSALIND Contreras

## 2021-08-13 NOTE — PROGRESS NOTES
4 Eyes Skin Assessment Completed by ROSALIND Contreras and ROSALIND Jimenez.    Head WDL  Ears WDL  Nose- cortrak in place R nare- no sign of skin breakdown- intact  Mouth- redness/scabbing on bottom lip  Neck WDL  Breast/Chest WDL  Shoulder Blades WDL  Spine WDL  (R) Arm/Elbow/Hand Bruising, swelling  (L) Arm/Elbow/Hand Bruising, swelling  Abdomen Cellulitis on sides and lower abdomen- black on lower left and right mid side- redness and bleeding on lower right  Groin redness, excoriation  Scrotum/Coccyx/Buttocks Redness and Excoriation, open, serous drainage  (R) Leg redness, edema, wound on upper inner thigh  (L) Leg Redness and Edema, wound on posterior inner thigh  (R) Heel/Foot/Toe Blanching, Boggy and Edema  (L) Heel/Foot/Toe Blanching, Boggy and Edema          Devices In Places Charlton and Cortrak      Interventions In Place Heel Float Boots, Pillows, Low Air Loss Mattress and Barrier Cream, barrier paste    Possible Skin Injury Yes, wound following    Pictures Uploaded Into Epic N/A, Melanie barrientos open  Wound Consult Placed N/A, already following  RN Wound Prevention Protocol Ordered Yes

## 2021-08-13 NOTE — PROGRESS NOTES
Skin assessment completed by ROSALIND Gross and ROSALIND Topete.  Head Jaundice  Ears Jaundice  Nose Jaundice, cortrak in right nare.   Mouth dry, dry blood, lips cracked   Neck WDL  Breast/Chest Bruising, Discoloration, Jaundice and Edema  Shoulder Blades WDL  Spine WDL  (R) Arm/Elbow/Hand Redness, Blanching, dry and Bruising  (L) Arm/Elbow/Hand Redness, Blanching, dry and Bruising  Abdomen Redness, black, brown, eschar, excoriation. Viscopaste in place.   Groin Redness, Blanching and Excoriation, hutson in place   Scrotum/Coccyx/Buttocks Redness, Blanching, Bleeding Excoriation and Discoloration right greater than left; Bleeding left butt cheek tear present.   (R) Leg Bruising, Jaundice and Edema  (L) Leg Bruising, Jaundice and Edema  (R) Heel/Foot/Toe Blanching, Boggy and Edema  (L) Heel/Foot/Toe Blanching, Boggy and Edema              Devices In Places Pulse Ox, cortrak, hutson         Interventions In Place Heel Float Boots, Pillows, Q2 Turns, Barrier Cream, Dri-Bill Pads and low air loss mattress      Possible Skin Injury Yes, wound following     Pictures Uploaded Into Epic Yes  Wound Consult Placed Yes, wound following   RN Wound Prevention Protocol Ordered Yes

## 2021-08-13 NOTE — PROGRESS NOTES
"Hospital Medicine Daily Progress Note    Date of Service  8/13/2021    Chief Complaint  Hien Alfaro is a 41 y.o. female admitted 8/1/2021 with abdominal wall cellulitis    Hospital Course    Per admitting provider:  \"Hien Alfaro is a 41 y.o. female who presented 8/1/2021 with Wound Check (Pt reports redness in lower abd started ~2 weeks ago, now presenting with blackened open wounds across lower abd to b/l groin. )  She is morbidly obese. She has a history of alcoholic cirrhosis, portal vein thrombosis NOT on anticoagulation (seen and decided by Dr. Montenegro, [facility]) and was hospitalized in June for decompensated alcoholic cirrhosis with hyperammonemia, bilirubinemia and hepatorenal syndrome with poor outpatient follow-up. She was placed on antibiotics for her pannus infections. Despite antibiotics, she noticed black open wounds along her abdomen to bilateral groin and also a lump/mass that started 2 weeks now RLQ region.\"      CT AP at another facility noted colitis and panniculitis, no gas or abscess noted. Transferred to [facility] ER for higher level of care, admitted to medicine service, surgery was consulted, no surgery indicated, cont abx.     Interval Problem Update    8/10/2021: The patient was admitted for further evaluation and management of abdominal wall cellulitis.  She was evaluated by ID with current recommendations to complete antibiotic therapy for 10 days.  Stop date is 8/11/2021.  On physical examination patient without any worsening erythema or tenderness to the affected areas.  She was evaluated by surgery who states that there is no surgical intervention needed at this time.  Patient is more awake this morning and is alert and oriented to time place and person.  We are pending her ability to tolerate oral intake prior to discontinuing NGT.  Pending reevaluation by PT/OT to determine disposition needs.  Patient did have some nausea and vomiting during afternoon meal however she denies any chest " pains, palpitations, shortness of breath.  No other overnight events reported.    8/11/2021: The patient was reevaluated by PT/OT with recommendations for SNF, pending placement. She is still unable to fully tolerate meals. Speech with recommendations to remove cortrak after she is able to tolerate 1-2 meals. Patient without nausea or vomiting but has decreased appetite. Her mentation continues to improve. She is alert to time place and person. States that she is ambulating better. No chest pain or palpitations. No other overnight events noted.    8/12/2021: The patient was seen and evaluated at bedside and remains pleasant. She is alert to time place and person. Patient's mentation has significantly improved. Patient still with significant debility requiring full assist during ambulation. We are pending placement to SNF for further physical therapy. Her appetite is improving steadily however nutrition with current recommendations to continue supplementation of her feeds with nocturnal tube feeding. Patient without bowel or bladder disturbances. No other overnight events reported.    8/13/2021: The patient was seen and evaluated at bedside and states that she still has persistent nausea and vomiting during meals.  She will continue to require NG tube for nocturnal supplementation.  Patient states that she has been having regular bowel movements and no worsening abdominal pain.  At this time we will further investigate with abdominal x-ray to rule out evolving obstruction as a cause of her persistent nausea and vomiting.  Patient was found to have hemoglobin level of 6.9 requiring transfusion of 1 unit packed RBCs.  Patient denies any active bleeding.  Patient states that she is ambulating better with assistance.  No other overnight events reported.  Pending placement to SNF.      I have personally seen and examined the patient at bedside. I discussed the plan of care with  patient.    Consultants/Specialty  infectious disease   General surgery    Code Status  Full Code    Disposition  Patient is not medically cleared.   Anticipate discharge to to home with close outpatient follow-up.  I have placed the appropriate orders for post-discharge needs.    Review of Systems  Review of Systems   Constitutional: Negative for chills, fever and malaise/fatigue.   HENT: Negative for hearing loss and tinnitus.    Eyes: Negative for blurred vision, double vision and pain.   Respiratory: Negative for cough, hemoptysis and stridor.    Cardiovascular: Negative for claudication and leg swelling.   Gastrointestinal: Positive for nausea and vomiting. Negative for abdominal pain, blood in stool, constipation and diarrhea.   Genitourinary: Negative for dysuria and urgency.   Musculoskeletal: Negative for myalgias and neck pain.   Skin: Negative for itching and rash.   Neurological: Negative for dizziness and headaches.   Endo/Heme/Allergies: Does not bruise/bleed easily.   Psychiatric/Behavioral: Negative for depression, memory loss and suicidal ideas. The patient does not have insomnia.         Physical Exam  Temp:  [36.2 °C (97.1 °F)-37.2 °C (99 °F)] 36.2 °C (97.1 °F)  Pulse:  [107-114] 107  Resp:  [18-20] 20  BP: (100-115)/(52-78) 100/53  SpO2:  [98 %-100 %] 98 %    Physical Exam  Vitals and nursing note reviewed.   Constitutional:       General: She is not in acute distress.     Appearance: Normal appearance. She is obese.   HENT:      Head: Normocephalic and atraumatic.      Mouth/Throat:      Mouth: Mucous membranes are moist.   Eyes:      Extraocular Movements: Extraocular movements intact.      Conjunctiva/sclera: Conjunctivae normal.      Pupils: Pupils are equal, round, and reactive to light.   Cardiovascular:      Rate and Rhythm: Normal rate and regular rhythm.      Pulses: Normal pulses.      Heart sounds: Normal heart sounds.   Pulmonary:      Effort: Pulmonary effort is normal.      Breath  sounds: Normal breath sounds.   Abdominal:      General: Abdomen is flat. Bowel sounds are normal.      Palpations: Abdomen is soft.   Musculoskeletal:         General: No swelling or tenderness. Normal range of motion.      Cervical back: Normal range of motion and neck supple.      Right lower leg: Edema present.      Left lower leg: Edema present.   Skin:     General: Skin is warm and dry.      Comments: Abdominal wall with several areas of induration however no associated erythema or purulent drainage. Areas of black scar tissue.    Neurological:      General: No focal deficit present.      Mental Status: She is alert and oriented to person, place, and time. Mental status is at baseline.   Psychiatric:         Mood and Affect: Mood normal.         Behavior: Behavior normal.         Fluids    Intake/Output Summary (Last 24 hours) at 8/13/2021 1206  Last data filed at 8/13/2021 0535  Gross per 24 hour   Intake 200 ml   Output 500 ml   Net -300 ml       Laboratory  Recent Labs     08/11/21  0243 08/12/21  0025 08/13/21  0222   WBC 15.3* 14.2* 16.9*   RBC 2.16* 2.23* 2.07*   HEMOGLOBIN 7.3* 7.6* 6.9*   HEMATOCRIT 22.3* 23.2* 21.0*   .2* 104.0* 101.4*   MCH 33.8* 34.1* 33.3*   MCHC 32.7* 32.8* 32.9*   RDW 68.4* 67.8* 62.6*   PLATELETCT 92* 81* 90*   MPV 11.7 11.5 11.6     Recent Labs     08/11/21  0243 08/12/21  0025 08/13/21  0222   SODIUM 141 143 140   POTASSIUM 3.8 3.7 3.8   CHLORIDE 113* 115* 111   CO2 16* 17* 17*   GLUCOSE 254* 175* 145*   BUN 16 15 13   CREATININE 0.49* 0.55 0.35*   CALCIUM 8.5 8.7 8.5                   Imaging  IR-MIDLINE CATHETER INSERTION WO GUIDANCE > AGE 3   Final Result                  Ultrasound-guided midline placement performed by qualified nursing staff    as above.          CT-EXTREMITY, LOWER WITH LEFT   Final Result      1.  No evidence of osteomyelitis.   2.  Soft tissue edema in the pannus and left thigh with skin thickening which can be seen in the setting of cellulitis.    3.  No abscess is identified.   4.  No soft tissue air is noted.   5.  Free fluid is seen in the pelvis.      CT-ABDOMEN-PELVIS WITH   Final Result      1.  Again there are morphologic changes of the liver consistent with cirrhosis with associated splenomegaly.   2.  Portal vein and mesenteric vessels are grossly patent.   3.  There is a small amount of ascites with mesenteric edema and subcutaneous edema.   4.  There is no soft tissue abscess or intra-abdominal or pelvic abscess.   5.  There is colonic wall edema most likely related to the liver disease and hypoproteinemia.      US-ABDOMEN LTD (SOFT TISSUE)   Final Result      1.  Small volume of ascites in the right upper quadrant adjacent to the liver.      2.  Cirrhotic appearance of liver.      3.  No large volume of ascites.      DX-ABDOMEN FOR TUBE PLACEMENT   Final Result      Cortrak feeding tube tip projects in the region of the duodenal bulb.      CT-HEAD W/O   Final Result      No evidence of acute intracranial process.      OUTSIDE IMAGES-CT ABDOMEN /PELVIS   Final Result      OUTSIDE IMAGES-DX CHEST   Final Result      OUTSIDE IMAGES-CT ABDOMEN /PELVIS   Final Result      [id number] VIEW    (Results Pending)        Assessment/Plan  * Abdominal panniculitis/cellulitis w/ concern for necrosis- (present on admission)  Assessment & Plan  CT AP at another facility colitis, panniculitis with no gas/abscess  Surgery f/u appreciated -- no indication for surgery at this time  ID is consulted. Pt is on zosyn and zyvox, Stop date 8/11/2021  Low threshold for CT imaging if septic shock/deteriorates  Follow blood culture  Wound care  Advised on sterile technique for insulin administration     RESOLVED    Coagulopathy (HCC)  Assessment & Plan  INR 2.85  I have ordered FFP and IV vitamin K    Liver failure (HCC)  Assessment & Plan  Secondary to alcoholic cirrhosis   With coagulopathy, bleeding, hepatic encephalopathy, ascites    Hyperbilirubinemia  Assessment &  Plan  Sec to ESLD  Total bilirubin 4. Her total bilirubin was 11 in June    Anxiety  Assessment & Plan  PRN atarax    Acute encephalopathy  Assessment & Plan  Likely hepatic encephalopathy  Ordered CT head, unremarkable  Ammonia elevated, trending down   On lactulose and Rifaximin    End stage liver disease (HCC)  Assessment & Plan  MELD score: 28 on admission  Secondary to ETOH  Hepatitis panel neg  Poor prognosis    Severe protein-calorie malnutrition (HCC)  Assessment & Plan  Ensure    Failure to thrive in adult  Assessment & Plan  PT/OT, PO diet as able to tolerated  Palliative care consult    Anasarca  Assessment & Plan  Secondary to ESLD  Supportive care  Continue to titrate lasix and spironolactone     Hypoglycemia  Assessment & Plan  Hypoglycemia protocol    Colitis  Assessment & Plan  As noted in panniculitis    RESOLVED    Portal vein thrombosis- (present on admission)  Assessment & Plan  Suspect chronic PV throbosis -- in which case there is no clear benefit for anticoagulation -- case was previously noted by hematology (Dr. Montenegro)    Will avoid chronic anticoagulation due to ongoing bleeding    Class 3 severe obesity due to excess calories with serious comorbidity and body mass index (BMI) of 40.0 to 44.9 in adult (HCC)- (present on admission)  Assessment & Plan  Diet and lifestyle modifications    Sepsis (HCC)  Assessment & Plan  This is Sepsis Present on admission  SIRS criteria identified on my evaluation include: Leukocytosis, with WBC greater than 12,000  Source is pannus  Sepsis protocol initiated  Fluid resuscitation ordered per protocol  IV antibiotics as appropriate for source of sepsis  While organ dysfunction may be noted elsewhere in this problem list or in the chart, degree of organ dysfunction does not meet CMS criteria for severe sepsis    On IVF  Abx  Follow up lactic acid and blood culture    RESOLVED      Hypothyroidism- (present on admission)  Assessment & Plan  TSH 7.2, elevated,  but improved from TSH in April, free T4 in normal range  Synthroid    Alcoholic cirrhosis of liver without ascites (HCC)- (present on admission)  Assessment & Plan  Decompensated.   reports patient has not been drinking since May  MELD score 28 on admission, 27-32% 90 day mortality rate  She has upcoming appointment with GI as outpatient      Type 2 diabetes mellitus with neurologic complication, without long-term current use of insulin (HCC)- (present on admission)  Assessment & Plan  Uncontrolled  Start on insulin sliding scale with serial Accu-Checks, nutritional and long acting insulin  Hypoglycemic protocol in place           VTE prophylaxis: SCDs/TEDs    I have performed a physical exam and reviewed and updated ROS and Plan today (8/13/2021). In review of yesterday's note (8/12/2021), there are no changes except as documented above.

## 2021-08-14 LAB
ANION GAP SERPL CALC-SCNC: 12 MMOL/L (ref 7–16)
BUN SERPL-MCNC: 13 MG/DL (ref 8–22)
CALCIUM SERPL-MCNC: 8.2 MG/DL (ref 8.5–10.5)
CHLORIDE SERPL-SCNC: 110 MMOL/L (ref 96–112)
CO2 SERPL-SCNC: 17 MMOL/L (ref 20–33)
CREAT SERPL-MCNC: 0.53 MG/DL (ref 0.5–1.4)
ERYTHROCYTE [DISTWIDTH] IN BLOOD BY AUTOMATED COUNT: 64.7 FL (ref 35.9–50)
GLUCOSE BLD-MCNC: 121 MG/DL (ref 65–99)
GLUCOSE BLD-MCNC: 132 MG/DL (ref 65–99)
GLUCOSE BLD-MCNC: 146 MG/DL (ref 65–99)
GLUCOSE SERPL-MCNC: 134 MG/DL (ref 65–99)
HCT VFR BLD AUTO: 23.9 % (ref 37–47)
HGB BLD-MCNC: 7.8 G/DL (ref 12–16)
MAGNESIUM SERPL-MCNC: 1.9 MG/DL (ref 1.5–2.5)
MCH RBC QN AUTO: 34.2 PG (ref 27–33)
MCHC RBC AUTO-ENTMCNC: 32.6 G/DL (ref 33.6–35)
MCV RBC AUTO: 104.8 FL (ref 81.4–97.8)
PHOSPHATE SERPL-MCNC: 3.3 MG/DL (ref 2.5–4.5)
PLATELET # BLD AUTO: 72 K/UL (ref 164–446)
PMV BLD AUTO: 12 FL (ref 9–12.9)
POTASSIUM SERPL-SCNC: 3.8 MMOL/L (ref 3.6–5.5)
RBC # BLD AUTO: 2.28 M/UL (ref 4.2–5.4)
SODIUM SERPL-SCNC: 139 MMOL/L (ref 135–145)
WBC # BLD AUTO: 17.2 K/UL (ref 4.8–10.8)

## 2021-08-14 PROCEDURE — 80048 BASIC METABOLIC PNL TOTAL CA: CPT

## 2021-08-14 PROCEDURE — 85027 COMPLETE CBC AUTOMATED: CPT

## 2021-08-14 PROCEDURE — 700111 HCHG RX REV CODE 636 W/ 250 OVERRIDE (IP): Performed by: INTERNAL MEDICINE

## 2021-08-14 PROCEDURE — 82962 GLUCOSE BLOOD TEST: CPT | Mod: 91

## 2021-08-14 PROCEDURE — A9270 NON-COVERED ITEM OR SERVICE: HCPCS | Performed by: INTERNAL MEDICINE

## 2021-08-14 PROCEDURE — 99232 SBSQ HOSP IP/OBS MODERATE 35: CPT | Performed by: INTERNAL MEDICINE

## 2021-08-14 PROCEDURE — 700102 HCHG RX REV CODE 250 W/ 637 OVERRIDE(OP): Performed by: INTERNAL MEDICINE

## 2021-08-14 PROCEDURE — 83735 ASSAY OF MAGNESIUM: CPT

## 2021-08-14 PROCEDURE — 36415 COLL VENOUS BLD VENIPUNCTURE: CPT

## 2021-08-14 PROCEDURE — 770006 HCHG ROOM/CARE - MED/SURG/GYN SEMI*

## 2021-08-14 PROCEDURE — 84100 ASSAY OF PHOSPHORUS: CPT

## 2021-08-14 RX ADMIN — Medication 1000 UNITS: at 04:55

## 2021-08-14 RX ADMIN — LEVOTHYROXINE SODIUM 50 MCG: 0.05 TABLET ORAL at 04:55

## 2021-08-14 RX ADMIN — RIFAXIMIN 550 MG: 550 TABLET ORAL at 04:56

## 2021-08-14 RX ADMIN — FOLIC ACID 1 MG: 1 TABLET ORAL at 04:55

## 2021-08-14 RX ADMIN — DIBASIC SODIUM PHOSPHATE, MONOBASIC POTASSIUM PHOSPHATE AND MONOBASIC SODIUM PHOSPHATE 250 MG: 852; 155; 130 TABLET ORAL at 04:55

## 2021-08-14 RX ADMIN — INSULIN LISPRO 2 UNITS: 100 INJECTION, SOLUTION INTRAVENOUS; SUBCUTANEOUS at 20:20

## 2021-08-14 RX ADMIN — Medication 1 CAPSULE: at 08:13

## 2021-08-14 RX ADMIN — THERA TABS 1 TABLET: TAB at 04:55

## 2021-08-14 RX ADMIN — HYDROMORPHONE HYDROCHLORIDE 0.25 MG: 1 INJECTION, SOLUTION INTRAMUSCULAR; INTRAVENOUS; SUBCUTANEOUS at 18:40

## 2021-08-14 RX ADMIN — DIBASIC SODIUM PHOSPHATE, MONOBASIC POTASSIUM PHOSPHATE AND MONOBASIC SODIUM PHOSPHATE 250 MG: 852; 155; 130 TABLET ORAL at 13:36

## 2021-08-14 RX ADMIN — DIBASIC SODIUM PHOSPHATE, MONOBASIC POTASSIUM PHOSPHATE AND MONOBASIC SODIUM PHOSPHATE 250 MG: 852; 155; 130 TABLET ORAL at 18:15

## 2021-08-14 RX ADMIN — ONDANSETRON 4 MG: 2 INJECTION INTRAMUSCULAR; INTRAVENOUS at 15:48

## 2021-08-14 RX ADMIN — LACTULOSE 45 ML: 20 SOLUTION ORAL at 18:14

## 2021-08-14 RX ADMIN — HYDROMORPHONE HYDROCHLORIDE 0.25 MG: 1 INJECTION, SOLUTION INTRAMUSCULAR; INTRAVENOUS; SUBCUTANEOUS at 04:55

## 2021-08-14 RX ADMIN — RIFAXIMIN 550 MG: 550 TABLET ORAL at 18:16

## 2021-08-14 RX ADMIN — CYANOCOBALAMIN TAB 500 MCG 2500 MCG: 500 TAB at 04:55

## 2021-08-14 ASSESSMENT — ENCOUNTER SYMPTOMS
MYALGIAS: 0
FEVER: 0
NECK PAIN: 0
HEMOPTYSIS: 0
NAUSEA: 1
DEPRESSION: 0
COUGH: 0
BLURRED VISION: 0
STRIDOR: 0
INSOMNIA: 0
CONSTIPATION: 0
VOMITING: 1
MEMORY LOSS: 0
CHILLS: 0
BRUISES/BLEEDS EASILY: 0
DOUBLE VISION: 0
HEADACHES: 0
CLAUDICATION: 0
BLOOD IN STOOL: 0
EYE PAIN: 0
DIARRHEA: 0
ABDOMINAL PAIN: 0
DIZZINESS: 0

## 2021-08-14 ASSESSMENT — PAIN DESCRIPTION - PAIN TYPE: TYPE: ACUTE PAIN

## 2021-08-14 NOTE — CARE PLAN
The patient is Watcher - Medium risk of patient condition declining or worsening    Shift Goals  Clinical Goals: Pt's pain will continue to be managed to a comfortable level  Patient Goals: pain control/comfort  Family Goals: get better and get home    Progress made toward(s) clinical / shift goals:  Pain has been managed with PRN IV dilaudid q.8.    Patient is not progressing towards the following goals:

## 2021-08-14 NOTE — PROGRESS NOTES
1 RN Skin Assessment completed.   Patient's risk of skin breakdown: High    Devices in place and skin assessed under:   [] Pulse Ox  [x] PIV [x] Central Line [] SCDs []Purewick  [x] Hutson  []Condom Cath   [] BMS        [x]  Cortrak   []  Oxymask   [] Bipap    [] Nasal Canula   [] N/A   [] Other(specify):     Right Ear  [x] WDL                or           [] Skin issue present (please provide assessment/description below)    Left Ear  [x] WDL                or           [] Skin issue present (please provide assessment/description below)    Right Elbow:  [] WDL               or           [x] Skin issue present (please provide assessment/description below)  -pink, blanching, bruising.   Left Elbow:   [] WDL                or           [x] Skin issue present (please provide assessment/description below)  -pink, blanching, bruising.   Coccyx/Buttocks:  [] WDL                or           [x] Skin issue present (please provide assessment/description below)  -red and excoriation with open wounds.     Right Heel/Foot/Toes:  [] WDL                or           [x] Skin issue present (please provide assessment/description below)  -blanching, boggy and edematous.   Left Heel/Foot/Toes:   [] WDL              or           [x] Skin issue present (please provide assessment/description below)   -blanching, boggy and edematous.     **Describe any other skin issues in areas not already listed from above list:   [] N/A  -redness around groin, and excoriation.   -necrotic tissue around pannus.   -wounds to upper and posterior bilateral  inner thighs.   -  Interventions In Place: low airloss mattress, Q2 turns, barrier cream, hutson care, incontinence care, wound team with wound care orders.     **If any new or digressing skin issues are found, fill out the selection below.    Possible Skin Injury Yes  Pictures Uploaded Into Epic: Yes  Wound Consult Placed: Yes, following.   RN Wound Prevention Protocol Ordered: Yes   What new  preventative interventions did you add? N/A

## 2021-08-14 NOTE — PROGRESS NOTES
Report received by dayshift RN. Assumed care of pt. Assessment complete. Pt A&Ox4, VSS and on RA. Pt had c/o leg and abdominal pain rated 8/10, medicated with PRN IV dilaudid. Charlton catheter in place. Pt is a SB assist to the bathroom and calls appropriately. Plan of care discussed. Call light within reach, bed in lowest position, and pt has no further questions at this time.

## 2021-08-14 NOTE — PROGRESS NOTES
"Hospital Medicine Daily Progress Note    Date of Service  8/14/2021    Chief Complaint  Hien Alfaro is a 41 y.o. female admitted 8/1/2021 with abdominal wall cellulitis    Hospital Course    Per admitting provider:  \"Hien Alfaro is a 41 y.o. female who presented 8/1/2021 with Wound Check (Pt reports redness in lower abd started ~2 weeks ago, now presenting with blackened open wounds across lower abd to b/l groin. )  She is morbidly obese. She has a history of alcoholic cirrhosis, portal vein thrombosis NOT on anticoagulation (seen and decided by Dr. Montenegro, [facility]) and was hospitalized in June for decompensated alcoholic cirrhosis with hyperammonemia, bilirubinemia and hepatorenal syndrome with poor outpatient follow-up. She was placed on antibiotics for her pannus infections. Despite antibiotics, she noticed black open wounds along her abdomen to bilateral groin and also a lump/mass that started 2 weeks now RLQ region.\"      CT AP at another facility noted colitis and panniculitis, no gas or abscess noted. Transferred to [facility] ER for higher level of care, admitted to medicine service, surgery was consulted, no surgery indicated, cont abx.     Interval Problem Update    8/10/2021: The patient was admitted for further evaluation and management of abdominal wall cellulitis.  She was evaluated by ID with current recommendations to complete antibiotic therapy for 10 days.  Stop date is 8/11/2021.  On physical examination patient without any worsening erythema or tenderness to the affected areas.  She was evaluated by surgery who states that there is no surgical intervention needed at this time.  Patient is more awake this morning and is alert and oriented to time place and person.  We are pending her ability to tolerate oral intake prior to discontinuing NGT.  Pending reevaluation by PT/OT to determine disposition needs.  Patient did have some nausea and vomiting during afternoon meal however she denies any chest " "pains, palpitations, shortness of breath.  No other overnight events reported.    8/11/2021: The patient was reevaluated by PT/OT with recommendations for SNF, pending placement. She is still unable to fully tolerate meals. Speech with recommendations to remove cortrak after she is able to tolerate 1-2 meals. Patient without nausea or vomiting but has decreased appetite. Her mentation continues to improve. She is alert to time place and person. States that she is ambulating better. No chest pain or palpitations. No other overnight events noted.    8/12/2021: The patient was seen and evaluated at bedside and remains pleasant. She is alert to time place and person. Patient's mentation has significantly improved. Patient still with significant debility requiring full assist during ambulation. We are pending placement to SNF for further physical therapy. Her appetite is improving steadily however nutrition with current recommendations to continue supplementation of her feeds with nocturnal tube feeding. Patient without bowel or bladder disturbances. No other overnight events reported.    8/13/2021: The patient was seen and evaluated at bedside and states that she still has persistent nausea and vomiting during meals.  She will continue to require NG tube for nocturnal supplementation.  Patient states that she has been having regular bowel movements and no worsening abdominal pain.  At this time we will further investigate with abdominal x-ray to rule out evolving obstruction as a cause of her persistent nausea and vomiting.  Patient was found to have hemoglobin level of 6.9 requiring transfusion of 1 unit packed RBCs.  Patient denies any active bleeding.  Patient states that she is ambulating better with assistance.  No other overnight events reported.  Pending placement to SNF.\"    8/14. The KUB did not show bowel obstruction or any cause for her persistent nausea and vomiting. Keepin gfood down. Will trial her off the " NG tube  Sleeping. Family at bedside. He mentioned planned for SNF on MOnday. They were presented with SNF choices.       I have personally seen and examined the patient at bedside. I discussed the plan of care with patient.    Consultants/Specialty  infectious disease   General surgery    Code Status  Full Code    Disposition  Patient is not medically cleared.   Anticipate discharge to to skilled nursing facility.  Per PT/OT  I have placed the appropriate orders for post-discharge needs.    Review of Systems  Review of Systems   Constitutional: Negative for chills, fever and malaise/fatigue.   HENT: Negative for hearing loss and tinnitus.    Eyes: Negative for blurred vision, double vision and pain.   Respiratory: Negative for cough, hemoptysis and stridor.    Cardiovascular: Negative for claudication and leg swelling.   Gastrointestinal: Positive for nausea and vomiting. Negative for abdominal pain, blood in stool, constipation and diarrhea.   Genitourinary: Negative for dysuria and urgency.   Musculoskeletal: Negative for myalgias and neck pain.   Skin: Negative for itching and rash.   Neurological: Negative for dizziness and headaches.   Endo/Heme/Allergies: Does not bruise/bleed easily.   Psychiatric/Behavioral: Negative for depression, memory loss and suicidal ideas. The patient does not have insomnia.         Physical Exam  Temp:  [36.5 °C (97.7 °F)-36.9 °C (98.4 °F)] 36.5 °C (97.7 °F)  Pulse:  [105-109] 106  Resp:  [16-20] 18  BP: (100-110)/(54-64) 101/54  SpO2:  [96 %-98 %] 98 %    Physical Exam  Vitals and nursing note reviewed.   Constitutional:       General: She is not in acute distress.     Appearance: Normal appearance. She is obese.   HENT:      Head: Normocephalic and atraumatic.      Right Ear: External ear normal.      Left Ear: External ear normal.      Nose: Nose normal.      Comments: NG tube in place     Mouth/Throat:      Mouth: Mucous membranes are moist.   Eyes:      General: No scleral  icterus.     Extraocular Movements: Extraocular movements intact.      Conjunctiva/sclera: Conjunctivae normal.      Pupils: Pupils are equal, round, and reactive to light.   Cardiovascular:      Rate and Rhythm: Normal rate and regular rhythm.      Pulses: Normal pulses.      Heart sounds: Normal heart sounds. No murmur heard.   No friction rub. No gallop.    Pulmonary:      Effort: Pulmonary effort is normal. No respiratory distress.      Breath sounds: Normal breath sounds. No stridor. No wheezing, rhonchi or rales.   Chest:      Chest wall: No tenderness.   Abdominal:      General: Abdomen is flat. Bowel sounds are normal. There is no distension.      Palpations: Abdomen is soft.      Tenderness: There is no abdominal tenderness. There is no guarding or rebound.   Musculoskeletal:         General: No swelling, tenderness or deformity. Normal range of motion.      Cervical back: Normal range of motion and neck supple. No rigidity.      Right lower leg: Edema present.      Left lower leg: Edema present.   Skin:     General: Skin is warm and dry.      Coloration: Skin is not jaundiced.      Comments: Abdominal wall with several areas of induration however no associated erythema or purulent drainage. Areas of black scar tissue.    Neurological:      General: No focal deficit present.      Mental Status: She is alert and oriented to person, place, and time. Mental status is at baseline.   Psychiatric:         Mood and Affect: Mood normal.         Behavior: Behavior normal.         Thought Content: Thought content normal.         Judgment: Judgment normal.         Fluids    Intake/Output Summary (Last 24 hours) at 8/14/2021 1002  Last data filed at 8/14/2021 0500  Gross per 24 hour   Intake 850 ml   Output 600 ml   Net 250 ml       Laboratory  Recent Labs     08/12/21  0025 08/13/21  0222 08/14/21  0518   WBC 14.2* 16.9* 17.2*   RBC 2.23* 2.07* 2.28*   HEMOGLOBIN 7.6* 6.9* 7.8*   HEMATOCRIT 23.2* 21.0* 23.9*   MCV  104.0* 101.4* 104.8*   MCH 34.1* 33.3* 34.2*   MCHC 32.8* 32.9* 32.6*   RDW 67.8* 62.6* 64.7*   PLATELETCT 81* 90* 72*   MPV 11.5 11.6 12.0     Recent Labs     08/12/21  0025 08/13/21  0222 08/14/21  0518   SODIUM 143 140 139   POTASSIUM 3.7 3.8 3.8   CHLORIDE 115* 111 110   CO2 17* 17* 17*   GLUCOSE 175* 145* 134*   BUN 15 13 13   CREATININE 0.55 0.35* 0.53   CALCIUM 8.7 8.5 8.2*                   Imaging  [id number] VIEW   Final Result      1.  Enteric tube projects over the expected location of the duodenal bulb.   2.  Nonspecific bowel gas pattern.   3.  Body wall edema.      IR-MIDLINE CATHETER INSERTION WO GUIDANCE > AGE 3   Final Result                  Ultrasound-guided midline placement performed by qualified nursing staff    as above.          CT-EXTREMITY, LOWER WITH LEFT   Final Result      1.  No evidence of osteomyelitis.   2.  Soft tissue edema in the pannus and left thigh with skin thickening which can be seen in the setting of cellulitis.   3.  No abscess is identified.   4.  No soft tissue air is noted.   5.  Free fluid is seen in the pelvis.      CT-ABDOMEN-PELVIS WITH   Final Result      1.  Again there are morphologic changes of the liver consistent with cirrhosis with associated splenomegaly.   2.  Portal vein and mesenteric vessels are grossly patent.   3.  There is a small amount of ascites with mesenteric edema and subcutaneous edema.   4.  There is no soft tissue abscess or intra-abdominal or pelvic abscess.   5.  There is colonic wall edema most likely related to the liver disease and hypoproteinemia.      US-ABDOMEN LTD (SOFT TISSUE)   Final Result      1.  Small volume of ascites in the right upper quadrant adjacent to the liver.      2.  Cirrhotic appearance of liver.      3.  No large volume of ascites.      DX-ABDOMEN FOR TUBE PLACEMENT   Final Result      Cortrak feeding tube tip projects in the region of the duodenal bulb.      CT-HEAD W/O   Final Result      No evidence of acute  "intracranial process.      OUTSIDE IMAGES-CT ABDOMEN /PELVIS   Final Result      OUTSIDE IMAGES-DX CHEST   Final Result      OUTSIDE IMAGES-CT ABDOMEN /PELVIS   Final Result           Assessment/Plan  * Abdominal panniculitis/cellulitis w/ concern for necrosis- (present on admission)  Assessment & Plan  CT AP at another facility colitis, panniculitis with no gas/abscess  Surgery f/u appreciated -- no indication for surgery at this time  ID is consulted. Pt is on zosyn and zyvox, Stop date 8/11/2021  Low threshold for CT imaging if septic shock/deteriorates  Follow blood culture  Wound care  Advised on sterile technique for insulin administration     RESOLVED\"    SNF planned.    Coagulopathy (HCC)  Assessment & Plan  INR 2.85  I have ordered FFP and IV vitamin K    Liver failure (HCC)  Assessment & Plan  Secondary to alcoholic cirrhosis   With coagulopathy, bleeding, hepatic encephalopathy, ascites    Hyperbilirubinemia  Assessment & Plan  Sec to ESLD  Total bilirubin 4. Her total bilirubin was 11 in June    Anxiety  Assessment & Plan  PRN atarax    Acute encephalopathy  Assessment & Plan  Likely hepatic encephalopathy  Ordered CT head, unremarkable  Ammonia elevated, trending down   On lactulose and Rifaximin    End stage liver disease (HCC)  Assessment & Plan  MELD score: 28 on admission  Secondary to ETOH  Hepatitis panel neg  Poor prognosis    Severe protein-calorie malnutrition (HCC)  Assessment & Plan  Ensure    Failure to thrive in adult  Assessment & Plan  PT/OT, PO diet as able to tolerated  Palliative care consult    Anasarca  Assessment & Plan  Secondary to ESLD  Supportive care  Continue to titrate lasix and spironolactone     Hypoglycemia  Assessment & Plan  Hypoglycemia protocol    Colitis  Assessment & Plan  As noted in panniculitis    RESOLVED    Portal vein thrombosis- (present on admission)  Assessment & Plan  Suspect chronic PV throbosis -- in which case there is no clear benefit for " "anticoagulation -- case was previously noted by hematology (Dr. Montenegro)    Will avoid chronic anticoagulation due to ongoing bleeding    Class 3 severe obesity due to excess calories with serious comorbidity and body mass index (BMI) of 40.0 to 44.9 in adult ([facility])- (present on admission)  Assessment & Plan  Diet and lifestyle modifications    Sepsis ([facility])  Assessment & Plan  This is Sepsis Present on admission  SIRS criteria identified on my evaluation include: Leukocytosis, with WBC greater than 12,000  Source is pannus  Sepsis protocol initiated  Fluid resuscitation ordered per protocol  IV antibiotics as appropriate for source of sepsis  While organ dysfunction may be noted elsewhere in this problem list or in the chart, degree of organ dysfunction does not meet CMS criteria for severe sepsis    On IVF  Abx  Follow up lactic acid and blood culture    RESOLVED      Hypothyroidism- (present on admission)  Assessment & Plan  TSH 7.2, elevated, but improved from TSH in April, free T4 in normal range  Synthroid    Alcoholic cirrhosis of liver without ascites ([facility])- (present on admission)  Assessment & Plan  Decompensated.   reports patient has not been drinking since May  MELD score 28 on admission, 27-32% 90 day mortality rate  She has upcoming appointment with GI as outpatient      Type 2 diabetes mellitus with neurologic complication, without long-term current use of insulin ([facility])- (present on admission)  Assessment & Plan  Uncontrolled  Start on insulin sliding scale with serial Accu-Checks, nutritional and long acting insulin  Hypoglycemic protocol in place\"    Recent Labs     08/12/21  1710 08/12/21  2025 08/13/21  0906 08/13/21  1248 08/13/21  1701 08/13/21  2031 08/14/21  0736 08/14/21  1204   POCGLUCOSE 175* 137* 126* 131* 132* 118* 121* 132*     Stable           VTE prophylaxis: SCDs/TEDs    I have performed a physical exam and reviewed and updated ROS and Plan today (8/14/2021). In review of " yesterday's note (8/13/2021), there are no changes except as documented above.

## 2021-08-15 ENCOUNTER — APPOINTMENT (OUTPATIENT)
Dept: RADIOLOGY | Facility: MEDICAL CENTER | Age: 41
DRG: 871 | End: 2021-08-15
Attending: INTERNAL MEDICINE
Payer: COMMERCIAL

## 2021-08-15 VITALS
TEMPERATURE: 98.6 F | RESPIRATION RATE: 18 BRPM | WEIGHT: 227.96 LBS | HEART RATE: 110 BPM | BODY MASS INDEX: 40.39 KG/M2 | HEIGHT: 63 IN | OXYGEN SATURATION: 99 % | SYSTOLIC BLOOD PRESSURE: 109 MMHG | DIASTOLIC BLOOD PRESSURE: 54 MMHG

## 2021-08-15 LAB
ALBUMIN SERPL BCP-MCNC: 2.1 G/DL (ref 3.2–4.9)
BUN SERPL-MCNC: 13 MG/DL (ref 8–22)
CALCIUM SERPL-MCNC: 8 MG/DL (ref 8.5–10.5)
CHLORIDE SERPL-SCNC: 109 MMOL/L (ref 96–112)
CO2 SERPL-SCNC: 18 MMOL/L (ref 20–33)
CREAT SERPL-MCNC: 0.5 MG/DL (ref 0.5–1.4)
ERYTHROCYTE [DISTWIDTH] IN BLOOD BY AUTOMATED COUNT: 61.7 FL (ref 35.9–50)
GLUCOSE BLD-MCNC: 151 MG/DL (ref 65–99)
GLUCOSE BLD-MCNC: 174 MG/DL (ref 65–99)
GLUCOSE SERPL-MCNC: 182 MG/DL (ref 65–99)
HCT VFR BLD AUTO: 21.5 % (ref 37–47)
HGB BLD-MCNC: 7.2 G/DL (ref 12–16)
LACTATE BLD-SCNC: 3.2 MMOL/L (ref 0.5–2)
MCH RBC QN AUTO: 34.4 PG (ref 27–33)
MCHC RBC AUTO-ENTMCNC: 33.5 G/DL (ref 33.6–35)
MCV RBC AUTO: 102.9 FL (ref 81.4–97.8)
PHOSPHATE SERPL-MCNC: 2.8 MG/DL (ref 2.5–4.5)
PLATELET # BLD AUTO: 67 K/UL (ref 164–446)
PMV BLD AUTO: 12.4 FL (ref 9–12.9)
POTASSIUM SERPL-SCNC: 3.6 MMOL/L (ref 3.6–5.5)
RBC # BLD AUTO: 2.09 M/UL (ref 4.2–5.4)
SODIUM SERPL-SCNC: 139 MMOL/L (ref 135–145)
WBC # BLD AUTO: 18 K/UL (ref 4.8–10.8)

## 2021-08-15 PROCEDURE — 99232 SBSQ HOSP IP/OBS MODERATE 35: CPT | Performed by: INTERNAL MEDICINE

## 2021-08-15 PROCEDURE — 700102 HCHG RX REV CODE 250 W/ 637 OVERRIDE(OP): Performed by: INTERNAL MEDICINE

## 2021-08-15 PROCEDURE — 83605 ASSAY OF LACTIC ACID: CPT

## 2021-08-15 PROCEDURE — 36415 COLL VENOUS BLD VENIPUNCTURE: CPT

## 2021-08-15 PROCEDURE — 82962 GLUCOSE BLOOD TEST: CPT

## 2021-08-15 PROCEDURE — 85027 COMPLETE CBC AUTOMATED: CPT

## 2021-08-15 PROCEDURE — 700111 HCHG RX REV CODE 636 W/ 250 OVERRIDE (IP): Performed by: INTERNAL MEDICINE

## 2021-08-15 PROCEDURE — A9270 NON-COVERED ITEM OR SERVICE: HCPCS | Performed by: INTERNAL MEDICINE

## 2021-08-15 PROCEDURE — 99239 HOSP IP/OBS DSCHRG MGMT >30: CPT | Performed by: INTERNAL MEDICINE

## 2021-08-15 PROCEDURE — 80069 RENAL FUNCTION PANEL: CPT

## 2021-08-15 RX ORDER — SODIUM CHLORIDE, SODIUM LACTATE, POTASSIUM CHLORIDE, AND CALCIUM CHLORIDE .6; .31; .03; .02 G/100ML; G/100ML; G/100ML; G/100ML
500 INJECTION, SOLUTION INTRAVENOUS
Status: DISCONTINUED | OUTPATIENT
Start: 2021-08-15 | End: 2021-08-15 | Stop reason: HOSPADM

## 2021-08-15 RX ADMIN — LEVOTHYROXINE SODIUM 50 MCG: 0.05 TABLET ORAL at 05:04

## 2021-08-15 RX ADMIN — RIFAXIMIN 550 MG: 550 TABLET ORAL at 05:04

## 2021-08-15 RX ADMIN — Medication 1 CAPSULE: at 08:37

## 2021-08-15 RX ADMIN — INSULIN LISPRO 2 UNITS: 100 INJECTION, SOLUTION INTRAVENOUS; SUBCUTANEOUS at 08:33

## 2021-08-15 RX ADMIN — CYANOCOBALAMIN TAB 500 MCG 2500 MCG: 500 TAB at 05:04

## 2021-08-15 RX ADMIN — THERA TABS 1 TABLET: TAB at 05:04

## 2021-08-15 RX ADMIN — Medication 1000 UNITS: at 05:04

## 2021-08-15 RX ADMIN — ONDANSETRON 4 MG: 2 INJECTION INTRAMUSCULAR; INTRAVENOUS at 06:39

## 2021-08-15 RX ADMIN — DIBASIC SODIUM PHOSPHATE, MONOBASIC POTASSIUM PHOSPHATE AND MONOBASIC SODIUM PHOSPHATE 250 MG: 852; 155; 130 TABLET ORAL at 05:04

## 2021-08-15 RX ADMIN — HYDROMORPHONE HYDROCHLORIDE 0.25 MG: 1 INJECTION, SOLUTION INTRAMUSCULAR; INTRAVENOUS; SUBCUTANEOUS at 00:10

## 2021-08-15 RX ADMIN — DIBASIC SODIUM PHOSPHATE, MONOBASIC POTASSIUM PHOSPHATE AND MONOBASIC SODIUM PHOSPHATE 250 MG: 852; 155; 130 TABLET ORAL at 00:06

## 2021-08-15 RX ADMIN — FOLIC ACID 1 MG: 1 TABLET ORAL at 05:04

## 2021-08-15 RX ADMIN — INSULIN LISPRO 8 UNITS: 100 INJECTION, SOLUTION INTRAVENOUS; SUBCUTANEOUS at 08:35

## 2021-08-15 ASSESSMENT — ENCOUNTER SYMPTOMS
NAUSEA: 0
DIARRHEA: 1
FEVER: 0
CHILLS: 0
ABDOMINAL PAIN: 0
VOMITING: 0
MYALGIAS: 0

## 2021-08-15 ASSESSMENT — PAIN DESCRIPTION - PAIN TYPE: TYPE: ACUTE PAIN

## 2021-08-15 NOTE — CARE PLAN
The patient is Stable - Low risk of patient condition declining or worsening    Shift Goals  Clinical Goals: comfort  Patient Goals: pt will be able to sleep comfortably  Family Goals: Get better and go home    Progress made toward(s) clinical / shift goals:      Patient is not progressing towards the following goals:

## 2021-08-15 NOTE — WOUND TEAM
Wound team note:   Pt seen for placement of BMS. MD order verified. Pt assessed, noted to be incontinent of loose brown stool. Sacrum/buttocks denuded. Sphincter tone determined to be adequate. BMS placed without difficulty,  45 mL of tap water placed in retention balloon, irrigated with  60 mL warm tap water. Confirmed irrigant/stool output in tube  Yes. Nursing to irrigate q shift with 60 mL-120 mL warm tap water or until patent.

## 2021-08-15 NOTE — CARE PLAN
Problem: Fall Risk  Goal: Patient will remain free from falls  Outcome: Progressing       The patient is Unstable - High likelihood or risk of patient condition declining or worsening    Shift Goals  Clinical Goals: Wound management  Patient Goals: Pain Control  Family Goals: Get better and go home    Progress made toward(s) clinical / shift goals:  Pt will remain comfortable this shift    Patient is not progressing towards the following goals:NA

## 2021-08-15 NOTE — DISCHARGE PLANNING
Reviewed with charge nurse that pt wants to go AMA today. She needs outpt wound care and she follows at the [facility]. If pt wants to leave AMA, then she has been instructed she will need to follow up with her PCP on Monday.

## 2021-08-15 NOTE — PROGRESS NOTES
Infectious Disease Progress Note    Author: Cash Jorgensen M.D. Date & Time of service: 8/15/2021  10:06 AM    Chief Complaint:  Follow-up for abdominal panniculitis/cellulitis    Interval History:   afebrile, WBC 13.4 patient is obtunded and only opens eyes to voice.   at bedside with questions  8/15 T-max 99.5, linezolid and Zosyn discontinued on , white count has since been creeping up, 18,000 today.  ID reconsulted for recommendations.  Patient with some diarrhea otherwise feeling, quite well with no fevers or chills, no abdominal pain, no pain or worsening redness swelling or purulent discharge from her abdominal or thigh wounds, infected planning to leave the hospital AMA today.    Labs Reviewed, Medications Reviewed and Wound Reviewed.    Review of Systems:  Review of Systems   Constitutional: Negative for chills, fever and malaise/fatigue.   Gastrointestinal: Positive for diarrhea. Negative for abdominal pain, nausea and vomiting.   Musculoskeletal: Negative for joint pain and myalgias.   Skin: Negative for itching and rash.   All other systems reviewed and are negative.    Hemodynamics:  Temp (24hrs), Av.9 °C (98.4 °F), Min:36.3 °C (97.3 °F), Max:37.5 °C (99.5 °F)  Temperature: 37 °C (98.6 °F)  Pulse  Av.7  Min: 76  Max: 134   Blood Pressure: 109/54       Physical Exam:  Physical Exam  Vitals and nursing note reviewed.   Constitutional:       General: She is not in acute distress.     Appearance: She is ill-appearing. She is not toxic-appearing or diaphoretic.   HENT:      Nose:      Comments: Core track     Mouth/Throat:      Pharynx: No oropharyngeal exudate.   Eyes:      General: Scleral icterus present.         Right eye: No discharge.         Left eye: No discharge.   Cardiovascular:      Rate and Rhythm: Normal rate.      Heart sounds: No murmur heard.     Pulmonary:      Effort: Pulmonary effort is normal. No respiratory distress.      Breath sounds: No stridor.   Abdominal:       Palpations: Abdomen is soft.      Tenderness: There is no abdominal tenderness.      Comments: Protuberant, but no significant distention and no fluid thrill, no tenderness.  Lower superficial abdominal wounds with necrotic skin on images, but no surrounding erythema, no active discharge, no tenderness, no fluctuance, no increased warmth   Musculoskeletal:      Right lower leg: Edema present.      Left lower leg: Edema present.   Skin:     General: Skin is warm.      Coloration: Skin is jaundiced.      Findings: No erythema or rash.      Comments: Erythema over the thigh has resolved, and wounds do not appear infected at this time   Neurological:      General: No focal deficit present.      Mental Status: She is alert.   Psychiatric:         Mood and Affect: Mood normal.         Behavior: Behavior normal.         Meds:    Current Facility-Administered Medications:   •  LR  •  Special Contact Isolation **AND** C Diff by PCR rflx Toxin **AND** Pharmacy  •  multivitamin  •  spironolactone  •  insulin glargine **AND** insulin lispro **AND** insulin lispro **AND** POC blood glucose manual result **AND** NOTIFY MD and PharmD **AND** [DISCONTINUED] glucose **AND** dextrose 50%  •  phosphorus  •  cyanocobalamin  •  folic acid  •  hydrOXYzine HCl  •  lactobacillus rhamnosus  •  levothyroxine  •  riFAXIMin  •  vitamin D  •  ondansetron  •  haloperidol lactate  •  [DISCONTINUED] oxyCODONE immediate-release **OR** [DISCONTINUED] oxyCODONE immediate-release **OR** HYDROmorphone  •  lidocaine **OR** lidocaine  •  furosemide  •  Notify provider if pain remains uncontrolled **AND** Use the Numeric Rating Scale (NRS), Monroy-Baker Faces (WBF), or FLACC on regular floors and Critical-Care Pain Observation Tool (CPOT) on ICUs/Trauma to assess pain **AND** Pulse Ox **AND** Pharmacy Consult Request **AND** If patient difficult to arouse and/or has respiratory depression (respiratory rate of 10 or less), stop any opiates that are  currently infusing and call a Rapid Response.    Labs:  Recent Labs     08/13/21 0222 08/14/21 0518 08/15/21  0440   WBC 16.9* 17.2* 18.0*   RBC 2.07* 2.28* 2.09*   HEMOGLOBIN 6.9* 7.8* 7.2*   HEMATOCRIT 21.0* 23.9* 21.5*   .4* 104.8* 102.9*   MCH 33.3* 34.2* 34.4*   RDW 62.6* 64.7* 61.7*   PLATELETCT 90* 72* 67*   MPV 11.6 12.0 12.4     Recent Labs     08/13/21 0222 08/14/21 0518 08/15/21  0440   SODIUM 140 139 139   POTASSIUM 3.8 3.8 3.6   CHLORIDE 111 110 109   CO2 17* 17* 18*   GLUCOSE 145* 134* 182*   BUN 13 13 13     Recent Labs     08/13/21  0222 08/14/21  0518 08/15/21  0440   ALBUMIN  --   --  2.1*   CREATININE 0.35* 0.53 0.50       Imaging:  CT-ABDOMEN-PELVIS WITH    Result Date: 8/5/2021 8/5/2021 1:07 PM HISTORY/REASON FOR EXAM:  Alcoholic cirrhosis. History of portal vein thrombosis. Black open wounds in the lower abdomen and groin region. Possible sepsis. TECHNIQUE/EXAM DESCRIPTION:   CT scan of the abdomen and pelvis with contrast. Contrast-enhanced helical scanning was obtained from the diaphragmatic domes through the pubic symphysis following the bolus administration of nonionic contrast without complication. 100 mL of Omnipaque 350 nonionic contrast was administered without complication. Low dose optimization technique was utilized for this CT exam including automated exposure control and adjustment of the mA and/or kV according to patient size. COMPARISON: CT 8/1/2021 and 4/27/2021 FINDINGS: Exam limited by overall body habitus. Lower Chest: Lungs demonstrate dependent atelectasis. Liver: Liver again demonstrates heterogeneous enhancement with hypertrophy of the lateral segment left lobe and slight nodularity suggesting underlying cirrhosis. No definite lesion is seen.. Spleen: Spleen measures 14.8 cm in diameter. Pancreas: Unremarkable. Gallbladder: The gallbladder has been resected. Biliary: Biliary tree is unchanged. Adrenal glands: Normal. Kidneys: Kidneys are unchanged with a  hypodense right superior pole lesion again seen too small characterize. There is no hydronephrosis. Lymph nodes: No adenopathy. Vasculature: There is no gross evidence of portal vein thrombosis on this exam. Bowel: There is an enteric tube in the 1st portion the duodenum. Stomach is decompressed. There is limited evaluation of the bowel due to patient body habitus and no contrast. There appears to be diffuse bowel wall thickening and edema involving the majority of the colon more prominent on the right. There is no small bowel obstruction. Peritoneum: There is a small amount of ascites predominantly perihepatic and perisplenic in location with a small amount of the pelvis. There is diffuse body wall edema. There is mesenteric edema. There is no free air. Pelvis: Uterus is unremarkable. Bladder is unremarkable. There appears to be a rectal tube in place. There is no soft tissue or subcutaneous abscess. Musculoskeletal: There is multilevel degenerative change in the lumbar spine. Mild anterior wedging at the T10 level is stable compared with 8/1/2021 but is new since 4/27/2021.     1.  Again there are morphologic changes of the liver consistent with cirrhosis with associated splenomegaly. 2.  Portal vein and mesenteric vessels are grossly patent. 3.  There is a small amount of ascites with mesenteric edema and subcutaneous edema. 4.  There is no soft tissue abscess or intra-abdominal or pelvic abscess. 5.  There is colonic wall edema most likely related to the liver disease and hypoproteinemia.    CT-EXTREMITY, LOWER WITH LEFT    Result Date: 8/5/2021 8/5/2021 1:08 PM HISTORY/REASON FOR EXAM:  Soft tissue infection suspected, thigh, xray done; concern for nec fascitis of thigh. TECHNIQUE/EXAM DESCRIPTION AND NUMBER OF VIEWS:  CT scan of the LEFT lower extremity with contrast, with reconstructions. Thin helical 3 mm sections were obtained from the distal femur through the proximal tibia/fibula. Sagittal and coronal  multiplanar reconstructions were generated from the axial images. A total of 100 mL of Omnipaque 350 nonionic contrast was administered  IV without complication. Up to date radiation dose reduction adjustments have been utilized to meet ALARA standards for radiation dose reduction. COMPARISON: None. FINDINGS: Femoral head is seated within the acetabulum. No fracture or dislocation is seen. Visualized pubic rami on the left are intact. There is no diastases of the symphysis pubis. No osseous destruction is seen to suggest osteomyelitis. There is soft tissue edema in the pannus with skin thickening. There is soft tissue edema and skin thickening in the thigh. Skin thickening is most prominent overlying the hip. No abscess is identified. There is no soft tissue air. There are multiple small left inguinal lymph nodes There is a rectal catheter. There is a small amount of free fluid in the lower abdomen and pelvis.     1.  No evidence of osteomyelitis. 2.  Soft tissue edema in the pannus and left thigh with skin thickening which can be seen in the setting of cellulitis. 3.  No abscess is identified. 4.  No soft tissue air is noted. 5.  Free fluid is seen in the pelvis.    CT-HEAD W/O    Result Date: 8/3/2021  8/3/2021 10:31 PM HISTORY/REASON FOR EXAM:  Delirium. TECHNIQUE/EXAM DESCRIPTION AND NUMBER OF VIEWS: CT of the head without contrast. Up to date radiation dose reduction adjustments have been utilized to meet ALARA standards for radiation dose reduction. COMPARISON:  Outside CT 6/11/2021 FINDINGS: There is no evidence of mass or mass effect. The ventricles and CSF spaces are normal. There is no periventricular chronic small vessel ischemic change present. There is no intracranial hemorrhage seen. The calvarium is intact. There is no scalp injury.     No evidence of acute intracranial process.    US-ABDOMEN LTD (SOFT TISSUE)    Result Date: 8/5/2021 8/5/2021 6:05 AM HISTORY/REASON FOR EXAM:  Distended Abdomen  TECHNIQUE/EXAM DESCRIPTION: Limited abdominal ultrasound. COMPARISON: None available. FINDINGS: There is a small volume of ascites surrounding the liver. The liver has a nodular texture with coarse echogenicity consistent with cirrhotic change. No large volume ascites is present.     1.  Small volume of ascites in the right upper quadrant adjacent to the liver. 2.  Cirrhotic appearance of liver. 3.  No large volume of ascites.    DX-ABDOMEN FOR TUBE PLACEMENT    Result Date: 8/5/2021 8/5/2021 1:51 AM HISTORY/REASON FOR EXAM:  Cortrak evaluation. TECHNIQUE/EXAM DESCRIPTION AND NUMBER OF VIEWS:  1 view(s) of the abdomen. COMPARISON:  None. FINDINGS: Cortrak feeding tube tip projects in the region of the duodenal bulb. The thoracic course appears appropriate. The bowel gas pattern is within normal limits.     Cortrak feeding tube tip projects in the region of the duodenal bulb.      Micro:  Results     Procedure Component Value Units Date/Time    C Diff by PCR rflx Toxin [[id number]]     Order Status: No result Specimen: Stool     CULTURE STOOL [[id number]]     Order Status: No result Specimen: Stool     Complete O&P [[id number]]     Order Status: No result Specimen: Stool     Blood Culture [[id number]]     Order Status: No result Specimen: Blood from Peripheral     Blood Culture [[id number]]     Order Status: No result Specimen: Blood from Peripheral     Urinalysis [[id number]]     Order Status: No result Specimen: Urine     Culture Wound W/ Gram Stain [[id number]]     Order Status: No result Specimen: Wound from Abdominal           Assessment:  Hien Alfaro is a 41 y.o. female admitted 8/1/2021.  Patient with alcoholic cirrhosis, portal vein thrombosis not on anticoagulation, recently hospitalized in June 2021 with decompensated alcoholic cirrhosis with hyperammonemia, hyperbilirubinemia, hepatorenal syndrome, with poor outpatient follow-up.  Patient was seen at [facility] with abdominal wall cellulitis and colitis,  received 2 weeks of doxycycline, Bactrim, rifampin, transferred to [facility] due to concern for necrotizing infection.  Patient noted redness in lower abdominal wall for about 2 weeks which then progressed to wounds with eschar over the lower abdomen and bilateral groin.  An outside CT scan was suggestive of panniculitis without abscess.  Patient was afebrile, initial leukocytosis of 13.4. Patient was initially started on clindamycin and Zosyn, on 8/4 the white count went up to 18,000.  Linezolid was added on 8/5, and since then the white count has been fluctuating.  Patient completed linezolid and Zyvox on 8/12.  Since then, the white count has continued to creep up, 18,000 today.    Pertinent diagnoses:  Abdominal wall panniculitis/cellulitis, resolved  Thigh cellulitis  Leukocytosis, worse  Encephalopathy, improved  Decompensated alcoholic cirrhosis  Morbid obesity    Plan:  -Agree with checking for C. difficile  -Clinically at this time, no evidence of an uncontrolled infection that would require restarting empiric broad antibiotics.  Patient overall improved compared to admission, abdominal wall and thigh wounds currently do not appear infected, and have improved following completing a course of broad-spectrum antibiotics earlier on this admission.  Monitor for any recurrence of infection which patient remains at risk for  -Continue to trend white count.  If this continues to trend upwards tomorrow, may need additional imaging with CT abdomen and pelvis, chest x-ray, ascitic tap if able    It appears patient is planning to leave AMA today.  ID will continue to follow inpatient if patient not discharged    Discussed with Dr. Abraham

## 2021-08-15 NOTE — PROGRESS NOTES
1 RN Skin Assessment completed.   Patient's risk of skin breakdown: Medium    Devices in place and skin assessed under:   [] Pulse Ox  [x] PIV [x] Central Line [] SCDs []Purewick  [x] [place]  []Condom Cath   [] BMS        []  Cortrak   []  Oxymask   [] Bipap    [] Nasal Canula   [] N/A   [] Other(specify):     Right Ear  [x] WDL                or           [] Skin issue present (please provide assessment/description below)    Left Ear  [x] WDL                or           [] Skin issue present (please provide assessment/description below)    Right Elbow:  [] WDL               or           [x] Skin issue present (please provide assessment/description below)  With bruises    Left Elbow:   [] WDL                or           [x] Skin issue present (please provide assessment/description below)  With bruises    Coccyx/Buttocks:  [] WDL                or           [x] Skin issue present (please provide assessment/description below)   With wounds, red and excoriated      Right Heel/Foot/Toes:  [] WDL                or           [x] Skin issue present (please provide assessment/description below)   Pink and blanching    Left Heel/Foot/Toes:   [] WDL              or           [x] Skin issue present (please provide assessment/description below)  Pink and blanching    **Describe any other skin issues in areas not already listed from above list:   [] N/A  Redness, blanching groin area  Discoloration area under the abdominal folds  Wounds in bilateral lower extremities      Interventions In Place: Q2 Turns and Pressure Redistribution Mattress    **If any new or digressing skin issues are found, fill out the selection below.    Possible Skin Injury Yes  Pictures Uploaded Into Epic: Yes  Wound Consult Placed: Yes  RN Wound Prevention Protocol Ordered: Yes   What new preventative interventions did you add? Q2 Turns and Pressure Redistribution Mattress

## 2021-08-16 NOTE — PROGRESS NOTES
Pt left AMA. MD discussed risk of leaving AMA. Pt understood risk. Midline, IV, rectal tube, and hutson removed. Pt was taken by  home. AMA formed signed by patient

## 2021-09-11 NOTE — DISCHARGE SUMMARY
Discharge Summary    CHIEF COMPLAINT ON ADMISSION  Chief Complaint   Patient presents with   • Wound Check     Pt reports redness in lower abd started ~2 weeks ago, now presenting with blackened open wounds across lower abd to b/l groin.        Reason for Admission  abdominal pain     Admission Date  8/1/2021    CODE STATUS  Full Code    HPI & HOSPITAL COURSE  This is a 41 y.o. female here with Wound Check (Pt reports redness in lower abd started ~2 weeks ago, now presenting with blackened open wounds across lower abd to b/l groin. )  Please review Dr. Jonathan Abraham M.D. notes for further details of history of present illness, past medical/social/family histories, allergies and medications. Please review Dr. Jorgensen, Dr. Lopez, ID, Dr. Busby, Surgery,  consultation notes.  She is morbidly obese. She has a history of alcoholic cirrhosis, portal vein thrombosis NOT on anticoagulation (seen and decided by Dr. Montenegro, [facility]) and was hospitalized in June for decompensated alcoholic cirrhosis with hyperammonemia, bilirubinemia and hepatorenal syndrome with poor outpatient follow-up. She was placed on antibiotics for her pannus infections. Despite antibiotics, she noticed black open wounds along her abdomen to bilateral groin and also a lump/mass that started 2 weeks now RLQ region. She denied fevers but endorses chills.  She is not COVID vaccinated but she mentions she was checked at [facility] and she was told she is negative.  She was seen at [facility]. According to accepting EDP, CT showed colitis aside from the panniculitis. No gas or abscess mentioned. Leukocytosis, lactic acidosis and a concern for necrotizing fasciitis. Thus the patient was transferred here.  At our ED, she is afebrile but TACHYCARDIC.   Leukocytosis, lactic acid 2.4.  Dr. Christiansen, EDP paged Dr. Shin, General Surgery.  Dr. Shin felt this is NOT necrotizing fasciitis and agreed with antibitics. Dr. Lopez, ID consulted and  patient completed a Zosyn-linezolid course last doses 8/11-12. She was planned for SNF/rehab.  Patient was transferred to [place] [id number] on 8/14. Her white count was slowly trending up from 14K to 18K since the discontinuation of antibiotics. She also has diarrhea, but is on lactulose. I spoke with THANIA Price who agreed with C. Diff test and at this time n antibiotics. Needed. He mentioned though patient needs to stay for monitoring leukocytosis as if it increases tomorrow, then she will need repeat CT scan. Patient however decided to LEAVE AGAINST MEDICAL ADVICE. I explained risks of leaving including sepsis and death. She understands the risk. Her  was at bedside who understands the risk. I advised her to see a doctor soon for labs to check on white count.    Imaging  [id number] VIEW   Final Result      1.  Enteric tube projects over the expected location of the duodenal bulb.   2.  Nonspecific bowel gas pattern.   3.  Body wall edema.      IR-MIDLINE CATHETER INSERTION WO GUIDANCE > AGE 3   Final Result                  Ultrasound-guided midline placement performed by qualified nursing staff    as above.          CT-EXTREMITY, LOWER WITH LEFT   Final Result      1.  No evidence of osteomyelitis.   2.  Soft tissue edema in the pannus and left thigh with skin thickening which can be seen in the setting of cellulitis.   3.  No abscess is identified.   4.  No soft tissue air is noted.   5.  Free fluid is seen in the pelvis.      CT-ABDOMEN-PELVIS WITH   Final Result      1.  Again there are morphologic changes of the liver consistent with cirrhosis with associated splenomegaly.   2.  Portal vein and mesenteric vessels are grossly patent.   3.  There is a small amount of ascites with mesenteric edema and subcutaneous edema.   4.  There is no soft tissue abscess or intra-abdominal or pelvic abscess.   5.  There is colonic wall edema most likely related to the liver disease and hypoproteinemia.       US-ABDOMEN LTD (SOFT TISSUE)   Final Result      1.  Small volume of ascites in the right upper quadrant adjacent to the liver.      2.  Cirrhotic appearance of liver.      3.  No large volume of ascites.      DX-ABDOMEN FOR TUBE PLACEMENT   Final Result      Cortrak feeding tube tip projects in the region of the duodenal bulb.      CT-HEAD W/O   Final Result      No evidence of acute intracranial process.      OUTSIDE IMAGES-CT ABDOMEN /PELVIS   Final Result      OUTSIDE IMAGES-DX CHEST   Final Result      OUTSIDE IMAGES-CT ABDOMEN /PELVIS   Final Result          Discharge Date  8/15/2021    FOLLOW UP ITEMS POST DISCHARGE      DISCHARGE DIAGNOSES  Principal Problem:    Abdominal panniculitis/cellulitis w/ concern for necrosis POA: Yes  Active Problems:    Type 2 diabetes mellitus with neurologic complication, without long-term current use of insulin (HCC) POA: Yes    Alcoholic cirrhosis of liver without ascites (HCC) POA: Yes    Hypothyroidism POA: Yes    Sepsis (HCC) POA: Unknown    Class 3 severe obesity due to excess calories with serious comorbidity and body mass index (BMI) of 40.0 to 44.9 in adult (HCC) (Chronic) POA: Yes    Portal vein thrombosis POA: Yes    Colitis POA: Unknown    Hypoglycemia POA: Unknown    Anasarca POA: Unknown    Failure to thrive in adult POA: Unknown    Severe protein-calorie malnutrition (HCC) POA: Unknown    End stage liver disease (HCC) POA: Unknown    Acute encephalopathy POA: Unknown    Anxiety POA: Unknown    Hyperbilirubinemia POA: Unknown    Liver failure (HCC) POA: Unknown    Coagulopathy (HCC) POA: Unknown        FOLLOW UP    MEDICATIONS ON DISCHARGE     Medication List      ASK your doctor about these medications      Instructions   acetaminophen 500 MG Tabs  Commonly known as: TYLENOL   Take 1,000 mg by mouth every 6 hours as needed for Mild Pain.  Dose: 1,000 mg     cyanocobalamin 2500 MCG Subl  Commonly known as: vitamin b12   Take 2,500 mcg by mouth every day. Under  39.4 the tongue  Dose: 2,500 mcg     folic acid 1 MG Tabs  Commonly known as: FOLVITE   Take 1 tablet by mouth every day.  Dose: 1 mg     lansoprazole 30 MG Cpdr  Commonly known as: PREVACID   Take 30 mg by mouth every day.  Dose: 30 mg     levothyroxine 50 MCG Tabs  Commonly known as: SYNTHROID   Take 50 mcg by mouth every morning on an empty stomach.  Dose: 50 mcg     multivitamin Tabs   Take 1 tablet by mouth every day.  Dose: 1 Tablet     omeprazole 40 MG delayed-release capsule  Commonly known as: PRILOSEC   Take 40 mg by mouth every day.  Dose: 40 mg     riFAXIMin 550 MG Tabs tablet  Commonly known as: XIFAXAN   Take 550 mg by mouth 2 times a day.  Dose: 550 mg     spironolactone 100 MG Tabs  Commonly known as: ALDACTONE   Take 1 tablet by mouth every day.  Dose: 100 mg     sulfamethoxazole-trimethoprim 800-160 MG tablet  Commonly known as: BACTRIM DS   Take 1 tablet by mouth 2 times a day. No known stop date  Dose: 1 Tablet     traMADol 50 MG Tabs  Commonly known as: ULTRAM   Take 100 mg by mouth every four hours as needed. 2 tablets = 100 mg  Dose: 100 mg            Allergies  No Known Allergies    DIET  Orders Placed This Encounter   Procedures   • Diet Order Diet: Regular     Standing Status:   Standing     Number of Occurrences:   1     Order Specific Question:   Diet:     Answer:   Regular [1]       ACTIVITY      CONSULTATIONS  ID, Surgery, Physicatry    PROCEDURES  CT-ABDOMEN-PELVIS WITH    Result Date: 8/5/2021 8/5/2021 1:07 PM HISTORY/REASON FOR EXAM:  Alcoholic cirrhosis. History of portal vein thrombosis. Black open wounds in the lower abdomen and groin region. Possible sepsis. TECHNIQUE/EXAM DESCRIPTION:   CT scan of the abdomen and pelvis with contrast. Contrast-enhanced helical scanning was obtained from the diaphragmatic domes through the pubic symphysis following the bolus administration of nonionic contrast without complication. 100 mL of Omnipaque 350 nonionic contrast was administered without  complication. Low dose optimization technique was utilized for this CT exam including automated exposure control and adjustment of the mA and/or kV according to patient size. COMPARISON: CT 8/1/2021 and 4/27/2021 FINDINGS: Exam limited by overall body habitus. Lower Chest: Lungs demonstrate dependent atelectasis. Liver: Liver again demonstrates heterogeneous enhancement with hypertrophy of the lateral segment left lobe and slight nodularity suggesting underlying cirrhosis. No definite lesion is seen.. Spleen: Spleen measures 14.8 cm in diameter. Pancreas: Unremarkable. Gallbladder: The gallbladder has been resected. Biliary: Biliary tree is unchanged. Adrenal glands: Normal. Kidneys: Kidneys are unchanged with a hypodense right superior pole lesion again seen too small characterize. There is no hydronephrosis. Lymph nodes: No adenopathy. Vasculature: There is no gross evidence of portal vein thrombosis on this exam. Bowel: There is an enteric tube in the 1st portion the duodenum. Stomach is decompressed. There is limited evaluation of the bowel due to patient body habitus and no contrast. There appears to be diffuse bowel wall thickening and edema involving the majority of the colon more prominent on the right. There is no small bowel obstruction. Peritoneum: There is a small amount of ascites predominantly perihepatic and perisplenic in location with a small amount of the pelvis. There is diffuse body wall edema. There is mesenteric edema. There is no free air. Pelvis: Uterus is unremarkable. Bladder is unremarkable. There appears to be a rectal tube in place. There is no soft tissue or subcutaneous abscess. Musculoskeletal: There is multilevel degenerative change in the lumbar spine. Mild anterior wedging at the T10 level is stable compared with 8/1/2021 but is new since 4/27/2021.     1.  Again there are morphologic changes of the liver consistent with cirrhosis with associated splenomegaly. 2.  Portal vein and  mesenteric vessels are grossly patent. 3.  There is a small amount of ascites with mesenteric edema and subcutaneous edema. 4.  There is no soft tissue abscess or intra-abdominal or pelvic abscess. 5.  There is colonic wall edema most likely related to the liver disease and hypoproteinemia.    CT-EXTREMITY, LOWER WITH LEFT    Result Date: 8/5/2021 8/5/2021 1:08 PM HISTORY/REASON FOR EXAM:  Soft tissue infection suspected, thigh, xray done; concern for nec fascitis of thigh. TECHNIQUE/EXAM DESCRIPTION AND NUMBER OF VIEWS:  CT scan of the LEFT lower extremity with contrast, with reconstructions. Thin helical 3 mm sections were obtained from the distal femur through the proximal tibia/fibula. Sagittal and coronal multiplanar reconstructions were generated from the axial images. A total of 100 mL of Omnipaque 350 nonionic contrast was administered  IV without complication. Up to date radiation dose reduction adjustments have been utilized to meet ALARA standards for radiation dose reduction. COMPARISON: None. FINDINGS: Femoral head is seated within the acetabulum. No fracture or dislocation is seen. Visualized pubic rami on the left are intact. There is no diastases of the symphysis pubis. No osseous destruction is seen to suggest osteomyelitis. There is soft tissue edema in the pannus with skin thickening. There is soft tissue edema and skin thickening in the thigh. Skin thickening is most prominent overlying the hip. No abscess is identified. There is no soft tissue air. There are multiple small left inguinal lymph nodes There is a rectal catheter. There is a small amount of free fluid in the lower abdomen and pelvis.     1.  No evidence of osteomyelitis. 2.  Soft tissue edema in the pannus and left thigh with skin thickening which can be seen in the setting of cellulitis. 3.  No abscess is identified. 4.  No soft tissue air is noted. 5.  Free fluid is seen in the pelvis.    CT-HEAD W/O    Result Date:  8/3/2021  8/3/2021 10:31 PM HISTORY/REASON FOR EXAM:  Delirium. TECHNIQUE/EXAM DESCRIPTION AND NUMBER OF VIEWS: CT of the head without contrast. Up to date radiation dose reduction adjustments have been utilized to meet ALARA standards for radiation dose reduction. COMPARISON:  Outside CT 6/11/2021 FINDINGS: There is no evidence of mass or mass effect. The ventricles and CSF spaces are normal. There is no periventricular chronic small vessel ischemic change present. There is no intracranial hemorrhage seen. The calvarium is intact. There is no scalp injury.     No evidence of acute intracranial process.    [id number] VIEW    Result Date: 8/13/2021 8/13/2021 7:35 PM HISTORY/REASON FOR EXAM:  Gastrointestinal Complaint. Fever. Abdominal pain. TECHNIQUE/EXAM DESCRIPTION AND NUMBER OF VIEWS:   1 views of the abdomen. COMPARISON: 8/5/2021 FINDINGS: Enteric tube projects over the expected location of the duodenal bulb. There is no evidence of bowel obstruction. No free intraperitoneal air is identified though evaluation is limited on supine imaging. There are surgical clips in the right upper quadrant. There is body wall edema.     1.  Enteric tube projects over the expected location of the duodenal bulb. 2.  Nonspecific bowel gas pattern. 3.  Body wall edema.    US-ABDOMEN LTD (SOFT TISSUE)    Result Date: 8/5/2021 8/5/2021 6:05 AM HISTORY/REASON FOR EXAM:  Distended Abdomen TECHNIQUE/EXAM DESCRIPTION: Limited abdominal ultrasound. COMPARISON: None available. FINDINGS: There is a small volume of ascites surrounding the liver. The liver has a nodular texture with coarse echogenicity consistent with cirrhotic change. No large volume ascites is present.     1.  Small volume of ascites in the right upper quadrant adjacent to the liver. 2.  Cirrhotic appearance of liver. 3.  No large volume of ascites.    DX-ABDOMEN FOR TUBE PLACEMENT    Result Date: 8/5/2021 8/5/2021 1:51 AM HISTORY/REASON FOR EXAM:  Cortrak evaluation.  TECHNIQUE/EXAM DESCRIPTION AND NUMBER OF VIEWS:  1 view(s) of the abdomen. COMPARISON:  None. FINDINGS: Cortrak feeding tube tip projects in the region of the duodenal bulb. The thoracic course appears appropriate. The bowel gas pattern is within normal limits.     Cortrak feeding tube tip projects in the region of the duodenal bulb.    IR-MIDLINE CATHETER INSERTION WO GUIDANCE > AGE 3    Result Date: 8/8/2021  HISTORY/REASON FOR EXAM:  Midline Placement   TECHNIQUE/EXAM DESCRIPTION AND NUMBER OF VIEWS: Midline insertion with ultrasound guidance.  FINDINGS: Midline insertion with Ultrasound Guidance was performed by qualified nursing staff without the assistance of a Radiologist. Midline positioning as measured by RN or as appropriate length of catheter selected.              Ultrasound-guided midline placement performed by qualified nursing staff as above.       LABORATORY  Lab Results   Component Value Date    SODIUM 139 08/15/2021    POTASSIUM 3.6 08/15/2021    CHLORIDE 109 08/15/2021    CO2 18 (L) 08/15/2021    GLUCOSE 182 (H) 08/15/2021    BUN 13 08/15/2021    CREATININE 0.50 08/15/2021        Lab Results   Component Value Date    WBC 18.0 (H) 08/15/2021    HEMOGLOBIN 7.2 (L) 08/15/2021    HEMATOCRIT 21.5 (L) 08/15/2021    PLATELETCT 67 (L) 08/15/2021        Total time of the discharge process exceeds 40 minutes.

## 2022-09-08 NOTE — ASSESSMENT & PLAN NOTE
Called patient to discuss disability paperwork. Patient to call back the office.   Patient called back and discussed had to make an appointment for disability paperwork. Informed date on forms said wanted completed by 9-7-2022. Patient stated to disregard that and  for paperwork aware and papers to be filled out . Transferring patient to schedule appointment.      ----- Message from Dianna Groves sent at 9/7/2022  1:20 PM CDT -----  Please print prescan out of Media Tab and then complete and sign.     Once form is completed and signed, please fax to [phone]    Please direct any questions to [phone]    Thanks,   Forms Completion Team.      Continue PPI for GERD which she takes at home